# Patient Record
Sex: MALE | Race: WHITE | HISPANIC OR LATINO | Employment: OTHER | URBAN - METROPOLITAN AREA
[De-identification: names, ages, dates, MRNs, and addresses within clinical notes are randomized per-mention and may not be internally consistent; named-entity substitution may affect disease eponyms.]

---

## 2023-08-22 ENCOUNTER — HOSPITAL ENCOUNTER (OUTPATIENT)
Dept: DATA CONVERSION | Facility: HOSPITAL | Age: 78
Discharge: HOME | End: 2023-08-22
Payer: MEDICARE

## 2023-08-22 DIAGNOSIS — I65.22 OCCLUSION AND STENOSIS OF LEFT CAROTID ARTERY: ICD-10-CM

## 2023-08-22 LAB
ABO + RH BLD: NORMAL
ALBUMIN SERPL-MCNC: 4 GM/DL (ref 3.5–5)
ALBUMIN/GLOB SERPL: 1.4 RATIO (ref 1.5–3)
ALP BLD-CCNC: 141 U/L (ref 35–125)
ALT SERPL-CCNC: 9 U/L (ref 5–40)
ANION GAP SERPL CALCULATED.3IONS-SCNC: 12 MMOL/L (ref 0–19)
ANTICOAGULANT: NORMAL
APTT PPP: 27.3 SEC (ref 22–32.5)
AST SERPL-CCNC: 20 U/L (ref 5–40)
BASOPHILS # BLD AUTO: 0.07 K/UL (ref 0–0.22)
BASOPHILS NFR BLD AUTO: 1 % (ref 0–1)
BILIRUB SERPL-MCNC: 0.3 MG/DL (ref 0.1–1.2)
BLD GP AB SCN SERPL QL: NEGATIVE
BLD PROD TYP BPU: NORMAL
BPU ID: NORMAL
BPU ID: NORMAL
BUN SERPL-MCNC: 38 MG/DL (ref 8–25)
BUN/CREAT SERPL: 10 RATIO (ref 8–21)
CALCIUM SERPL-MCNC: 8.7 MG/DL (ref 8.5–10.4)
CHLORIDE SERPL-SCNC: 104 MMOL/L (ref 97–107)
CO2 SERPL-SCNC: 24 MMOL/L (ref 24–31)
CREAT SERPL-MCNC: 3.8 MG/DL (ref 0.4–1.6)
DEPRECATED RDW RBC AUTO: 49.8 FL (ref 37–54)
DIFFERENTIAL METHOD BLD: ABNORMAL
EOSINOPHIL # BLD AUTO: 0.63 K/UL (ref 0–0.45)
EOSINOPHIL NFR BLD: 9 % (ref 0–3)
ERYTHROCYTE [DISTWIDTH] IN BLOOD BY AUTOMATED COUNT: 14.3 % (ref 11.7–15)
GFR SERPL CREATININE-BSD FRML MDRD: 16 ML/MIN/1.73 M2
GLOBULIN SER-MCNC: 2.9 G/DL (ref 1.9–3.7)
GLUCOSE SERPL-MCNC: 100 MG/DL (ref 65–99)
HBA1C MFR BLD: 6 % (ref 4–6)
HCT VFR BLD AUTO: 33.8 % (ref 41–50)
HGB BLD-MCNC: 11 GM/DL (ref 13.5–16.5)
HX OF BLOOD TRANSFUSION: NORMAL
IMM GRANULOCYTES # BLD AUTO: 0.02 K/UL (ref 0–0.1)
INR PPP: 1 (ref 0.86–1.16)
LYMPHOCYTES # BLD AUTO: 1.79 K/UL (ref 1.2–3.2)
LYMPHOCYTES NFR BLD MANUAL: 25.6 % (ref 20–40)
MAJ XM SERPL-IMP: NORMAL
MAJ XM SERPL-IMP: NORMAL
MCH RBC QN AUTO: 31.3 PG (ref 26–34)
MCHC RBC AUTO-ENTMCNC: 32.5 % (ref 31–37)
MCV RBC AUTO: 96 FL (ref 80–100)
MONOCYTES # BLD AUTO: 0.36 K/UL (ref 0–0.8)
MONOCYTES NFR BLD MANUAL: 5.1 % (ref 0–8)
NEUTROPHILS # BLD AUTO: 4.13 K/UL
NEUTROPHILS # BLD AUTO: 4.13 K/UL (ref 1.8–7.7)
NEUTROPHILS.IMMATURE NFR BLD: 0.3 % (ref 0–1)
NEUTS SEG NFR BLD: 59 % (ref 50–70)
NRBC BLD-RTO: 0 /100 WBC
NUM BPU REQUESTED: 2
PLATELET # BLD AUTO: 227 K/UL (ref 150–450)
PMV BLD AUTO: 12 CU (ref 7–12.6)
POTASSIUM SERPL-SCNC: 5.1 MMOL/L (ref 3.4–5.1)
PROT SERPL-MCNC: 6.9 G/DL (ref 5.9–7.9)
PROTHROMBIN TIME: 10.9 SEC (ref 9.3–12.7)
RBC # BLD AUTO: 3.52 M/UL (ref 4.5–5.5)
SODIUM SERPL-SCNC: 140 MMOL/L (ref 133–145)
SPECIMEN EXP DATE BLD: NORMAL
STATUS OF UNIT: NORMAL
STATUS OF UNIT: NORMAL
SURGERY DATE: NORMAL
TEST ORDERED: NORMAL
TRANSF BAND NUM PATIENT: NORMAL
TRANSFUSION STATUS: NORMAL
TRANSFUSION STATUS: NORMAL
UNIT DIVISION: 0
UNIT DIVISION: 0
WBC # BLD AUTO: 7 K/UL (ref 4.5–11)

## 2023-08-23 PROBLEM — E16.2 HYPOGLYCEMIA: Status: ACTIVE | Noted: 2023-08-23

## 2023-08-23 PROBLEM — N18.9 ANEMIA OF CHRONIC RENAL FAILURE: Status: ACTIVE | Noted: 2023-08-23

## 2023-08-23 PROBLEM — D63.1 ANEMIA IN CHRONIC KIDNEY DISEASE: Status: ACTIVE | Noted: 2023-08-23

## 2023-08-23 PROBLEM — M35.9 SYSTEMIC INVOLVEMENT OF CONNECTIVE TISSUE (MULTI): Status: ACTIVE | Noted: 2023-08-23

## 2023-08-23 PROBLEM — I35.8 AORTIC VALVE SCLEROSIS: Status: ACTIVE | Noted: 2023-08-23

## 2023-08-23 PROBLEM — I21.3 ACUTE ST ELEVATION MYOCARDIAL INFARCTION (STEMI) (MULTI): Status: ACTIVE | Noted: 2019-01-20

## 2023-08-23 PROBLEM — I47.29 NONSUSTAINED VENTRICULAR TACHYCARDIA (MULTI): Status: ACTIVE | Noted: 2023-08-23

## 2023-08-23 PROBLEM — I15.1 HYPERTENSION SECONDARY TO OTHER RENAL DISORDERS: Status: ACTIVE | Noted: 2023-08-23

## 2023-08-23 PROBLEM — I35.0 MILD AORTIC VALVE STENOSIS: Status: ACTIVE | Noted: 2023-08-23

## 2023-08-23 PROBLEM — E78.5 DYSLIPIDEMIA: Status: ACTIVE | Noted: 2023-08-23

## 2023-08-23 PROBLEM — I65.29 CAROTID ARTERY STENOSIS: Status: ACTIVE | Noted: 2023-08-23

## 2023-08-23 PROBLEM — N25.81 SECONDARY HYPERPARATHYROIDISM (MULTI): Status: ACTIVE | Noted: 2023-08-23

## 2023-08-23 PROBLEM — N19 RENAL FAILURE: Status: ACTIVE | Noted: 2023-08-23

## 2023-08-23 PROBLEM — I65.22 OCCLUSION OF LEFT INTERNAL CAROTID ARTERY: Status: ACTIVE | Noted: 2023-08-23

## 2023-08-23 PROBLEM — R73.09 BLOOD GLUCOSE ABNORMAL: Status: ACTIVE | Noted: 2023-08-23

## 2023-08-23 PROBLEM — I35.9 AORTIC VALVE DISEASE: Status: ACTIVE | Noted: 2023-08-23

## 2023-08-23 PROBLEM — L02.419: Status: ACTIVE | Noted: 2023-08-23

## 2023-08-23 PROBLEM — M32.14: Status: ACTIVE | Noted: 2023-08-23

## 2023-08-23 PROBLEM — I10 ESSENTIAL HYPERTENSION: Status: ACTIVE | Noted: 2023-08-23

## 2023-08-23 PROBLEM — H92.03 OTALGIA OF BOTH EARS: Status: ACTIVE | Noted: 2023-08-23

## 2023-08-23 PROBLEM — E55.9 VITAMIN D DEFICIENCY: Status: ACTIVE | Noted: 2023-08-23

## 2023-08-23 PROBLEM — I12.9 NEPHROSCLEROSIS: Status: ACTIVE | Noted: 2023-08-23

## 2023-08-23 PROBLEM — N18.9 ANEMIA IN CHRONIC KIDNEY DISEASE: Status: ACTIVE | Noted: 2023-08-23

## 2023-08-23 PROBLEM — G63 POLYNEUROPATHY ASSOCIATED WITH UNDERLYING DISEASE (MULTI): Status: ACTIVE | Noted: 2023-08-23

## 2023-08-23 PROBLEM — G62.89 PERIPHERAL MOTOR NEUROPATHY: Status: ACTIVE | Noted: 2023-08-23

## 2023-08-23 PROBLEM — G47.01 INSOMNIA DUE TO MEDICAL CONDITION: Status: ACTIVE | Noted: 2023-08-23

## 2023-08-23 PROBLEM — M06.4 INFLAMMATORY POLYARTHROPATHY (MULTI): Status: ACTIVE | Noted: 2023-08-23

## 2023-08-23 PROBLEM — J31.0 RHINITIS: Status: ACTIVE | Noted: 2023-08-23

## 2023-08-23 PROBLEM — Z95.5 HISTORY OF CORONARY ARTERY STENT PLACEMENT: Status: ACTIVE | Noted: 2023-08-23

## 2023-08-23 PROBLEM — I25.2 HISTORY OF MYOCARDIAL INFARCTION: Status: ACTIVE | Noted: 2023-08-23

## 2023-08-23 PROBLEM — H90.3 BILATERAL SENSORINEURAL HEARING LOSS: Status: ACTIVE | Noted: 2023-08-23

## 2023-08-23 PROBLEM — E11.22 TYPE 2 DIABETES MELLITUS WITH DIABETIC CHRONIC KIDNEY DISEASE (MULTI): Status: ACTIVE | Noted: 2023-08-23

## 2023-08-23 PROBLEM — R00.1 SINUS BRADYCARDIA: Status: ACTIVE | Noted: 2023-08-23

## 2023-08-23 PROBLEM — D63.1 ANEMIA OF CHRONIC RENAL FAILURE: Status: ACTIVE | Noted: 2023-08-23

## 2023-08-23 PROBLEM — I87.2 VENOUS INSUFFICIENCY: Status: ACTIVE | Noted: 2023-08-23

## 2023-08-23 PROBLEM — M51.9 DISORDER OF INTERVERTEBRAL DISC OF LUMBAR SPINE: Status: ACTIVE | Noted: 2023-08-23

## 2023-08-23 PROBLEM — N18.30 CHRONIC RENAL INSUFFICIENCY, STAGE III (MODERATE) (MULTI): Status: ACTIVE | Noted: 2023-08-23

## 2023-08-23 PROBLEM — N28.89 OTHER SPECIFIED DISORDERS OF KIDNEY AND URETER: Status: ACTIVE | Noted: 2023-08-23

## 2023-08-23 PROBLEM — M32.9 SYSTEMIC LUPUS ERYTHEMATOSUS (MULTI): Status: ACTIVE | Noted: 2023-08-23

## 2023-08-23 PROBLEM — D59.10 AUTOIMMUNE HEMOLYTIC ANEMIA (MULTI): Status: ACTIVE | Noted: 2023-08-23

## 2023-08-23 PROBLEM — I21.9 MYOCARDIAL INFARCTION (MULTI): Status: ACTIVE | Noted: 2023-08-23

## 2023-08-23 PROBLEM — E78.2 MIXED HYPERLIPIDEMIA: Status: ACTIVE | Noted: 2023-08-23

## 2023-08-23 RX ORDER — GABAPENTIN 300 MG/1
1 CAPSULE ORAL DAILY
COMMUNITY
Start: 2022-01-17 | End: 2023-10-02

## 2023-08-23 RX ORDER — LOSARTAN POTASSIUM 50 MG/1
100 TABLET ORAL DAILY
COMMUNITY

## 2023-08-23 RX ORDER — HYDROXYCHLOROQUINE SULFATE 200 MG/1
1 TABLET, FILM COATED ORAL DAILY
COMMUNITY
Start: 2018-07-24 | End: 2023-10-02 | Stop reason: SDUPTHER

## 2023-08-23 RX ORDER — SILDENAFIL 100 MG/1
1 TABLET, FILM COATED ORAL DAILY
COMMUNITY
Start: 2020-05-15 | End: 2023-11-22 | Stop reason: WASHOUT

## 2023-08-23 RX ORDER — POTASSIUM CHLORIDE 750 MG/1
1 TABLET, FILM COATED, EXTENDED RELEASE ORAL
COMMUNITY
End: 2023-11-22 | Stop reason: WASHOUT

## 2023-08-23 RX ORDER — CETIRIZINE HYDROCHLORIDE 10 MG/1
1 TABLET ORAL DAILY
COMMUNITY
End: 2023-11-27 | Stop reason: ALTCHOICE

## 2023-08-23 RX ORDER — LOVASTATIN 20 MG/1
1 TABLET ORAL DAILY
COMMUNITY
End: 2023-11-22 | Stop reason: WASHOUT

## 2023-08-23 RX ORDER — ERGOCALCIFEROL 1.25 MG/1
1 CAPSULE ORAL
COMMUNITY
Start: 2020-04-21 | End: 2023-10-02 | Stop reason: SDUPTHER

## 2023-08-23 RX ORDER — VIT A/VIT C/VIT E/ZINC/COPPER 4296-226
CAPSULE ORAL
COMMUNITY
End: 2023-11-27 | Stop reason: ALTCHOICE

## 2023-08-23 RX ORDER — ESZOPICLONE 2 MG/1
1 TABLET, FILM COATED ORAL NIGHTLY PRN
COMMUNITY
Start: 2022-01-11 | End: 2023-11-20

## 2023-08-23 RX ORDER — AMLODIPINE BESYLATE 10 MG/1
1 TABLET ORAL DAILY
COMMUNITY
Start: 2021-06-15 | End: 2023-11-27 | Stop reason: ALTCHOICE

## 2023-08-23 RX ORDER — ALLOPURINOL 300 MG/1
1 TABLET ORAL DAILY
COMMUNITY
Start: 2015-08-24 | End: 2023-11-07

## 2023-08-23 RX ORDER — CARVEDILOL 6.25 MG/1
1 TABLET ORAL DAILY
COMMUNITY
Start: 2021-07-21

## 2023-08-23 RX ORDER — LEVOTHYROXINE SODIUM 50 UG/1
1 TABLET ORAL
COMMUNITY
Start: 2021-12-15 | End: 2024-03-14

## 2023-08-23 RX ORDER — ERYTHROPOIETIN 10000 [IU]/ML
INJECTION, SOLUTION INTRAVENOUS; SUBCUTANEOUS
COMMUNITY

## 2023-08-23 RX ORDER — FLUTICASONE PROPIONATE 50 MCG
2 SPRAY, SUSPENSION (ML) NASAL DAILY
COMMUNITY
Start: 2021-05-17 | End: 2023-10-01

## 2023-08-23 RX ORDER — IBUPROFEN 400 MG/1
1 TABLET ORAL 3 TIMES DAILY PRN
COMMUNITY
End: 2023-11-27 | Stop reason: ALTCHOICE

## 2023-08-23 RX ORDER — AMLODIPINE BESYLATE 5 MG/1
1 TABLET ORAL DAILY
COMMUNITY
End: 2023-10-12 | Stop reason: ALTCHOICE

## 2023-08-23 RX ORDER — ATORVASTATIN CALCIUM 20 MG/1
1 TABLET, FILM COATED ORAL DAILY
COMMUNITY
Start: 2021-11-05 | End: 2024-03-15

## 2023-08-23 RX ORDER — ATENOLOL 25 MG/1
1 TABLET ORAL DAILY
COMMUNITY
End: 2023-10-12 | Stop reason: ALTCHOICE

## 2023-08-23 RX ORDER — HYDRALAZINE HYDROCHLORIDE 50 MG/1
TABLET, FILM COATED ORAL 2 TIMES DAILY
COMMUNITY
End: 2023-10-12 | Stop reason: ALTCHOICE

## 2023-08-23 RX ORDER — LEVOTHYROXINE SODIUM 75 UG/1
1 TABLET ORAL
COMMUNITY
End: 2023-11-27 | Stop reason: ALTCHOICE

## 2023-08-23 RX ORDER — HYDROCHLOROTHIAZIDE 12.5 MG/1
1 CAPSULE ORAL
COMMUNITY
Start: 2022-01-24 | End: 2023-10-12 | Stop reason: ALTCHOICE

## 2023-09-14 ENCOUNTER — HOSPITAL ENCOUNTER (OUTPATIENT)
Dept: DATA CONVERSION | Facility: HOSPITAL | Age: 78
Discharge: HOME | End: 2023-09-14
Payer: MEDICARE

## 2023-09-14 DIAGNOSIS — N18.4 CHRONIC KIDNEY DISEASE, STAGE 4 (SEVERE) (MULTI): ICD-10-CM

## 2023-09-14 LAB
ALBUMIN SERPL-MCNC: 3.5 GM/DL (ref 3.5–5)
ANION GAP SERPL CALCULATED.3IONS-SCNC: 13 MMOL/L (ref 0–19)
BACTERIA UR QL AUTO: NEGATIVE
BILIRUB UR QL STRIP.AUTO: NEGATIVE
BUN SERPL-MCNC: 38 MG/DL (ref 8–25)
BUN/CREAT SERPL: 9.7 RATIO (ref 8–21)
CALCIUM SERPL-MCNC: 8.8 MG/DL (ref 8.5–10.4)
CHLORIDE SERPL-SCNC: 110 MMOL/L (ref 97–107)
CLARITY UR: CLEAR
CO2 SERPL-SCNC: 22 MMOL/L (ref 24–31)
COLOR UR: ABNORMAL
CREAT SERPL-MCNC: 3.9 MG/DL (ref 0.4–1.6)
CREAT UR-MCNC: 115.3 MG/DL
DEPRECATED RDW RBC AUTO: 52.7 FL (ref 37–54)
ERYTHROCYTE [DISTWIDTH] IN BLOOD BY AUTOMATED COUNT: 14.6 % (ref 11.7–15)
GFR SERPL CREATININE-BSD FRML MDRD: 15 ML/MIN/1.73 M2
GLUCOSE SERPL-MCNC: 125 MG/DL (ref 65–99)
GLUCOSE UR STRIP.AUTO-MCNC: NEGATIVE MG/DL
HCT VFR BLD AUTO: 34.3 % (ref 41–50)
HGB BLD-MCNC: 11 GM/DL (ref 13.5–16.5)
HGB UR QL STRIP.AUTO: 2 /HPF (ref 0–3)
HGB UR QL: NEGATIVE
HYALINE CASTS UR QL AUTO: 3 /LPF
KETONES UR QL STRIP.AUTO: NEGATIVE
LEUKOCYTE ESTERASE UR QL STRIP.AUTO: NEGATIVE
MCH RBC QN AUTO: 31.5 PG (ref 26–34)
MCHC RBC AUTO-ENTMCNC: 32.1 % (ref 31–37)
MCV RBC AUTO: 98.3 FL (ref 80–100)
MICROALBUMIN UR-MCNC: 2732 MG/L (ref 0–23)
MICROALBUMIN/CREAT UR: 2369.5 MG/G (ref 0–30)
NITRITE UR QL STRIP.AUTO: NEGATIVE
NRBC BLD-RTO: 0 /100 WBC
PH UR STRIP.AUTO: 6 [PH] (ref 4.6–8)
PHOSPHATE SERPL-MCNC: 4.6 MG/DL (ref 2.5–4.5)
PLATELET # BLD AUTO: 235 K/UL (ref 150–450)
PMV BLD AUTO: 12.1 CU (ref 7–12.6)
POTASSIUM SERPL-SCNC: 4.6 MMOL/L (ref 3.4–5.1)
PROT UR STRIP.AUTO-MCNC: 300 MG/DL
RBC # BLD AUTO: 3.49 M/UL (ref 4.5–5.5)
REFLEX MICROSCOPIC (UA): ABNORMAL
SODIUM SERPL-SCNC: 144 MMOL/L (ref 133–145)
SP GR UR STRIP.AUTO: 1.01 (ref 1–1.03)
SQUAMOUS UR QL AUTO: ABNORMAL /HPF
UROBILINOGEN UR QL STRIP.AUTO: NORMAL MG/DL (ref 0–1)
WBC # BLD AUTO: 7.7 K/UL (ref 4.5–11)
WBC #/AREA URNS AUTO: 2 /HPF (ref 0–3)

## 2023-09-30 DIAGNOSIS — G62.89 PERIPHERAL MOTOR NEUROPATHY: Primary | ICD-10-CM

## 2023-09-30 DIAGNOSIS — J30.2 SEASONAL ALLERGIC RHINITIS, UNSPECIFIED TRIGGER: Primary | ICD-10-CM

## 2023-09-30 DIAGNOSIS — L30.9 DERMATITIS: ICD-10-CM

## 2023-10-01 RX ORDER — FLUTICASONE PROPIONATE 50 MCG
2 SPRAY, SUSPENSION (ML) NASAL DAILY
Qty: 48 G | Refills: 11 | Status: SHIPPED | OUTPATIENT
Start: 2023-10-01

## 2023-10-01 RX ORDER — TRIAMCINOLONE ACETONIDE 1 MG/G
CREAM TOPICAL
Qty: 90 G | Refills: 1 | Status: SHIPPED | OUTPATIENT
Start: 2023-10-01 | End: 2023-11-22 | Stop reason: WASHOUT

## 2023-10-02 DIAGNOSIS — E55.9 HYPOVITAMINOSIS D: Primary | ICD-10-CM

## 2023-10-02 DIAGNOSIS — M32.9 SYSTEMIC LUPUS ERYTHEMATOSUS, UNSPECIFIED SLE TYPE, UNSPECIFIED ORGAN INVOLVEMENT STATUS (MULTI): ICD-10-CM

## 2023-10-02 RX ORDER — ERGOCALCIFEROL 1.25 MG/1
1 CAPSULE ORAL
Qty: 12 CAPSULE | Refills: 3 | Status: SHIPPED | OUTPATIENT
Start: 2023-10-02 | End: 2023-10-23

## 2023-10-02 RX ORDER — HYDROXYCHLOROQUINE SULFATE 200 MG/1
200 TABLET, FILM COATED ORAL DAILY
Qty: 90 TABLET | Refills: 1 | Status: SHIPPED | OUTPATIENT
Start: 2023-10-02 | End: 2023-10-23

## 2023-10-02 RX ORDER — HYDROXYCHLOROQUINE SULFATE 200 MG/1
TABLET, FILM COATED ORAL
Qty: 200 TABLET | Refills: 2 | OUTPATIENT
Start: 2023-10-02

## 2023-10-02 RX ORDER — ERGOCALCIFEROL 1.25 1/1
1 CAPSULE ORAL
Qty: 11 CAPSULE | Refills: 3 | OUTPATIENT
Start: 2023-10-02

## 2023-10-02 RX ORDER — GABAPENTIN 300 MG/1
300 CAPSULE ORAL DAILY
Qty: 90 CAPSULE | Refills: 3 | Status: SHIPPED | OUTPATIENT
Start: 2023-10-02 | End: 2024-06-04 | Stop reason: SDUPTHER

## 2023-10-03 DIAGNOSIS — N18.4 CKD (CHRONIC KIDNEY DISEASE) STAGE 4, GFR 15-29 ML/MIN (MULTI): Primary | ICD-10-CM

## 2023-10-03 DIAGNOSIS — D63.8 ANEMIA OF CHRONIC DISEASE: ICD-10-CM

## 2023-10-03 RX ORDER — FAMOTIDINE 10 MG/ML
20 INJECTION INTRAVENOUS ONCE AS NEEDED
Status: CANCELLED | OUTPATIENT
Start: 2023-10-04

## 2023-10-03 RX ORDER — DIPHENHYDRAMINE HYDROCHLORIDE 50 MG/ML
50 INJECTION INTRAMUSCULAR; INTRAVENOUS AS NEEDED
Status: CANCELLED | OUTPATIENT
Start: 2023-10-04

## 2023-10-03 RX ORDER — ALBUTEROL SULFATE 0.83 MG/ML
3 SOLUTION RESPIRATORY (INHALATION) AS NEEDED
Status: CANCELLED | OUTPATIENT
Start: 2023-10-04

## 2023-10-03 RX ORDER — EPINEPHRINE 0.3 MG/.3ML
0.3 INJECTION SUBCUTANEOUS EVERY 5 MIN PRN
Status: CANCELLED | OUTPATIENT
Start: 2023-10-04

## 2023-10-03 NOTE — PROGRESS NOTES
Patient to be scheduled for (continuation ) of Procrit injections.  For Diagnosis: Anemia due to chronic kidney disease   FLAT DOSE)     For a total dose of __10,000_units every __2_weeks.  Per the Rx, Procrit to be administered ONLY if the Hgb is <=_11.0__   If the Hgb is >11.0___ we will hold the injection and reschedule pt for next injection for the next week.  Labs..  9/28/23  Hgb: __11.4  Iron Studies completed on: _7/10/2 due at next visit.  History of hypertension? Yes   Last injection received: _9/7/23__ (if continuation)    Due: now___    This result meets treatment criteria

## 2023-10-05 ENCOUNTER — DOCUMENTATION (OUTPATIENT)
Dept: INFUSION THERAPY | Facility: CLINIC | Age: 78
End: 2023-10-05

## 2023-10-05 ENCOUNTER — LAB (OUTPATIENT)
Dept: LAB | Facility: LAB | Age: 78
End: 2023-10-05
Payer: MEDICARE

## 2023-10-05 DIAGNOSIS — N18.9 CHRONIC KIDNEY DISEASE, UNSPECIFIED: ICD-10-CM

## 2023-10-05 DIAGNOSIS — Z79.899 OTHER LONG TERM (CURRENT) DRUG THERAPY: Primary | ICD-10-CM

## 2023-10-05 DIAGNOSIS — M32.10 SYSTEMIC LUPUS ERYTHEMATOSUS, ORGAN OR SYSTEM INVOLVEMENT UNSPECIFIED (MULTI): ICD-10-CM

## 2023-10-05 LAB
ALBUMIN SERPL BCP-MCNC: 3.6 G/DL (ref 3.4–5)
ALP SERPL-CCNC: 109 U/L (ref 33–136)
ALT SERPL W P-5'-P-CCNC: 10 U/L (ref 10–52)
ANION GAP SERPL CALC-SCNC: 12 MMOL/L (ref 10–20)
AST SERPL W P-5'-P-CCNC: 10 U/L (ref 9–39)
BASOPHILS # BLD AUTO: 0.07 X10*3/UL (ref 0–0.1)
BASOPHILS NFR BLD AUTO: 1.3 %
BILIRUB SERPL-MCNC: 0.4 MG/DL (ref 0–1.2)
BUN SERPL-MCNC: 39 MG/DL (ref 6–23)
CALCIUM SERPL-MCNC: 8.6 MG/DL (ref 8.6–10.3)
CHLORIDE SERPL-SCNC: 105 MMOL/L (ref 98–107)
CO2 SERPL-SCNC: 25 MMOL/L (ref 21–32)
CREAT SERPL-MCNC: 3.84 MG/DL (ref 0.5–1.3)
CRP SERPL-MCNC: <0.1 MG/DL
EOSINOPHIL # BLD AUTO: 0.6 X10*3/UL (ref 0–0.4)
EOSINOPHIL NFR BLD AUTO: 10.8 %
ERYTHROCYTE [DISTWIDTH] IN BLOOD BY AUTOMATED COUNT: 13.6 % (ref 11.5–14.5)
ERYTHROCYTE [SEDIMENTATION RATE] IN BLOOD BY WESTERGREN METHOD: 15 MM/H (ref 0–20)
GFR SERPL CREATININE-BSD FRML MDRD: 15 ML/MIN/1.73M*2
GLUCOSE SERPL-MCNC: 103 MG/DL (ref 74–99)
HCT VFR BLD AUTO: 33.6 % (ref 41–52)
HGB BLD-MCNC: 10.8 G/DL (ref 13.5–17.5)
IMM GRANULOCYTES # BLD AUTO: 0.01 X10*3/UL (ref 0–0.5)
IMM GRANULOCYTES NFR BLD AUTO: 0.2 % (ref 0–0.9)
LYMPHOCYTES # BLD AUTO: 1.47 X10*3/UL (ref 0.8–3)
LYMPHOCYTES NFR BLD AUTO: 26.6 %
MCH RBC QN AUTO: 31.4 PG (ref 26–34)
MCHC RBC AUTO-ENTMCNC: 32.1 G/DL (ref 32–36)
MCV RBC AUTO: 98 FL (ref 80–100)
MONOCYTES # BLD AUTO: 0.37 X10*3/UL (ref 0.05–0.8)
MONOCYTES NFR BLD AUTO: 6.7 %
NEUTROPHILS # BLD AUTO: 3.01 X10*3/UL (ref 1.6–5.5)
NEUTROPHILS NFR BLD AUTO: 54.4 %
NRBC BLD-RTO: 0 /100 WBCS (ref 0–0)
PLATELET # BLD AUTO: 192 X10*3/UL (ref 150–450)
PMV BLD AUTO: 12.5 FL (ref 7.5–11.5)
POTASSIUM SERPL-SCNC: 4.4 MMOL/L (ref 3.5–5.3)
PROT SERPL-MCNC: 6.5 G/DL (ref 6.4–8.2)
RBC # BLD AUTO: 3.44 X10*6/UL (ref 4.5–5.9)
SODIUM SERPL-SCNC: 138 MMOL/L (ref 136–145)
WBC # BLD AUTO: 5.5 X10*3/UL (ref 4.4–11.3)

## 2023-10-05 PROCEDURE — 85652 RBC SED RATE AUTOMATED: CPT

## 2023-10-05 PROCEDURE — 85025 COMPLETE CBC W/AUTO DIFF WBC: CPT

## 2023-10-05 PROCEDURE — 86225 DNA ANTIBODY NATIVE: CPT

## 2023-10-05 PROCEDURE — 86160 COMPLEMENT ANTIGEN: CPT

## 2023-10-05 PROCEDURE — 36415 COLL VENOUS BLD VENIPUNCTURE: CPT

## 2023-10-05 PROCEDURE — 80053 COMPREHEN METABOLIC PANEL: CPT

## 2023-10-05 PROCEDURE — 86140 C-REACTIVE PROTEIN: CPT

## 2023-10-05 NOTE — PROGRESS NOTES
Patient to be scheduled for (continuation ) of Procrit injections.  For Diagnosis: Anemia due to chronic kidney disease    For a total dose of _10,000__units every 1___weeks.  Per the Rx, Procrit to be administered ONLY if the Hgb is <=___11.0   If the Hgb is >__11.0_ we will hold the injection and reschedule pt for next injection for the next week.  Labs..    Hgb: __11.4 on 9/28/23  Iron Studies completed on: __June 2023  History of hypertension? Yes   Last injection received: _9/7/23__ (if continuation)    Due: _now__    This result meets treatment criteria.

## 2023-10-06 LAB
C3 SERPL-MCNC: 110 MG/DL (ref 87–200)
C4 SERPL-MCNC: 27 MG/DL (ref 10–50)
DSDNA AB SER-ACNC: 1 IU/ML

## 2023-10-12 ENCOUNTER — OFFICE VISIT (OUTPATIENT)
Dept: RHEUMATOLOGY | Facility: CLINIC | Age: 78
End: 2023-10-12
Payer: MEDICARE

## 2023-10-12 VITALS
DIASTOLIC BLOOD PRESSURE: 68 MMHG | SYSTOLIC BLOOD PRESSURE: 144 MMHG | WEIGHT: 163 LBS | HEART RATE: 55 BPM | BODY MASS INDEX: 25.58 KG/M2 | HEIGHT: 67 IN

## 2023-10-12 DIAGNOSIS — F32.9 REACTIVE DEPRESSION: Primary | ICD-10-CM

## 2023-10-12 DIAGNOSIS — I15.1 HYPERTENSION SECONDARY TO OTHER RENAL DISORDERS: ICD-10-CM

## 2023-10-12 PROBLEM — I25.10 CORONARY ARTERY DISEASE: Status: ACTIVE | Noted: 2023-10-12

## 2023-10-12 PROBLEM — R94.31 ELECTROCARDIOGRAM ABNORMAL: Status: ACTIVE | Noted: 2023-10-12

## 2023-10-12 PROCEDURE — 99214 OFFICE O/P EST MOD 30 MIN: CPT | Performed by: INTERNAL MEDICINE

## 2023-10-12 PROCEDURE — 3077F SYST BP >= 140 MM HG: CPT | Performed by: INTERNAL MEDICINE

## 2023-10-12 PROCEDURE — 3078F DIAST BP <80 MM HG: CPT | Performed by: INTERNAL MEDICINE

## 2023-10-12 PROCEDURE — 1159F MED LIST DOCD IN RCRD: CPT | Performed by: INTERNAL MEDICINE

## 2023-10-12 PROCEDURE — 1126F AMNT PAIN NOTED NONE PRSNT: CPT | Performed by: INTERNAL MEDICINE

## 2023-10-12 PROCEDURE — 1036F TOBACCO NON-USER: CPT | Performed by: INTERNAL MEDICINE

## 2023-10-12 RX ORDER — ERYTHROMYCIN 5 MG/G
OINTMENT OPHTHALMIC
COMMUNITY
Start: 2023-07-28 | End: 2024-02-21

## 2023-10-12 RX ORDER — SERTRALINE HYDROCHLORIDE 25 MG/1
25 TABLET, FILM COATED ORAL ONCE
Status: DISCONTINUED | OUTPATIENT
Start: 2023-10-12 | End: 2023-10-12

## 2023-10-12 RX ORDER — CALCITRIOL 0.25 UG/1
2 CAPSULE ORAL EVERY 24 HOURS
COMMUNITY
Start: 2023-09-21

## 2023-10-12 RX ORDER — LORATADINE 10 MG/1
10 TABLET ORAL DAILY
COMMUNITY
Start: 2023-05-14 | End: 2024-02-21

## 2023-10-12 RX ORDER — CHLORHEXIDINE GLUCONATE ORAL RINSE 1.2 MG/ML
SOLUTION DENTAL
COMMUNITY
Start: 2022-12-13 | End: 2023-10-12 | Stop reason: ALTCHOICE

## 2023-10-12 ASSESSMENT — ENCOUNTER SYMPTOMS
CARDIOVASCULAR NEGATIVE: 1
MYALGIAS: 0
JOINT SWELLING: 0
DYSPHORIC MOOD: 1
ARTHRALGIAS: 0
DEPRESSION: 0
ENDOCRINE NEGATIVE: 1
HEMATURIA: 0
HEMATOLOGIC/LYMPHATIC NEGATIVE: 1
OCCASIONAL FEELINGS OF UNSTEADINESS: 0
NEUROLOGICAL NEGATIVE: 1
APPETITE CHANGE: 0
LOSS OF SENSATION IN FEET: 0
GASTROINTESTINAL NEGATIVE: 1
RESPIRATORY NEGATIVE: 1
EYES NEGATIVE: 1
FATIGUE: 1

## 2023-10-12 ASSESSMENT — PATIENT HEALTH QUESTIONNAIRE - PHQ9
2. FEELING DOWN, DEPRESSED OR HOPELESS: NOT AT ALL
SUM OF ALL RESPONSES TO PHQ9 QUESTIONS 1 AND 2: 0
1. LITTLE INTEREST OR PLEASURE IN DOING THINGS: NOT AT ALL

## 2023-10-12 ASSESSMENT — PAIN SCALES - GENERAL: PAINLEVEL: 0-NO PAIN

## 2023-10-12 NOTE — PROGRESS NOTES
"George Rowan     Chief Complaint:  This 77 y.o. year old male presents with the chief complaint of systemic lupus erythematosus (SLE) and chronic renal insufficiency.     HPI  Subjective:  Prob. #1: SLE       The patient returns for ongoing follow-up of SLE.       Since his previous visit on 4/26/2023, George reports no significant change in his status. In particular, the patient denies: rashes, severe headaches, xerophthalmia, xerostomia, xerosis, sunlight photosensitivity, polyarthritis, pleurisy, pericarditis, accelerated hypertension, dyspnea, Raynaud's, gross hematuria, seizures, hypercoagulability, or seizures.        The patient does report two concerns: first, he reports a gradual increase in this systolic bp, averaging ~140 mm Hg; diastolic bp averages ~60 mm Hg. Second, he is very concerned about the downward trend of his EGFR, which is now 15. He is discussing this change with Dr. Mckoy, nephrology, and Dr. Mckoy has made some changes in his medications, especially stopping both the diuretic and potassium. The patient is feeling very \"depressed lately because I see myself on a dialysis machine or having to do overnight (home) dialysis\". He reports feeling very depressed, sad, and has occasionally been crying. He asks whether an anti-depressant could be prescribed.          However, it is important to point out that the patient's SLE is otherwise asymptomatic. His treatment now consists only of HYDROXYCHLOROQUINE (HCQ).     Review of Systems   Constitutional:  Positive for fatigue. Negative for appetite change.   HENT: Negative.     Eyes: Negative.    Respiratory: Negative.     Cardiovascular: Negative.    Gastrointestinal: Negative.    Endocrine: Negative.    Genitourinary:  Negative for hematuria and urgency.        Nocturia x 2-3   Musculoskeletal:  Negative for arthralgias, gait problem, joint swelling and myalgias.   Skin:  Negative for rash.   Neurological: Negative.    Hematological: " "Negative.    Psychiatric/Behavioral:  Positive for dysphoric mood. Negative for suicidal ideas.         Situational depression due to concerns over chronic renal insufficiency and potential dialysis       Objective:    Vital signs:  Vitals:    10/12/23 1010   BP: 144/68   Pulse: 55       Physical Exam  Vitals and nursing note reviewed.   Constitutional:       Appearance: Normal appearance. He is normal weight.   HENT:      Head: Normocephalic and atraumatic.   Eyes:      Extraocular Movements: Extraocular movements intact.      Conjunctiva/sclera: Conjunctivae normal.      Pupils: Pupils are equal, round, and reactive to light.   Neck:      Vascular: Carotid bruit present.      Comments: Bilateral carotid bruits, L>R.  Cardiovascular:      Rate and Rhythm: Normal rate and regular rhythm.      Comments: Pulses: decreased left carotid pulse associated with bruit. S1 and S2 normal; no gallop; Gr3/6 systolic ejection murmur, likely aortic stenosis; Gr 2/6 systolic ejection murmur heard best at the left midaxillary line, likely a mitral valve stenosis. No definite diastolic murmur(s) heard  Musculoskeletal:         General: No swelling, tenderness or deformity. Normal range of motion.      Cervical back: Normal range of motion.      Right lower leg: No edema.      Left lower leg: No edema.   Skin:     General: Skin is warm.   Neurological:      Mental Status: He is alert.   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 Left carotid atherosclerosis: Patient informs me he has >70% left carotid artery occlusion and will require surgery. He is being referred to a vascular surgeon.  3. Anemia of chronic renal disease: Patient is being treated with PROCRIT every 3 weeks per Dr. Mckoy.  4. Depression: I agree that patient should be treated for a reactive depression. I recommend SERTRALINE 25 mg every day to begin. ECG to measure Qtc interval will be done, especially because patient is on treatment with HCQ. I reviewed the patient's last ECG done in 2019, and Qtc was 428, a normal value. Patient understands and agrees. I reviewed the indications, risks/rewards, and potential side effects of SERTRALINE. I also reviewed his med list and looked for any potential adverse interactions with other medications, and found none at this time.      Maurice Felix MD

## 2023-10-13 LAB — C1Q SERPL-MCNC: 10.9 MG/DL (ref 10.2–20.3)

## 2023-10-16 ENCOUNTER — TELEPHONE (OUTPATIENT)
Dept: RHEUMATOLOGY | Facility: CLINIC | Age: 78
End: 2023-10-16

## 2023-10-16 ENCOUNTER — HOSPITAL ENCOUNTER (OUTPATIENT)
Dept: CARDIOLOGY | Facility: CLINIC | Age: 78
Discharge: HOME | End: 2023-10-16
Payer: MEDICARE

## 2023-10-16 DIAGNOSIS — I15.1 HYPERTENSION SECONDARY TO OTHER RENAL DISORDERS: ICD-10-CM

## 2023-10-16 PROCEDURE — 93005 ELECTROCARDIOGRAM TRACING: CPT

## 2023-10-16 PROCEDURE — 93010 ELECTROCARDIOGRAM REPORT: CPT | Performed by: INTERNAL MEDICINE

## 2023-10-17 NOTE — TELEPHONE ENCOUNTER
I reviewed the ECG report. The Qtc interval result was not indicated on the report. Please call Dr. Waddell's office and ask him to amend report with the Qtc interval result. This is being requested for patient on HYDROXYCHLOROQUINE, as indicated on the ECG request. Call pt and inform that I cannot send Rx until I have that result. Dr GABRIEL

## 2023-10-20 ENCOUNTER — INFUSION (OUTPATIENT)
Dept: INFUSION THERAPY | Facility: CLINIC | Age: 78
End: 2023-10-20
Payer: MEDICARE

## 2023-10-20 VITALS — HEART RATE: 66 BPM | SYSTOLIC BLOOD PRESSURE: 180 MMHG | DIASTOLIC BLOOD PRESSURE: 78 MMHG | OXYGEN SATURATION: 99 %

## 2023-10-20 DIAGNOSIS — N18.4 CKD (CHRONIC KIDNEY DISEASE) STAGE 4, GFR 15-29 ML/MIN (MULTI): Primary | ICD-10-CM

## 2023-10-20 PROCEDURE — 99211 OFF/OP EST MAY X REQ PHY/QHP: CPT | Performed by: NURSE PRACTITIONER

## 2023-10-20 ASSESSMENT — PAIN SCALES - GENERAL: PAINLEVEL: 0-NO PAIN

## 2023-10-20 NOTE — PROGRESS NOTES
[facility]   infusion Clinic Note   Date: 2023   Name: George Rowan  : 1945   MRN: [id number]         Reason for Visit: No chief complaint on file.       Visit Type: INJECTION     Ordered By: Dr. Corea   Accompanied by:Self      Diagnosis: No diagnosis found.      Allergies:   Allergies as of 10/20/2023 - Reviewed 10/20/2023   Allergen Reaction Noted    Azathioprine Other 2023    Fluorescein-benoxinate Swelling 2023    Other Unknown 2023    Tetracaine Other 2023        Current Medications has a current medication list which includes the following prescription(s): allopurinol, amlodipine, aspirin, atorvastatin, calcitriol, carvedilol, cetirizine, procrit, ergocalciferol, erythromycin, eszopiclone, fluticasone, gabapentin, hydroxychloroquine, ibuprofen, levothyroxine, levothyroxine, loratadine, losartan, lovastatin, NON FORMULARY, potassium chloride cr, sildenafil, triamcinolone, vitamin b complex vit c no.4, and preservision areds.          Vitals:There were no vitals filed for this visit.       Infusion Pre-procedure Checklist:   Allergies reviewed: yes   Medications reviewed: yes     Previous reaction to current treatment: No      Assess patient for the concerns below. Document provider notification as appropriate.  - Active or recent infection with/without current antibiotic use: n/a  - Recent or planned invasive dental work: n/a  - Recent or planned surgeries: n/a  - Recently received or plans to receive vaccinations: n/a  - Has treatment related toxicities: n/a  - Is pregnant:  n/a    - Does the patient meet criteria to treat? No    Provider notified? Yes      Pain: 0    Is the pain different from normal: n/a   Is the pain tolerable: n/a   Is your Doctor aware: n/a       Labs: Labs reviewed      Fall Risk Screening:         Review of Systems - Oncology      Infusion Readiness:   Assessment Concerns Related to Infusion: Yes  Provider notified:  yes      Document Below Only If Indicated:   New Patient Education:  N/A (returning patient for continuation of therapy. Ongoing education provided as needed.)       Drug Specific Questions:  NOT APPLICABLE     Weight Based Drug Calculations:  WEIGHT BASED DRUGS: NOT APPLICABLE           Initiated By: PRINCESS Barrera   Time: 9:23 AM     George Rowan had no medications administered during this visit. Bloodpresure elevated. Hemoglobin is 10.5  Will notify Dr. Mckoy.

## 2023-10-23 ENCOUNTER — PATIENT MESSAGE (OUTPATIENT)
Dept: RHEUMATOLOGY | Facility: CLINIC | Age: 78
End: 2023-10-23
Payer: MEDICARE

## 2023-10-23 NOTE — PATIENT COMMUNICATION
Telephone conversation: Reported to patient results of ECG, especially mildly prolonged Qtc of 369. This is a change from the previous value in 2019. This prolongation may be due to HYDROXYCHLOROQUINE. I explained its significance. I recommend against addition of SERTRALINE due to potential to further prolong QTc, which may cause a potentially dangerous arrhythmia. Patient can remain on HCQ, since his blood level is optimal. A repeat ECG will be done in 4-6 mos to monitor the QTc. Patient understands the issue, and is agreeable to monitoring the interval by subsequent ECGs. MENA

## 2023-10-26 ENCOUNTER — APPOINTMENT (OUTPATIENT)
Dept: VASCULAR SURGERY | Facility: CLINIC | Age: 78
End: 2023-10-26
Payer: MEDICARE

## 2023-11-03 ENCOUNTER — APPOINTMENT (OUTPATIENT)
Dept: INFUSION THERAPY | Facility: CLINIC | Age: 78
End: 2023-11-03
Payer: MEDICARE

## 2023-11-05 DIAGNOSIS — M10.9 GOUT, UNSPECIFIED CAUSE, UNSPECIFIED CHRONICITY, UNSPECIFIED SITE: ICD-10-CM

## 2023-11-06 ENCOUNTER — INFUSION (OUTPATIENT)
Dept: INFUSION THERAPY | Facility: CLINIC | Age: 78
End: 2023-11-06
Payer: MEDICARE

## 2023-11-06 VITALS
RESPIRATION RATE: 16 BRPM | DIASTOLIC BLOOD PRESSURE: 71 MMHG | SYSTOLIC BLOOD PRESSURE: 151 MMHG | HEART RATE: 78 BPM | TEMPERATURE: 97.6 F | OXYGEN SATURATION: 98 %

## 2023-11-06 DIAGNOSIS — D63.8 ANEMIA OF CHRONIC DISEASE: ICD-10-CM

## 2023-11-06 DIAGNOSIS — N18.4 CKD (CHRONIC KIDNEY DISEASE) STAGE 4, GFR 15-29 ML/MIN (MULTI): ICD-10-CM

## 2023-11-06 LAB — POC HEMOGLOBIN: 9.1 G/DL (ref 13.5–17.5)

## 2023-11-06 PROCEDURE — 85018 HEMOGLOBIN: CPT | Performed by: NURSE PRACTITIONER

## 2023-11-06 RX ORDER — DIPHENHYDRAMINE HYDROCHLORIDE 50 MG/ML
50 INJECTION INTRAMUSCULAR; INTRAVENOUS AS NEEDED
Status: CANCELLED | OUTPATIENT
Start: 2023-11-20

## 2023-11-06 RX ORDER — ALBUTEROL SULFATE 0.83 MG/ML
3 SOLUTION RESPIRATORY (INHALATION) AS NEEDED
Status: CANCELLED | OUTPATIENT
Start: 2023-11-20

## 2023-11-06 RX ORDER — FAMOTIDINE 10 MG/ML
20 INJECTION INTRAVENOUS ONCE AS NEEDED
Status: CANCELLED | OUTPATIENT
Start: 2023-11-20

## 2023-11-06 RX ORDER — EPINEPHRINE 0.3 MG/.3ML
0.3 INJECTION SUBCUTANEOUS EVERY 5 MIN PRN
Status: CANCELLED | OUTPATIENT
Start: 2023-11-20

## 2023-11-06 ASSESSMENT — PAIN SCALES - GENERAL: PAINLEVEL: 0-NO PAIN

## 2023-11-06 NOTE — PROGRESS NOTES
1115  Blood pressure is 185/ 63.  Pt. Requests that he goes home to take his second blood pressure pill and return at 1400 to have blood pressure rechecked.  Spoke to Kylie Smith NP and she is ok with this plan.      1340 Patient is back in the [facility], his BP at this time is 157/52. Hemocue from earlier is 9.1. Patient is reporting that he normally gets 15,000 units of Retacrit. Call placed to Dr. Mckoy for a new order. Winnie in Dr. Mckoy's office reported that the MD would like to keep the patient at 10,000U at this time due to hgb not dropping to low over a couple of weeks. Patient made aware of plan and is fine with this. 10,000 U given today in the TOBY. Patient scheduled ahead for 2 weeks.

## 2023-11-06 NOTE — PROGRESS NOTES
[facility]   Infusion Clinic Note   Date: 2023   Name: George Rowan  : 1945   MRN: [id number]         Reason for Visit: OP Infusion (Retacrit)      Accompanied by:Self   Visit Type:: injection   Diagnosis: No diagnosis found.    Allergies:   Allergies as of 2023 - Reviewed 2023   Allergen Reaction Noted    Azathioprine Other 2023    Fluorescein-benoxinate Swelling 2023    Other Unknown 2023    Tetracaine Other 2023      Current Meds has a current medication list which includes the following prescription(s): allopurinol, amlodipine, aspirin, atorvastatin, calcitriol, carvedilol, cetirizine, procrit, ergocalciferol, erythromycin, eszopiclone, fluticasone, gabapentin, hydroxychloroquine, ibuprofen, levothyroxine, levothyroxine, loratadine, losartan, lovastatin, NON FORMULARY, potassium chloride cr, sildenafil, triamcinolone, vitamin b complex vit c no.4, and preservision areds.        Vitals:There were no vitals filed for this visit.   Infusion Pre-procedure Checklist   Allergies reviewed: yes   Medications reviewed: yes   Contraindications to treatment:No   Previous reaction to current treatment:No   Current Health Issues: None   Pain: '    Is the pain different from normal: No   Is the pain tolerable: n/a   Is your Doctor aware: n/a   Contraindications based on patient history: No   Provider notified: Not applicable   Labs: Labs reviewed   Fall Risk Screening:      Review of Systems   All other systems reviewed and are negative.     Negative for complaint: [x] all other systems have been reviewed and are negative for complaint   Infusion Readiness:   Assessment Concerns Related to Infusion: No  Provider notified: n/a  Assess patient for the concerns below. Document provider notification as appropriate:  - Does not meet criteria to treat N/A  - Has an active or recent infection with/without current antibiotic use N/A  - Has  recent/planned dental work N/A  - Has recent/planned surgeries N/A  - Has recently received or plans to receive vaccinations N/A  - Has treatment related toxicities N/A  - Is pregnant (unless noted otherwise) N/A    Initiated By: Soila Townsend RN   Time: 10:18 AM     George Rowan had no medications administered during this visit. PT. Blood pressure initially on admisision was 190/74 at 1000.

## 2023-11-06 NOTE — PROGRESS NOTES
1030 Blood pressure has come down some but is not within the paramenters to receive his retacrit.  Hemo Cue was 9.1.  Pt. States he wants to contnue to wait to see if h is blood pressure will continue to come down in order for him to receive his retacrit.

## 2023-11-07 RX ORDER — ALLOPURINOL 300 MG/1
150 TABLET ORAL DAILY
Qty: 90 TABLET | Refills: 3 | Status: SHIPPED | OUTPATIENT
Start: 2023-11-07

## 2023-11-20 ENCOUNTER — APPOINTMENT (OUTPATIENT)
Dept: INFUSION THERAPY | Facility: CLINIC | Age: 78
End: 2023-11-20
Payer: MEDICARE

## 2023-11-22 ENCOUNTER — INFUSION (OUTPATIENT)
Dept: INFUSION THERAPY | Facility: CLINIC | Age: 78
End: 2023-11-22
Payer: MEDICARE

## 2023-11-22 ENCOUNTER — OFFICE VISIT (OUTPATIENT)
Dept: PRIMARY CARE | Facility: CLINIC | Age: 78
End: 2023-11-22
Payer: MEDICARE

## 2023-11-22 VITALS
BODY MASS INDEX: 25.69 KG/M2 | RESPIRATION RATE: 18 BRPM | WEIGHT: 164 LBS | HEART RATE: 55 BPM | SYSTOLIC BLOOD PRESSURE: 154 MMHG | DIASTOLIC BLOOD PRESSURE: 54 MMHG | OXYGEN SATURATION: 100 % | TEMPERATURE: 98.1 F

## 2023-11-22 VITALS
RESPIRATION RATE: 16 BRPM | BODY MASS INDEX: 25.74 KG/M2 | HEIGHT: 67 IN | DIASTOLIC BLOOD PRESSURE: 60 MMHG | TEMPERATURE: 97.3 F | SYSTOLIC BLOOD PRESSURE: 112 MMHG | HEART RATE: 61 BPM | WEIGHT: 164 LBS | OXYGEN SATURATION: 98 %

## 2023-11-22 DIAGNOSIS — N18.4 CKD (CHRONIC KIDNEY DISEASE) STAGE 4, GFR 15-29 ML/MIN (MULTI): ICD-10-CM

## 2023-11-22 DIAGNOSIS — N64.4 BREAST TENDERNESS IN MALE: Primary | ICD-10-CM

## 2023-11-22 DIAGNOSIS — G63 POLYNEUROPATHY ASSOCIATED WITH UNDERLYING DISEASE (MULTI): ICD-10-CM

## 2023-11-22 DIAGNOSIS — G47.01 INSOMNIA DUE TO MEDICAL CONDITION: ICD-10-CM

## 2023-11-22 DIAGNOSIS — N25.81 SECONDARY HYPERPARATHYROIDISM (MULTI): ICD-10-CM

## 2023-11-22 DIAGNOSIS — M35.9 SYSTEMIC INVOLVEMENT OF CONNECTIVE TISSUE (MULTI): ICD-10-CM

## 2023-11-22 DIAGNOSIS — I77.9 DISORDER OF CAROTID ARTERY (CMS-HCC): ICD-10-CM

## 2023-11-22 DIAGNOSIS — D63.8 ANEMIA OF CHRONIC DISEASE: ICD-10-CM

## 2023-11-22 DIAGNOSIS — G47.00 INSOMNIA, UNSPECIFIED TYPE: ICD-10-CM

## 2023-11-22 DIAGNOSIS — D59.10 AUTOIMMUNE HEMOLYTIC ANEMIA (MULTI): ICD-10-CM

## 2023-11-22 DIAGNOSIS — I47.29 NONSUSTAINED VENTRICULAR TACHYCARDIA (MULTI): ICD-10-CM

## 2023-11-22 LAB — POC HEMOGLOBIN: 10.7 G/DL (ref 13.5–17.5)

## 2023-11-22 PROCEDURE — 3074F SYST BP LT 130 MM HG: CPT | Performed by: FAMILY MEDICINE

## 2023-11-22 PROCEDURE — 1036F TOBACCO NON-USER: CPT | Performed by: FAMILY MEDICINE

## 2023-11-22 PROCEDURE — 96372 THER/PROPH/DIAG INJ SC/IM: CPT | Performed by: NURSE PRACTITIONER

## 2023-11-22 PROCEDURE — 1160F RVW MEDS BY RX/DR IN RCRD: CPT | Performed by: FAMILY MEDICINE

## 2023-11-22 PROCEDURE — 85018 HEMOGLOBIN: CPT | Performed by: NURSE PRACTITIONER

## 2023-11-22 PROCEDURE — 1126F AMNT PAIN NOTED NONE PRSNT: CPT | Performed by: FAMILY MEDICINE

## 2023-11-22 PROCEDURE — 3078F DIAST BP <80 MM HG: CPT | Performed by: FAMILY MEDICINE

## 2023-11-22 PROCEDURE — 99214 OFFICE O/P EST MOD 30 MIN: CPT | Performed by: FAMILY MEDICINE

## 2023-11-22 PROCEDURE — 1159F MED LIST DOCD IN RCRD: CPT | Performed by: FAMILY MEDICINE

## 2023-11-22 RX ORDER — FAMOTIDINE 10 MG/ML
20 INJECTION INTRAVENOUS ONCE AS NEEDED
Status: CANCELLED | OUTPATIENT
Start: 2023-12-04

## 2023-11-22 RX ORDER — ESZOPICLONE 2 MG/1
TABLET, FILM COATED ORAL
Qty: 30 TABLET | Refills: 2 | Status: SHIPPED | OUTPATIENT
Start: 2023-11-22 | End: 2024-01-08 | Stop reason: SDUPTHER

## 2023-11-22 RX ORDER — ALBUTEROL SULFATE 0.83 MG/ML
3 SOLUTION RESPIRATORY (INHALATION) AS NEEDED
Status: CANCELLED | OUTPATIENT
Start: 2023-12-04

## 2023-11-22 RX ORDER — DIPHENHYDRAMINE HYDROCHLORIDE 50 MG/ML
50 INJECTION INTRAMUSCULAR; INTRAVENOUS AS NEEDED
Status: CANCELLED | OUTPATIENT
Start: 2023-12-04

## 2023-11-22 RX ORDER — EPINEPHRINE 0.3 MG/.3ML
0.3 INJECTION SUBCUTANEOUS EVERY 5 MIN PRN
Status: CANCELLED | OUTPATIENT
Start: 2023-12-04

## 2023-11-22 ASSESSMENT — ENCOUNTER SYMPTOMS
CARDIOVASCULAR NEGATIVE: 1
NEUROLOGICAL NEGATIVE: 1
LOSS OF SENSATION IN FEET: 0
CONSTITUTIONAL NEGATIVE: 1
GASTROINTESTINAL NEGATIVE: 1
DEPRESSION: 0
OCCASIONAL FEELINGS OF UNSTEADINESS: 0
RESPIRATORY NEGATIVE: 1
MUSCULOSKELETAL NEGATIVE: 1

## 2023-11-22 ASSESSMENT — PAIN SCALES - GENERAL
PAINLEVEL: 0-NO PAIN
PAINLEVEL: 0-NO PAIN

## 2023-11-22 NOTE — PROGRESS NOTES
"Subjective   Patient ID: George Rowan is a 77 y.o. male who presents for Breast Mass (Pt is here with concerns of some left breast tenderness and would just like to get it checked. Pt needs a refill of lunesta./).    HPI home bps have been good after Dr Mckoy has increased his meds.      Review of Systems   Constitutional: Negative.    HENT: Negative.     Respiratory: Negative.     Cardiovascular: Negative.    Gastrointestinal: Negative.    Genitourinary: Negative.    Musculoskeletal: Negative.    Neurological: Negative.        Objective   /60 (BP Location: Left arm, Patient Position: Sitting, BP Cuff Size: Adult)   Pulse 61   Temp 36.3 °C (97.3 °F) (Temporal)   Resp 16   Ht 1.702 m (5' 7\")   Wt 74.4 kg (164 lb)   SpO2 98%   BMI 25.69 kg/m²     Physical Exam  Constitutional:       General: He is not in acute distress.     Appearance: Normal appearance.   Cardiovascular:      Rate and Rhythm: Normal rate and regular rhythm.      Heart sounds: No murmur heard.  Pulmonary:      Breath sounds: Normal breath sounds. No wheezing.   Chest:      Comments: Some mild tenderness and slight fullness left aerolar region.  No nipple dc.  No definite mass.    Neurological:      Mental Status: He is alert.         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

## 2023-11-22 NOTE — PATIENT INSTRUCTIONS
Today you received: ( free text drug name and dose, including premedication's)  Retacrit 10,000 units    For:   1. Anemia of chronic disease    2. CKD (chronic kidney disease) stage 4, GFR 15-29 ml/min (CMS/[facility])          Please read the  Medication Guide that was given to you and reviewed during todays visit.     (Tell all doctors including dentists that you are taking this medication)     Go to the emergency room or call 911 if:  -You have signs of allergic reaction:   o         Rash, hives, itching.   o         Swollen, blistered, peeling skin.   o         Swelling of face, lips, mouth, tongue or throat.   o         Tightness of chest, trouble breathing, swallowing or talking      Call your doctor:     - If IV / injection site gets red, warm, swollen, itchy or leaks fluid or pus.     (Leave dressing on your IV site for at least 2 hours and keep area clean and dry  - If you get sick or have symptoms of infection or are not feeling well for any reason.    (Wash your hands often, stay away from people who are sick)  - If you have side effects from your medication that do not go away or are bothersome.     (Refer to the teaching your nurse gave you for side effects to call your doctor about)     Common side effects may include:  stuffy nose, headache, feeling tired, muscle aches, upset stomach  - Before receiving any vaccines, Call the [facility] at   if:  - You get sick, are on antibiotics, have had a recent vaccine, have surgery or dental work and your doctor wants your visit rescheduled.  - You need to cancel and reschedule your visit for any reason. Call at least 2 days before your visit if you need to cancel.   - Your insurance changes before your next visit.    (We will need to get approval from your new insurance. This can take up to two weeks.)     The [facility] is opened Monday thru Friday. We are closed on weekends and holidays.     Voice mail will take your call if the  center is closed. If you leave a message please allow 24 hours for a call back during weekdays. If you leave a message on a weekend/holiday, we will call you back the next business day.

## 2023-11-22 NOTE — PROGRESS NOTES
[facility]   infusion Clinic Note   Date: 2023   Name: George Rowan  : 1945   MRN: [id number]         Reason for Visit: OP Infusion (Procrit)   HGB 10.7 today via hemocue      Visit Type: INJECTION      Ordered By: Dr. Mckoy      Accompanied by:Self      Diagnosis: Anemia of chronic disease    CKD (chronic kidney disease) stage 4, GFR 15-29 ml/min (CMS/[facility])       Allergies:   Allergies as of 2023 - Reviewed 2023   Allergen Reaction Noted    Azathioprine Other 2023    Fluorescein-benoxinate Swelling 2023    Other Unknown 2023    Tetracaine Other 2023         Current Medications has a current medication list which includes the following prescription(s): allopurinol, amlodipine, aspirin, atorvastatin, calcitriol, carvedilol, cetirizine, procrit, ergocalciferol, erythromycin, eszopiclone, fluticasone, gabapentin, hydroxychloroquine, ibuprofen, levothyroxine, levothyroxine, loratadine, losartan, NON FORMULARY, vitamin b complex vit c no.4, and preservision areds, and the following Facility-Administered Medications: epoetin clyde-epbx.       Vitals:  Vitals:    23 1055   BP: 154/54   Pulse: 55   Resp: 18   Temp: 36.7 °C (98.1 °F)   SpO2: 100%   Weight: 74.4 kg (164 lb)             Infusion Pre-procedure Checklist:   - Allergies reviewed: yes   - Medications reviewed: no       - Previous reaction to current treatment: no      Assess patient for the concerns below. Document provider notification as appropriate.  - Active or recent infection with/without current antibiotic use: no  - Recent or planned invasive dental work: no  - Recent or planned surgeries: no  - Recently received or plans to receive vaccinations: no  - Has treatment related toxicities: no  - Is pregnant:  n/a      Pain: 0   - Is the pain different from normal: no   - Is the pain tolerable: yes   - Is your Doctor aware:  no      Labs: Labs reviewed Hemocue 10.7 today    (Consider pulling in pertinent labs):      Fall Risk Screening:         Review Of Systems:  Review of Systems   All other systems reviewed and are negative.        Infusion Readiness:   - Assessment Concerns Related to Infusion: No  - Provider notified: n/a      Document Below Only If Indicated:   New Patient Education:  N/A (returning patient for continuation of therapy. Ongoing education provided as needed.)        Treatment Conditions & Drug Specific Questions:  NOT APPLICABLE        Weight Based Drug Calculations:  WEIGHT BASED DRUGS: NOT APPLICABLE          Initiated By: Terrell Alanis RN   Time: 10:59 AM     George Rowan received Retacrit 10,000 units today November 22, 2023

## 2023-11-27 ENCOUNTER — OFFICE VISIT (OUTPATIENT)
Dept: VASCULAR SURGERY | Facility: CLINIC | Age: 78
End: 2023-11-27
Payer: MEDICARE

## 2023-11-27 VITALS — HEART RATE: 50 BPM | SYSTOLIC BLOOD PRESSURE: 173 MMHG | DIASTOLIC BLOOD PRESSURE: 72 MMHG

## 2023-11-27 DIAGNOSIS — I65.23 BILATERAL CAROTID ARTERY STENOSIS: Primary | ICD-10-CM

## 2023-11-27 PROCEDURE — 3077F SYST BP >= 140 MM HG: CPT | Performed by: SURGERY

## 2023-11-27 PROCEDURE — 1036F TOBACCO NON-USER: CPT | Performed by: SURGERY

## 2023-11-27 PROCEDURE — 1160F RVW MEDS BY RX/DR IN RCRD: CPT | Performed by: SURGERY

## 2023-11-27 PROCEDURE — 3078F DIAST BP <80 MM HG: CPT | Performed by: SURGERY

## 2023-11-27 PROCEDURE — 99203 OFFICE O/P NEW LOW 30 MIN: CPT | Performed by: SURGERY

## 2023-11-27 PROCEDURE — 1159F MED LIST DOCD IN RCRD: CPT | Performed by: SURGERY

## 2023-11-27 PROCEDURE — 99213 OFFICE O/P EST LOW 20 MIN: CPT | Performed by: SURGERY

## 2023-11-27 PROCEDURE — 1126F AMNT PAIN NOTED NONE PRSNT: CPT | Performed by: SURGERY

## 2023-11-27 ASSESSMENT — LIFESTYLE VARIABLES
SKIP TO QUESTIONS 9-10: 1
HOW MANY STANDARD DRINKS CONTAINING ALCOHOL DO YOU HAVE ON A TYPICAL DAY: PATIENT DOES NOT DRINK
AUDIT-C TOTAL SCORE: 0
HOW OFTEN DO YOU HAVE A DRINK CONTAINING ALCOHOL: NEVER
HOW OFTEN DO YOU HAVE SIX OR MORE DRINKS ON ONE OCCASION: NEVER

## 2023-11-27 ASSESSMENT — ENCOUNTER SYMPTOMS
OCCASIONAL FEELINGS OF UNSTEADINESS: 0
DEPRESSION: 0
LOSS OF SENSATION IN FEET: 0

## 2023-11-27 ASSESSMENT — COLUMBIA-SUICIDE SEVERITY RATING SCALE - C-SSRS
2. HAVE YOU ACTUALLY HAD ANY THOUGHTS OF KILLING YOURSELF?: NO
6. HAVE YOU EVER DONE ANYTHING, STARTED TO DO ANYTHING, OR PREPARED TO DO ANYTHING TO END YOUR LIFE?: NO
1. IN THE PAST MONTH, HAVE YOU WISHED YOU WERE DEAD OR WISHED YOU COULD GO TO SLEEP AND NOT WAKE UP?: NO

## 2023-11-27 ASSESSMENT — PATIENT HEALTH QUESTIONNAIRE - PHQ9
2. FEELING DOWN, DEPRESSED OR HOPELESS: NOT AT ALL
1. LITTLE INTEREST OR PLEASURE IN DOING THINGS: NOT AT ALL
SUM OF ALL RESPONSES TO PHQ9 QUESTIONS 1 AND 2: 0

## 2023-11-27 NOTE — PROGRESS NOTES
Patient comes for evaluation of carotid atherosclerotic disease.  He is asymptomatic of stroke symptoms.  He has stage IV chronic kidney disease and is discussing peritoneal dialysis with his nephrologist.  He is a former smoker having quit in 2014.  He denies any current stroke or cardiac symptoms.  He has a carotid duplex done in the  system performed February 2022 showing a greater than 70% stenosis of the left internal carotid artery.  He reports his creatinine is over 4 mg/dL.    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    Reviewing his duplex at the bedside with a linear probe I see that his internal carotid and common carotid artery are heavily calcified obscuring adequate visualization of the lumen.  The common carotid artery is occluded or calcified down to the base of the neck suggesting disease originating from the arch.    The right side has decent flow.  I do not have any records from [facility].  I am going to order a carotid duplex in our lab.    In the setting of chronic kidney disease and asymptomatic carotid stenosis, carotid endarterectomy faces and increased risk of stroke.  Investigating this with contrast confers a risk of worsening kidney failure and dialysis and would be contraindicated.    He is following closely with both the nephrologist and the cardiologist and ultimately would likely benefit from best medical therapy which is increasingly considered a good option for those with high risk situations for carotid revascularization.    I will see the patient after his carotid duplex.  He was to stay on his aspirin and his antilipid medications.  Patient understands and agrees with my recommendations.    Total time with patient including reviewing prior studies was 35 minutes.

## 2023-12-06 ENCOUNTER — INFUSION (OUTPATIENT)
Dept: INFUSION THERAPY | Facility: CLINIC | Age: 78
End: 2023-12-06
Payer: MEDICARE

## 2023-12-06 VITALS
WEIGHT: 166.67 LBS | TEMPERATURE: 96.5 F | BODY MASS INDEX: 26.1 KG/M2 | HEART RATE: 55 BPM | DIASTOLIC BLOOD PRESSURE: 66 MMHG | RESPIRATION RATE: 18 BRPM | SYSTOLIC BLOOD PRESSURE: 198 MMHG | OXYGEN SATURATION: 100 %

## 2023-12-06 DIAGNOSIS — D63.8 ANEMIA OF CHRONIC DISEASE: ICD-10-CM

## 2023-12-06 DIAGNOSIS — N18.4 CKD (CHRONIC KIDNEY DISEASE) STAGE 4, GFR 15-29 ML/MIN (MULTI): ICD-10-CM

## 2023-12-06 LAB — POC HEMOGLOBIN: 10 G/DL (ref 13.5–17.5)

## 2023-12-06 PROCEDURE — 85018 HEMOGLOBIN: CPT | Performed by: NURSE PRACTITIONER

## 2023-12-06 RX ORDER — EPINEPHRINE 0.3 MG/.3ML
0.3 INJECTION SUBCUTANEOUS EVERY 5 MIN PRN
Status: CANCELLED | OUTPATIENT
Start: 2023-12-20

## 2023-12-06 RX ORDER — DIPHENHYDRAMINE HYDROCHLORIDE 50 MG/ML
50 INJECTION INTRAMUSCULAR; INTRAVENOUS AS NEEDED
Status: CANCELLED | OUTPATIENT
Start: 2023-12-20

## 2023-12-06 RX ORDER — FAMOTIDINE 10 MG/ML
20 INJECTION INTRAVENOUS ONCE AS NEEDED
Status: CANCELLED | OUTPATIENT
Start: 2023-12-20

## 2023-12-06 RX ORDER — ALBUTEROL SULFATE 0.83 MG/ML
3 SOLUTION RESPIRATORY (INHALATION) AS NEEDED
Status: CANCELLED | OUTPATIENT
Start: 2023-12-20

## 2023-12-06 ASSESSMENT — PAIN SCALES - GENERAL: PAINLEVEL: 0-NO PAIN

## 2023-12-06 NOTE — PATIENT INSTRUCTIONS
Today :George Rowan had no medications administered during this visit.     For:   1. CKD (chronic kidney disease) stage 4, GFR 15-29 ml/min (CMS/[facility])    2. Anemia of chronic disease         Your next appointment is due in:  tomorrow 12/7/23 to recheck BP      Please read the  Medication Guide that was given to you and reviewed during todays visit.     (Tell all doctors including dentists that you are taking this medication)     Go to the emergency room or call 911 if:  -You have signs of allergic reaction:   -Rash, hives, itching.   -Swollen, blistered, peeling skin.   -Swelling of face, lips, mouth, tongue or throat.   -Tightness of chest, trouble breathing, swallowing or talking     Call your doctor:  - If IV / injection site gets red, warm, swollen, itchy or leaks fluid or pus.     (Leave dressing on your IV site for at least 2 hours and keep area clean and dry  - If you get sick or have symptoms of infection or are not feeling well for any reason.    (Wash your hands often, stay away from people who are sick)  - If you have side effects from your medication that do not go away or are bothersome.     (Refer to the teaching your nurse gave you for side effects to call your doctor about)    - Common side effects may include:  stuffy nose, headache, feeling tired, muscle aches, upset stomach  - Before receiving any vaccines     - Call the [facility] at   If:  - You get sick, are on antibiotics, have had a recent vaccine, have surgery or dental work and your doctor wants your visit rescheduled.  - You need to cancel and reschedule your visit for any reason. Call at least 2 days before your visit if you need to cancel.   - Your insurance changes before your next visit.    (We will need to get approval from your new insurance. This can take up to two weeks.)     [facility] is opened Monday thru Friday. We are closed on weekends and holidays.   Voice mail will take your call if  the center is closed. If you leave a message please allow 24 hours for a call back during weekdays. If you leave a message on a weekend/holiday, we will call you back the next business day.

## 2023-12-06 NOTE — PROGRESS NOTES
[facility]   infusion Clinic Note   Date: 2023   Name: George Rowan  : 1945   MRN: [id number]         Reason for Visit: OP Infusion (Retacrit)         Visit Type: INJECTION      Ordered By: Dr. Mckoy      Accompanied by:Self      Diagnosis: CKD (chronic kidney disease) stage 4, GFR 15-29 ml/min (CMS/[facility])    Anemia of chronic disease       Allergies:   Allergies as of 2023 - Reviewed 2023   Allergen Reaction Noted    Azathioprine Other 2023    Fluorescein-benoxinate Swelling 2023    Other Unknown 2023    Tetracaine Other 2023         Current Medications has a current medication list which includes the following prescription(s): allopurinol, aspirin, atorvastatin, calcitriol, carvedilol, procrit, ergocalciferol, erythromycin, eszopiclone, fluticasone, gabapentin, hydralazine/hydrochlorothiazid, hydroxychloroquine, levothyroxine, loratadine, losartan, and NON FORMULARY, and the following Facility-Administered Medications: epoetin clyde-epbx.       Vitals:  Vitals:    23 1423   BP: (!) 197/66   BP Location: Left arm   Pulse: 55   Resp: 18   Temp: 35.8 °C (96.5 °F)   TempSrc: Temporal   SpO2: 100%   Weight: 75.6 kg (166 lb 10.7 oz)             Infusion Pre-procedure Checklist:   - Allergies reviewed: yes   - Medications reviewed: yes       - Previous reaction to current treatment: no      Assess patient for the concerns below. Document provider notification as appropriate.  - Active or recent infection with/without current antibiotic use: no  - Recent or planned invasive dental work: no  - Recent or planned surgeries: no  - Recently received or plans to receive vaccinations: no  - Has treatment related toxicities: no  - Is pregnant:  no      Pain: 0   - Is the pain different from normal: no   - Is the pain tolerable: yes   - Is your Doctor aware:  n/a      Labs:  Hemocue 10.0 today 23         Fall Risk Screening: Chris Grajeda  "Risk  History of Falling, Immediate or Within 3 Months: No  Secondary Diagnosis: Yes  Ambulatory Aid: Walks without aid/bedrest/nurse assist  Intravenous Therapy/Heparin Lock: No  Gait/Transferring: Normal/bedrest/immobile  Mental Status: Oriented to own ability  Perez Fall Risk Score: 15       Review Of Systems:  Review of Systems   All other systems reviewed and are negative.        Infusion Readiness:   - Assessment Concerns Related to Infusion: Yes. /66 rechecked manually and 198/66. Patient unable to get Retacrit today due to elevated BP. Patient rescheduled to come back tomorrow 12/7 at 1000 am.    - Provider notified: Yes Kylie Smith NP notified.      Document Below Only If Indicated:   New Patient Education:    N/A (returning patient for continuation of therapy. Ongoing education provided as needed.)        Treatment Conditions & Drug Specific Questions:    Epoetin Burak (EPOGEN. PROCRIT, RETACRIT)    TREATMENT CONDITIONS:    Unless otherwise specified on patient specific therapy plan HOLD and notify provider prior to proceeding with today's injection if:  o Hemoglobin GREATER THAN 11 g/dL (or parameter set by prescribing provider)  o Increase in hemoglobin GREATER THAN 1 g/dL   o SBP GREATER THAN 160 mmHg    Lab Results   Component Value Date/Time    HGB 10.0 (A) 12/06/2023 1429    HGB 10.7 (A) 11/22/2023 1102    HGB 9.1 (A) 11/06/2023 1138     No results found for: \"HGBCAP\"     Labs reviewed and patient meets treatment conditions? Yes    DRUG SPECIFIC QUESTIONS:  - Does the patient have uncontrolled hypertension?  Yes    (Use is contraindicated in patients with uncontrolled hypertension)     - Educate on the following:? Yes       - Increased risk of serious cardiovascular reactions, thromboembolic reactions, stroke and tumor progression.        - Need to undergo regular blood pressure monitoring. Adhere to prescribed anti-hypertensive regimen and follow recommended dietary restrictions.        - " Need to contact their healthcare provider for new onset neurologic symptoms or change in seizure frequency.        - Need for  regular laboratory tests to monitor hemoglobin levels.          Weight Based Drug Calculations:    WEIGHT BASED DRUGS: NOT APPLICABLE / FLAT DOSE          Initiated By: Terrell Alanis RN   Time: 3:01 PM     George Rowan had no medications administered during this visit.

## 2023-12-07 ENCOUNTER — INFUSION (OUTPATIENT)
Dept: INFUSION THERAPY | Facility: CLINIC | Age: 78
End: 2023-12-07
Payer: MEDICARE

## 2023-12-07 VITALS
DIASTOLIC BLOOD PRESSURE: 82 MMHG | HEART RATE: 61 BPM | OXYGEN SATURATION: 99 % | RESPIRATION RATE: 18 BRPM | BODY MASS INDEX: 25.83 KG/M2 | WEIGHT: 164.9 LBS | SYSTOLIC BLOOD PRESSURE: 210 MMHG | TEMPERATURE: 97.2 F

## 2023-12-07 DIAGNOSIS — N18.4 CKD (CHRONIC KIDNEY DISEASE) STAGE 4, GFR 15-29 ML/MIN (MULTI): Primary | ICD-10-CM

## 2023-12-07 PROCEDURE — 99211 OFF/OP EST MAY X REQ PHY/QHP: CPT | Performed by: NURSE PRACTITIONER

## 2023-12-07 ASSESSMENT — PAIN SCALES - GENERAL: PAINLEVEL: 0-NO PAIN

## 2023-12-07 NOTE — PATIENT INSTRUCTIONS
Today :George Rowan had no medications administered during this visit.     For:   1. CKD (chronic kidney disease) stage 4, GFR 15-29 ml/min (CMS/[facility])         Your next appointment is due in:  one week        Please read the  Medication Guide that was given to you and reviewed during todays visit.     (Tell all doctors including dentists that you are taking this medication)     Go to the emergency room or call 911 if:  -You have signs of allergic reaction:   -Rash, hives, itching.   -Swollen, blistered, peeling skin.   -Swelling of face, lips, mouth, tongue or throat.   -Tightness of chest, trouble breathing, swallowing or talking     Call your doctor:  - If IV / injection site gets red, warm, swollen, itchy or leaks fluid or pus.     (Leave dressing on your IV site for at least 2 hours and keep area clean and dry  - If you get sick or have symptoms of infection or are not feeling well for any reason.    (Wash your hands often, stay away from people who are sick)  - If you have side effects from your medication that do not go away or are bothersome.     (Refer to the teaching your nurse gave you for side effects to call your doctor about)    - Common side effects may include:  stuffy nose, headache, feeling tired, muscle aches, upset stomach  - Before receiving any vaccines     - [facility] at   If:  - You get sick, are on antibiotics, have had a recent vaccine, have surgery or dental work and your doctor wants your visit rescheduled.  - You need to cancel and reschedule your visit for any reason. Call at least 2 days before your visit if you need to cancel.   - Your insurance changes before your next visit.    (We will need to get approval from your new insurance. This can take up to two weeks.)     [facility] is opened Monday thru Friday. We are closed on weekends and holidays.   Voice mail will take your call if the center is closed. If you leave a message please  allow 24 hours for a call back during weekdays. If you leave a message on a weekend/holiday, we will call you back the next business day.

## 2023-12-07 NOTE — PROGRESS NOTES
[facility]   infusion Clinic Note   Date: 2023   Name: George Rowan  : 1945   MRN: [id number]         Reason for Visit: OP Infusion (Retacrit)         Visit Type: INJECTION      Ordered By: Dr. Mckoy      Accompanied by:Self      Diagnosis: CKD stage 4      Allergies:   Allergies as of 2023 - Reviewed 2023   Allergen Reaction Noted    Azathioprine Other 2023    Fluorescein-benoxinate Swelling 2023    Other Unknown 2023    Tetracaine Other 2023         Current Medications has a current medication list which includes the following prescription(s): allopurinol, aspirin, atorvastatin, calcitriol, carvedilol, procrit, ergocalciferol, erythromycin, eszopiclone, fluticasone, gabapentin, hydralazine/hydrochlorothiazid, hydroxychloroquine, levothyroxine, loratadine, losartan, and NON FORMULARY.       Vitals:  Vitals:    23 1013 23 1030 23 1458   BP: 180/75 (!) 182/66 (!) 210/82   Pulse: 60  61   Resp: 18     Temp: 36.2 °C (97.2 °F)     TempSrc: Temporal     SpO2: 99%     Weight: 74.8 kg (164 lb 14.5 oz)               Infusion Pre-procedure Checklist:   - Allergies reviewed: yes   - Medications reviewed: yes       - Previous reaction to current treatment: no      Assess patient for the concerns below. Document provider notification as appropriate.  - Active or recent infection with/without current antibiotic use: no  - Recent or planned invasive dental work: no  - Recent or planned surgeries: no  - Recently received or plans to receive vaccinations: no  - Has treatment related toxicities: no  - Is pregnant:  no      Pain: 0   - Is the pain different from normal: no   - Is the pain tolerable: yes   - Is your Doctor aware:  n/a      Labs:  Hemocue 10.0 yesterday 23         Fall Risk Screening: Chris Fall Risk  History of Falling, Immediate or Within 3 Months: No  Secondary Diagnosis: Yes  Ambulatory Aid: Walks without  "aid/bedrest/nurse assist  Intravenous Therapy/Heparin Lock: No  Gait/Transferring: Normal/bedrest/immobile  Mental Status: Oriented to own ability  Perez Fall Risk Score: 15       Review Of Systems:  Review of Systems   All other systems reviewed and are negative.        Infusion Readiness:   - Assessment Concerns Related to Infusion: Yes. Patient's /66 and patient reports taking BP medications this morning. Discussed with Kylie Manning  NP and patient will return at 3:00 pm today and BP will be rechecked. Patient returned at 1458 today and /82. Patient asymptomatic. Patient unable to get Retacrit due to uncontrolled hypertension.    - Provider notified: Yes. Dr. Mckoy notified of above and will follow-up with patient.      Document Below Only If Indicated:   New Patient Education:    N/A (returning patient for continuation of therapy. Ongoing education provided as needed.)        Treatment Conditions & Drug Specific Questions:    Epoetin Burak (EPOGEN. PROCRIT, RETACRIT)    TREATMENT CONDITIONS:    Unless otherwise specified on patient specific therapy plan HOLD and notify provider prior to proceeding with today's injection if:  o Hemoglobin GREATER THAN 11 g/dL (or parameter set by prescribing provider)  o Increase in hemoglobin GREATER THAN 1 g/dL   o SBP GREATER THAN 160 mmHg    Lab Results   Component Value Date/Time    HGB 10.0 (A) 12/06/2023 1429    HGB 10.7 (A) 11/22/2023 1102    HGB 9.1 (A) 11/06/2023 1138     No results found for: \"HGBCAP\"     Labs reviewed and patient meets treatment conditions? Yes    DRUG SPECIFIC QUESTIONS:  - Does the patient have uncontrolled hypertension?  Yes    (Use is contraindicated in patients with uncontrolled hypertension)     - Educate on the following:? Yes       - Increased risk of serious cardiovascular reactions, thromboembolic reactions, stroke and tumor progression.        - Need to undergo regular blood pressure monitoring. Adhere to prescribed " anti-hypertensive regimen and follow recommended dietary restrictions.        - Need to contact their healthcare provider for new onset neurologic symptoms or change in seizure frequency.        - Need for  regular laboratory tests to monitor hemoglobin levels.          Weight Based Drug Calculations:    WEIGHT BASED DRUGS: NOT APPLICABLE / FLAT DOSE          Initiated By: Terrell Alanis RN   Time: 2:58 PM     George Rowan had no medications administered during this visit.  Patient's systolic blood pressure consistently above 160. Instructed patient to follow up with Dr. Mckoy.  RTC  1 week. Call placed to Dr. Mckoy's office to make him aware. PATIENCE JOLLEY-CNP

## 2023-12-11 ENCOUNTER — INFUSION (OUTPATIENT)
Dept: INFUSION THERAPY | Facility: CLINIC | Age: 78
End: 2023-12-11
Payer: MEDICARE

## 2023-12-11 VITALS
WEIGHT: 163.36 LBS | RESPIRATION RATE: 18 BRPM | TEMPERATURE: 96.6 F | DIASTOLIC BLOOD PRESSURE: 60 MMHG | HEART RATE: 51 BPM | OXYGEN SATURATION: 98 % | SYSTOLIC BLOOD PRESSURE: 176 MMHG | BODY MASS INDEX: 25.59 KG/M2

## 2023-12-11 DIAGNOSIS — N18.4 CKD (CHRONIC KIDNEY DISEASE) STAGE 4, GFR 15-29 ML/MIN (MULTI): ICD-10-CM

## 2023-12-11 DIAGNOSIS — D63.8 ANEMIA OF CHRONIC DISEASE: ICD-10-CM

## 2023-12-11 PROCEDURE — 99211 OFF/OP EST MAY X REQ PHY/QHP: CPT | Performed by: NURSE PRACTITIONER

## 2023-12-11 RX ORDER — DIPHENHYDRAMINE HYDROCHLORIDE 50 MG/ML
50 INJECTION INTRAMUSCULAR; INTRAVENOUS AS NEEDED
Status: CANCELLED | OUTPATIENT
Start: 2023-12-20

## 2023-12-11 RX ORDER — FAMOTIDINE 10 MG/ML
20 INJECTION INTRAVENOUS ONCE AS NEEDED
Status: CANCELLED | OUTPATIENT
Start: 2023-12-20

## 2023-12-11 RX ORDER — ALBUTEROL SULFATE 0.83 MG/ML
3 SOLUTION RESPIRATORY (INHALATION) AS NEEDED
Status: CANCELLED | OUTPATIENT
Start: 2023-12-20

## 2023-12-11 RX ORDER — EPINEPHRINE 0.3 MG/.3ML
0.3 INJECTION SUBCUTANEOUS EVERY 5 MIN PRN
Status: CANCELLED | OUTPATIENT
Start: 2023-12-20

## 2023-12-11 ASSESSMENT — PAIN SCALES - GENERAL: PAINLEVEL: 0-NO PAIN

## 2023-12-11 NOTE — PROGRESS NOTES
[facility]   infusion Clinic Note   Date: 2023   Name: George Rowan  : 1945   MRN: [id number]         Reason for Visit: OP Infusion (Retacrit)         Visit Type: INJECTION      Ordered By: Dr. Mckoy      Accompanied by:Self      Diagnosis: CKD stage 4      Allergies:   Allergies as of 2023 - Reviewed 2023   Allergen Reaction Noted    Azathioprine Other 2023    Fluorescein-benoxinate Swelling 2023    Other Unknown 2023    Tetracaine Other 2023         Current Medications has a current medication list which includes the following prescription(s): allopurinol, aspirin, atorvastatin, calcitriol, carvedilol, procrit, ergocalciferol, erythromycin, eszopiclone, fluticasone, gabapentin, hydralazine/hydrochlorothiazid, hydroxychloroquine, levothyroxine, loratadine, losartan, and NON FORMULARY.       Vitals:  Vitals:    23 0903 23 0915   BP: (!) 186/68 176/60   BP Location: Right arm    Pulse: 51    Resp: 18    Temp: 35.9 °C (96.6 °F)    TempSrc: Temporal    SpO2: 98%    Weight: 74.1 kg (163 lb 5.8 oz)              Infusion Pre-procedure Checklist:   - Allergies reviewed: yes   - Medications reviewed: yes       - Previous reaction to current treatment: no      Assess patient for the concerns below. Document provider notification as appropriate.  - Active or recent infection with/without current antibiotic use: no  - Recent or planned invasive dental work: no  - Recent or planned surgeries: no  - Recently received or plans to receive vaccinations: no  - Has treatment related toxicities: no  - Is pregnant:  no      Pain: 0   - Is the pain different from normal: no   - Is the pain tolerable: yes   - Is your Doctor aware:  n/a      Labs:  Hemocue 10.0 23         Fall Risk Screening: Chris Fall Risk  History of Falling, Immediate or Within 3 Months: No  Secondary Diagnosis: Yes  Ambulatory Aid: Walks without aid/bedrest/nurse  "assist  Intravenous Therapy/Heparin Lock: No  Gait/Transferring: Normal/bedrest/immobile  Mental Status: Oriented to own ability  Perez Fall Risk Score: 15       Review Of Systems:  Review of Systems   All other systems reviewed and are negative.        Infusion Readiness:   - Assessment Concerns Related to Infusion: Yes. Patient's /68 (checked manually and 176/60). Patient unable to receive Retacrit today due to uncontrolled hypertension. Discussed with Kylie Smith NP and patient informed to follow-up with Dr. Mckoy today.    - Provider notified: Yes. Dr. Mckoy notified of above and will follow-up with patient.      Document Below Only If Indicated:   New Patient Education:    N/A (returning patient for continuation of therapy. Ongoing education provided as needed.)        Treatment Conditions & Drug Specific Questions:    Epoetin Burak (EPOGEN. PROCRIT, RETACRIT)    TREATMENT CONDITIONS:    Unless otherwise specified on patient specific therapy plan HOLD and notify provider prior to proceeding with today's injection if:  o Hemoglobin GREATER THAN 11 g/dL (or parameter set by prescribing provider)  o Increase in hemoglobin GREATER THAN 1 g/dL   o SBP GREATER THAN 160 mmHg    Lab Results   Component Value Date/Time    HGB 10.0 (A) 12/06/2023 1429    HGB 10.7 (A) 11/22/2023 1102    HGB 9.1 (A) 11/06/2023 1138     No results found for: \"HGBCAP\"     Labs reviewed and patient meets treatment conditions? Yes    DRUG SPECIFIC QUESTIONS:  - Does the patient have uncontrolled hypertension?  Yes    (Use is contraindicated in patients with uncontrolled hypertension)     - Educate on the following:? Yes       - Increased risk of serious cardiovascular reactions, thromboembolic reactions, stroke and tumor progression.        - Need to undergo regular blood pressure monitoring. Adhere to prescribed anti-hypertensive regimen and follow recommended dietary restrictions.        - Need to contact their healthcare " provider for new onset neurologic symptoms or change in seizure frequency.        - Need for  regular laboratory tests to monitor hemoglobin levels.          Weight Based Drug Calculations:    WEIGHT BASED DRUGS: NOT APPLICABLE / FLAT DOSE          Initiated By: Terrell Alanis RN   Time: 9:18 AM     George Rowan had no medications administered during this visit.

## 2023-12-13 ENCOUNTER — INFUSION (OUTPATIENT)
Dept: INFUSION THERAPY | Facility: CLINIC | Age: 78
End: 2023-12-13
Payer: MEDICARE

## 2023-12-13 VITALS
SYSTOLIC BLOOD PRESSURE: 166 MMHG | HEART RATE: 54 BPM | DIASTOLIC BLOOD PRESSURE: 68 MMHG | OXYGEN SATURATION: 98 % | BODY MASS INDEX: 25.66 KG/M2 | WEIGHT: 163.8 LBS

## 2023-12-13 DIAGNOSIS — N18.4 CKD (CHRONIC KIDNEY DISEASE) STAGE 4, GFR 15-29 ML/MIN (MULTI): ICD-10-CM

## 2023-12-13 DIAGNOSIS — D63.8 ANEMIA OF CHRONIC DISEASE: ICD-10-CM

## 2023-12-13 ASSESSMENT — ENCOUNTER SYMPTOMS
ENDOCRINE NEGATIVE: 1
NEUROLOGICAL NEGATIVE: 1
RESPIRATORY NEGATIVE: 1
EYES NEGATIVE: 1
HEMATOLOGIC/LYMPHATIC NEGATIVE: 1
FATIGUE: 1
MUSCULOSKELETAL NEGATIVE: 1
PSYCHIATRIC NEGATIVE: 1
CARDIOVASCULAR NEGATIVE: 1
GASTROINTESTINAL NEGATIVE: 1

## 2023-12-13 NOTE — PROGRESS NOTES
[facility]   infusion Clinic Note   Date: 2023   Name: George Rowan  : 1945   MRN: [id number]         Reason for Visit: No chief complaint on file.         Visit Type: INJECTION      Ordered By: Ean      Accompanied by:Self      Diagnosis: Anemia of chronic disease    CKD (chronic kidney disease) stage 4, GFR 15-29 ml/min (CMS/[facility])       Allergies:   Allergies as of 2023 - Reviewed 2023   Allergen Reaction Noted    Azathioprine Other 2023    Fluorescein-benoxinate Swelling 2023    Other Unknown 2023    Tetracaine Other 2023         Current Medications has a current medication list which includes the following prescription(s): hydralazine/hydrochlorothiazid, allopurinol, aspirin, atorvastatin, calcitriol, carvedilol, procrit, ergocalciferol, erythromycin, eszopiclone, fluticasone, gabapentin, hydroxychloroquine, levothyroxine, loratadine, losartan, and NON FORMULARY.       Vitals:There were no vitals filed for this visit. Manual /68.          Infusion Pre-procedure Checklist:   - Allergies reviewed: yes   - Medications reviewed: yes       - Previous reaction to current treatment: no      Assess patient for the concerns below. Document provider notification as appropriate.  - Active or recent infection with/without current antibiotic use: no  - Recent or planned invasive dental work: no  - Recent or planned surgeries: no  - Recently received or plans to receive vaccinations: no  - Has treatment related toxicities: no  - Is pregnant:  n/a      Pain: 0   - Is the pain different from normal: n/a   - Is the pain tolerable: n/a   - Is your Doctor aware:  n/a      Labs:  POCT hemoglobin on 23 was 10.0         Fall Risk Screening:         Review Of Systems:  Review of Systems   Constitutional:  Positive for fatigue.   HENT:  Negative.     Eyes: Negative.    Respiratory: Negative.     Cardiovascular: Negative.   "  Gastrointestinal: Negative.    Endocrine: Negative.    Genitourinary: Negative.     Musculoskeletal: Negative.    Skin: Negative.    Neurological: Negative.    Hematological: Negative.    Psychiatric/Behavioral: Negative.           Infusion Readiness:   - Assessment Concerns Related to Infusion: Yes  - Provider notified: yes      Document Below Only If Indicated:   New Patient Education:    N/A (returning patient for continuation of therapy. Ongoing education provided as needed.)        Treatment Conditions & Drug Specific Questions:    Epoetin Burak (EPOGEN. PROCRIT, RETACRIT)    TREATMENT CONDITIONS:    Unless otherwise specified on patient specific therapy plan HOLD and notify provider prior to proceeding with today's injection if:  o Hemoglobin GREATER THAN 11 g/dL (or parameter set by prescribing provider)  o Increase in hemoglobin GREATER THAN 1 g/dL   o SBP GREATER THAN 160 mmHg    Lab Results   Component Value Date/Time    HGB 10.0 (A) 12/06/2023 1429    HGB 10.7 (A) 11/22/2023 1102    HGB 9.1 (A) 11/06/2023 1138     No results found for: \"HGBCAP\"      Labs reviewed and patient meets treatment conditions? Yes    DRUG SPECIFIC QUESTIONS:  - Does the patient have uncontrolled hypertension?  Yes    (Use is contraindicated in patients with uncontrolled hypertension)     - Educate on the following:? Yes       - Increased risk of serious cardiovascular reactions, thromboembolic reactions, stroke and tumor progression.        - Need to undergo regular blood pressure monitoring. Adhere to prescribed anti-hypertensive regimen and follow recommended dietary restrictions.        - Need to contact their healthcare provider for new onset neurologic symptoms or change in seizure frequency.        - Need for  regular laboratory tests to monitor hemoglobin levels.          Weight Based Drug Calculations:    WEIGHT BASED DRUGS: NOT APPLICABLE / FLAT DOSE          Initiated By: SHOLA Barrera-CNP   Time: 11:10 AM "     George Rowan had no medications administered during this visit.  Systolic blood pressure remains over 160.  Messaged Dr. Mckoy.

## 2023-12-15 ENCOUNTER — HOSPITAL ENCOUNTER (OUTPATIENT)
Dept: RADIOLOGY | Facility: HOSPITAL | Age: 78
Discharge: HOME | End: 2023-12-15
Payer: MEDICARE

## 2023-12-15 VITALS — HEIGHT: 67 IN | BODY MASS INDEX: 27.47 KG/M2 | WEIGHT: 175 LBS

## 2023-12-15 DIAGNOSIS — N64.4 MASTODYNIA: ICD-10-CM

## 2023-12-15 PROCEDURE — 77066 DX MAMMO INCL CAD BI: CPT

## 2023-12-20 ENCOUNTER — INFUSION (OUTPATIENT)
Dept: INFUSION THERAPY | Facility: CLINIC | Age: 78
End: 2023-12-20
Payer: MEDICARE

## 2023-12-20 VITALS
DIASTOLIC BLOOD PRESSURE: 68 MMHG | OXYGEN SATURATION: 98 % | SYSTOLIC BLOOD PRESSURE: 167 MMHG | RESPIRATION RATE: 16 BRPM | TEMPERATURE: 97.6 F | HEART RATE: 56 BPM

## 2023-12-20 DIAGNOSIS — N18.4 CKD (CHRONIC KIDNEY DISEASE) STAGE 4, GFR 15-29 ML/MIN (MULTI): Primary | ICD-10-CM

## 2023-12-20 DIAGNOSIS — D63.8 ANEMIA OF CHRONIC DISEASE: ICD-10-CM

## 2023-12-20 LAB — POC HEMOGLOBIN: 9.7 G/DL (ref 13.5–17.5)

## 2023-12-20 PROCEDURE — 85018 HEMOGLOBIN: CPT | Performed by: NURSE PRACTITIONER

## 2023-12-20 PROCEDURE — 96372 THER/PROPH/DIAG INJ SC/IM: CPT | Performed by: NURSE PRACTITIONER

## 2023-12-20 RX ORDER — EPINEPHRINE 0.3 MG/.3ML
0.3 INJECTION SUBCUTANEOUS EVERY 5 MIN PRN
Status: CANCELLED | OUTPATIENT
Start: 2024-01-03

## 2023-12-20 RX ORDER — ALBUTEROL SULFATE 0.83 MG/ML
3 SOLUTION RESPIRATORY (INHALATION) AS NEEDED
Status: CANCELLED | OUTPATIENT
Start: 2024-01-03

## 2023-12-20 RX ORDER — FAMOTIDINE 10 MG/ML
20 INJECTION INTRAVENOUS ONCE AS NEEDED
Status: CANCELLED | OUTPATIENT
Start: 2024-01-03

## 2023-12-20 RX ORDER — DIPHENHYDRAMINE HYDROCHLORIDE 50 MG/ML
50 INJECTION INTRAMUSCULAR; INTRAVENOUS AS NEEDED
Status: CANCELLED | OUTPATIENT
Start: 2024-01-03

## 2023-12-20 ASSESSMENT — PAIN SCALES - GENERAL: PAINLEVEL: 0-NO PAIN

## 2023-12-20 NOTE — PATIENT INSTRUCTIONS
Today :We administered epoetin clyde-epbx.     For:   1. Anemia of chronic disease    2. CKD (chronic kidney disease) stage 4, GFR 15-29 ml/min (CMS/[facility])         Your next appointment is due in:  two weeks        Please read the  Medication Guide that was given to you and reviewed during todays visit.     (Tell all doctors including dentists that you are taking this medication)     Go to the emergency room or call 911 if:  -You have signs of allergic reaction:   -Rash, hives, itching.   -Swollen, blistered, peeling skin.   -Swelling of face, lips, mouth, tongue or throat.   -Tightness of chest, trouble breathing, swallowing or talking     Call your doctor:  - If IV / injection site gets red, warm, swollen, itchy or leaks fluid or pus.     (Leave dressing on your IV site for at least 2 hours and keep area clean and dry  - If you get sick or have symptoms of infection or are not feeling well for any reason.    (Wash your hands often, stay away from people who are sick)  - If you have side effects from your medication that do not go away or are bothersome.     (Refer to the teaching your nurse gave you for side effects to call your doctor about)    - Common side effects may include:  stuffy nose, headache, feeling tired, muscle aches, upset stomach  - Before receiving any vaccines     - Call [facility] at   If:  - You get sick, are on antibiotics, have had a recent vaccine, have surgery or dental work and your doctor wants your visit rescheduled.  - You need to cancel and reschedule your visit for any reason. Call at least 2 days before your visit if you need to cancel.   - Your insurance changes before your next visit.    (We will need to get approval from your new insurance. This can take up to two weeks.)     [facility] is opened Monday thru Friday. We are closed on weekends and holidays.   Voice mail will take your call if the center is closed. If you leave a message please  allow 24 hours for a call back during weekdays. If you leave a message on a weekend/holiday, we will call you back the next business day.

## 2023-12-20 NOTE — PROGRESS NOTES
[facility]   infusion Clinic Note   Date: 2023   Name: George Rowan  : 1945   MRN: [id number]         Reason for Visit: OP Infusion (Retacrit)         Visit Type: INJECTION      Ordered By: Dr. Mckoy      Accompanied by:Significant Other      Diagnosis: Anemia of chronic disease    CKD (chronic kidney disease) stage 4, GFR 15-29 ml/min (CMS/[facility])       Allergies:   Allergies as of 2023 - Reviewed 2023   Allergen Reaction Noted    Azathioprine Other 2023    Fluorescein-benoxinate Swelling 2023    Other Unknown 2023    Tetracaine Other 2023         Current Medications has a current medication list which includes the following prescription(s): allopurinol, aspirin, atorvastatin, calcitriol, carvedilol, procrit, ergocalciferol, erythromycin, eszopiclone, fluticasone, gabapentin, hydralazine/hydrochlorothiazid, hydroxychloroquine, levothyroxine, loratadine, losartan, and NON FORMULARY.       Vitals:  Vitals:    23 1015 23 1026 23 1037   BP: 169/72 168/67 167/68   Pulse: 56     Resp: 16     Temp: 36.4 °C (97.6 °F)     TempSrc: Temporal     SpO2: 98%               Infusion Pre-procedure Checklist:   - Allergies reviewed: yes   - Medications reviewed: yes       - Previous reaction to current treatment: no      Assess patient for the concerns below. Document provider notification as appropriate.  - Active or recent infection with/without current antibiotic use: no  - Recent or planned invasive dental work: no  - Recent or planned surgeries: no  - Recently received or plans to receive vaccinations: no  - Has treatment related toxicities: no  - Is pregnant:  no      Pain: 0   - Is the pain different from normal: no   - Is the pain tolerable: yes   - Is your Doctor aware:  n/a      Labs:  Hemocue done today 23 and 9.7         Fall Risk Screening: Chris Fall Risk  History of Falling, Immediate or Within 3 Months:  "No  Secondary Diagnosis: Yes  Ambulatory Aid: Walks without aid/bedrest/nurse assist  Intravenous Therapy/Heparin Lock: No  Gait/Transferring: Normal/bedrest/immobile  Mental Status: Oriented to own ability  Perez Fall Risk Score: 15       Review Of Systems:  Review of Systems   All other systems reviewed and are negative.        Infusion Readiness:   - Assessment Concerns Related to Infusion: Yes  - Provider notified: yes. Ok per Dr. Mckoy to give Retacrit today with /68 because states was assessed in office and BP normal. Patient has \"white coat\" syndrome.      Document Below Only If Indicated:   New Patient Education:    N/A (returning patient for continuation of therapy. Ongoing education provided as needed.)        Treatment Conditions & Drug Specific Questions:    Epoetin Burak (EPOGEN. PROCRIT, RETACRIT)    TREATMENT CONDITIONS:    Unless otherwise specified on patient specific therapy plan HOLD and notify provider prior to proceeding with today's injection if:  o Hemoglobin GREATER THAN 11 g/dL (parameter set by prescribing provider)  o Increase in hemoglobin GREATER THAN 1 g/dL   o SBP GREATER THAN 160 mmHg    Lab Results   Component Value Date/Time    HGB 9.7 (A) 12/20/2023 1025    HGB 10.0 (A) 12/06/2023 1429    HGB 10.7 (A) 11/22/2023 1102     No results found for: \"HGBCAP\"     Labs reviewed and patient meets treatment conditions? Yes    DRUG SPECIFIC QUESTIONS:  - Does the patient have uncontrolled hypertension?  No.     (Use is contraindicated in patients with uncontrolled hypertension)     - Educate on the following:? Yes       - Increased risk of serious cardiovascular reactions, thromboembolic reactions, stroke and tumor progression.        - Need to undergo regular blood pressure monitoring. Adhere to prescribed anti-hypertensive regimen and follow recommended dietary restrictions.        - Need to contact their healthcare provider for new onset neurologic symptoms or change in seizure " frequency.        - Need for  regular laboratory tests to monitor hemoglobin levels.          Weight Based Drug Calculations:    WEIGHT BASED DRUGS: NOT APPLICABLE / FLAT DOSE          Initiated By: Terrell Alanis RN   Time: 10:41 AM     We administered epoetin clyde-epbx.

## 2023-12-20 NOTE — PROGRESS NOTES
Per Dr. Townsend;ock concerning BP and procrit:  MD Magali Quiles, APRN-CNP  I told  Him to increase to 100 of hydralazine before appt. I would suggest giving the medicine regardless of  The BP at this point. This is all “white coat HTN” he had normal BP in my office yesterday. He is very anxious.

## 2023-12-21 RX ORDER — ALBUTEROL SULFATE 0.83 MG/ML
3 SOLUTION RESPIRATORY (INHALATION) AS NEEDED
Status: CANCELLED | OUTPATIENT
Start: 2024-01-03

## 2023-12-21 RX ORDER — DIPHENHYDRAMINE HYDROCHLORIDE 50 MG/ML
50 INJECTION INTRAMUSCULAR; INTRAVENOUS AS NEEDED
Status: CANCELLED | OUTPATIENT
Start: 2024-01-03

## 2023-12-21 RX ORDER — FAMOTIDINE 10 MG/ML
20 INJECTION INTRAVENOUS ONCE AS NEEDED
Status: CANCELLED | OUTPATIENT
Start: 2024-01-03

## 2023-12-21 RX ORDER — EPINEPHRINE 0.3 MG/.3ML
0.3 INJECTION SUBCUTANEOUS EVERY 5 MIN PRN
Status: CANCELLED | OUTPATIENT
Start: 2024-01-03

## 2023-12-27 DIAGNOSIS — E55.9 HYPOVITAMINOSIS D: ICD-10-CM

## 2023-12-28 RX ORDER — ERGOCALCIFEROL 1.25 1/1
1 CAPSULE ORAL
Qty: 15 CAPSULE | Refills: 2 | Status: SHIPPED | OUTPATIENT
Start: 2023-12-28 | End: 2024-02-29 | Stop reason: WASHOUT

## 2024-01-01 ENCOUNTER — HOSPITAL ENCOUNTER (OUTPATIENT)
Facility: HOSPITAL | Age: 79
Setting detail: OUTPATIENT SURGERY
End: 2024-01-01
Attending: INTERNAL MEDICINE | Admitting: INTERNAL MEDICINE
Payer: MEDICARE

## 2024-01-01 ENCOUNTER — APPOINTMENT (OUTPATIENT)
Dept: RADIOLOGY | Facility: HOSPITAL | Age: 79
DRG: 871 | End: 2024-01-01
Payer: MEDICARE

## 2024-01-01 ENCOUNTER — APPOINTMENT (OUTPATIENT)
Dept: CARDIOLOGY | Facility: HOSPITAL | Age: 79
DRG: 871 | End: 2024-01-01
Payer: MEDICARE

## 2024-01-01 ENCOUNTER — HOSPITAL ENCOUNTER (OUTPATIENT)
Dept: CARDIOLOGY | Facility: HOSPITAL | Age: 79
Discharge: HOME | DRG: 871 | End: 2024-08-13
Payer: MEDICARE

## 2024-01-01 DIAGNOSIS — I21.4 NSTEMI (NON-ST ELEVATED MYOCARDIAL INFARCTION) (MULTI): ICD-10-CM

## 2024-01-01 DIAGNOSIS — I20.9 ANGINA PECTORIS (CMS-HCC): ICD-10-CM

## 2024-01-01 PROCEDURE — 71045 X-RAY EXAM CHEST 1 VIEW: CPT

## 2024-01-01 PROCEDURE — 71250 CT THORAX DX C-: CPT

## 2024-01-01 PROCEDURE — 93005 ELECTROCARDIOGRAM TRACING: CPT

## 2024-01-03 ENCOUNTER — APPOINTMENT (OUTPATIENT)
Dept: INFUSION THERAPY | Facility: CLINIC | Age: 79
End: 2024-01-03
Payer: MEDICARE

## 2024-01-04 LAB
ATRIAL RATE: 48 BPM
P AXIS: 33 DEGREES
P OFFSET: 175 MS
P ONSET: 117 MS
PR INTERVAL: 188 MS
Q ONSET: 211 MS
QRS COUNT: 8 BEATS
QRS DURATION: 112 MS
QT INTERVAL: 482 MS
QTC CALCULATION(BAZETT): 430 MS
QTC FREDERICIA: 447 MS
R AXIS: 25 DEGREES
T AXIS: -17 DEGREES
T OFFSET: 452 MS
VENTRICULAR RATE: 48 BPM

## 2024-01-08 DIAGNOSIS — G47.00 INSOMNIA, UNSPECIFIED TYPE: ICD-10-CM

## 2024-01-08 RX ORDER — ESZOPICLONE 2 MG/1
TABLET, FILM COATED ORAL
Qty: 30 TABLET | Refills: 2 | Status: SHIPPED | OUTPATIENT
Start: 2024-01-08 | End: 2024-01-09 | Stop reason: SDUPTHER

## 2024-01-09 ENCOUNTER — OFFICE VISIT (OUTPATIENT)
Dept: VASCULAR SURGERY | Facility: CLINIC | Age: 79
End: 2024-01-09
Payer: MEDICARE

## 2024-01-09 ENCOUNTER — CLINICAL SUPPORT (OUTPATIENT)
Dept: VASCULAR MEDICINE | Facility: CLINIC | Age: 79
End: 2024-01-09
Payer: MEDICARE

## 2024-01-09 VITALS — SYSTOLIC BLOOD PRESSURE: 134 MMHG | DIASTOLIC BLOOD PRESSURE: 47 MMHG | HEART RATE: 48 BPM

## 2024-01-09 DIAGNOSIS — G47.00 INSOMNIA, UNSPECIFIED TYPE: ICD-10-CM

## 2024-01-09 DIAGNOSIS — I65.23 BILATERAL CAROTID ARTERY STENOSIS: Primary | ICD-10-CM

## 2024-01-09 DIAGNOSIS — I65.23 BILATERAL CAROTID ARTERY STENOSIS: ICD-10-CM

## 2024-01-09 PROCEDURE — 3078F DIAST BP <80 MM HG: CPT | Performed by: SURGERY

## 2024-01-09 PROCEDURE — 1036F TOBACCO NON-USER: CPT | Performed by: SURGERY

## 2024-01-09 PROCEDURE — 93880 EXTRACRANIAL BILAT STUDY: CPT | Performed by: SURGERY

## 2024-01-09 PROCEDURE — 1126F AMNT PAIN NOTED NONE PRSNT: CPT | Performed by: SURGERY

## 2024-01-09 PROCEDURE — 99212 OFFICE O/P EST SF 10 MIN: CPT | Performed by: SURGERY

## 2024-01-09 PROCEDURE — 93880 EXTRACRANIAL BILAT STUDY: CPT

## 2024-01-09 PROCEDURE — 3075F SYST BP GE 130 - 139MM HG: CPT | Performed by: SURGERY

## 2024-01-09 PROCEDURE — 1159F MED LIST DOCD IN RCRD: CPT | Performed by: SURGERY

## 2024-01-09 RX ORDER — ESZOPICLONE 2 MG/1
TABLET, FILM COATED ORAL
Qty: 30 TABLET | Refills: 0 | Status: SHIPPED | OUTPATIENT
Start: 2024-01-09 | End: 2024-01-23 | Stop reason: SDUPTHER

## 2024-01-09 ASSESSMENT — PATIENT HEALTH QUESTIONNAIRE - PHQ9
2. FEELING DOWN, DEPRESSED OR HOPELESS: NOT AT ALL
1. LITTLE INTEREST OR PLEASURE IN DOING THINGS: NOT AT ALL
SUM OF ALL RESPONSES TO PHQ9 QUESTIONS 1 & 2: 0

## 2024-01-09 ASSESSMENT — ENCOUNTER SYMPTOMS
DEPRESSION: 0
OCCASIONAL FEELINGS OF UNSTEADINESS: 0
LOSS OF SENSATION IN FEET: 0

## 2024-01-09 ASSESSMENT — PAIN SCALES - GENERAL: PAINLEVEL: 0-NO PAIN

## 2024-01-09 NOTE — PROGRESS NOTES
Mr. Rowan comes for follow-up of his carotid disease.  His left common carotid artery has a calcified plaque that causes a greater than 70% stenosis.  Both internal carotid arteries are relatively patent.  He is asymptomatic of stroke.  He has good control of his cardiovascular risk factors and is on a statin and medications for his coronary artery disease and kidney failure.  He is not yet on dialysis.    As we have discussed at our last visit, patients with chronic kidney disease and asymptomatic carotid atherosclerosis do not benefit from prophylactic carotid surgery or interventions.  Was nice to see is that his carotid artery has not changed symptoms of velocities compared to his last ultrasound of February 2022.    I went to review back in 6 months for carotid duplex.  Meanwhile I asked that he continue his close follow-up and management of his cardiovascular risk factors and to start regular daily exercise.  He understands and agrees to proceed.

## 2024-01-17 ENCOUNTER — INFUSION (OUTPATIENT)
Dept: INFUSION THERAPY | Facility: CLINIC | Age: 79
End: 2024-01-17
Payer: MEDICARE

## 2024-01-17 VITALS
BODY MASS INDEX: 25.7 KG/M2 | DIASTOLIC BLOOD PRESSURE: 64 MMHG | TEMPERATURE: 97.7 F | WEIGHT: 164.1 LBS | HEART RATE: 60 BPM | RESPIRATION RATE: 16 BRPM | SYSTOLIC BLOOD PRESSURE: 159 MMHG | OXYGEN SATURATION: 98 %

## 2024-01-17 DIAGNOSIS — N18.4 CKD (CHRONIC KIDNEY DISEASE) STAGE 4, GFR 15-29 ML/MIN (MULTI): ICD-10-CM

## 2024-01-17 DIAGNOSIS — D63.8 ANEMIA OF CHRONIC DISEASE: ICD-10-CM

## 2024-01-17 LAB — POC HEMOGLOBIN: 9.4 G/DL (ref 13.5–17.5)

## 2024-01-17 PROCEDURE — 85018 HEMOGLOBIN: CPT | Performed by: NURSE PRACTITIONER

## 2024-01-17 PROCEDURE — 96372 THER/PROPH/DIAG INJ SC/IM: CPT | Performed by: NURSE PRACTITIONER

## 2024-01-17 RX ORDER — EPINEPHRINE 0.3 MG/.3ML
0.3 INJECTION SUBCUTANEOUS EVERY 5 MIN PRN
Status: CANCELLED | OUTPATIENT
Start: 2024-01-24

## 2024-01-17 RX ORDER — ALBUTEROL SULFATE 0.83 MG/ML
3 SOLUTION RESPIRATORY (INHALATION) AS NEEDED
Status: CANCELLED | OUTPATIENT
Start: 2024-01-24

## 2024-01-17 RX ORDER — DIPHENHYDRAMINE HYDROCHLORIDE 50 MG/ML
50 INJECTION INTRAMUSCULAR; INTRAVENOUS AS NEEDED
Status: CANCELLED | OUTPATIENT
Start: 2024-01-24

## 2024-01-17 RX ORDER — FAMOTIDINE 10 MG/ML
20 INJECTION INTRAVENOUS ONCE AS NEEDED
Status: CANCELLED | OUTPATIENT
Start: 2024-01-24

## 2024-01-17 ASSESSMENT — PAIN SCALES - GENERAL: PAINLEVEL: 0-NO PAIN

## 2024-01-17 NOTE — PATIENT INSTRUCTIONS
Today :We administered epoetin clyde-epbx.     For:   1. Anemia of chronic disease    2. CKD (chronic kidney disease) stage 4, GFR 15-29 ml/min (CMS/[facility])         Your next appointment is due in:  One Week from today        Please read the  Medication Guide that was given to you and reviewed during todays visit.     (Tell all doctors including dentists that you are taking this medication)     Go to the emergency room or call 911 if:  -You have signs of allergic reaction:   -Rash, hives, itching.   -Swollen, blistered, peeling skin.   -Swelling of face, lips, mouth, tongue or throat.   -Tightness of chest, trouble breathing, swallowing or talking     Call your doctor:  - If IV / injection site gets red, warm, swollen, itchy or leaks fluid or pus.     (Leave dressing on your IV site for at least 2 hours and keep area clean and dry  - If you get sick or have symptoms of infection or are not feeling well for any reason.    (Wash your hands often, stay away from people who are sick)  - If you have side effects from your medication that do not go away or are bothersome.     (Refer to the teaching your nurse gave you for side effects to call your doctor about)    - Common side effects may include:  stuffy nose, headache, feeling tired, muscle aches, upset stomach  - Before receiving any vaccines     - [facility] at   If:  - You get sick, are on antibiotics, have had a recent vaccine, have surgery or dental work and your doctor wants your visit rescheduled.  - You need to cancel and reschedule your visit for any reason. Call at least 2 days before your visit if you need to cancel.   - Your insurance changes before your next visit.    (We will need to get approval from your new insurance. This can take up to two weeks.)     [facility] is opened Monday thru Friday. We are closed on weekends and holidays.   Voice mail will take your call if the center is closed. If you leave a  message please allow 24 hours for a call back during weekdays. If you leave a message on a weekend/holiday, we will call you back the next business day.

## 2024-01-17 NOTE — PROGRESS NOTES
[facility]   infusion Clinic Note   Date: 2024   Name: George Rowan  : 1945   MRN: [id number]         Reason for Visit: Injections (Procrit)         Visit Type: INJECTION      Ordered By: Ean      Accompanied by:Self      Diagnosis: Anemia of chronic disease    CKD (chronic kidney disease) stage 4, GFR 15-29 ml/min (CMS/formerly [facility])       Allergies:   Allergies as of 2024 - Reviewed 2024   Allergen Reaction Noted   • Azathioprine Other 2023   • Fluorescein-benoxinate Swelling 2023   • Other Unknown 2023   • Tetracaine Other 2023         Current Medications has a current medication list which includes the following prescription(s): allopurinol, aspirin, atorvastatin, calcitriol, carvedilol, procrit, erythromycin, eszopiclone, fluticasone, gabapentin, hydralazine/hydrochlorothiazid, hydroxychloroquine, levothyroxine, loratadine, losartan, NON FORMULARY, and vitamin d2.       Vitals:  Vitals:    24 0903   BP: 159/64   Pulse: 60   Resp: 16   Temp: 36.5 °C (97.7 °F)   SpO2: 98%   Weight: 74.4 kg (164 lb 1.6 oz)             Infusion Pre-procedure Checklist:   - Allergies reviewed: yes   - Medications reviewed: yes       - Previous reaction to current treatment: no      Assess patient for the concerns below. Document provider notification as appropriate.  - Active or recent infection with/without current antibiotic use: no  - Recent or planned invasive dental work: no  - Recent or planned surgeries: no  - Recently received or plans to receive vaccinations: no  - Has treatment related toxicities: no  - Is pregnant:  n/a      Pain: 0   - Is the pain different from normal: no   - Is the pain tolerable: n/a   - Is your Doctor aware:  n/a      Labs:  POC HGB 9.4 24         Fall Risk Screening: Chris Fall Risk  History of Falling, Immediate or Within 3 Months: No  Secondary Diagnosis: Yes  Ambulatory Aid: Walks without aid/bedrest/nurse  "assist  Intravenous Therapy/Heparin Lock: No  Gait/Transferring: Normal/bedrest/immobile  Mental Status: Oriented to own ability  Perez Fall Risk Score: 15       Review Of Systems:  Review of Systems - Oncology      Infusion Readiness:   - Assessment Concerns Related to Infusion: No  - Provider notified: n/a      Document Below Only If Indicated:   New Patient Education:    N/A (returning patient for continuation of therapy. Ongoing education provided as needed.)        Treatment Conditions & Drug Specific Questions:    Epoetin Burak (EPOGEN. PROCRIT, RETACRIT)    TREATMENT CONDITIONS:    Unless otherwise specified on patient specific therapy plan HOLD and notify provider prior to proceeding with today's injection if:  o Hemoglobin GREATER THAN 11 g/dL (parameter set by prescribing provider)  o Increase in hemoglobin GREATER THAN 1 g/dL   o SBP GREATER THAN 160 mmHg    Lab Results   Component Value Date/Time    HGB 9.4 (A) 01/17/2024 0922    HGB 9.7 (A) 12/20/2023 1025    HGB 10.0 (A) 12/06/2023 1429     No results found for: \"HGBCAP\"     Labs reviewed and patient meets treatment conditions? Yes    DRUG SPECIFIC QUESTIONS:  - Does the patient have uncontrolled hypertension?  No    (Use is contraindicated in patients with uncontrolled hypertension)     - Educate on the following:? Yes       - Increased risk of serious cardiovascular reactions, thromboembolic reactions, stroke and tumor progression.        - Need to undergo regular blood pressure monitoring. Adhere to prescribed anti-hypertensive regimen and follow recommended dietary restrictions.        - Need to contact their healthcare provider for new onset neurologic symptoms or change in seizure frequency.        - Need for  regular laboratory tests to monitor hemoglobin levels.          Weight Based Drug Calculations:    WEIGHT BASED DRUGS: NOT APPLICABLE / FLAT DOSE          Initiated By: Alyson Flores RN   Time: 9:23 AM     We administered epoetin " clyde-epbx.

## 2024-01-23 ENCOUNTER — OFFICE VISIT (OUTPATIENT)
Dept: PRIMARY CARE | Facility: CLINIC | Age: 79
End: 2024-01-23
Payer: MEDICARE

## 2024-01-23 VITALS
HEIGHT: 67 IN | TEMPERATURE: 98.1 F | BODY MASS INDEX: 25.58 KG/M2 | SYSTOLIC BLOOD PRESSURE: 110 MMHG | WEIGHT: 163 LBS | OXYGEN SATURATION: 99 % | HEART RATE: 78 BPM | RESPIRATION RATE: 20 BRPM | DIASTOLIC BLOOD PRESSURE: 70 MMHG

## 2024-01-23 DIAGNOSIS — I65.23 BILATERAL CAROTID ARTERY STENOSIS: ICD-10-CM

## 2024-01-23 DIAGNOSIS — G47.00 INSOMNIA, UNSPECIFIED TYPE: Primary | ICD-10-CM

## 2024-01-23 PROCEDURE — 99213 OFFICE O/P EST LOW 20 MIN: CPT | Performed by: FAMILY MEDICINE

## 2024-01-23 PROCEDURE — 3078F DIAST BP <80 MM HG: CPT | Performed by: FAMILY MEDICINE

## 2024-01-23 PROCEDURE — 3074F SYST BP LT 130 MM HG: CPT | Performed by: FAMILY MEDICINE

## 2024-01-23 PROCEDURE — 1159F MED LIST DOCD IN RCRD: CPT | Performed by: FAMILY MEDICINE

## 2024-01-23 PROCEDURE — 1036F TOBACCO NON-USER: CPT | Performed by: FAMILY MEDICINE

## 2024-01-23 PROCEDURE — 1126F AMNT PAIN NOTED NONE PRSNT: CPT | Performed by: FAMILY MEDICINE

## 2024-01-23 RX ORDER — ESZOPICLONE 2 MG/1
TABLET, FILM COATED ORAL
Qty: 30 TABLET | Refills: 2 | Status: SHIPPED | OUTPATIENT
Start: 2024-01-23 | End: 2024-04-18 | Stop reason: ALTCHOICE

## 2024-01-23 NOTE — PROGRESS NOTES
"Subjective   Patient ID: George Rowan is a 78 y.o. male who presents for Med Refill (PATIENT IS HERE FOR REFILL ON HIS LUNESTA).    HPI He had carotid surgery scheduled originally and then had another opinion and repeat US in Jan.      Review of Systems   Constitutional: Negative.    HENT: Negative.     Respiratory: Negative.     Cardiovascular: Negative.    Gastrointestinal: Negative.    Genitourinary: Negative.    Musculoskeletal: Negative.    Neurological: Negative.        Objective   /70 (BP Location: Right arm, Patient Position: Sitting, BP Cuff Size: Adult)   Pulse 78   Temp 36.7 °C (98.1 °F) (Skin)   Resp 20   Ht 1.702 m (5' 7\")   Wt 73.9 kg (163 lb)   SpO2 99%   BMI 25.53 kg/m²     Physical Exam  Constitutional:       General: He is not in acute distress.     Appearance: Normal appearance.   Cardiovascular:      Rate and Rhythm: Normal rate and regular rhythm.      Heart sounds: No murmur heard.  Pulmonary:      Breath sounds: Normal breath sounds. No wheezing.   Neurological:      Mental Status: He is alert.         Assessment/Plan   Problem List Items Addressed This Visit             ICD-10-CM    Carotid artery stenosis I65.29     Other Visit Diagnoses         Codes    Insomnia, unspecified type    -  Primary G47.00    Relevant Medications    eszopiclone (Lunesta) 2 mg tablet          Discussed pros/cons of ongoing sleep med use an he will just use occasionally and try to wean off over time.   Follow up with vascular as scheduled.      "

## 2024-01-24 ENCOUNTER — LAB (OUTPATIENT)
Dept: LAB | Facility: LAB | Age: 79
End: 2024-01-24
Payer: MEDICARE

## 2024-01-24 DIAGNOSIS — N18.4 CHRONIC KIDNEY DISEASE, STAGE 4 (SEVERE) (MULTI): ICD-10-CM

## 2024-01-24 DIAGNOSIS — N25.81 SECONDARY HYPERPARATHYROIDISM OF RENAL ORIGIN (MULTI): ICD-10-CM

## 2024-01-24 DIAGNOSIS — N25.81 SECONDARY HYPERPARATHYROIDISM OF RENAL ORIGIN (MULTI): Primary | ICD-10-CM

## 2024-01-24 LAB
ALBUMIN SERPL BCP-MCNC: 3.5 G/DL (ref 3.4–5)
ANION GAP SERPL CALC-SCNC: 13 MMOL/L (ref 10–20)
BUN SERPL-MCNC: 52 MG/DL (ref 6–23)
CALCIUM SERPL-MCNC: 8.1 MG/DL (ref 8.6–10.3)
CHLORIDE SERPL-SCNC: 107 MMOL/L (ref 98–107)
CO2 SERPL-SCNC: 23 MMOL/L (ref 21–32)
CREAT SERPL-MCNC: 4.66 MG/DL (ref 0.5–1.3)
EGFRCR SERPLBLD CKD-EPI 2021: 12 ML/MIN/1.73M*2
ERYTHROCYTE [DISTWIDTH] IN BLOOD BY AUTOMATED COUNT: 15.4 % (ref 11.5–14.5)
GLUCOSE SERPL-MCNC: 127 MG/DL (ref 74–99)
HCT VFR BLD AUTO: 28.6 % (ref 41–52)
HGB BLD-MCNC: 9.1 G/DL (ref 13.5–17.5)
MCH RBC QN AUTO: 32.5 PG (ref 26–34)
MCHC RBC AUTO-ENTMCNC: 31.8 G/DL (ref 32–36)
MCV RBC AUTO: 102 FL (ref 80–100)
NRBC BLD-RTO: 0.3 /100 WBCS (ref 0–0)
PHOSPHATE SERPL-MCNC: 4.8 MG/DL (ref 2.5–4.9)
PLATELET # BLD AUTO: 231 X10*3/UL (ref 150–450)
POTASSIUM SERPL-SCNC: 4.6 MMOL/L (ref 3.5–5.3)
RBC # BLD AUTO: 2.8 X10*6/UL (ref 4.5–5.9)
SODIUM SERPL-SCNC: 138 MMOL/L (ref 136–145)
WBC # BLD AUTO: 6.9 X10*3/UL (ref 4.4–11.3)

## 2024-01-24 PROCEDURE — 85027 COMPLETE CBC AUTOMATED: CPT

## 2024-01-24 PROCEDURE — 83970 ASSAY OF PARATHORMONE: CPT

## 2024-01-24 PROCEDURE — 36415 COLL VENOUS BLD VENIPUNCTURE: CPT

## 2024-01-24 PROCEDURE — 80069 RENAL FUNCTION PANEL: CPT

## 2024-01-25 DIAGNOSIS — D63.8 ANEMIA OF CHRONIC DISEASE: Primary | ICD-10-CM

## 2024-01-25 DIAGNOSIS — N18.4 CKD (CHRONIC KIDNEY DISEASE) STAGE 4, GFR 15-29 ML/MIN (MULTI): ICD-10-CM

## 2024-01-25 LAB — PTH-INTACT SERPL-MCNC: 120.7 PG/ML (ref 18.5–88)

## 2024-01-25 RX ORDER — DIPHENHYDRAMINE HYDROCHLORIDE 50 MG/ML
50 INJECTION INTRAMUSCULAR; INTRAVENOUS AS NEEDED
Status: CANCELLED | OUTPATIENT
Start: 2024-01-31

## 2024-01-25 RX ORDER — ALBUTEROL SULFATE 0.83 MG/ML
3 SOLUTION RESPIRATORY (INHALATION) AS NEEDED
Status: CANCELLED | OUTPATIENT
Start: 2024-01-31

## 2024-01-25 RX ORDER — EPINEPHRINE 0.3 MG/.3ML
0.3 INJECTION SUBCUTANEOUS EVERY 5 MIN PRN
Status: CANCELLED | OUTPATIENT
Start: 2024-01-31

## 2024-01-25 RX ORDER — FAMOTIDINE 10 MG/ML
20 INJECTION INTRAVENOUS ONCE AS NEEDED
Status: CANCELLED | OUTPATIENT
Start: 2024-01-31

## 2024-01-28 DIAGNOSIS — M32.9 SYSTEMIC LUPUS ERYTHEMATOSUS, UNSPECIFIED SLE TYPE, UNSPECIFIED ORGAN INVOLVEMENT STATUS (MULTI): ICD-10-CM

## 2024-01-28 ASSESSMENT — ENCOUNTER SYMPTOMS
MUSCULOSKELETAL NEGATIVE: 1
CARDIOVASCULAR NEGATIVE: 1
RESPIRATORY NEGATIVE: 1
GASTROINTESTINAL NEGATIVE: 1
NEUROLOGICAL NEGATIVE: 1
CONSTITUTIONAL NEGATIVE: 1

## 2024-01-29 RX ORDER — HYDROXYCHLOROQUINE SULFATE 200 MG/1
TABLET, FILM COATED ORAL DAILY
Qty: 100 TABLET | Refills: 2 | Status: SHIPPED | OUTPATIENT
Start: 2024-01-29

## 2024-01-31 ENCOUNTER — INFUSION (OUTPATIENT)
Dept: INFUSION THERAPY | Facility: CLINIC | Age: 79
End: 2024-01-31
Payer: MEDICARE

## 2024-01-31 VITALS
BODY MASS INDEX: 25.56 KG/M2 | DIASTOLIC BLOOD PRESSURE: 60 MMHG | RESPIRATION RATE: 16 BRPM | TEMPERATURE: 97.8 F | WEIGHT: 163.2 LBS | SYSTOLIC BLOOD PRESSURE: 125 MMHG | HEART RATE: 68 BPM | OXYGEN SATURATION: 99 %

## 2024-01-31 DIAGNOSIS — D63.8 ANEMIA OF CHRONIC DISEASE: ICD-10-CM

## 2024-01-31 DIAGNOSIS — N18.4 CKD (CHRONIC KIDNEY DISEASE) STAGE 4, GFR 15-29 ML/MIN (MULTI): ICD-10-CM

## 2024-01-31 PROCEDURE — 96372 THER/PROPH/DIAG INJ SC/IM: CPT | Performed by: NURSE PRACTITIONER

## 2024-01-31 RX ORDER — EPINEPHRINE 0.3 MG/.3ML
0.3 INJECTION SUBCUTANEOUS EVERY 5 MIN PRN
Status: CANCELLED | OUTPATIENT
Start: 2024-02-07

## 2024-01-31 RX ORDER — FAMOTIDINE 10 MG/ML
20 INJECTION INTRAVENOUS ONCE AS NEEDED
Status: CANCELLED | OUTPATIENT
Start: 2024-02-07

## 2024-01-31 RX ORDER — ALBUTEROL SULFATE 0.83 MG/ML
3 SOLUTION RESPIRATORY (INHALATION) AS NEEDED
Status: CANCELLED | OUTPATIENT
Start: 2024-02-07

## 2024-01-31 RX ORDER — DIPHENHYDRAMINE HYDROCHLORIDE 50 MG/ML
50 INJECTION INTRAMUSCULAR; INTRAVENOUS AS NEEDED
Status: CANCELLED | OUTPATIENT
Start: 2024-02-07

## 2024-01-31 ASSESSMENT — PAIN SCALES - GENERAL: PAINLEVEL: 0-NO PAIN

## 2024-01-31 NOTE — PROGRESS NOTES
[facility]   infusion Clinic Note   Date: 2024   Name: George Rowan  : 1945   MRN: [id number]         Reason for Visit: OP Infusion (retacrit)         Visit Type: INJECTION      Ordered By: Ean      Accompanied by:Self      Diagnosis: Anemia of chronic disease    CKD (chronic kidney disease) stage 4, GFR 15-29 ml/min (CMS/[facility])         Allergies:   Allergies as of 2024 - Reviewed 2024   Allergen Reaction Noted    Azathioprine Other 2023    Fluorescein-benoxinate Swelling 2023    Other Unknown 2023    Tetracaine Other 2023         Current Medications has a current medication list which includes the following prescription(s): allopurinol, aspirin, atorvastatin, calcitriol, carvedilol, procrit, erythromycin, eszopiclone, fluticasone, gabapentin, hydralazine/hydrochlorothiazid, hydroxychloroquine, levothyroxine, loratadine, losartan, NON FORMULARY, and vitamin d2.       Vitals:  Vitals:    24 0848   BP: 125/60   BP Location: Left arm   Patient Position: Sitting   BP Cuff Size: Adult long   Pulse: 68   Resp: 16   Temp: 36.6 °C (97.8 °F)   TempSrc: Temporal   SpO2: 99%   Weight: 74 kg (163 lb 3.2 oz)             Infusion Pre-procedure Checklist:   - Allergies reviewed: yes   - Medications reviewed: yes       - Previous reaction to current treatment: no      Assess patient for the concerns below. Document provider notification as appropriate.  - Active or recent infection with/without current antibiotic use: no  - Recent or planned invasive dental work: no  - Recent or planned surgeries: no  - Recently received or plans to receive vaccinations: no  - Has treatment related toxicities: no  - Is pregnant:  n/a      Pain: 0   - Is the pain different from normal: no   - Is the pain tolerable: n/a   - Is your Doctor aware:  n/a      Labs: Hgb 9.1 24         Fall Risk Screening: Chris Fall Risk  History of Falling, Immediate or Within  "3 Months: No  Secondary Diagnosis: Yes  Ambulatory Aid: Walks without aid/bedrest/nurse assist  Intravenous Therapy/Heparin Lock: No  Gait/Transferring: Normal/bedrest/immobile  Mental Status: Oriented to own ability  Perez Fall Risk Score: 15       Review Of Systems:  Review of Systems   All other systems reviewed and are negative.        Infusion Readiness:   - Assessment Concerns Related to Infusion: No  - Provider notified: n/a      Document Below Only If Indicated:   New Patient Education:    N/A (returning patient for continuation of therapy. Ongoing education provided as needed.)        Treatment Conditions & Drug Specific Questions:    Epoetin Burak (EPOGEN. PROCRIT, RETACRIT)    TREATMENT CONDITIONS:    Unless otherwise specified on patient specific therapy plan HOLD and notify provider prior to proceeding with today's injection if:  o Hemoglobin GREATER THAN 11 g/dL (parameter set by prescribing provider)  o Increase in hemoglobin GREATER THAN 1 g/dL   o SBP GREATER THAN 160 mmHg    Lab Results   Component Value Date/Time    HGB 9.1 (L) 01/24/2024 1145    HGB 9.4 (A) 01/17/2024 0922    HGB 9.7 (A) 12/20/2023 1025    HGB 10.0 (A) 12/06/2023 1429     No results found for: \"HGBCAP\"     Labs reviewed and patient meets treatment conditions? Yes    DRUG SPECIFIC QUESTIONS:  - Does the patient have uncontrolled hypertension?  No    (Use is contraindicated in patients with uncontrolled hypertension)     - Educate on the following:? Yes       - Increased risk of serious cardiovascular reactions, thromboembolic reactions, stroke and tumor progression.        - Need to undergo regular blood pressure monitoring. Adhere to prescribed anti-hypertensive regimen and follow recommended dietary restrictions.        - Need to contact their healthcare provider for new onset neurologic symptoms or change in seizure frequency.        - Need for  regular laboratory tests to monitor hemoglobin levels.          Weight Based Drug " Calculations:    WEIGHT BASED DRUGS: NOT APPLICABLE / FLAT DOSE          Initiated By: Terrell Alanis RN   Time: 9:05 AM     We administered epoetin clyde-epbx.

## 2024-01-31 NOTE — PATIENT INSTRUCTIONS
Today :We administered epoetin clyde-epbx.     For:   1. Anemia of chronic disease    2. CKD (chronic kidney disease) stage 4, GFR 15-29 ml/min (CMS/formerly [facility])         Your next appointment is due in:  one week        Please read the  Medication Guide that was given to you and reviewed during todays visit.     (Tell all doctors including dentists that you are taking this medication)     Go to the emergency room or call 911 if:  -You have signs of allergic reaction:   -Rash, hives, itching.   -Swollen, blistered, peeling skin.   -Swelling of face, lips, mouth, tongue or throat.   -Tightness of chest, trouble breathing, swallowing or talking     Call your doctor:  - If IV / injection site gets red, warm, swollen, itchy or leaks fluid or pus.     (Leave dressing on your IV site for at least 2 hours and keep area clean and dry  - If you get sick or have symptoms of infection or are not feeling well for any reason.    (Wash your hands often, stay away from people who are sick)  - If you have side effects from your medication that do not go away or are bothersome.     (Refer to the teaching your nurse gave you for side effects to call your doctor about)    - Common side effects may include:  stuffy nose, headache, feeling tired, muscle aches, upset stomach  - Before receiving any vaccines     - Call [facility] at   If:  - You get sick, are on antibiotics, have had a recent vaccine, have surgery or dental work and your doctor wants your visit rescheduled.  - You need to cancel and reschedule your visit for any reason. Call at least 2 days before your visit if you need to cancel.   - Your insurance changes before your next visit.    (We will need to get approval from your new insurance. This can take up to two weeks.)     [facility] is opened Monday thru Friday. We are closed on weekends and holidays.   Voice mail will take your call if the center is closed. If you leave a message please  allow 24 hours for a call back during weekdays. If you leave a message on a weekend/holiday, we will call you back the next business day.

## 2024-02-08 ENCOUNTER — INFUSION (OUTPATIENT)
Dept: INFUSION THERAPY | Facility: CLINIC | Age: 79
End: 2024-02-08
Payer: MEDICARE

## 2024-02-08 VITALS
WEIGHT: 163 LBS | OXYGEN SATURATION: 96 % | SYSTOLIC BLOOD PRESSURE: 126 MMHG | DIASTOLIC BLOOD PRESSURE: 58 MMHG | RESPIRATION RATE: 16 BRPM | TEMPERATURE: 97.8 F | HEART RATE: 62 BPM | BODY MASS INDEX: 25.53 KG/M2

## 2024-02-08 DIAGNOSIS — N18.4 CKD (CHRONIC KIDNEY DISEASE) STAGE 4, GFR 15-29 ML/MIN (MULTI): ICD-10-CM

## 2024-02-08 DIAGNOSIS — D63.8 ANEMIA OF CHRONIC DISEASE: ICD-10-CM

## 2024-02-08 LAB
POC HEMOGLOBIN: 9.3 G/DL (ref 13.5–17.5)
POC HEMOGLOBIN: 9.3 G/DL (ref 13.5–17.5)

## 2024-02-08 PROCEDURE — 85018 HEMOGLOBIN: CPT | Performed by: NURSE PRACTITIONER

## 2024-02-08 PROCEDURE — 96372 THER/PROPH/DIAG INJ SC/IM: CPT | Performed by: NURSE PRACTITIONER

## 2024-02-08 RX ORDER — ALBUTEROL SULFATE 0.83 MG/ML
3 SOLUTION RESPIRATORY (INHALATION) AS NEEDED
Status: CANCELLED | OUTPATIENT
Start: 2024-02-14

## 2024-02-08 RX ORDER — FAMOTIDINE 10 MG/ML
20 INJECTION INTRAVENOUS ONCE AS NEEDED
Status: CANCELLED | OUTPATIENT
Start: 2024-02-14

## 2024-02-08 RX ORDER — EPINEPHRINE 0.3 MG/.3ML
0.3 INJECTION SUBCUTANEOUS EVERY 5 MIN PRN
Status: CANCELLED | OUTPATIENT
Start: 2024-02-14

## 2024-02-08 RX ORDER — DIPHENHYDRAMINE HYDROCHLORIDE 50 MG/ML
50 INJECTION INTRAMUSCULAR; INTRAVENOUS AS NEEDED
Status: CANCELLED | OUTPATIENT
Start: 2024-02-14

## 2024-02-08 ASSESSMENT — PAIN SCALES - GENERAL: PAINLEVEL: 0-NO PAIN

## 2024-02-08 NOTE — PATIENT INSTRUCTIONS
Today :We administered epoetin clyde-epbx.     For:   1. Anemia of chronic disease    2. CKD (chronic kidney disease) stage 4, GFR 15-29 ml/min (CMS/[facility])         Your next appointment is due in:  2/14/24        Please read the  Medication Guide that was given to you and reviewed during todays visit.     (Tell all doctors including dentists that you are taking this medication)     Go to the emergency room or call 911 if:  -You have signs of allergic reaction:   -Rash, hives, itching.   -Swollen, blistered, peeling skin.   -Swelling of face, lips, mouth, tongue or throat.   -Tightness of chest, trouble breathing, swallowing or talking     Call your doctor:  - If IV / injection site gets red, warm, swollen, itchy or leaks fluid or pus.     (Leave dressing on your IV site for at least 2 hours and keep area clean and dry  - If you get sick or have symptoms of infection or are not feeling well for any reason.    (Wash your hands often, stay away from people who are sick)  - If you have side effects from your medication that do not go away or are bothersome.     (Refer to the teaching your nurse gave you for side effects to call your doctor about)    - Common side effects may include:  stuffy nose, headache, feeling tired, muscle aches, upset stomach  - Before receiving any vaccines     - Call the [facility] at   If:  - You get sick, are on antibiotics, have had a recent vaccine, have surgery or dental work and your doctor wants your visit rescheduled.  - You need to cancel and reschedule your visit for any reason. Call at least 2 days before your visit if you need to cancel.   - Your insurance changes before your next visit.    (We will need to get approval from your new insurance. This can take up to two weeks.)     [facility] is opened Monday thru Friday. We are closed on weekends and holidays.   Voice mail will take your call if the center is closed. If you leave a message please  allow 24 hours for a call back during weekdays. If you leave a message on a weekend/holiday, we will call you back the next business day.

## 2024-02-14 ENCOUNTER — INFUSION (OUTPATIENT)
Dept: INFUSION THERAPY | Facility: CLINIC | Age: 79
End: 2024-02-14
Payer: MEDICARE

## 2024-02-14 VITALS
WEIGHT: 163 LBS | OXYGEN SATURATION: 99 % | DIASTOLIC BLOOD PRESSURE: 66 MMHG | TEMPERATURE: 98.2 F | RESPIRATION RATE: 16 BRPM | SYSTOLIC BLOOD PRESSURE: 132 MMHG | HEART RATE: 57 BPM | BODY MASS INDEX: 25.53 KG/M2

## 2024-02-14 DIAGNOSIS — N18.4 CKD (CHRONIC KIDNEY DISEASE) STAGE 4, GFR 15-29 ML/MIN (MULTI): ICD-10-CM

## 2024-02-14 DIAGNOSIS — D63.8 ANEMIA OF CHRONIC DISEASE: ICD-10-CM

## 2024-02-14 PROCEDURE — 96372 THER/PROPH/DIAG INJ SC/IM: CPT | Performed by: NURSE PRACTITIONER

## 2024-02-14 RX ORDER — ALBUTEROL SULFATE 0.83 MG/ML
3 SOLUTION RESPIRATORY (INHALATION) AS NEEDED
Status: CANCELLED | OUTPATIENT
Start: 2024-02-15

## 2024-02-14 RX ORDER — EPINEPHRINE 0.3 MG/.3ML
0.3 INJECTION SUBCUTANEOUS EVERY 5 MIN PRN
Status: CANCELLED | OUTPATIENT
Start: 2024-02-15

## 2024-02-14 RX ORDER — FAMOTIDINE 10 MG/ML
20 INJECTION INTRAVENOUS ONCE AS NEEDED
Status: CANCELLED | OUTPATIENT
Start: 2024-02-15

## 2024-02-14 RX ORDER — DIPHENHYDRAMINE HYDROCHLORIDE 50 MG/ML
50 INJECTION INTRAMUSCULAR; INTRAVENOUS AS NEEDED
Status: CANCELLED | OUTPATIENT
Start: 2024-02-15

## 2024-02-14 ASSESSMENT — PAIN SCALES - GENERAL: PAINLEVEL: 0-NO PAIN

## 2024-02-14 ASSESSMENT — ENCOUNTER SYMPTOMS: FATIGUE: 1

## 2024-02-14 NOTE — PROGRESS NOTES
[facility]   infusion Clinic Note   Date: 2024   Name: George Rowan  : 1945   MRN: [id number]         Reason for Visit: OP Infusion (Retacrit injection)         Visit Type: INJECTION       Ordered By: Dr. Mckoy      Accompanied by:Self      Diagnosis: Anemia of chronic disease    CKD (chronic kidney disease) stage 4, GFR 15-29 ml/min (CMS/[facility])       Allergies:   Allergies as of 2024 - Reviewed 2024   Allergen Reaction Noted    Azathioprine Other 2023    Fluorescein-benoxinate Swelling 2023    Other Unknown 2023    Tetracaine Other 2023         Current Medications has a current medication list which includes the following prescription(s): allopurinol, aspirin, atorvastatin, calcitriol, carvedilol, procrit, erythromycin, eszopiclone, fluticasone, gabapentin, hydralazine/hydrochlorothiazid, hydroxychloroquine, levothyroxine, loratadine, losartan, NON FORMULARY, and vitamin d2.       Vitals:   Vitals:    24 0856   BP: 132/66   Pulse: 57   Resp: 16   Temp: 36.8 °C (98.2 °F)   TempSrc: Temporal   SpO2: 99%   Weight: 73.9 kg (163 lb)   PainSc: 0-No pain             Infusion Pre-procedure Checklist:   - Allergies reviewed: yes   - Medications reviewed: yes       - Previous reaction to current treatment: no      Assess patient for the concerns below. Document provider notification as appropriate.  - Active or recent infection with/without current antibiotic use: n/a  - Recent or planned invasive dental work: n/a  - Recent or planned surgeries: n/a  - Recently received or plans to receive vaccinations: n/a  - Has treatment related toxicities: n/a  - Is pregnant:  n/a      Pain: 0   - Is the pain different from normal: no   - Is the pain tolerable: n/a   - Is your Doctor aware:  n/a      Labs:  reviewed         Fall Risk Screening: Chris Fall Risk  History of Falling, Immediate or Within 3 Months: Yes  Secondary Diagnosis:  "No  Ambulatory Aid: Walks without aid/bedrest/nurse assist  Intravenous Therapy/Heparin Lock: No  Gait/Transferring: Normal/bedrest/immobile  Mental Status: Oriented to own ability  Perez Fall Risk Score: 25       Review Of Systems:  Review of Systems   Constitutional:  Positive for fatigue.   All other systems reviewed and are negative.        Infusion Readiness:   - Assessment Concerns Related to Infusion: No  - Provider notified: no      Document Below Only If Indicated:   New Patient Education:    N/A (returning patient for continuation of therapy. Ongoing education provided as needed.)        Treatment Conditions & Drug Specific Questions:    Epoetin Burak (EPOGEN. PROCRIT, RETACRIT)    TREATMENT CONDITIONS:    Unless otherwise specified on patient specific therapy plan HOLD and notify provider prior to proceeding with today's injection if:  o Hemoglobin GREATER THAN 11 g/dL (parameter set by prescribing provider)  o Increase in hemoglobin GREATER THAN 1 g/dL   o SBP GREATER THAN 160 mmHg    Lab Results   Component Value Date/Time    HGB 9.3 (A) 02/08/2024 0953    HGB 9.3 (A) 02/08/2024 0953    HGB 9.1 (L) 01/24/2024 1145    HGB 9.4 (A) 01/17/2024 0922     No results found for: \"HGBCAP\"     Labs reviewed and patient meets treatment conditions? Yes    DRUG SPECIFIC QUESTIONS:  - Does the patient have uncontrolled hypertension?  No    (Use is contraindicated in patients with uncontrolled hypertension)     - Educate on the following:? Yes       - Increased risk of serious cardiovascular reactions, thromboembolic reactions, stroke and tumor progression.        - Need to undergo regular blood pressure monitoring. Adhere to prescribed anti-hypertensive regimen and follow recommended dietary restrictions.        - Need to contact their healthcare provider for new onset neurologic symptoms or change in seizure frequency.        - Need for  regular laboratory tests to monitor hemoglobin levels.          Weight Based Drug " Calculations:    WEIGHT BASED DRUGS: NOT APPLICABLE / FLAT DOSE          Initiated By: Rosa M Swenson RN   Time: 9:14 AM     We administered epoetin clyde-epbx.    Procrit 15,000 units subcutaneous given.  Tolerated injection well.  RTC 2/20/24.

## 2024-02-15 ENCOUNTER — DOCUMENTATION (OUTPATIENT)
Dept: INFUSION THERAPY | Facility: CLINIC | Age: 79
End: 2024-02-15
Payer: MEDICARE

## 2024-02-15 DIAGNOSIS — D50.9 IRON DEFICIENCY ANEMIA, UNSPECIFIED IRON DEFICIENCY ANEMIA TYPE: ICD-10-CM

## 2024-02-15 DIAGNOSIS — N18.5 CKD (CHRONIC KIDNEY DISEASE) STAGE 5, GFR LESS THAN 15 ML/MIN (MULTI): Primary | ICD-10-CM

## 2024-02-16 ENCOUNTER — LAB (OUTPATIENT)
Dept: LAB | Facility: LAB | Age: 79
End: 2024-02-16
Payer: MEDICARE

## 2024-02-16 DIAGNOSIS — N18.5 CKD (CHRONIC KIDNEY DISEASE) STAGE 5, GFR LESS THAN 15 ML/MIN (MULTI): ICD-10-CM

## 2024-02-16 DIAGNOSIS — D50.9 IRON DEFICIENCY ANEMIA, UNSPECIFIED IRON DEFICIENCY ANEMIA TYPE: ICD-10-CM

## 2024-02-16 LAB
FERRITIN SERPL-MCNC: 54 NG/ML (ref 20–300)
IRON SATN MFR SERPL: 12 % (ref 25–45)
IRON SERPL-MCNC: 35 UG/DL (ref 35–150)
TIBC SERPL-MCNC: 282 UG/DL (ref 240–445)
UIBC SERPL-MCNC: 247 UG/DL (ref 110–370)

## 2024-02-16 PROCEDURE — 82728 ASSAY OF FERRITIN: CPT

## 2024-02-16 PROCEDURE — 36415 COLL VENOUS BLD VENIPUNCTURE: CPT

## 2024-02-16 PROCEDURE — 83550 IRON BINDING TEST: CPT

## 2024-02-16 PROCEDURE — 83540 ASSAY OF IRON: CPT

## 2024-02-20 ENCOUNTER — APPOINTMENT (OUTPATIENT)
Dept: INFUSION THERAPY | Facility: CLINIC | Age: 79
End: 2024-02-20
Payer: MEDICARE

## 2024-02-21 ENCOUNTER — OFFICE VISIT (OUTPATIENT)
Dept: CARDIOLOGY | Facility: CLINIC | Age: 79
End: 2024-02-21
Payer: MEDICARE

## 2024-02-21 VITALS
WEIGHT: 165 LBS | DIASTOLIC BLOOD PRESSURE: 62 MMHG | HEART RATE: 61 BPM | OXYGEN SATURATION: 99 % | SYSTOLIC BLOOD PRESSURE: 138 MMHG | BODY MASS INDEX: 25.84 KG/M2

## 2024-02-21 DIAGNOSIS — I25.10 CORONARY ARTERY DISEASE INVOLVING NATIVE CORONARY ARTERY OF NATIVE HEART WITHOUT ANGINA PECTORIS: Primary | ICD-10-CM

## 2024-02-21 DIAGNOSIS — I35.0 NONRHEUMATIC AORTIC VALVE STENOSIS: ICD-10-CM

## 2024-02-21 PROCEDURE — 99214 OFFICE O/P EST MOD 30 MIN: CPT | Performed by: INTERNAL MEDICINE

## 2024-02-21 PROCEDURE — 1036F TOBACCO NON-USER: CPT | Performed by: INTERNAL MEDICINE

## 2024-02-21 PROCEDURE — 3075F SYST BP GE 130 - 139MM HG: CPT | Performed by: INTERNAL MEDICINE

## 2024-02-21 PROCEDURE — 1126F AMNT PAIN NOTED NONE PRSNT: CPT | Performed by: INTERNAL MEDICINE

## 2024-02-21 PROCEDURE — 3078F DIAST BP <80 MM HG: CPT | Performed by: INTERNAL MEDICINE

## 2024-02-21 PROCEDURE — 1159F MED LIST DOCD IN RCRD: CPT | Performed by: INTERNAL MEDICINE

## 2024-02-21 ASSESSMENT — ENCOUNTER SYMPTOMS
OCCASIONAL FEELINGS OF UNSTEADINESS: 0
DEPRESSION: 0
LOSS OF SENSATION IN FEET: 0

## 2024-02-21 ASSESSMENT — PAIN SCALES - GENERAL: PAINLEVEL: 0-NO PAIN

## 2024-02-21 NOTE — PROGRESS NOTES
Subjective      Chief Complaint   Patient presents with    6 month follow up     Hx of myocardial infarction        Here for coronary disease risk management.  He remains angina free normal functional capacity, has excellent cholesterol/lipid management.  Nuclear stress test July 2023 with moderate fixed inferior wall previous myocardial infarction and no reversible ischemia with left ventricular ejection fraction 63%, echocardiography June 2023 normal LV systolic function, left ventricular ejection fraction 60 to 65%, mild aortic valve stenosis with aortic valve area 1.48 cm² and mild aortic regurgitation,.    Previous: Posterior wall STEMI February 2019 with catheterization showing mid circumflex occlusion left main had mild disease LAD had a 60 to 70% calcified proximal to mid stenosis with an intramyocardial bridge distally and right coronary artery small nondominant with 60 to 70% proximal stenosis.  Left ventricular ejection fraction 55 to 59% with posterior wall hypokinesis.  Drug-eluting stent deployment successful in his circumflex with a 3/12 mm Synergy stent dilated to 3.43 mm in overlapping 3/16 and 3/12 millimeters Synergy stents to mid vessel dilated to 3.32 mm    He has moderate bilateral carotid artery disease documented October 2022 and that is followed by Dr. Nina his vascular surgeon.    He has chronic kidney disease and that is now followed by his nephrologist,         Review of Systems   All other systems reviewed and are negative.       Objective   Physical Exam  Constitutional:       Appearance: Normal appearance.   HENT:      Head: Normocephalic and atraumatic.   Eyes:      Pupils: Pupils are equal, round, and reactive to light.   Cardiovascular:      Rate and Rhythm: Normal rate and regular rhythm.      Pulses: Normal pulses.      Heart sounds: Normal heart sounds.      Comments: 1/6 systolic ejection murmur aortic region and across precordium consistent with his mild aortic valve  stenosis.  Pulmonary:      Effort: Pulmonary effort is normal.      Breath sounds: Normal breath sounds.   Abdominal:      General: Abdomen is flat. Bowel sounds are normal.      Palpations: Abdomen is soft.   Musculoskeletal:         General: Normal range of motion.      Cervical back: Normal range of motion.   Skin:     General: Skin is warm and dry.   Neurological:      General: No focal deficit present.   Psychiatric:         Mood and Affect: Mood normal.         Judgment: Judgment normal.          Lab Review:   Not applicable    No problem-specific Assessment & Plan notes found for this encounter.

## 2024-02-22 ENCOUNTER — INFUSION (OUTPATIENT)
Dept: INFUSION THERAPY | Facility: CLINIC | Age: 79
End: 2024-02-22
Payer: MEDICARE

## 2024-02-22 VITALS
HEART RATE: 62 BPM | DIASTOLIC BLOOD PRESSURE: 61 MMHG | SYSTOLIC BLOOD PRESSURE: 130 MMHG | TEMPERATURE: 97.7 F | OXYGEN SATURATION: 98 % | WEIGHT: 164 LBS | BODY MASS INDEX: 25.69 KG/M2 | RESPIRATION RATE: 16 BRPM

## 2024-02-22 DIAGNOSIS — N18.4 CKD (CHRONIC KIDNEY DISEASE) STAGE 4, GFR 15-29 ML/MIN (MULTI): ICD-10-CM

## 2024-02-22 DIAGNOSIS — D63.8 ANEMIA OF CHRONIC DISEASE: ICD-10-CM

## 2024-02-22 LAB — POC HEMOGLOBIN: 9.9 G/DL (ref 13.5–17.5)

## 2024-02-22 PROCEDURE — 85018 HEMOGLOBIN: CPT | Performed by: NURSE PRACTITIONER

## 2024-02-22 PROCEDURE — 96372 THER/PROPH/DIAG INJ SC/IM: CPT | Performed by: NURSE PRACTITIONER

## 2024-02-22 RX ORDER — EPINEPHRINE 0.3 MG/.3ML
0.3 INJECTION SUBCUTANEOUS EVERY 5 MIN PRN
Status: CANCELLED | OUTPATIENT
Start: 2024-02-28

## 2024-02-22 RX ORDER — FAMOTIDINE 10 MG/ML
20 INJECTION INTRAVENOUS ONCE AS NEEDED
Status: CANCELLED | OUTPATIENT
Start: 2024-02-28

## 2024-02-22 RX ORDER — DIPHENHYDRAMINE HYDROCHLORIDE 50 MG/ML
50 INJECTION INTRAMUSCULAR; INTRAVENOUS AS NEEDED
Status: CANCELLED | OUTPATIENT
Start: 2024-02-28

## 2024-02-22 RX ORDER — ALBUTEROL SULFATE 0.83 MG/ML
3 SOLUTION RESPIRATORY (INHALATION) AS NEEDED
Status: CANCELLED | OUTPATIENT
Start: 2024-02-28

## 2024-02-22 ASSESSMENT — PAIN SCALES - GENERAL: PAINLEVEL: 0-NO PAIN

## 2024-02-22 NOTE — PATIENT INSTRUCTIONS
Today :We administered epoetin clyde-epbx.     For:   1. Anemia of chronic disease    2. CKD (chronic kidney disease) stage 4, GFR 15-29 ml/min (CMS/[facility])         Your next appointment is due in:  One week        Please read the  Medication Guide that was given to you and reviewed during todays visit.     (Tell all doctors including dentists that you are taking this medication)     Go to the emergency room or call 911 if:  -You have signs of allergic reaction:   -Rash, hives, itching.   -Swollen, blistered, peeling skin.   -Swelling of face, lips, mouth, tongue or throat.   -Tightness of chest, trouble breathing, swallowing or talking     Call your doctor:  - If IV / injection site gets red, warm, swollen, itchy or leaks fluid or pus.     (Leave dressing on your IV site for at least 2 hours and keep area clean and dry  - If you get sick or have symptoms of infection or are not feeling well for any reason.    (Wash your hands often, stay away from people who are sick)  - If you have side effects from your medication that do not go away or are bothersome.     (Refer to the teaching your nurse gave you for side effects to call your doctor about)    - Common side effects may include:  stuffy nose, headache, feeling tired, muscle aches, upset stomach  - Before receiving any vaccines     - [facility] at   If:  - You get sick, are on antibiotics, have had a recent vaccine, have surgery or dental work and your doctor wants your visit rescheduled.  - You need to cancel and reschedule your visit for any reason. Call at least 2 days before your visit if you need to cancel.   - Your insurance changes before your next visit.    (We will need to get approval from your new insurance. This can take up to two weeks.)     The [facility] is opened Monday thru Friday. We are closed on weekends and holidays.   Voice mail will take your call if the center is closed. If you leave a message please  allow 24 hours for a call back during weekdays. If you leave a message on a weekend/holiday, we will call you back the next business day.

## 2024-02-22 NOTE — PROGRESS NOTES
[facility]   infusion Clinic Note   Date: 2024   Name: George Rowan  : 1945   MRN: [id number]         Reason for Visit: Injections (Retacrit)         Visit Type: INJECTION      Ordered By: Ean      Accompanied by:Self      Diagnosis: Anemia of chronic disease    CKD (chronic kidney disease) stage 4, GFR 15-29 ml/min (CMS/[facility])         Allergies:   Allergies as of 2024 - Reviewed 2024   Allergen Reaction Noted    Azathioprine Other 2023    Fluorescein-benoxinate Swelling 2023    Other Unknown 2023    Tetracaine Other 2023         Current Medications has a current medication list which includes the following prescription(s): allopurinol, aspirin, atorvastatin, calcitriol, carvedilol, procrit, eszopiclone, fluticasone, gabapentin, hydralazine/hydrochlorothiazid, hydroxychloroquine, levothyroxine, losartan, NON FORMULARY, vit a/vit c/vit e/zinc/copper, vitamin b complex, and vitamin d2.       Vitals:  Vitals:    24 0851   BP: 130/61   BP Location: Left arm   Patient Position: Sitting   BP Cuff Size: Adult long   Pulse: 62   Resp: 16   Temp: 36.5 °C (97.7 °F)   TempSrc: Temporal   SpO2: 98%   Weight: 74.4 kg (164 lb)             Infusion Pre-procedure Checklist:   - Allergies reviewed: yes   - Medications reviewed: yes       - Previous reaction to current treatment: no      Assess patient for the concerns below. Document provider notification as appropriate.  - Active or recent infection with/without current antibiotic use: no  - Recent or planned invasive dental work: no  - Recent or planned surgeries: no  - Recently received or plans to receive vaccinations: no  - Has treatment related toxicities: no  - Is pregnant:  n/a      Pain: 0   - Is the pain different from normal: no   - Is the pain tolerable: n/a   - Is your Doctor aware:  n/a      Labs: Hgb 9.9 24         Fall Risk Screening:         Review Of Systems:  Review  "of Systems   All other systems reviewed and are negative.        Infusion Readiness:   - Assessment Concerns Related to Infusion: No  - Provider notified: n/a      Document Below Only If Indicated:   New Patient Education:    N/A (returning patient for continuation of therapy. Ongoing education provided as needed.)        Treatment Conditions & Drug Specific Questions:    Epoetin Burak (EPOGEN. PROCRIT, RETACRIT)    TREATMENT CONDITIONS:    Unless otherwise specified on patient specific therapy plan HOLD and notify provider prior to proceeding with today's injection if:  o Hemoglobin GREATER THAN 11 g/dL (parameter set by prescribing provider)  o Increase in hemoglobin GREATER THAN 1 g/dL   o SBP GREATER THAN 160 mmHg    Lab Results   Component Value Date/Time    HGB 9.9 (A) 02/22/2024 0911    HGB 9.3 (A) 02/08/2024 0953    HGB 9.3 (A) 02/08/2024 0953     No results found for: \"HGBCAP\"     Labs reviewed and patient meets treatment conditions? Yes    DRUG SPECIFIC QUESTIONS:  - Does the patient have uncontrolled hypertension?  No    (Use is contraindicated in patients with uncontrolled hypertension)     - Educate on the following:? Yes       - Increased risk of serious cardiovascular reactions, thromboembolic reactions, stroke and tumor progression.        - Need to undergo regular blood pressure monitoring. Adhere to prescribed anti-hypertensive regimen and follow recommended dietary restrictions.        - Need to contact their healthcare provider for new onset neurologic symptoms or change in seizure frequency.        - Need for  regular laboratory tests to monitor hemoglobin levels.          Weight Based Drug Calculations:    WEIGHT BASED DRUGS: NOT APPLICABLE / FLAT DOSE          Initiated By: Alyson Flores RN   Time: 9:11 AM     We administered epoetin burak-epbx.   Pt had iron studies completed 02/15, Dr Mckoy is out of the office until 02/26 and will review results when he returns per office staff. Made pt " aware.

## 2024-02-23 NOTE — ASSESSMENT & PLAN NOTE
"PRN Medication Follow-up Note:    Behavior:    Patient (Ciro King is a 20 y.o. male, : 2003, MRN: [id number])     Allergies: Patient has no known allergies.    Ciro's  height is 5' 6" (1.676 m) and weight is 65.8 kg (145 lb). His temperature is 97.7 °F (36.5 °C). His blood pressure is 116/77 and his pulse is 95. His respiration is 18 and oxygen saturation is 97%.     Administered Trazodone 150 mg per physician order to Ciro for restlessness.      Intervention:    Intervention to Ciro's response:None required.       Response:    Ciro's response: Resting quietly in bed. Remains awake at this time.       Plan:     Continue to monitor per MD/PA/APRN orders; and reevaluate medication effectiveness within 30 minutes.    " Doing well and angina free.  He has a history of previous posterior wall infarction, preserved LV systolic function, successful circumflex stenting and had moderate LAD disease that has been managed medically in the setting of nonischemic stress test in the past.  He has mild aortic valve stenosis follow-up with echocardiography    Suggest Lexiscan nuclear stress test and echocardiography prior to his 6-month follow-up visit to reassess moderate obstructive LAD disease on previous catheterization in reassess his degree of aortic valve stenosis.    Follow-up in September 2024 to review his clinical status and test results.

## 2024-02-28 ENCOUNTER — APPOINTMENT (OUTPATIENT)
Dept: INFUSION THERAPY | Facility: CLINIC | Age: 79
End: 2024-02-28
Payer: MEDICARE

## 2024-02-28 ENCOUNTER — INFUSION (OUTPATIENT)
Dept: INFUSION THERAPY | Facility: CLINIC | Age: 79
End: 2024-02-28
Payer: MEDICARE

## 2024-02-28 VITALS
BODY MASS INDEX: 25.84 KG/M2 | DIASTOLIC BLOOD PRESSURE: 72 MMHG | WEIGHT: 165 LBS | OXYGEN SATURATION: 100 % | HEART RATE: 56 BPM | TEMPERATURE: 97.7 F | SYSTOLIC BLOOD PRESSURE: 154 MMHG

## 2024-02-28 DIAGNOSIS — D63.8 ANEMIA OF CHRONIC DISEASE: Primary | ICD-10-CM

## 2024-02-28 DIAGNOSIS — N18.4 CKD (CHRONIC KIDNEY DISEASE) STAGE 4, GFR 15-29 ML/MIN (MULTI): ICD-10-CM

## 2024-02-28 DIAGNOSIS — D63.8 ANEMIA OF CHRONIC DISEASE: ICD-10-CM

## 2024-02-28 DIAGNOSIS — N18.9 ANEMIA OF CHRONIC RENAL FAILURE, UNSPECIFIED CKD STAGE: ICD-10-CM

## 2024-02-28 DIAGNOSIS — D63.1 ANEMIA OF CHRONIC RENAL FAILURE, UNSPECIFIED CKD STAGE: ICD-10-CM

## 2024-02-28 PROCEDURE — 96372 THER/PROPH/DIAG INJ SC/IM: CPT | Performed by: NURSE PRACTITIONER

## 2024-02-28 RX ORDER — ALBUTEROL SULFATE 0.83 MG/ML
3 SOLUTION RESPIRATORY (INHALATION) AS NEEDED
Status: CANCELLED | OUTPATIENT
Start: 2024-02-29

## 2024-02-28 RX ORDER — EPINEPHRINE 0.3 MG/.3ML
0.3 INJECTION SUBCUTANEOUS EVERY 5 MIN PRN
Status: CANCELLED | OUTPATIENT
Start: 2024-02-28

## 2024-02-28 RX ORDER — FAMOTIDINE 10 MG/ML
20 INJECTION INTRAVENOUS ONCE AS NEEDED
Status: CANCELLED | OUTPATIENT
Start: 2024-02-29

## 2024-02-28 RX ORDER — EPINEPHRINE 0.3 MG/.3ML
0.3 INJECTION SUBCUTANEOUS EVERY 5 MIN PRN
Status: CANCELLED | OUTPATIENT
Start: 2024-02-29

## 2024-02-28 RX ORDER — ALBUTEROL SULFATE 0.83 MG/ML
3 SOLUTION RESPIRATORY (INHALATION) AS NEEDED
Status: CANCELLED | OUTPATIENT
Start: 2024-02-28

## 2024-02-28 RX ORDER — DIPHENHYDRAMINE HYDROCHLORIDE 50 MG/ML
50 INJECTION INTRAMUSCULAR; INTRAVENOUS AS NEEDED
Status: CANCELLED | OUTPATIENT
Start: 2024-02-28

## 2024-02-28 RX ORDER — DIPHENHYDRAMINE HYDROCHLORIDE 50 MG/ML
50 INJECTION INTRAMUSCULAR; INTRAVENOUS AS NEEDED
Status: CANCELLED | OUTPATIENT
Start: 2024-02-29

## 2024-02-28 RX ORDER — FAMOTIDINE 10 MG/ML
20 INJECTION INTRAVENOUS ONCE AS NEEDED
Status: CANCELLED | OUTPATIENT
Start: 2024-02-28

## 2024-02-28 ASSESSMENT — PAIN SCALES - GENERAL: PAINLEVEL: 0-NO PAIN

## 2024-02-28 NOTE — PATIENT INSTRUCTIONS
Today :We administered epoetin clyde-epbx.     For:   1. Anemia of chronic disease    2. CKD (chronic kidney disease) stage 4, GFR 15-29 ml/min (CMS/[facility])         Your next appointment is due in:  one week for Iron Infusions        Please read the  Medication Guide that was given to you and reviewed during todays visit.     (Tell all doctors including dentists that you are taking this medication)     Go to the emergency room or call 911 if:  -You have signs of allergic reaction:   -Rash, hives, itching.   -Swollen, blistered, peeling skin.   -Swelling of face, lips, mouth, tongue or throat.   -Tightness of chest, trouble breathing, swallowing or talking     Call your doctor:  - If IV / injection site gets red, warm, swollen, itchy or leaks fluid or pus.     (Leave dressing on your IV site for at least 2 hours and keep area clean and dry  - If you get sick or have symptoms of infection or are not feeling well for any reason.    (Wash your hands often, stay away from people who are sick)  - If you have side effects from your medication that do not go away or are bothersome.     (Refer to the teaching your nurse gave you for side effects to call your doctor about)    - Common side effects may include:  stuffy nose, headache, feeling tired, muscle aches, upset stomach  - Before receiving any vaccines     - Call the [facility] at   If:  - You get sick, are on antibiotics, have had a recent vaccine, have surgery or dental work and your doctor wants your visit rescheduled.  - You need to cancel and reschedule your visit for any reason. Call at least 2 days before your visit if you need to cancel.   - Your insurance changes before your next visit.    (We will need to get approval from your new insurance. This can take up to two weeks.)     [facility] is opened Monday thru Friday. We are closed on weekends and holidays.   Voice mail will take your call if the center is closed. If you leave  a message please allow 24 hours for a call back during weekdays. If you leave a message on a weekend/holiday, we will call you back the next business day.

## 2024-02-28 NOTE — PROGRESS NOTES
[facility]   infusion Clinic Note   Date: 2024   Name: George Rowan  : 1945   MRN: [id number]         Reason for Visit: OP Infusion (Retacrit)   Hgb 9.9 on 24      Visit Type: INJECTION      Ordered By: Ean      Accompanied by:Self      Diagnosis: Anemia of chronic disease    CKD (chronic kidney disease) stage 4, GFR 15-29 ml/min (CMS/[facility])         Allergies:   Allergies as of 2024 - Reviewed 2024   Allergen Reaction Noted    Azathioprine Other 2023    Fluorescein-benoxinate Swelling 2023    Other Unknown 2023    Tetracaine Other 2023         Current Medications has a current medication list which includes the following prescription(s): allopurinol, aspirin, atorvastatin, calcitriol, carvedilol, procrit, eszopiclone, fluticasone, gabapentin, hydralazine/hydrochlorothiazid, hydroxychloroquine, levothyroxine, losartan, NON FORMULARY, vit a/vit c/vit e/zinc/copper, vitamin b complex, and vitamin d2.       Vitals:  Vitals:    24 1355   BP: 154/72   BP Location: Left arm   Patient Position: Sitting   BP Cuff Size: Adult long   Pulse: 56   Temp: 36.5 °C (97.7 °F)   TempSrc: Temporal   SpO2: 100%   Weight: 74.8 kg (165 lb)             Infusion Pre-procedure Checklist:   - Allergies reviewed: yes   - Medications reviewed: yes       - Previous reaction to current treatment: no      Assess patient for the concerns below. Document provider notification as appropriate.  - Active or recent infection with/without current antibiotic use: no  - Recent or planned invasive dental work: no  - Recent or planned surgeries: no  - Recently received or plans to receive vaccinations: no  - Has treatment related toxicities: no  - Is pregnant:  n/a      Pain: 0   - Is the pain different from normal: no   - Is the pain tolerable: n/a   - Is your Doctor aware:  n/a      Labs: Hgb 9.9 24         Fall Risk Screening: Chris Fall Risk  History of  "Falling, Immediate or Within 3 Months: No  Secondary Diagnosis: Yes  Ambulatory Aid: Walks without aid/bedrest/nurse assist  Intravenous Therapy/Heparin Lock: No  Gait/Transferring: Normal/bedrest/immobile  Mental Status: Oriented to own ability  Perez Fall Risk Score: 15       Review Of Systems:  Review of Systems   All other systems reviewed and are negative.        Infusion Readiness:   - Assessment Concerns Related to Infusion: No  - Provider notified: n/a      Document Below Only If Indicated:   New Patient Education:    N/A (returning patient for continuation of therapy. Ongoing education provided as needed.)        Treatment Conditions & Drug Specific Questions:    Epoetin Burak (EPOGEN. PROCRIT, RETACRIT)    TREATMENT CONDITIONS:    Unless otherwise specified on patient specific therapy plan HOLD and notify provider prior to proceeding with today's injection if:  o Hemoglobin GREATER THAN 11 g/dL (parameter set by prescribing provider)  o Increase in hemoglobin GREATER THAN 1 g/dL   o SBP GREATER THAN 160 mmHg    Lab Results   Component Value Date/Time    HGB 9.9 (A) 02/22/2024 0911    HGB 9.3 (A) 02/08/2024 0953    HGB 9.3 (A) 02/08/2024 0953     No results found for: \"HGBCAP\"     Labs reviewed and patient meets treatment conditions? Yes    DRUG SPECIFIC QUESTIONS:  - Does the patient have uncontrolled hypertension?  No    (Use is contraindicated in patients with uncontrolled hypertension)     - Educate on the following:? Yes       - Increased risk of serious cardiovascular reactions, thromboembolic reactions, stroke and tumor progression.        - Need to undergo regular blood pressure monitoring. Adhere to prescribed anti-hypertensive regimen and follow recommended dietary restrictions.        - Need to contact their healthcare provider for new onset neurologic symptoms or change in seizure frequency.        - Need for  regular laboratory tests to monitor hemoglobin levels.          Weight Based Drug " Calculations:    WEIGHT BASED DRUGS: NOT APPLICABLE / FLAT DOSE          Initiated By: Trerell Alanis RN   Time: 2:14 PM     We administered epoetin clyde-epbx. Patient to start Iron infusions next appointment.

## 2024-02-29 ENCOUNTER — OFFICE VISIT (OUTPATIENT)
Dept: PRIMARY CARE | Facility: CLINIC | Age: 79
End: 2024-02-29
Payer: MEDICARE

## 2024-02-29 VITALS
HEIGHT: 67 IN | WEIGHT: 173 LBS | BODY MASS INDEX: 27.15 KG/M2 | HEART RATE: 67 BPM | RESPIRATION RATE: 20 BRPM | OXYGEN SATURATION: 99 % | SYSTOLIC BLOOD PRESSURE: 136 MMHG | DIASTOLIC BLOOD PRESSURE: 70 MMHG | TEMPERATURE: 98.9 F

## 2024-02-29 DIAGNOSIS — E55.9 HYPOVITAMINOSIS D: ICD-10-CM

## 2024-02-29 DIAGNOSIS — R35.1 NOCTURIA: Primary | ICD-10-CM

## 2024-02-29 LAB — PSA SERPL-MCNC: 0.4 NG/ML

## 2024-02-29 PROCEDURE — 99213 OFFICE O/P EST LOW 20 MIN: CPT | Performed by: FAMILY MEDICINE

## 2024-02-29 PROCEDURE — 1036F TOBACCO NON-USER: CPT | Performed by: FAMILY MEDICINE

## 2024-02-29 PROCEDURE — 3075F SYST BP GE 130 - 139MM HG: CPT | Performed by: FAMILY MEDICINE

## 2024-02-29 PROCEDURE — 3078F DIAST BP <80 MM HG: CPT | Performed by: FAMILY MEDICINE

## 2024-02-29 PROCEDURE — 36415 COLL VENOUS BLD VENIPUNCTURE: CPT | Performed by: FAMILY MEDICINE

## 2024-02-29 PROCEDURE — 84153 ASSAY OF PSA TOTAL: CPT | Performed by: FAMILY MEDICINE

## 2024-02-29 PROCEDURE — 1159F MED LIST DOCD IN RCRD: CPT | Performed by: FAMILY MEDICINE

## 2024-02-29 PROCEDURE — 1126F AMNT PAIN NOTED NONE PRSNT: CPT | Performed by: FAMILY MEDICINE

## 2024-02-29 RX ORDER — TAMSULOSIN HYDROCHLORIDE 0.4 MG/1
0.4 CAPSULE ORAL DAILY
Qty: 30 CAPSULE | Refills: 11 | Status: SHIPPED | OUTPATIENT
Start: 2024-02-29 | End: 2025-02-28

## 2024-02-29 ASSESSMENT — PAIN SCALES - GENERAL: PAINLEVEL: 0-NO PAIN

## 2024-02-29 NOTE — PROGRESS NOTES
"Subjective   Patient ID: George Rowan is a 78 y.o. male who presents for Follow-up (PATIENT IS HERE TO  GET BLOOD WORK TO CHECK HIS PROSTATE LEVEL, HE HAS NOT HAD IT DONE SINCE 2018).    HPI he has nocturia x 2-3 each night.  Some freqency during the day.     Review of Systems see HPI    Objective   /70 (BP Location: Right arm, Patient Position: Sitting, BP Cuff Size: Adult)   Pulse 67   Temp 37.2 °C (98.9 °F) (Skin)   Resp 20   Ht 1.702 m (5' 7\")   Wt 78.5 kg (173 lb)   SpO2 99%   BMI 27.10 kg/m²     Physical Exam  Constitutional:       General: He is not in acute distress.     Appearance: Normal appearance.   Cardiovascular:      Rate and Rhythm: Normal rate and regular rhythm.      Heart sounds: No murmur heard.  Pulmonary:      Breath sounds: Normal breath sounds. No wheezing.   Neurological:      Mental Status: He is alert.         Assessment/Plan   Problem List Items Addressed This Visit    None  Visit Diagnoses         Codes    Nocturia    -  Primary R35.1    Relevant Medications    tamsulosin (Flomax) 0.4 mg 24 hr capsule    Other Relevant Orders    PSA (Completed)    Hypovitaminosis D     E55.9          Recheck 1 month     "

## 2024-03-06 ENCOUNTER — APPOINTMENT (OUTPATIENT)
Dept: INFUSION THERAPY | Facility: CLINIC | Age: 79
End: 2024-03-06
Payer: MEDICARE

## 2024-03-10 ENCOUNTER — DOCUMENTATION (OUTPATIENT)
Dept: INFUSION THERAPY | Facility: CLINIC | Age: 79
End: 2024-03-10
Payer: MEDICARE

## 2024-03-10 NOTE — PROGRESS NOTES
Patient to be scheduled for acute venofer infusions.   For Diagnosis: Iron Deficiency Anemia, CKD stage 5    Labs…  Iron Studies completed on:   Lab Results   Component Value Date    IRON 35 02/16/2024    TIBC 282 02/16/2024    FERRITIN 54 02/16/2024    % transferrin 12    Lab Results   Component Value Date    WBC 6.9 01/24/2024    HGB 9.9 (A) 02/22/2024    HCT 28.6 (L) 01/24/2024     (H) 01/24/2024     01/24/2024        Urine Hcg test ordered prior to first infusion?  Not applicable (If female pt <60 years of age and with reproductive capability)      Last infusion received: new (if continuation)    Due: ASAP    This result meets treatment criteria.

## 2024-03-13 ENCOUNTER — APPOINTMENT (OUTPATIENT)
Dept: INFUSION THERAPY | Facility: CLINIC | Age: 79
End: 2024-03-13
Payer: MEDICARE

## 2024-03-13 ENCOUNTER — INFUSION (OUTPATIENT)
Dept: INFUSION THERAPY | Facility: CLINIC | Age: 79
End: 2024-03-13
Payer: MEDICARE

## 2024-03-13 VITALS
TEMPERATURE: 98.2 F | SYSTOLIC BLOOD PRESSURE: 137 MMHG | WEIGHT: 161 LBS | DIASTOLIC BLOOD PRESSURE: 49 MMHG | HEART RATE: 49 BPM | RESPIRATION RATE: 16 BRPM | OXYGEN SATURATION: 97 % | BODY MASS INDEX: 25.22 KG/M2

## 2024-03-13 DIAGNOSIS — E78.2 MIXED HYPERLIPIDEMIA: Primary | ICD-10-CM

## 2024-03-13 DIAGNOSIS — D63.1 ANEMIA OF CHRONIC RENAL FAILURE, UNSPECIFIED CKD STAGE: ICD-10-CM

## 2024-03-13 DIAGNOSIS — N25.81 SECONDARY HYPERPARATHYROIDISM OF RENAL ORIGIN (MULTI): ICD-10-CM

## 2024-03-13 DIAGNOSIS — D63.8 ANEMIA OF CHRONIC DISEASE: ICD-10-CM

## 2024-03-13 DIAGNOSIS — N18.9 ANEMIA OF CHRONIC RENAL FAILURE, UNSPECIFIED CKD STAGE: ICD-10-CM

## 2024-03-13 PROCEDURE — 96365 THER/PROPH/DIAG IV INF INIT: CPT | Performed by: NURSE PRACTITIONER

## 2024-03-13 RX ORDER — EPINEPHRINE 0.3 MG/.3ML
0.3 INJECTION SUBCUTANEOUS EVERY 5 MIN PRN
Status: CANCELLED | OUTPATIENT
Start: 2024-03-20

## 2024-03-13 RX ORDER — FAMOTIDINE 10 MG/ML
20 INJECTION INTRAVENOUS ONCE AS NEEDED
Status: CANCELLED | OUTPATIENT
Start: 2024-03-20

## 2024-03-13 RX ORDER — DIPHENHYDRAMINE HYDROCHLORIDE 50 MG/ML
50 INJECTION INTRAMUSCULAR; INTRAVENOUS AS NEEDED
Status: CANCELLED | OUTPATIENT
Start: 2024-03-20

## 2024-03-13 RX ORDER — ALBUTEROL SULFATE 0.83 MG/ML
3 SOLUTION RESPIRATORY (INHALATION) AS NEEDED
Status: CANCELLED | OUTPATIENT
Start: 2024-03-20

## 2024-03-13 ASSESSMENT — PAIN SCALES - GENERAL: PAINLEVEL: 0-NO PAIN

## 2024-03-13 ASSESSMENT — ENCOUNTER SYMPTOMS: FATIGUE: 1

## 2024-03-13 NOTE — PROGRESS NOTES
[facility]   Infusion Clinic Note   Date: 2024   Name: George Rowan  : 1945   MRN: [id number]         Reason for Visit: OP Infusion (Venofer)         Visit Type: INFUSION       Ordered By: Dr. Mckoy      Accompanied by:Self      Diagnosis: Anemia of chronic disease    Anemia of chronic renal failure, unspecified CKD stage       Allergies:   Allergies as of 2024 - Reviewed 2024   Allergen Reaction Noted    Azathioprine Other 2023    Fluorescein-benoxinate Swelling 2023    Other Unknown 2023    Tetracaine Other 2023         Current Medications has a current medication list which includes the following prescription(s): allopurinol, aspirin, atorvastatin, calcitriol, carvedilol, procrit, eszopiclone, fluticasone, gabapentin, hydralazine/hydrochlorothiazid, hydroxychloroquine, levothyroxine, losartan, NON FORMULARY, tamsulosin, vit a/vit c/vit e/zinc/copper, and vitamin b complex.       Vitals:   Vitals:    24 0908 24 1000 24 1031   BP: (!) 139/48 (!) 135/48 (!) 137/49   Pulse: 52 (!) 48 (!) 49   Resp: 16 16 16   Temp: 36.8 °C (98.2 °F)     TempSrc: Temporal     SpO2: 100% 97%    Weight: 73 kg (161 lb)     PainSc: 0-No pain               Infusion Pre-procedure Checklist:   - Allergies reviewed: yes   - Medications reviewed: yes       - Previous reaction to current treatment: no      Assess patient for the concerns below. Document provider notification as appropriate.  - Active or recent infection with/without current antibiotic use: no  - Recent or planned invasive dental work: no  - Recent or planned surgeries: no  - Recently received or plans to receive vaccinations: no  - Has treatment related toxicities: no  - Is pregnant:  n/a      Pain: 0   - Is the pain different from normal: no   - Is the pain tolerable: yes   - Is your Doctor aware:  n/a      Labs:  labs reviewed.         Fall Risk Screening: Perez Nicci  Risk  History of Falling, Immediate or Within 3 Months: No  Secondary Diagnosis: Yes  Ambulatory Aid: Walks without aid/bedrest/nurse assist  Intravenous Therapy/Heparin Lock: Yes  Gait/Transferring: Normal/bedrest/immobile  Mental Status: Oriented to own ability  Perez Fall Risk Score: 35       Review Of Systems:  Review of Systems   Constitutional:  Positive for fatigue.   All other systems reviewed and are negative.        Infusion Readiness:   - Assessment Concerns Related to Infusion: No  - Provider notified: No      Document Below Only If Indicated:   New Patient Education:    NEW PATIENT MEDICATION EDUCATION PT PROVIDED WITH WRITTEN ([employer] PT EDUCATION SHEET) AND VERBAL EDUCATION REGARDING MEDICATION GIVEN. VERIFIED MEDICATION NAME WITH PATIENT AND DISCUSSED REASON FOR USE. BRIEFLY DISCUSSED HOW MEDICATION WORKS AND EDUCATED ON GOAL OF TREATMENT, FREQUENCY OF TREATMENT, ADVERSE RXN'S AND COMMON SIDE EFFECTS TO MONITOR FOR. INSTRUCTED PT TO ASSURE THAT ALL PROVIDERS INCLUDING DENTISTS ARE AWARE OF MEDICATION RECEIVED. DISCUSSED FLOW OF VISIT AND ORIENTED TO INFUSION CENTER. PT VERBALIZES UNDERSTANDING. CALL LIGHT PROVIDED AND PT AWARE TO ALERT STAFF OF ANY CONCERNS DURING TREATMENT.        Treatment Conditions & Drug Specific Questions:          Weight Based Drug Calculations:    WEIGHT BASED DRUGS: NOT APPLICABLE / FLAT DOSE          Initiated By: Terrell Alanis RN   Time: 10:46 AM     We administered iron sucrose (Venofer) 200 mg in sodium chloride 0.9% 100 mL IV.   1000 Line flushed with NS 20 ml after venofer completed.  1030 Patient observed and VSS. No signs of reaction noted. Patient tolerated well.

## 2024-03-13 NOTE — PATIENT INSTRUCTIONS
Today :We administered iron sucrose (Venofer) 200 mg in sodium chloride 0.9% 100 mL IV.     For:   1. Anemia of chronic disease    2. Anemia of chronic renal failure, unspecified CKD stage         Your next appointment is due in:  March 20, 2024        Please read the  Medication Guide that was given to you and reviewed during todays visit.     (Tell all doctors including dentists that you are taking this medication)     Go to the emergency room or call 911 if:  -You have signs of allergic reaction:   -Rash, hives, itching.   -Swollen, blistered, peeling skin.   -Swelling of face, lips, mouth, tongue or throat.   -Tightness of chest, trouble breathing, swallowing or talking     Call your doctor:  - If IV / injection site gets red, warm, swollen, itchy or leaks fluid or pus.     (Leave dressing on your IV site for at least 2 hours and keep area clean and dry  - If you get sick or have symptoms of infection or are not feeling well for any reason.    (Wash your hands often, stay away from people who are sick)  - If you have side effects from your medication that do not go away or are bothersome.     (Refer to the teaching your nurse gave you for side effects to call your doctor about)    - Common side effects may include:  stuffy nose, headache, feeling tired, muscle aches, upset stomach  - Before receiving any vaccines     - [facility] at   If:  - You get sick, are on antibiotics, have had a recent vaccine, have surgery or dental work and your doctor wants your visit rescheduled.  - You need to cancel and reschedule your visit for any reason. Call at least 2 days before your visit if you need to cancel.   - Your insurance changes before your next visit.    (We will need to get approval from your new insurance. This can take up to two weeks.)     The [facility] is opened Monday thru Friday. We are closed on weekends and holidays.   Voice mail will take your call if the center  is closed. If you leave a message please allow 24 hours for a call back during weekdays. If you leave a message on a weekend/holiday, we will call you back the next business day.

## 2024-03-14 PROBLEM — J30.2 SEASONAL ALLERGIES: Status: ACTIVE | Noted: 2024-03-14

## 2024-03-14 PROBLEM — F32.9 REACTIVE DEPRESSION: Status: ACTIVE | Noted: 2024-03-14

## 2024-03-14 PROBLEM — N64.4 BREAST TENDERNESS: Status: ACTIVE | Noted: 2024-03-14

## 2024-03-14 PROBLEM — E66.3 OVERWEIGHT WITH BODY MASS INDEX (BMI) 25.0-29.9: Status: ACTIVE | Noted: 2024-03-14

## 2024-03-14 PROBLEM — R42 DIZZINESS: Status: ACTIVE | Noted: 2024-03-14

## 2024-03-14 PROBLEM — Z86.79 HISTORY OF HYPERTENSION: Status: ACTIVE | Noted: 2024-03-14

## 2024-03-14 PROBLEM — R07.9 CHEST PAIN: Status: ACTIVE | Noted: 2024-03-14

## 2024-03-14 PROBLEM — Z86.2 HISTORY OF IMMUNE DISORDER: Status: ACTIVE | Noted: 2024-03-14

## 2024-03-14 RX ORDER — LEVOTHYROXINE SODIUM 50 UG/1
50 TABLET ORAL
Qty: 100 TABLET | Refills: 2 | Status: SHIPPED | OUTPATIENT
Start: 2024-03-14

## 2024-03-15 RX ORDER — ATORVASTATIN CALCIUM 20 MG/1
20 TABLET, FILM COATED ORAL DAILY
Qty: 100 TABLET | Refills: 2 | Status: SHIPPED | OUTPATIENT
Start: 2024-03-15

## 2024-03-20 ENCOUNTER — INFUSION (OUTPATIENT)
Dept: INFUSION THERAPY | Facility: CLINIC | Age: 79
End: 2024-03-20
Payer: MEDICARE

## 2024-03-20 VITALS
WEIGHT: 162 LBS | BODY MASS INDEX: 25.37 KG/M2 | OXYGEN SATURATION: 99 % | RESPIRATION RATE: 17 BRPM | DIASTOLIC BLOOD PRESSURE: 45 MMHG | SYSTOLIC BLOOD PRESSURE: 143 MMHG | HEART RATE: 51 BPM | TEMPERATURE: 102 F

## 2024-03-20 DIAGNOSIS — D63.8 ANEMIA OF CHRONIC DISEASE: ICD-10-CM

## 2024-03-20 DIAGNOSIS — D63.1 ANEMIA OF CHRONIC RENAL FAILURE, UNSPECIFIED CKD STAGE: ICD-10-CM

## 2024-03-20 DIAGNOSIS — N18.9 ANEMIA OF CHRONIC RENAL FAILURE, UNSPECIFIED CKD STAGE: ICD-10-CM

## 2024-03-20 PROCEDURE — 96365 THER/PROPH/DIAG IV INF INIT: CPT | Performed by: NURSE PRACTITIONER

## 2024-03-20 RX ORDER — ALBUTEROL SULFATE 0.83 MG/ML
3 SOLUTION RESPIRATORY (INHALATION) AS NEEDED
Status: CANCELLED | OUTPATIENT
Start: 2024-03-27

## 2024-03-20 RX ORDER — FAMOTIDINE 10 MG/ML
20 INJECTION INTRAVENOUS ONCE AS NEEDED
Status: CANCELLED | OUTPATIENT
Start: 2024-03-27

## 2024-03-20 RX ORDER — EPINEPHRINE 0.3 MG/.3ML
0.3 INJECTION SUBCUTANEOUS EVERY 5 MIN PRN
Status: CANCELLED | OUTPATIENT
Start: 2024-03-27

## 2024-03-20 RX ORDER — DIPHENHYDRAMINE HYDROCHLORIDE 50 MG/ML
50 INJECTION INTRAMUSCULAR; INTRAVENOUS AS NEEDED
Status: CANCELLED | OUTPATIENT
Start: 2024-03-27

## 2024-03-20 ASSESSMENT — PAIN SCALES - GENERAL: PAINLEVEL: 0-NO PAIN

## 2024-03-20 ASSESSMENT — ENCOUNTER SYMPTOMS: FATIGUE: 1

## 2024-03-20 NOTE — PATIENT INSTRUCTIONS
Today :We administered iron sucrose (Venofer) 200 mg in sodium chloride 0.9% 100 mL IV.     For:   1. Anemia of chronic disease    2. Anemia of chronic renal failure, unspecified CKD stage         Your next appointment is due in:  3/27/2024        Please read the  Medication Guide that was given to you and reviewed during todays visit.     (Tell all doctors including dentists that you are taking this medication)     Go to the emergency room or call 911 if:  -You have signs of allergic reaction:   -Rash, hives, itching.   -Swollen, blistered, peeling skin.   -Swelling of face, lips, mouth, tongue or throat.   -Tightness of chest, trouble breathing, swallowing or talking     Call your doctor:  - If IV / injection site gets red, warm, swollen, itchy or leaks fluid or pus.     (Leave dressing on your IV site for at least 2 hours and keep area clean and dry  - If you get sick or have symptoms of infection or are not feeling well for any reason.    (Wash your hands often, stay away from people who are sick)  - If you have side effects from your medication that do not go away or are bothersome.     (Refer to the teaching your nurse gave you for side effects to call your doctor about)    - Common side effects may include:  stuffy nose, headache, feeling tired, muscle aches, upset stomach  - Before receiving any vaccines     - Call the [facility] at   If:  - You get sick, are on antibiotics, have had a recent vaccine, have surgery or dental work and your doctor wants your visit rescheduled.  - You need to cancel and reschedule your visit for any reason. Call at least 2 days before your visit if you need to cancel.   - Your insurance changes before your next visit.    (We will need to get approval from your new insurance. This can take up to two weeks.)     [facility] is opened Monday thru Friday. We are closed on weekends and holidays.   Voice mail will take your call if the center is  closed. If you leave a message please allow 24 hours for a call back during weekdays. If you leave a message on a weekend/holiday, we will call you back the next business day.

## 2024-03-20 NOTE — PROGRESS NOTES
[facility]   Infusion Clinic Note   Date: 2024   Name: George Rowan  : 1945   MRN: [id number]         Reason for Visit: OP Infusion (Iron Sucrose Infusion - 2 of 5 doses)         Visit Type: INFUSION       Ordered By: Dr. Mckoy      Accompanied by:Self      Diagnosis: Anemia of chronic disease    Anemia of chronic renal failure, unspecified CKD stage       Allergies:   Allergies as of 2024 - Reviewed 2024   Allergen Reaction Noted    Azathioprine Other 2023    Fluorescein-benoxinate Swelling 2023    Other Unknown 2023    Tetracaine Other 2023         Current Medications has a current medication list which includes the following prescription(s): allopurinol, aspirin, atorvastatin, calcitriol, carvedilol, procrit, eszopiclone, fluticasone, gabapentin, hydralazine/hydrochlorothiazid, hydroxychloroquine, levothyroxine, losartan, NON FORMULARY, tamsulosin, vit a/vit c/vit e/zinc/copper, and vitamin b complex, and the following Facility-Administered Medications: iron sucrose (Venofer) 200 mg in sodium chloride 0.9% 100 mL IV.       Vitals:   Vitals:    24 0910   BP: (!) 148/47   Pulse: 55   Resp: 16   Temp: 37.3 °C (99.1 °F)   TempSrc: Temporal   SpO2: 100%   Weight: 73.5 kg (162 lb)   PainSc: 0-No pain             Infusion Pre-procedure Checklist:   - Allergies reviewed: yes   - Medications reviewed: yes       - Previous reaction to current treatment: no      Assess patient for the concerns below. Document provider notification as appropriate.  - Active or recent infection with/without current antibiotic use: n/a  - Recent or planned invasive dental work: n/a  - Recent or planned surgeries: n/a  - Recently received or plans to receive vaccinations: n/a  - Has treatment related toxicities: n/a  - Is pregnant:  n/a      Pain: 0   - Is the pain different from normal: n/a   - Is the pain tolerable: n/a   - Is your Doctor aware:  n/a       Labs:  reviewed from 2/16/2024         Fall Risk Screening: Perez Fall Risk  History of Falling, Immediate or Within 3 Months: No  Secondary Diagnosis: Yes  Ambulatory Aid: Walks without aid/bedrest/nurse assist  Intravenous Therapy/Heparin Lock: No  Gait/Transferring: Normal/bedrest/immobile  Mental Status: Oriented to own ability  Perez Fall Risk Score: 15       Review Of Systems:  Review of Systems   Constitutional:  Positive for fatigue.         Infusion Readiness:   - Assessment Concerns Related to Infusion: No  - Provider notified: no      Document Below Only If Indicated:   New Patient Education:    N/A (returning patient for continuation of therapy. Ongoing education provided as needed.)        Treatment Conditions & Drug Specific Questions:            Weight Based Drug Calculations:    WEIGHT BASED DRUGS: NOT APPLICABLE / FLAT DOSE          Initiated By: Rosa M Swenson RN   Time: 9:27 AM     We administered iron sucrose (Venofer) 200 mg in sodium chloride 0.9% 100 mL IV.   1035 30 minute observation complete.  VSS.  No s/s adverse reaction noted.  RTC 3/27/2024.

## 2024-03-21 ENCOUNTER — OFFICE VISIT (OUTPATIENT)
Dept: PRIMARY CARE | Facility: CLINIC | Age: 79
End: 2024-03-21
Payer: MEDICARE

## 2024-03-21 VITALS
WEIGHT: 164 LBS | DIASTOLIC BLOOD PRESSURE: 66 MMHG | HEART RATE: 50 BPM | BODY MASS INDEX: 25.69 KG/M2 | TEMPERATURE: 97.7 F | SYSTOLIC BLOOD PRESSURE: 138 MMHG | OXYGEN SATURATION: 99 %

## 2024-03-21 DIAGNOSIS — H60.501 ACUTE OTITIS EXTERNA OF RIGHT EAR, UNSPECIFIED TYPE: Primary | ICD-10-CM

## 2024-03-21 PROCEDURE — 1159F MED LIST DOCD IN RCRD: CPT | Performed by: REGISTERED NURSE

## 2024-03-21 PROCEDURE — 99212 OFFICE O/P EST SF 10 MIN: CPT | Performed by: REGISTERED NURSE

## 2024-03-21 PROCEDURE — 3078F DIAST BP <80 MM HG: CPT | Performed by: REGISTERED NURSE

## 2024-03-21 PROCEDURE — 1160F RVW MEDS BY RX/DR IN RCRD: CPT | Performed by: REGISTERED NURSE

## 2024-03-21 PROCEDURE — 3075F SYST BP GE 130 - 139MM HG: CPT | Performed by: REGISTERED NURSE

## 2024-03-21 PROCEDURE — 1125F AMNT PAIN NOTED PAIN PRSNT: CPT | Performed by: REGISTERED NURSE

## 2024-03-21 PROCEDURE — 1036F TOBACCO NON-USER: CPT | Performed by: REGISTERED NURSE

## 2024-03-21 RX ORDER — NEOMYCIN SULFATE, POLYMYXIN B SULFATE, HYDROCORTISONE 3.5; 10000; 1 MG/ML; [USP'U]/ML; MG/ML
2 SOLUTION/ DROPS AURICULAR (OTIC) 4 TIMES DAILY
Qty: 2.8 ML | Refills: 0 | Status: SHIPPED | OUTPATIENT
Start: 2024-03-21 | End: 2024-03-28

## 2024-03-21 ASSESSMENT — ENCOUNTER SYMPTOMS
DEPRESSION: 0
LOSS OF SENSATION IN FEET: 0
OCCASIONAL FEELINGS OF UNSTEADINESS: 0

## 2024-03-21 ASSESSMENT — COLUMBIA-SUICIDE SEVERITY RATING SCALE - C-SSRS
2. HAVE YOU ACTUALLY HAD ANY THOUGHTS OF KILLING YOURSELF?: NO
1. IN THE PAST MONTH, HAVE YOU WISHED YOU WERE DEAD OR WISHED YOU COULD GO TO SLEEP AND NOT WAKE UP?: NO
6. HAVE YOU EVER DONE ANYTHING, STARTED TO DO ANYTHING, OR PREPARED TO DO ANYTHING TO END YOUR LIFE?: NO

## 2024-03-21 ASSESSMENT — PAIN SCALES - GENERAL: PAINLEVEL: 8

## 2024-03-21 ASSESSMENT — PATIENT HEALTH QUESTIONNAIRE - PHQ9
SUM OF ALL RESPONSES TO PHQ9 QUESTIONS 1 AND 2: 0
1. LITTLE INTEREST OR PLEASURE IN DOING THINGS: NOT AT ALL
2. FEELING DOWN, DEPRESSED OR HOPELESS: NOT AT ALL

## 2024-03-21 NOTE — PROGRESS NOTES
Subjective   Patient ID: George Rowan is a 78 y.o. male who presents for Earache (Bilateral ear pain x 3-4 days, maybe a week. ).    Earache        Ear pain bilaterally  Review of Systems   HENT:  Positive for ear pain.    All other systems reviewed and are negative.      Objective   /66   Pulse 50   Temp 36.5 °C (97.7 °F)   Wt 74.4 kg (164 lb)   SpO2 99%   BMI 25.69 kg/m²     Physical Exam  Vitals reviewed.   Constitutional:       Appearance: Normal appearance.   HENT:      Right Ear: Tympanic membrane and external ear normal.      Left Ear: Tympanic membrane, ear canal and external ear normal.   Neurological:      Mental Status: He is alert.   Psychiatric:         Mood and Affect: Mood normal.         Behavior: Behavior normal.         Assessment/Plan   Problem List Items Addressed This Visit    None  Visit Diagnoses         Codes    Acute otitis externa of right ear, unspecified type    -  Primary H60.501    Relevant Medications    neomycin-polymyxin-HC (Cortisporin) otic solution

## 2024-03-27 ENCOUNTER — APPOINTMENT (OUTPATIENT)
Dept: INFUSION THERAPY | Facility: CLINIC | Age: 79
End: 2024-03-27
Payer: MEDICARE

## 2024-03-27 ENCOUNTER — APPOINTMENT (OUTPATIENT)
Dept: PRIMARY CARE | Facility: CLINIC | Age: 79
End: 2024-03-27
Payer: MEDICARE

## 2024-03-27 ENCOUNTER — INFUSION (OUTPATIENT)
Dept: INFUSION THERAPY | Facility: CLINIC | Age: 79
End: 2024-03-27
Payer: MEDICARE

## 2024-03-27 VITALS
HEART RATE: 48 BPM | TEMPERATURE: 98.2 F | WEIGHT: 159 LBS | SYSTOLIC BLOOD PRESSURE: 150 MMHG | DIASTOLIC BLOOD PRESSURE: 70 MMHG | RESPIRATION RATE: 16 BRPM | OXYGEN SATURATION: 96 % | BODY MASS INDEX: 24.9 KG/M2

## 2024-03-27 DIAGNOSIS — N18.9 ANEMIA OF CHRONIC RENAL FAILURE, UNSPECIFIED CKD STAGE: ICD-10-CM

## 2024-03-27 DIAGNOSIS — D63.8 ANEMIA OF CHRONIC DISEASE: ICD-10-CM

## 2024-03-27 DIAGNOSIS — D63.1 ANEMIA OF CHRONIC RENAL FAILURE, UNSPECIFIED CKD STAGE: ICD-10-CM

## 2024-03-27 LAB — POC HEMOGLOBIN: 9.6 G/DL (ref 13.5–17.5)

## 2024-03-27 PROCEDURE — 96365 THER/PROPH/DIAG IV INF INIT: CPT | Performed by: NURSE PRACTITIONER

## 2024-03-27 PROCEDURE — 85018 HEMOGLOBIN: CPT | Performed by: NURSE PRACTITIONER

## 2024-03-27 RX ORDER — FAMOTIDINE 10 MG/ML
20 INJECTION INTRAVENOUS ONCE AS NEEDED
Status: CANCELLED | OUTPATIENT
Start: 2024-04-03

## 2024-03-27 RX ORDER — DIPHENHYDRAMINE HYDROCHLORIDE 50 MG/ML
50 INJECTION INTRAMUSCULAR; INTRAVENOUS AS NEEDED
Status: CANCELLED | OUTPATIENT
Start: 2024-04-03

## 2024-03-27 RX ORDER — ALBUTEROL SULFATE 0.83 MG/ML
3 SOLUTION RESPIRATORY (INHALATION) AS NEEDED
Status: CANCELLED | OUTPATIENT
Start: 2024-04-03

## 2024-03-27 RX ORDER — EPINEPHRINE 0.3 MG/.3ML
0.3 INJECTION SUBCUTANEOUS EVERY 5 MIN PRN
Status: CANCELLED | OUTPATIENT
Start: 2024-04-03

## 2024-03-27 ASSESSMENT — ENCOUNTER SYMPTOMS: FATIGUE: 1

## 2024-03-27 ASSESSMENT — PAIN SCALES - GENERAL: PAINLEVEL: 0-NO PAIN

## 2024-03-27 NOTE — PATIENT INSTRUCTIONS
Today :We administered iron sucrose (Venofer) 200 mg in sodium chloride 0.9% 100 mL IV.     For:   1. Anemia of chronic disease    2. Anemia of chronic renal failure, unspecified CKD stage         Your next appointment is due in:  one week        Please read the  Medication Guide that was given to you and reviewed during todays visit.     (Tell all doctors including dentists that you are taking this medication)     Go to the emergency room or call 911 if:  -You have signs of allergic reaction:   -Rash, hives, itching.   -Swollen, blistered, peeling skin.   -Swelling of face, lips, mouth, tongue or throat.   -Tightness of chest, trouble breathing, swallowing or talking     Call your doctor:  - If IV / injection site gets red, warm, swollen, itchy or leaks fluid or pus.     (Leave dressing on your IV site for at least 2 hours and keep area clean and dry  - If you get sick or have symptoms of infection or are not feeling well for any reason.    (Wash your hands often, stay away from people who are sick)  - If you have side effects from your medication that do not go away or are bothersome.     (Refer to the teaching your nurse gave you for side effects to call your doctor about)    - Common side effects may include:  stuffy nose, headache, feeling tired, muscle aches, upset stomach  - Before receiving any vaccines     - Call [facility] at   If:  - You get sick, are on antibiotics, have had a recent vaccine, have surgery or dental work and your doctor wants your visit rescheduled.  - You need to cancel and reschedule your visit for any reason. Call at least 2 days before your visit if you need to cancel.   - Your insurance changes before your next visit.    (We will need to get approval from your new insurance. This can take up to two weeks.)     [facility] is opened Monday thru Friday. We are closed on weekends and holidays.   Voice mail will take your call if the center is  closed. If you leave a message please allow 24 hours for a call back during weekdays. If you leave a message on a weekend/holiday, we will call you back the next business day.

## 2024-03-27 NOTE — PROGRESS NOTES
1030: Spoke to Dr. Daily about heart rate in the 40's.  Dr. Mckoy instructed patient to hold carvedilol tonight and then each day if heart rate less than 60.  Patient educated on this. Pito said he checks his blood pressure and heart rate at home.

## 2024-03-27 NOTE — PROGRESS NOTES
[facility]   Infusion Clinic Note   Date: 2024   Name: George Rowan  : 1945   MRN: [id number]         Reason for Visit: OP Infusion (Venofer)   Today is day 3 for patient's venofer infusions      Visit Type: INFUSION       Ordered By: Dr. Mckoy      Accompanied by:Self      Diagnosis: Anemia of chronic disease    Anemia of chronic renal failure, unspecified CKD stage         Allergies:   Allergies as of 2024 - Reviewed 2024   Allergen Reaction Noted    Azathioprine Other 2023    Fluorescein-benoxinate Swelling 2023    Other Unknown 2023    Tetracaine Other 2023         Current Medications has a current medication list which includes the following prescription(s): allopurinol, aspirin, atorvastatin, calcitriol, carvedilol, procrit, eszopiclone, fluticasone, gabapentin, hydralazine/hydrochlorothiazid, hydroxychloroquine, levothyroxine, losartan, neomycin-polymyxin-hc, NON FORMULARY, tamsulosin, vit a/vit c/vit e/zinc/copper, and vitamin b complex.       Vitals:   Vitals:    24 0907 24 1000   BP: 161/69 150/70   Pulse: 50 (!) 48   Resp: 16 16   Temp: 36.8 °C (98.2 °F) 36.8 °C (98.2 °F)   TempSrc: Temporal    SpO2: 98% 96%   Weight: 72.1 kg (159 lb)    PainSc: 0-No pain              Infusion Pre-procedure Checklist:   - Allergies reviewed: yes   - Medications reviewed: yes       - Previous reaction to current treatment: no      Assess patient for the concerns below. Document provider notification as appropriate.  - Active or recent infection with/without current antibiotic use: Yes. Patient states is on ear drops for an ear infection. He started these a few days ago and reports improvement of symptoms. Denies ear pain.  - Recent or planned invasive dental work: n/a  - Recent or planned surgeries: n/a  - Recently received or plans to receive vaccinations: n/a  - Has treatment related toxicities: n/a  - Is pregnant:  n/a       Pain: 0   - Is the pain different from normal: n/a   - Is the pain tolerable: n/a   - Is your Doctor aware:  n/a      Labs: hemocue done today 9.6. Patient reporting more fatigue.         Fall Risk Screening: Chris Fall Risk  History of Falling, Immediate or Within 3 Months: No  Secondary Diagnosis: Yes  Ambulatory Aid: Walks without aid/bedrest/nurse assist  Intravenous Therapy/Heparin Lock: Yes  Gait/Transferring: Normal/bedrest/immobile  Mental Status: Oriented to own ability  Perez Fall Risk Score: 35       Review Of Systems:  Review of Systems   Constitutional:  Positive for fatigue.     Patient reports more fatigue the last few days. He reported symptoms to Dr. Mckoy office yesterday.       Infusion Readiness:   - Assessment Concerns Related to Infusion: No  - Provider notified: no      Document Below Only If Indicated:   New Patient Education:    N/A (returning patient for continuation of therapy. Ongoing education provided as needed.)        Treatment Conditions & Drug Specific Questions:    Venofer        Weight Based Drug Calculations:    WEIGHT BASED DRUGS: NOT APPLICABLE / FLAT DOSE          Initiated By: Terrell Alanis RN   Time: 10:35 AM     We administered iron sucrose (Venofer) 200 mg in sodium chloride 0.9% 100 mL IV.   1015 line flushed with NS 25 ml when infusion completed.  Patient tolerated well.

## 2024-03-28 ENCOUNTER — TELEPHONE (OUTPATIENT)
Dept: CARDIOLOGY | Facility: CLINIC | Age: 79
End: 2024-03-28
Payer: MEDICARE

## 2024-03-28 NOTE — TELEPHONE ENCOUNTER
Patient calling to let you know that dr bucio stopped her carvedilol and increased his losartan to 100 mg once a day due to low pulse , he wanted to make sure this was ok with you

## 2024-04-03 ENCOUNTER — INFUSION (OUTPATIENT)
Dept: INFUSION THERAPY | Facility: CLINIC | Age: 79
End: 2024-04-03
Payer: MEDICARE

## 2024-04-03 VITALS
TEMPERATURE: 98 F | RESPIRATION RATE: 17 BRPM | OXYGEN SATURATION: 100 % | SYSTOLIC BLOOD PRESSURE: 130 MMHG | HEART RATE: 54 BPM | DIASTOLIC BLOOD PRESSURE: 49 MMHG

## 2024-04-03 DIAGNOSIS — N18.9 ANEMIA OF CHRONIC RENAL FAILURE, UNSPECIFIED CKD STAGE: ICD-10-CM

## 2024-04-03 DIAGNOSIS — D63.8 ANEMIA OF CHRONIC DISEASE: ICD-10-CM

## 2024-04-03 DIAGNOSIS — D63.1 ANEMIA OF CHRONIC RENAL FAILURE, UNSPECIFIED CKD STAGE: ICD-10-CM

## 2024-04-03 PROCEDURE — 96365 THER/PROPH/DIAG IV INF INIT: CPT | Performed by: NURSE PRACTITIONER

## 2024-04-03 RX ORDER — FAMOTIDINE 10 MG/ML
20 INJECTION INTRAVENOUS ONCE AS NEEDED
Status: CANCELLED | OUTPATIENT
Start: 2024-04-10

## 2024-04-03 RX ORDER — ALBUTEROL SULFATE 0.83 MG/ML
3 SOLUTION RESPIRATORY (INHALATION) AS NEEDED
Status: CANCELLED | OUTPATIENT
Start: 2024-04-10

## 2024-04-03 RX ORDER — DIPHENHYDRAMINE HYDROCHLORIDE 50 MG/ML
50 INJECTION INTRAMUSCULAR; INTRAVENOUS AS NEEDED
Status: CANCELLED | OUTPATIENT
Start: 2024-04-10

## 2024-04-03 RX ORDER — EPINEPHRINE 0.3 MG/.3ML
0.3 INJECTION SUBCUTANEOUS EVERY 5 MIN PRN
Status: CANCELLED | OUTPATIENT
Start: 2024-04-10

## 2024-04-03 ASSESSMENT — ENCOUNTER SYMPTOMS
FATIGUE: 1
LIGHT-HEADEDNESS: 1
SLEEP DISTURBANCE: 1
DIZZINESS: 1

## 2024-04-03 ASSESSMENT — PAIN SCALES - GENERAL: PAINLEVEL: 0-NO PAIN

## 2024-04-03 NOTE — PROGRESS NOTES
[facility]   Infusion Clinic Note   Date: April 3, 2024   Name: George Rowan  : 1945   MRN: [id number]         Reason for Visit: OP Infusion (Iron Sucrose Infusion)         Visit Type: INFUSION-4 of 5 doses       Ordered By: Dr. Mckoy      Accompanied by:Wife      Diagnosis: Anemia of chronic disease    Anemia of chronic renal failure, unspecified CKD stage       Allergies:   Allergies as of 2024 - Reviewed 2024   Allergen Reaction Noted    Azathioprine Other 2023    Fluorescein-benoxinate Swelling 2023    Other Unknown 2023    Tetracaine Other 2023         Current Medications has a current medication list which includes the following prescription(s): allopurinol, aspirin, atorvastatin, calcitriol, carvedilol, procrit, eszopiclone, fluticasone, gabapentin, hydralazine/hydrochlorothiazid, hydroxychloroquine, levothyroxine, losartan, NON FORMULARY, tamsulosin, vit a/vit c/vit e/zinc/copper, and vitamin b complex.       Vitals:   Vitals:    24 0909 24 1000 24 1030   BP: 123/53 124/50 (!) 130/49   Pulse: 63 58 54   Resp: 17 17 17   Temp: 36.7 °C (98 °F)     SpO2: 98% 99% 100%   PainSc: 0-No pain               Infusion Pre-procedure Checklist:   - Allergies reviewed: yes   - Medications reviewed: yes       - Previous reaction to current treatment: no      Assess patient for the concerns below. Document provider notification as appropriate.  - Active or recent infection with/without current antibiotic use: n/a  - Recent or planned invasive dental work: n/a  - Recent or planned surgeries: n/a  - Recently received or plans to receive vaccinations: n/a  - Has treatment related toxicities: n/a  - Is pregnant:  n/a      Pain: 0   - Is the pain different from normal: n/a   - Is the pain tolerable: n/a   - Is your Doctor aware:  n/a      Labs:  reviewed         Fall Risk Screening: Chris Fall Risk  History of Falling, Immediate or Within 3  Months: No  Secondary Diagnosis: Yes  Ambulatory Aid: Walks without aid/bedrest/nurse assist  Intravenous Therapy/Heparin Lock: No  Gait/Transferring: Weak  Mental Status: Oriented to own ability  Perez Fall Risk Score: 25       Review Of Systems:  Review of Systems   Constitutional:  Positive for fatigue.   Neurological:  Positive for dizziness and light-headedness.   Psychiatric/Behavioral:  Positive for sleep disturbance.    All other systems reviewed and are negative.        Infusion Readiness:   - Assessment Concerns Related to Infusion: No  - Provider notified: no      Document Below Only If Indicated:   New Patient Education:    N/A (returning patient for continuation of therapy. Ongoing education provided as needed.)        Treatment Conditions & Drug Specific Questions:            Weight Based Drug Calculations:    WEIGHT BASED DRUGS: NOT APPLICABLE / FLAT DOSE          Initiated By: Rosa M Swenson RN   Time: 10:35 AM     We administered iron sucrose (Venofer) 200 mg in sodium chloride 0.9% 100 mL IV.   1030 30 minute observation complete.  VSS. No s/s adverse reaction noted.  RTC 4/10/2024.

## 2024-04-03 NOTE — PATIENT INSTRUCTIONS
Today :We administered iron sucrose (Venofer) 200 mg in sodium chloride 0.9% 100 mL IV.     For:   1. Anemia of chronic disease    2. Anemia of chronic renal failure, unspecified CKD stage         Your next appointment is due in:  4/10/2024        Please read the  Medication Guide that was given to you and reviewed during todays visit.     (Tell all doctors including dentists that you are taking this medication)     Go to the emergency room or call 911 if:  -You have signs of allergic reaction:   -Rash, hives, itching.   -Swollen, blistered, peeling skin.   -Swelling of face, lips, mouth, tongue or throat.   -Tightness of chest, trouble breathing, swallowing or talking     Call your doctor:  - If IV / injection site gets red, warm, swollen, itchy or leaks fluid or pus.     (Leave dressing on your IV site for at least 2 hours and keep area clean and dry  - If you get sick or have symptoms of infection or are not feeling well for any reason.    (Wash your hands often, stay away from people who are sick)  - If you have side effects from your medication that do not go away or are bothersome.     (Refer to the teaching your nurse gave you for side effects to call your doctor about)    - Common side effects may include:  stuffy nose, headache, feeling tired, muscle aches, upset stomach  - Before receiving any vaccines     - Call [facility] at   If:  - You get sick, are on antibiotics, have had a recent vaccine, have surgery or dental work and your doctor wants your visit rescheduled.  - You need to cancel and reschedule your visit for any reason. Call at least 2 days before your visit if you need to cancel.   - Your insurance changes before your next visit.    (We will need to get approval from your new insurance. This can take up to two weeks.)     [facility] is opened Monday thru Friday. We are closed on weekends and holidays.   Voice mail will take your call if the center is  closed. If you leave a message please allow 24 hours for a call back during weekdays. If you leave a message on a weekend/holiday, we will call you back the next business day.

## 2024-04-08 DIAGNOSIS — D63.8 ANEMIA OF CHRONIC DISEASE: Primary | ICD-10-CM

## 2024-04-08 DIAGNOSIS — N18.4 CKD (CHRONIC KIDNEY DISEASE) STAGE 4, GFR 15-29 ML/MIN (MULTI): ICD-10-CM

## 2024-04-08 RX ORDER — DIPHENHYDRAMINE HYDROCHLORIDE 50 MG/ML
50 INJECTION INTRAMUSCULAR; INTRAVENOUS AS NEEDED
Status: CANCELLED | OUTPATIENT
Start: 2024-04-15

## 2024-04-08 RX ORDER — ALBUTEROL SULFATE 0.83 MG/ML
3 SOLUTION RESPIRATORY (INHALATION) AS NEEDED
Status: CANCELLED | OUTPATIENT
Start: 2024-04-15

## 2024-04-08 RX ORDER — FAMOTIDINE 10 MG/ML
20 INJECTION INTRAVENOUS ONCE AS NEEDED
Status: CANCELLED | OUTPATIENT
Start: 2024-04-15

## 2024-04-08 RX ORDER — EPINEPHRINE 0.3 MG/.3ML
0.3 INJECTION SUBCUTANEOUS EVERY 5 MIN PRN
Status: CANCELLED | OUTPATIENT
Start: 2024-04-15

## 2024-04-09 ENCOUNTER — INFUSION (OUTPATIENT)
Dept: INFUSION THERAPY | Facility: CLINIC | Age: 79
End: 2024-04-09
Payer: MEDICARE

## 2024-04-09 VITALS
TEMPERATURE: 98.2 F | WEIGHT: 159.6 LBS | DIASTOLIC BLOOD PRESSURE: 64 MMHG | BODY MASS INDEX: 25 KG/M2 | OXYGEN SATURATION: 98 % | SYSTOLIC BLOOD PRESSURE: 125 MMHG | RESPIRATION RATE: 16 BRPM | HEART RATE: 72 BPM

## 2024-04-09 DIAGNOSIS — N18.9 ANEMIA OF CHRONIC RENAL FAILURE, UNSPECIFIED CKD STAGE: Primary | ICD-10-CM

## 2024-04-09 DIAGNOSIS — D63.1 ANEMIA OF CHRONIC RENAL FAILURE, UNSPECIFIED CKD STAGE: Primary | ICD-10-CM

## 2024-04-09 PROCEDURE — 85018 HEMOGLOBIN: CPT | Performed by: NURSE PRACTITIONER

## 2024-04-09 NOTE — PROGRESS NOTES
[facility]   Infusion Clinic Note   Date: 2024   Name: George Rowan  : 1945   MRN: [id number]         Reason for Visit: Injection (Retacrit)    Patients  hgb today is 11.1 - no injection given   Visit Type: INJECTION       Ordered By: Dr. Mckoy      Accompanied by:Wife      Diagnosis: No diagnosis found.       Allergies:   Allergies as of 2024 - Reviewed 2024   Allergen Reaction Noted    Azathioprine Other 2023    Fluorescein-benoxinate Swelling 2023    Other Unknown 2023    Tetracaine Other 2023         Current Medications has a current medication list which includes the following prescription(s): allopurinol, aspirin, atorvastatin, calcitriol, carvedilol, procrit, eszopiclone, fluticasone, gabapentin, hydralazine/hydrochlorothiazid, hydroxychloroquine, levothyroxine, losartan, NON FORMULARY, tamsulosin, vit a/vit c/vit e/zinc/copper, and vitamin b complex.       Vitals:   There were no vitals filed for this visit.          Infusion Pre-procedure Checklist:   - Allergies reviewed: yes   - Medications reviewed: yes       - Previous reaction to current treatment: no      Assess patient for the concerns below. Document provider notification as appropriate.  - Active or recent infection with/without current antibiotic use: no  - Recent or planned invasive dental work: no  - Recent or planned surgeries: no  - Recently received or plans to receive vaccinations: no  - Has treatment related toxicities: no  - Is pregnant:  n/a      Pain: 5   - Is the pain different from normal: yes   - Is the pain tolerable: yes   - Is your Doctor aware:  yes   NP will call Dr. York's office to make him aware.   Labs: N/A         Fall Risk Screening:         Review Of Systems:  Review of Systems - Oncology      Infusion Readiness:   - Assessment Concerns Related to Infusion: Yes  - Provider notified: n/a      Document Below Only If Indicated:   New Patient  "Education:    N/A (returning patient for continuation of therapy. Ongoing education provided as needed.)        Treatment Conditions & Drug Specific Questions:    Epoetin Burak (EPOGEN. PROCRIT, RETACRIT)    TREATMENT CONDITIONS:    Unless otherwise specified on patient specific therapy plan HOLD and notify provider prior to proceeding with today's injection if:  o Hemoglobin GREATER THAN 11 g/dL (parameter set by prescribing provider)  o Increase in hemoglobin GREATER THAN 1 g/dL   o SBP GREATER THAN 160 mmHg    Lab Results   Component Value Date/Time    HGB 9.6 (A) 03/27/2024 1005    HGB 9.9 (A) 02/22/2024 0911    HGB 9.3 (A) 02/08/2024 0953    HGB 9.3 (A) 02/08/2024 0953     No results found for: \"HGBCAP\"     Labs reviewed and patient meets treatment conditions? Yes    DRUG SPECIFIC QUESTIONS:  - Does the patient have uncontrolled hypertension?  No    (Use is contraindicated in patients with uncontrolled hypertension)     - Educate on the following:? Yes       - Increased risk of serious cardiovascular reactions, thromboembolic reactions, stroke and tumor progression.        - Need to undergo regular blood pressure monitoring. Adhere to prescribed anti-hypertensive regimen and follow recommended dietary restrictions.        - Need to contact their healthcare provider for new onset neurologic symptoms or change in seizure frequency.        - Need for  regular laboratory tests to monitor hemoglobin levels.          Weight Based Drug Calculations:    WEIGHT BASED DRUGS: NOT APPLICABLE / FLAT DOSE          Initiated By: PRINCESS Hodgson   Time: 10:34 AM     George Rowan had no medications administered during this visit.      "

## 2024-04-10 ENCOUNTER — OFFICE VISIT (OUTPATIENT)
Dept: PRIMARY CARE | Facility: CLINIC | Age: 79
End: 2024-04-10
Payer: MEDICARE

## 2024-04-10 ENCOUNTER — INFUSION (OUTPATIENT)
Dept: INFUSION THERAPY | Facility: CLINIC | Age: 79
End: 2024-04-10
Payer: MEDICARE

## 2024-04-10 VITALS
SYSTOLIC BLOOD PRESSURE: 118 MMHG | RESPIRATION RATE: 16 BRPM | TEMPERATURE: 97.5 F | WEIGHT: 160 LBS | HEIGHT: 67 IN | OXYGEN SATURATION: 96 % | BODY MASS INDEX: 25.11 KG/M2 | DIASTOLIC BLOOD PRESSURE: 54 MMHG | HEART RATE: 58 BPM

## 2024-04-10 VITALS
HEART RATE: 54 BPM | DIASTOLIC BLOOD PRESSURE: 55 MMHG | RESPIRATION RATE: 16 BRPM | OXYGEN SATURATION: 98 % | BODY MASS INDEX: 25.09 KG/M2 | WEIGHT: 160.2 LBS | SYSTOLIC BLOOD PRESSURE: 121 MMHG | TEMPERATURE: 98.2 F

## 2024-04-10 DIAGNOSIS — I10 ESSENTIAL HYPERTENSION: ICD-10-CM

## 2024-04-10 DIAGNOSIS — G89.29 CHRONIC PAIN OF RIGHT KNEE: ICD-10-CM

## 2024-04-10 DIAGNOSIS — D63.1 ANEMIA OF CHRONIC RENAL FAILURE, STAGE 4 (SEVERE) (MULTI): ICD-10-CM

## 2024-04-10 DIAGNOSIS — N18.4 CKD (CHRONIC KIDNEY DISEASE) STAGE 4, GFR 15-29 ML/MIN (MULTI): ICD-10-CM

## 2024-04-10 DIAGNOSIS — D63.8 ANEMIA IN OTHER CHRONIC DISEASES CLASSIFIED ELSEWHERE: Primary | ICD-10-CM

## 2024-04-10 DIAGNOSIS — D63.1 ANEMIA OF CHRONIC RENAL FAILURE, UNSPECIFIED CKD STAGE: ICD-10-CM

## 2024-04-10 DIAGNOSIS — D63.8 ANEMIA OF CHRONIC DISEASE: ICD-10-CM

## 2024-04-10 DIAGNOSIS — I35.9 AORTIC VALVE DISEASE: ICD-10-CM

## 2024-04-10 DIAGNOSIS — N18.4 ANEMIA OF CHRONIC RENAL FAILURE, STAGE 4 (SEVERE) (MULTI): ICD-10-CM

## 2024-04-10 DIAGNOSIS — R53.1 WEAKNESS: Primary | ICD-10-CM

## 2024-04-10 DIAGNOSIS — N18.9 ANEMIA OF CHRONIC RENAL FAILURE, UNSPECIFIED CKD STAGE: ICD-10-CM

## 2024-04-10 DIAGNOSIS — M25.561 CHRONIC PAIN OF RIGHT KNEE: ICD-10-CM

## 2024-04-10 PROCEDURE — 1159F MED LIST DOCD IN RCRD: CPT | Performed by: FAMILY MEDICINE

## 2024-04-10 PROCEDURE — 1036F TOBACCO NON-USER: CPT | Performed by: FAMILY MEDICINE

## 2024-04-10 PROCEDURE — 99214 OFFICE O/P EST MOD 30 MIN: CPT | Performed by: FAMILY MEDICINE

## 2024-04-10 PROCEDURE — 96365 THER/PROPH/DIAG IV INF INIT: CPT | Performed by: NURSE PRACTITIONER

## 2024-04-10 PROCEDURE — 1160F RVW MEDS BY RX/DR IN RCRD: CPT | Performed by: FAMILY MEDICINE

## 2024-04-10 PROCEDURE — 3074F SYST BP LT 130 MM HG: CPT | Performed by: FAMILY MEDICINE

## 2024-04-10 PROCEDURE — 1125F AMNT PAIN NOTED PAIN PRSNT: CPT | Performed by: FAMILY MEDICINE

## 2024-04-10 PROCEDURE — 3078F DIAST BP <80 MM HG: CPT | Performed by: FAMILY MEDICINE

## 2024-04-10 RX ORDER — HYDRALAZINE HYDROCHLORIDE 50 MG/1
TABLET, FILM COATED ORAL
COMMUNITY
Start: 2024-01-03 | End: 2024-04-10 | Stop reason: ALTCHOICE

## 2024-04-10 RX ORDER — EPINEPHRINE 0.3 MG/.3ML
0.3 INJECTION SUBCUTANEOUS EVERY 5 MIN PRN
OUTPATIENT
Start: 2024-04-10

## 2024-04-10 RX ORDER — DIPHENHYDRAMINE HYDROCHLORIDE 50 MG/ML
50 INJECTION INTRAMUSCULAR; INTRAVENOUS AS NEEDED
OUTPATIENT
Start: 2024-04-10

## 2024-04-10 RX ORDER — ALBUTEROL SULFATE 0.83 MG/ML
3 SOLUTION RESPIRATORY (INHALATION) AS NEEDED
OUTPATIENT
Start: 2024-04-10

## 2024-04-10 RX ORDER — FAMOTIDINE 10 MG/ML
20 INJECTION INTRAVENOUS ONCE AS NEEDED
OUTPATIENT
Start: 2024-04-10

## 2024-04-10 ASSESSMENT — PAIN SCALES - GENERAL
PAINLEVEL: 2
PAINLEVEL: 8

## 2024-04-10 ASSESSMENT — COLUMBIA-SUICIDE SEVERITY RATING SCALE - C-SSRS
6. HAVE YOU EVER DONE ANYTHING, STARTED TO DO ANYTHING, OR PREPARED TO DO ANYTHING TO END YOUR LIFE?: NO
2. HAVE YOU ACTUALLY HAD ANY THOUGHTS OF KILLING YOURSELF?: NO
1. IN THE PAST MONTH, HAVE YOU WISHED YOU WERE DEAD OR WISHED YOU COULD GO TO SLEEP AND NOT WAKE UP?: NO

## 2024-04-10 ASSESSMENT — ENCOUNTER SYMPTOMS
SLEEP DISTURBANCE: 1
FATIGUE: 1
OCCASIONAL FEELINGS OF UNSTEADINESS: 0
DEPRESSION: 0
LOSS OF SENSATION IN FEET: 0
LIGHT-HEADEDNESS: 1

## 2024-04-10 NOTE — PATIENT INSTRUCTIONS
Today :We administered iron sucrose (Venofer) 200 mg in sodium chloride 0.9% 100 mL IV.     For:   1. Anemia of chronic disease    2. Anemia of chronic renal failure, unspecified CKD stage         Your next appointment is due in:  two weeks        Please read the  Medication Guide that was given to you and reviewed during todays visit.     (Tell all doctors including dentists that you are taking this medication)     Go to the emergency room or call 911 if:  -You have signs of allergic reaction:   -Rash, hives, itching.   -Swollen, blistered, peeling skin.   -Swelling of face, lips, mouth, tongue or throat.   -Tightness of chest, trouble breathing, swallowing or talking     Call your doctor:  - If IV / injection site gets red, warm, swollen, itchy or leaks fluid or pus.     (Leave dressing on your IV site for at least 2 hours and keep area clean and dry  - If you get sick or have symptoms of infection or are not feeling well for any reason.    (Wash your hands often, stay away from people who are sick)  - If you have side effects from your medication that do not go away or are bothersome.     (Refer to the teaching your nurse gave you for side effects to call your doctor about)    - Common side effects may include:  stuffy nose, headache, feeling tired, muscle aches, upset stomach  - Before receiving any vaccines     - Call the [facility] at   If:  - You get sick, are on antibiotics, have had a recent vaccine, have surgery or dental work and your doctor wants your visit rescheduled.  - You need to cancel and reschedule your visit for any reason. Call at least 2 days before your visit if you need to cancel.   - Your insurance changes before your next visit.    (We will need to get approval from your new insurance. This can take up to two weeks.)     The [facility] is opened Monday thru Friday. We are closed on weekends and holidays.   Voice mail will take your call if the center is  closed. If you leave a message please allow 24 hours for a call back during weekdays. If you leave a message on a weekend/holiday, we will call you back the next business day.

## 2024-04-10 NOTE — PROGRESS NOTES
"6Subjective   Patient ID: George Rowan is a 78 y.o. male who presents for Knee Pain and Mass (Pt is here with complaints of right knee pain increased with pressure, pt would also like a lump on the back head checked. ).    HPI he has been having iron infusions and will be changing to procrit.  Hx of renal failure also.    Some fatigue.  Insomnia and lunesta no longer working.    Pain with wt bearing on his right knee.   He has been having some weakness and shaking of his legs bilaterally.  His wife states he seems to be speaking differently and unengaged/some hypersomnia and just not himself as he used to be.  Some memory loss.     Review of Systems  See HPI  Objective   /54 (BP Location: Left arm, Patient Position: Sitting, BP Cuff Size: Adult)   Pulse 58   Temp 36.4 °C (97.5 °F) (Temporal)   Resp 16   Ht 1.702 m (5' 7\")   Wt 72.6 kg (160 lb)   SpO2 96%   BMI 25.06 kg/m²     Physical Exam  Constitutional:       General: He is not in acute distress.     Appearance: Normal appearance.   Cardiovascular:      Rate and Rhythm: Normal rate and regular rhythm.      Heart sounds: No murmur heard.  Pulmonary:      Breath sounds: Normal breath sounds. No wheezing.   Musculoskeletal:      Comments: Some mild weakness of upper thighs.  Some right knee pain anteriorly going from sitting to standing.  Mild jt line tenderness. No effusion.    Neurological:      Mental Status: He is alert.         Assessment/Plan   Problem List Items Addressed This Visit             ICD-10-CM    Essential hypertension I10    Aortic valve disease I35.9    Anemia of chronic renal failure N18.9, D63.1    CKD (chronic kidney disease) stage 4, GFR 15-29 ml/min (CMS/HCC) N18.4     Other Visit Diagnoses         Codes    Weakness    -  Primary R53.1    Relevant Orders    Referral to Neurology    Chronic pain of right knee     M25.561, G89.29        He will be changing back to procrit near future and continue regular follow up with " nephrology.  He has follow up with ortho in the near future for knee pain.  Disc prior xrs with pt that did show some mild oa.    He get neuro eval and follow up after this.  Disc staying active and keep bp log.

## 2024-04-10 NOTE — PROGRESS NOTES
[facility]   Infusion Clinic Note   Date: April 10, 2024   Name: George Rowan  : 1945   MRN: [id number]         Reason for Visit: OP Infusion (Venofer)         Visit Type: INFUSION-5 of 5 doses       Ordered By: Dr. Mckoy      Accompanied by:Wife      Diagnosis: Anemia of chronic disease    Anemia of chronic renal failure, unspecified CKD stage       Allergies:   Allergies as of 04/10/2024 - Reviewed 04/10/2024   Allergen Reaction Noted    Azathioprine Other 2023    Fluorescein-benoxinate Swelling 2023    Other Unknown 2023    Tetracaine Other 2023         Current Medications has a current medication list which includes the following prescription(s): allopurinol, aspirin, atorvastatin, calcitriol, carvedilol, procrit, eszopiclone, fluticasone, gabapentin, hydralazine/hydrochlorothiazid, hydroxychloroquine, levothyroxine, losartan, NON FORMULARY, tamsulosin, vit a/vit c/vit e/zinc/copper, and vitamin b complex.       Vitals:   Vitals:    04/10/24 0908 04/10/24 1007   BP: 116/53 121/55   Pulse: 67 54   Resp: 16 16   Temp: 36.7 °C (98 °F) 36.8 °C (98.2 °F)   TempSrc: Temporal    SpO2: 96% 98%   Weight: 72.7 kg (160 lb 3.2 oz)    PainSc:   8    PainLoc: Knee              Infusion Pre-procedure Checklist:   - Allergies reviewed: yes   - Medications reviewed: yes       - Previous reaction to current treatment: no      Assess patient for the concerns below. Document provider notification as appropriate.  - Active or recent infection with/without current antibiotic use: n/a  - Recent or planned invasive dental work: n/a  - Recent or planned surgeries: n/a  - Recently received or plans to receive vaccinations: n/a  - Has treatment related toxicities: n/a  - Is pregnant:  n/a      Pain: 8   - Is the pain different from normal: no   - Is the pain tolerable: yes   - Is your Doctor aware:  Yes. Seeing Dr. York today   Patient c/o right knee pain x 2 weeks with steps  and walking. Also, reporting a bump on the left side of his head. Seeing Dr. York today for both concerns.    Labs:  reviewed         Fall Risk Screening: Perez Fall Risk  History of Falling, Immediate or Within 3 Months: No  Secondary Diagnosis: Yes  Ambulatory Aid: Walks without aid/bedrest/nurse assist  Intravenous Therapy/Heparin Lock: Yes  Gait/Transferring: Normal/bedrest/immobile  Mental Status: Oriented to own ability  Perez Fall Risk Score: 35       Review Of Systems:  Review of Systems   Constitutional:  Positive for fatigue.   Neurological:  Positive for light-headedness.        Reports feeling slightly lightheaded when he stands up at times.   Psychiatric/Behavioral:  Positive for sleep disturbance.    All other systems reviewed and are negative.        Infusion Readiness:   - Assessment Concerns Related to Infusion: No  - Provider notified: no      Document Below Only If Indicated:   New Patient Education:    N/A (returning patient for continuation of therapy. Ongoing education provided as needed.)        Treatment Conditions & Drug Specific Questions:    Venofer        Weight Based Drug Calculations:    WEIGHT BASED DRUGS: NOT APPLICABLE / FLAT DOSE          Initiated By: Terrell Alanis RN   Time: 10:46 AM     We administered iron sucrose (Venofer) 200 mg in sodium chloride 0.9% 100 mL IV.   1010 Line flushed with NS 25 ml when infusion completed. VSS  1020 Patient tolerated well. Has appointment at 1030 with Dr. York. Discharged with his wife to go to appointment.

## 2024-04-11 ENCOUNTER — TELEPHONE (OUTPATIENT)
Dept: PRIMARY CARE | Facility: CLINIC | Age: 79
End: 2024-04-11
Payer: MEDICARE

## 2024-04-11 DIAGNOSIS — I63.9 CEREBROVASCULAR ACCIDENT (CVA), UNSPECIFIED MECHANISM (MULTI): ICD-10-CM

## 2024-04-11 DIAGNOSIS — N19 RENAL FAILURE, UNSPECIFIED CHRONICITY: ICD-10-CM

## 2024-04-11 DIAGNOSIS — G62.89 PERIPHERAL MOTOR NEUROPATHY: ICD-10-CM

## 2024-04-15 ENCOUNTER — TELEPHONE (OUTPATIENT)
Dept: PRIMARY CARE | Facility: CLINIC | Age: 79
End: 2024-04-15
Payer: MEDICARE

## 2024-04-15 DIAGNOSIS — N23 KIDNEY PAIN: ICD-10-CM

## 2024-04-16 ENCOUNTER — APPOINTMENT (OUTPATIENT)
Dept: RADIOLOGY | Facility: HOSPITAL | Age: 79
End: 2024-04-16
Payer: MEDICARE

## 2024-04-16 ENCOUNTER — APPOINTMENT (OUTPATIENT)
Dept: CARDIOLOGY | Facility: HOSPITAL | Age: 79
End: 2024-04-16
Payer: MEDICARE

## 2024-04-17 ENCOUNTER — APPOINTMENT (OUTPATIENT)
Dept: INFUSION THERAPY | Facility: CLINIC | Age: 79
End: 2024-04-17
Payer: MEDICARE

## 2024-04-17 ENCOUNTER — APPOINTMENT (OUTPATIENT)
Dept: RHEUMATOLOGY | Facility: CLINIC | Age: 79
End: 2024-04-17
Payer: MEDICARE

## 2024-04-18 ENCOUNTER — TELEPHONE (OUTPATIENT)
Dept: PRIMARY CARE | Facility: CLINIC | Age: 79
End: 2024-04-18

## 2024-04-18 ENCOUNTER — LAB (OUTPATIENT)
Dept: LAB | Facility: LAB | Age: 79
End: 2024-04-18
Payer: MEDICARE

## 2024-04-18 DIAGNOSIS — D63.8 ANEMIA IN OTHER CHRONIC DISEASES CLASSIFIED ELSEWHERE: ICD-10-CM

## 2024-04-18 DIAGNOSIS — G47.00 INSOMNIA, UNSPECIFIED TYPE: Primary | ICD-10-CM

## 2024-04-18 LAB
ERYTHROCYTE [DISTWIDTH] IN BLOOD BY AUTOMATED COUNT: 14.1 % (ref 11.5–14.5)
HCT VFR BLD AUTO: 33.6 % (ref 41–52)
HGB BLD-MCNC: 10.9 G/DL (ref 13.5–17.5)
MCH RBC QN AUTO: 32 PG (ref 26–34)
MCHC RBC AUTO-ENTMCNC: 32.4 G/DL (ref 32–36)
MCV RBC AUTO: 99 FL (ref 80–100)
NRBC BLD-RTO: 0 /100 WBCS (ref 0–0)
PLATELET # BLD AUTO: 216 X10*3/UL (ref 150–450)
RBC # BLD AUTO: 3.41 X10*6/UL (ref 4.5–5.9)
WBC # BLD AUTO: 8.6 X10*3/UL (ref 4.4–11.3)

## 2024-04-18 PROCEDURE — 82728 ASSAY OF FERRITIN: CPT

## 2024-04-18 PROCEDURE — 36415 COLL VENOUS BLD VENIPUNCTURE: CPT

## 2024-04-18 PROCEDURE — 83550 IRON BINDING TEST: CPT

## 2024-04-18 PROCEDURE — 83540 ASSAY OF IRON: CPT

## 2024-04-18 PROCEDURE — 85027 COMPLETE CBC AUTOMATED: CPT

## 2024-04-18 PROCEDURE — 80069 RENAL FUNCTION PANEL: CPT

## 2024-04-18 RX ORDER — TRAZODONE HYDROCHLORIDE 50 MG/1
50 TABLET ORAL NIGHTLY PRN
Qty: 30 TABLET | Refills: 5 | Status: SHIPPED | OUTPATIENT
Start: 2024-04-18 | End: 2025-04-18

## 2024-04-19 LAB
ALBUMIN SERPL BCP-MCNC: 3.9 G/DL (ref 3.4–5)
ANION GAP SERPL CALC-SCNC: 16 MMOL/L (ref 10–20)
BUN SERPL-MCNC: 82 MG/DL (ref 6–23)
CALCIUM SERPL-MCNC: 13.2 MG/DL (ref 8.6–10.3)
CHLORIDE SERPL-SCNC: 100 MMOL/L (ref 98–107)
CO2 SERPL-SCNC: 27 MMOL/L (ref 21–32)
CREAT SERPL-MCNC: 6.37 MG/DL (ref 0.5–1.3)
EGFRCR SERPLBLD CKD-EPI 2021: 8 ML/MIN/1.73M*2
FERRITIN SERPL-MCNC: 1052 NG/ML (ref 20–300)
GLUCOSE SERPL-MCNC: 140 MG/DL (ref 74–99)
IRON SATN MFR SERPL: 47 % (ref 25–45)
IRON SERPL-MCNC: 110 UG/DL (ref 35–150)
PHOSPHATE SERPL-MCNC: 5.9 MG/DL (ref 2.5–4.9)
POTASSIUM SERPL-SCNC: 4.9 MMOL/L (ref 3.5–5.3)
SODIUM SERPL-SCNC: 138 MMOL/L (ref 136–145)
TIBC SERPL-MCNC: 236 UG/DL (ref 240–445)
UIBC SERPL-MCNC: 126 UG/DL (ref 110–370)

## 2024-04-23 ENCOUNTER — OFFICE VISIT (OUTPATIENT)
Dept: RHEUMATOLOGY | Facility: CLINIC | Age: 79
End: 2024-04-23
Payer: MEDICARE

## 2024-04-23 VITALS
DIASTOLIC BLOOD PRESSURE: 60 MMHG | BODY MASS INDEX: 24.61 KG/M2 | SYSTOLIC BLOOD PRESSURE: 124 MMHG | HEART RATE: 53 BPM | OXYGEN SATURATION: 99 % | HEIGHT: 67 IN | WEIGHT: 156.8 LBS

## 2024-04-23 DIAGNOSIS — I15.1 HYPERTENSION SECONDARY TO OTHER RENAL DISORDERS: Primary | ICD-10-CM

## 2024-04-23 DIAGNOSIS — E83.52 HYPERCALCEMIA: ICD-10-CM

## 2024-04-23 DIAGNOSIS — M32.19 OTHER SYSTEMIC LUPUS ERYTHEMATOSUS WITH OTHER ORGAN INVOLVEMENT (MULTI): ICD-10-CM

## 2024-04-23 PROCEDURE — 1159F MED LIST DOCD IN RCRD: CPT | Performed by: INTERNAL MEDICINE

## 2024-04-23 PROCEDURE — 3078F DIAST BP <80 MM HG: CPT | Performed by: INTERNAL MEDICINE

## 2024-04-23 PROCEDURE — 1036F TOBACCO NON-USER: CPT | Performed by: INTERNAL MEDICINE

## 2024-04-23 PROCEDURE — 1160F RVW MEDS BY RX/DR IN RCRD: CPT | Performed by: INTERNAL MEDICINE

## 2024-04-23 PROCEDURE — 3074F SYST BP LT 130 MM HG: CPT | Performed by: INTERNAL MEDICINE

## 2024-04-23 PROCEDURE — 99214 OFFICE O/P EST MOD 30 MIN: CPT | Performed by: INTERNAL MEDICINE

## 2024-04-23 ASSESSMENT — ROUTINE ASSESSMENT OF PATIENT INDEX DATA (RAPID3)
ON A SCALE OF ONE TO TEN, HOW MUCH PAIN HAVE YOU HAD BECAUSE OF YOUR CONDITION OVER THE PAST WEEK?: 0
FN_SCORE: 0
TURN_FAUCETS_OFF: WITHOUT ANY DIFFICULTY
PARTIPATE_RECREATIONAL_ACTIVITIES: WITHOUT ANY DIFFICULTY
TOTAL RAPID3 SCORE: 8
WEIGHTED_TOTAL_SCORE: 2.67
SUM OF QUESTIONS A TO J: 0
ON A SCALE OF ONE TO TEN, CONSIDERING ALL THE WAYS IN WHICH ILLNESS AND HEALTH CONDITIONS MAY AFFECT YOU AT THIS TIME, PLEASE INDICATE BELOW HOW YOU ARE DOING:: 8
IN_OUT_BED: WITHOUT ANY DIFFICULTY
WALK_FLAT_GROUND: WITHOUT ANY DIFFICULTY
IN_OUT_TRANSPORT: WITHOUT ANY DIFFICULTY
WALK_KILOMETERS: WITHOUT ANY DIFFICULTY
DRESS_YOURSELF: WITHOUT ANY DIFFICULTY
FEELINGS_DEPRESSION: WITHOUT ANY DIFFICULTY
PICK_CLOTHES_OFF_FLOOR: WITHOUT ANY DIFFICULTY
SEVERITY_SCORE: MODERATE SEVERITY (MS)
WASH_DRY_BODY: WITHOUT ANY DIFFICULTY
FEELINGS_ANXIETY_NERVOUS: WITH SOME DIFFICULTY
LIFT_CUP_TO_MOUTH: WITHOUT ANY DIFFICULTY
SEVERITY_SCORE: 0
ON A SCALE OF ONE TO TEN, CONSIDERING ALL THE WAYS IN WHICH ILLNESS AND HEALTH CONDITIONS MAY AFFECT YOU AT THIS TIME, PLEASE INDICATE BELOW HOW YOU ARE DOING:: 8
GOOD_NIGHTS_SLEEP: WITH MUCH DIFFICULTY
ON A SCALE OF ONE TO TEN, HOW MUCH PAIN HAVE YOU HAD BECAUSE OF YOUR CONDITION OVER THE PAST WEEK?: 0

## 2024-04-23 ASSESSMENT — PAIN - FUNCTIONAL ASSESSMENT: PAIN_FUNCTIONAL_ASSESSMENT: 0-10

## 2024-04-23 ASSESSMENT — PAIN SCALES - GENERAL: PAINLEVEL_OUTOF10: 0 - NO PAIN

## 2024-04-23 ASSESSMENT — ENCOUNTER SYMPTOMS
DEPRESSION: 0
OCCASIONAL FEELINGS OF UNSTEADINESS: 0
LOSS OF SENSATION IN FEET: 0

## 2024-04-23 ASSESSMENT — PATIENT HEALTH QUESTIONNAIRE - PHQ9: 2. FEELING DOWN, DEPRESSED OR HOPELESS: SEVERAL DAYS

## 2024-04-23 NOTE — PROGRESS NOTES
"Chief Complaint:    This 78 y.o. male presents with the chief complaint of systemic lupus erythematosus (SLE) and chronic renal failure.     Subjective:   HPI   Problem:  1: SLE       Today the patient reports that his renal failure has progressed. He states that the GFR is now 8, according to the data in MyChart. He has an appointment with Dr. Mckoy to discuss plans going forward.        The patient again denies new or ongoing symptoms of SLE based on the queries I addressed. In particularly, he has had no new CNS symptoms, including headaches, seizures, impaired memory, or dysphasia.          The patient continues on treatment of SLE with HYDROXYCHLOROQUINE 200 mg/day.   2.  Hypercalcemia         The patient alerted me to the elevated serum calcium level he found in MyChart.          This is verified; however, at this moment, patient denies any hypercalcemic symptoms whatsoever.                ROS:        The patient's ROS remain unchanged with exceptions of worsening fatigability, difficulty with sleep quality and duration, and some modest difficulty with short-term memory.     Objective:   /60   Pulse 53   Ht 1.702 m (5' 7\")   Wt 71.1 kg (156 lb 12.8 oz)   SpO2 99%   BMI 24.56 kg/m²      Physical Exam  Vitals and nursing note reviewed.   Constitutional:       General: He is not in acute distress.     Appearance: He is normal weight. He is ill-appearing.   HENT:      Head: Normocephalic.   Eyes:      Extraocular Movements: Extraocular movements intact.      Conjunctiva/sclera: Conjunctivae normal.      Pupils: Pupils are equal, round, and reactive to light.   Neck:      Vascular: Carotid bruit present.   Cardiovascular:      Rate and Rhythm: Normal rate and regular rhythm.      Pulses: Normal pulses.      Heart sounds: Murmur heard.      Crescendo decrescendo systolic murmur is present with a grade of 3/6.      No S3 or S4 sounds.   Musculoskeletal:      Cervical back: Normal range of motion and " neck supple. No rigidity.      Right lower leg: No edema.      Left lower leg: No edema.   Lymphadenopathy:      Cervical: No cervical adenopathy.   Skin:     General: Skin is warm.      Capillary Refill: Capillary refill takes 2 to 3 seconds.   Neurological:      Mental Status: He is alert and oriented to person, place, and time.      Cranial Nerves: Cranial nerves 2-12 are intact.      Motor: No weakness or tremor.      Coordination: Coordination is intact.      Deep Tendon Reflexes:      Reflex Scores:       Tricep reflexes are 1+ on the right side and 1+ on the left side.       Bicep reflexes are 1+ on the right side and 1+ on the left side.       Brachioradialis reflexes are 1+ on the right side and 1+ on the left side.       Patellar reflexes are 1+ on the right side and 1+ on the left side.       Achilles reflexes are 1+ on the right side and 1+ on the left side.  Psychiatric:         Mood and Affect: Mood normal.         Behavior: Behavior normal.          Assessment:   Systemic lupus erythematosus - M32.9  Renal insufficiency with chronic kidney disease stage 5 -N18.5  3.   Query hypercalcemia - E83.52  4.   Left carotid atherosclerosis - I65.22  5.   Anemia of chronic kidney disease - D63.1  6.   Dyslipidemia - E78.5  7.   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 Continue HYDROXYCHLOROQUINE.    Hypercalcemia- Patient is being referred to [facility] lab for repeat assessment of serum calcium and     Return to office in 3 months  4.    Education/counseling: I answered all queries regarding SLE, hypercalcemia, and treatment options. I strongly urged patient to have repeat calcium and phosphorus levels; no evidence of hypercalcemia is identified at today's evaluation. Patient is in agreement, and son is supportive. Finally, patient also urged to follow-up with Dr. Mckoy regarding his renal assessment. Patient agrees.       Maurice Felix MD

## 2024-04-24 ENCOUNTER — TELEPHONE (OUTPATIENT)
Dept: RHEUMATOLOGY | Facility: CLINIC | Age: 79
End: 2024-04-24

## 2024-04-24 ENCOUNTER — APPOINTMENT (OUTPATIENT)
Dept: INFUSION THERAPY | Facility: CLINIC | Age: 79
End: 2024-04-24
Payer: MEDICARE

## 2024-04-24 ENCOUNTER — LAB (OUTPATIENT)
Dept: LAB | Facility: LAB | Age: 79
End: 2024-04-24
Payer: MEDICARE

## 2024-04-24 ENCOUNTER — INFUSION (OUTPATIENT)
Dept: INFUSION THERAPY | Facility: CLINIC | Age: 79
End: 2024-04-24
Payer: MEDICARE

## 2024-04-24 VITALS
DIASTOLIC BLOOD PRESSURE: 59 MMHG | TEMPERATURE: 97.8 F | OXYGEN SATURATION: 98 % | RESPIRATION RATE: 16 BRPM | HEART RATE: 56 BPM | WEIGHT: 155 LBS | SYSTOLIC BLOOD PRESSURE: 136 MMHG | BODY MASS INDEX: 24.28 KG/M2

## 2024-04-24 DIAGNOSIS — N18.5 CHRONIC KIDNEY DISEASE, STAGE 5 (MULTI): Primary | ICD-10-CM

## 2024-04-24 DIAGNOSIS — E83.52 HYPERCALCEMIA: ICD-10-CM

## 2024-04-24 DIAGNOSIS — N18.4 CKD (CHRONIC KIDNEY DISEASE) STAGE 4, GFR 15-29 ML/MIN (MULTI): ICD-10-CM

## 2024-04-24 DIAGNOSIS — D63.8 ANEMIA OF CHRONIC DISEASE: Primary | ICD-10-CM

## 2024-04-24 DIAGNOSIS — N18.5 CHRONIC KIDNEY DISEASE, STAGE 5 (MULTI): ICD-10-CM

## 2024-04-24 LAB
ALBUMIN SERPL-MCNC: 4 G/DL (ref 3.5–5)
ANION GAP SERPL CALC-SCNC: 12 MMOL/L
BUN SERPL-MCNC: 76 MG/DL (ref 8–25)
CA-I BLD-SCNC: 1.54 MMOL/L (ref 1.1–1.33)
CALCIUM SERPL-MCNC: 11.9 MG/DL (ref 8.5–10.4)
CHLORIDE SERPL-SCNC: 101 MMOL/L (ref 97–107)
CO2 SERPL-SCNC: 23 MMOL/L (ref 24–31)
CREAT SERPL-MCNC: 5.9 MG/DL (ref 0.4–1.6)
EGFRCR SERPLBLD CKD-EPI 2021: 9 ML/MIN/1.73M*2
ERYTHROCYTE [DISTWIDTH] IN BLOOD BY AUTOMATED COUNT: 14.1 % (ref 11.5–14.5)
GLUCOSE SERPL-MCNC: 102 MG/DL (ref 65–99)
HBV CORE AB SER QL: NONREACTIVE
HBV SURFACE AB SER-ACNC: <3.1 MIU/ML
HBV SURFACE AG SERPL QL IA: NONREACTIVE
HCT VFR BLD AUTO: 32.3 % (ref 41–52)
HGB BLD-MCNC: 10.6 G/DL (ref 13.5–17.5)
MCH RBC QN AUTO: 31.7 PG (ref 26–34)
MCHC RBC AUTO-ENTMCNC: 32.8 G/DL (ref 32–36)
MCV RBC AUTO: 97 FL (ref 80–100)
NRBC BLD-RTO: 0 /100 WBCS (ref 0–0)
PHOSPHATE SERPL-MCNC: 4.9 MG/DL (ref 2.5–4.5)
PLATELET # BLD AUTO: 203 X10*3/UL (ref 150–450)
POTASSIUM SERPL-SCNC: 5 MMOL/L (ref 3.4–5.1)
PTH-INTACT SERPL-MCNC: 29.1 PG/ML (ref 18.5–88)
RBC # BLD AUTO: 3.34 X10*6/UL (ref 4.5–5.9)
SODIUM SERPL-SCNC: 136 MMOL/L (ref 133–145)
WBC # BLD AUTO: 6.6 X10*3/UL (ref 4.4–11.3)

## 2024-04-24 PROCEDURE — 80069 RENAL FUNCTION PANEL: CPT

## 2024-04-24 PROCEDURE — 36415 COLL VENOUS BLD VENIPUNCTURE: CPT

## 2024-04-24 PROCEDURE — 83970 ASSAY OF PARATHORMONE: CPT

## 2024-04-24 PROCEDURE — 87340 HEPATITIS B SURFACE AG IA: CPT

## 2024-04-24 PROCEDURE — 96372 THER/PROPH/DIAG INJ SC/IM: CPT | Performed by: NURSE PRACTITIONER

## 2024-04-24 PROCEDURE — 86704 HEP B CORE ANTIBODY TOTAL: CPT

## 2024-04-24 PROCEDURE — 86706 HEP B SURFACE ANTIBODY: CPT

## 2024-04-24 PROCEDURE — 85027 COMPLETE CBC AUTOMATED: CPT

## 2024-04-24 PROCEDURE — 82330 ASSAY OF CALCIUM: CPT

## 2024-04-24 RX ORDER — ALBUTEROL SULFATE 0.83 MG/ML
3 SOLUTION RESPIRATORY (INHALATION) AS NEEDED
Status: CANCELLED | OUTPATIENT
Start: 2024-05-08

## 2024-04-24 RX ORDER — EPINEPHRINE 0.3 MG/.3ML
0.3 INJECTION SUBCUTANEOUS EVERY 5 MIN PRN
Status: CANCELLED | OUTPATIENT
Start: 2024-05-08

## 2024-04-24 RX ORDER — DIPHENHYDRAMINE HYDROCHLORIDE 50 MG/ML
50 INJECTION INTRAMUSCULAR; INTRAVENOUS AS NEEDED
Status: CANCELLED | OUTPATIENT
Start: 2024-05-08

## 2024-04-24 RX ORDER — FAMOTIDINE 10 MG/ML
20 INJECTION INTRAVENOUS ONCE AS NEEDED
Status: CANCELLED | OUTPATIENT
Start: 2024-05-08

## 2024-04-24 ASSESSMENT — PAIN SCALES - GENERAL: PAINLEVEL: 0-NO PAIN

## 2024-04-24 NOTE — TELEPHONE ENCOUNTER
PT CALLED & STATES AFTER HE WAS SEEN YESTERDAY LABS WERE TO BE ORDERED FOR GFR.  PLEASE ORDER IF NEEDED. DR CESAR ALREADY ORDERED

## 2024-04-24 NOTE — PROGRESS NOTES
[facility]   Infusion Clinic Note   Date: 2024   Name: George Rowan  : 1945   MRN: [id number]         Reason for Visit: Injections (Retacrit 15,000 u)   Hgb was 10.9 on 24      Visit Type: INJECTION       Ordered By: Dr Mckoy      Accompanied by:Wife      Diagnosis: Anemia of chronic disease    CKD (chronic kidney disease) stage 4, GFR 15-29 ml/min (Multi)       Allergies:   Allergies as of 2024 - Reviewed 2024   Allergen Reaction Noted    Azathioprine Other 2023    Fluorescein-benoxinate Swelling 2023    Other Unknown 2023    Tetracaine Other 2023         Current Medications has a current medication list which includes the following prescription(s): allopurinol, aspirin, atorvastatin, calcitriol, carvedilol, procrit, fluticasone, gabapentin, hydralazine/hydrochlorothiazid, hydroxychloroquine, levothyroxine, losartan, NON FORMULARY, tamsulosin, trazodone, vit a/vit c/vit e/zinc/copper, and vitamin b complex.       Vitals:   Vitals:    24 0903   BP: 136/59   Pulse: 56   Resp: 16   Temp: 36.6 °C (97.8 °F)   TempSrc: Temporal   SpO2: 98%   Weight: 70.3 kg (155 lb)   PainSc: 0-No pain             Infusion Pre-procedure Checklist:   - Allergies reviewed: yes   - Medications reviewed: yes       - Previous reaction to current treatment: no      Assess patient for the concerns below. Document provider notification as appropriate.  - Active or recent infection with/without current antibiotic use: no  - Recent or planned invasive dental work: no  - Recent or planned surgeries: no  - Recently received or plans to receive vaccinations: no  - Has treatment related toxicities: no  - Is pregnant:  n/a      Pain: 0   - Is the pain different from normal: n/a   - Is the pain tolerable: n/a   - Is your Doctor aware:  n/a      Labs: N/A           Fall Risk Screening: Chris Fall Risk  Secondary Diagnosis: Yes  Ambulatory Aid: Walks without  "aid/bedrest/nurse assist  Intravenous Therapy/Heparin Lock: No  Mental Status: Oriented to own ability       Review Of Systems:  Review of Systems - Oncology      Infusion Readiness:   - Assessment Concerns Related to Infusion: No  - Provider notified: n/a      Document Below Only If Indicated:   New Patient Education:    N/A (returning patient for continuation of therapy. Ongoing education provided as needed.)        Treatment Conditions & Drug Specific Questions:    Epoetin Burak (EPOGEN. PROCRIT, RETACRIT)    TREATMENT CONDITIONS:    Unless otherwise specified on patient specific therapy plan HOLD and notify provider prior to proceeding with today's injection if:  o Hemoglobin GREATER THAN 11 g/dL (parameter set by prescribing provider)  o Increase in hemoglobin GREATER THAN 1 g/dL   o SBP GREATER THAN 160 mmHg    Lab Results   Component Value Date/Time    HGB 10.9 (L) 04/18/2024 1237    HGB 9.6 (A) 03/27/2024 1005    HGB 9.9 (A) 02/22/2024 0911    HGB 9.3 (A) 02/08/2024 0953    HGB 9.3 (A) 02/08/2024 0953     No results found for: \"HGBCAP\"     Labs reviewed and patient meets treatment conditions? Yes    DRUG SPECIFIC QUESTIONS:  - Does the patient have uncontrolled hypertension?  No    (Use is contraindicated in patients with uncontrolled hypertension)     - Educate on the following:? Yes       - Increased risk of serious cardiovascular reactions, thromboembolic reactions, stroke and tumor progression.        - Need to undergo regular blood pressure monitoring. Adhere to prescribed anti-hypertensive regimen and follow recommended dietary restrictions.        - Need to contact their healthcare provider for new onset neurologic symptoms or change in seizure frequency.        - Need for  regular laboratory tests to monitor hemoglobin levels.          Weight Based Drug Calculations:    WEIGHT BASED DRUGS: NOT APPLICABLE / FLAT DOSE          Initiated By: PRINCESS Hodgson   Time: 9:33 AM     We administered " epoetin clyde-epbx.

## 2024-05-01 ENCOUNTER — APPOINTMENT (OUTPATIENT)
Dept: INFUSION THERAPY | Facility: CLINIC | Age: 79
End: 2024-05-01
Payer: MEDICARE

## 2024-05-07 ENCOUNTER — HOSPITAL ENCOUNTER (OUTPATIENT)
Dept: RADIOLOGY | Facility: HOSPITAL | Age: 79
Discharge: HOME | End: 2024-05-07
Payer: MEDICARE

## 2024-05-07 ENCOUNTER — HOSPITAL ENCOUNTER (OUTPATIENT)
Dept: CARDIOLOGY | Facility: HOSPITAL | Age: 79
Discharge: HOME | End: 2024-05-07
Payer: MEDICARE

## 2024-05-07 DIAGNOSIS — I35.0 NONRHEUMATIC AORTIC VALVE STENOSIS: ICD-10-CM

## 2024-05-07 DIAGNOSIS — I25.10 CORONARY ARTERY DISEASE INVOLVING NATIVE CORONARY ARTERY OF NATIVE HEART WITHOUT ANGINA PECTORIS: ICD-10-CM

## 2024-05-07 PROCEDURE — A9502 TC99M TETROFOSMIN: HCPCS | Performed by: INTERNAL MEDICINE

## 2024-05-07 PROCEDURE — 78452 HT MUSCLE IMAGE SPECT MULT: CPT

## 2024-05-07 PROCEDURE — 3430000001 HC RX 343 DIAGNOSTIC RADIOPHARMACEUTICALS: Performed by: INTERNAL MEDICINE

## 2024-05-07 PROCEDURE — 2500000004 HC RX 250 GENERAL PHARMACY W/ HCPCS (ALT 636 FOR OP/ED): Performed by: INTERNAL MEDICINE

## 2024-05-07 PROCEDURE — 78452 HT MUSCLE IMAGE SPECT MULT: CPT | Performed by: RADIOLOGY

## 2024-05-07 PROCEDURE — 93017 CV STRESS TEST TRACING ONLY: CPT

## 2024-05-07 RX ORDER — REGADENOSON 0.08 MG/ML
0.4 INJECTION, SOLUTION INTRAVENOUS ONCE
Status: COMPLETED | OUTPATIENT
Start: 2024-05-07 | End: 2024-05-07

## 2024-05-07 RX ADMIN — REGADENOSON 0.4 MG: 0.08 INJECTION, SOLUTION INTRAVENOUS at 08:45

## 2024-05-07 RX ADMIN — TETROFOSMIN 34.3 MILLICURIE: 0.23 INJECTION, POWDER, LYOPHILIZED, FOR SOLUTION INTRAVENOUS at 09:18

## 2024-05-07 RX ADMIN — TETROFOSMIN 11.4 MILLICURIE: 0.23 INJECTION, POWDER, LYOPHILIZED, FOR SOLUTION INTRAVENOUS at 08:12

## 2024-05-08 ENCOUNTER — INFUSION (OUTPATIENT)
Dept: INFUSION THERAPY | Facility: CLINIC | Age: 79
End: 2024-05-08
Payer: MEDICARE

## 2024-05-08 VITALS
WEIGHT: 158 LBS | TEMPERATURE: 98 F | RESPIRATION RATE: 18 BRPM | SYSTOLIC BLOOD PRESSURE: 134 MMHG | DIASTOLIC BLOOD PRESSURE: 66 MMHG | OXYGEN SATURATION: 97 % | HEART RATE: 67 BPM | BODY MASS INDEX: 24.75 KG/M2

## 2024-05-08 DIAGNOSIS — N18.4 CKD (CHRONIC KIDNEY DISEASE) STAGE 4, GFR 15-29 ML/MIN (MULTI): ICD-10-CM

## 2024-05-08 DIAGNOSIS — D63.8 ANEMIA OF CHRONIC DISEASE: ICD-10-CM

## 2024-05-08 LAB — POC HEMOGLOBIN: 9.9 G/DL (ref 13.5–17.5)

## 2024-05-08 PROCEDURE — 85018 HEMOGLOBIN: CPT | Performed by: NURSE PRACTITIONER

## 2024-05-08 PROCEDURE — 96372 THER/PROPH/DIAG INJ SC/IM: CPT | Performed by: NURSE PRACTITIONER

## 2024-05-08 RX ORDER — DIPHENHYDRAMINE HYDROCHLORIDE 50 MG/ML
50 INJECTION INTRAMUSCULAR; INTRAVENOUS AS NEEDED
Status: CANCELLED | OUTPATIENT
Start: 2024-05-22

## 2024-05-08 RX ORDER — ALBUTEROL SULFATE 0.83 MG/ML
3 SOLUTION RESPIRATORY (INHALATION) AS NEEDED
Status: CANCELLED | OUTPATIENT
Start: 2024-05-22

## 2024-05-08 RX ORDER — EPINEPHRINE 0.3 MG/.3ML
0.3 INJECTION SUBCUTANEOUS EVERY 5 MIN PRN
Status: CANCELLED | OUTPATIENT
Start: 2024-05-22

## 2024-05-08 RX ORDER — FAMOTIDINE 10 MG/ML
20 INJECTION INTRAVENOUS ONCE AS NEEDED
Status: CANCELLED | OUTPATIENT
Start: 2024-05-22

## 2024-05-08 ASSESSMENT — PAIN SCALES - GENERAL: PAINLEVEL: 0-NO PAIN

## 2024-05-08 NOTE — PROGRESS NOTES
[facility]   Infusion Clinic Note   Date: May 8, 2024   Name: George Rowan  : 1945   MRN: [id number]         Reason for Visit: OP Infusion (Retacrit)        Visit Type: INJECTION       Ordered By: Dr Mckoy      Accompanied by:Wife      Diagnosis: Anemia of chronic disease    CKD (chronic kidney disease) stage 4, GFR 15-29 ml/min (Multi)       Allergies:   Allergies as of 2024 - Reviewed 2024   Allergen Reaction Noted    Azathioprine Other 2023    Fluorescein-benoxinate Swelling 2023    Other Unknown 2023    Tetracaine Other 2023         Current Medications has a current medication list which includes the following prescription(s): allopurinol, aspirin, atorvastatin, calcitriol, carvedilol, procrit, fluticasone, gabapentin, hydralazine/hydrochlorothiazid, hydroxychloroquine, levothyroxine, losartan, NON FORMULARY, tamsulosin, trazodone, vit a/vit c/vit e/zinc/copper, and vitamin b complex.       Vitals:   Vitals:    24 0858   BP: 134/66   Pulse: 67   Resp: 18   Temp: 36.7 °C (98 °F)   TempSrc: Temporal   SpO2: 97%   Weight: 71.7 kg (158 lb)   PainSc: 0-No pain             Infusion Pre-procedure Checklist:   - Allergies reviewed: yes   - Medications reviewed: yes       - Previous reaction to current treatment: no      Assess patient for the concerns below. Document provider notification as appropriate.  - Active or recent infection with/without current antibiotic use: no  - Recent or planned invasive dental work: no  - Recent or planned surgeries: no  - Recently received or plans to receive vaccinations: no  - Has treatment related toxicities: no  - Is pregnant:  n/a      Pain: 0   - Is the pain different from normal: n/a   - Is the pain tolerable: n/a   - Is your Doctor aware:  n/a      Labs: Hemocue 9.9 today 24           Fall Risk Screening: Chris Fall Risk  History of Falling, Immediate or Within 3 Months: No  Secondary Diagnosis:  "Yes  Ambulatory Aid: Walks without aid/bedrest/nurse assist  Intravenous Therapy/Heparin Lock: No  Gait/Transferring: Normal/bedrest/immobile  Mental Status: Oriented to own ability  Perez Fall Risk Score: 15       Review Of Systems:  Review of Systems   All other systems reviewed and are negative.        Infusion Readiness:   - Assessment Concerns Related to Infusion: No  - Provider notified: n/a      Document Below Only If Indicated:   New Patient Education:    N/A (returning patient for continuation of therapy. Ongoing education provided as needed.)        Treatment Conditions & Drug Specific Questions:    Epoetin Burak (EPOGEN. PROCRIT, RETACRIT)    TREATMENT CONDITIONS:    Unless otherwise specified on patient specific therapy plan HOLD and notify provider prior to proceeding with today's injection if:  o Hemoglobin GREATER THAN 11 g/dL (parameter set by prescribing provider)  o Increase in hemoglobin GREATER THAN 1 g/dL   o SBP GREATER THAN 160 mmHg    Lab Results   Component Value Date/Time    HGB 9.9 (A) 05/08/2024 0909    HGB 10.6 (L) 04/24/2024 1051    HGB 10.9 (L) 04/18/2024 1237    HGB 9.6 (A) 03/27/2024 1005    HGB 9.9 (A) 02/22/2024 0911     No results found for: \"HGBCAP\"     Labs reviewed and patient meets treatment conditions? Yes    DRUG SPECIFIC QUESTIONS:  - Does the patient have uncontrolled hypertension?  No    (Use is contraindicated in patients with uncontrolled hypertension)     - Educate on the following:? Yes       - Increased risk of serious cardiovascular reactions, thromboembolic reactions, stroke and tumor progression.        - Need to undergo regular blood pressure monitoring. Adhere to prescribed anti-hypertensive regimen and follow recommended dietary restrictions.        - Need to contact their healthcare provider for new onset neurologic symptoms or change in seizure frequency.        - Need for  regular laboratory tests to monitor hemoglobin levels.          Weight Based Drug " Calculations:    WEIGHT BASED DRUGS: NOT APPLICABLE / FLAT DOSE          Initiated By: Terrell Alanis RN   Time: 9:17 AM     We administered epoetin clyde-epbx.

## 2024-05-08 NOTE — PATIENT INSTRUCTIONS
Today :We administered epoetin clyde-epbx.     For:   1. Anemia of chronic disease    2. CKD (chronic kidney disease) stage 4, GFR 15-29 ml/min (Multi)         Your next appointment is due in:  May 22, 2024        Please read the  Medication Guide that was given to you and reviewed during todays visit.     (Tell all doctors including dentists that you are taking this medication)     Go to the emergency room or call 911 if:  -You have signs of allergic reaction:   -Rash, hives, itching.   -Swollen, blistered, peeling skin.   -Swelling of face, lips, mouth, tongue or throat.   -Tightness of chest, trouble breathing, swallowing or talking     Call your doctor:  - If IV / injection site gets red, warm, swollen, itchy or leaks fluid or pus.     (Leave dressing on your IV site for at least 2 hours and keep area clean and dry  - If you get sick or have symptoms of infection or are not feeling well for any reason.    (Wash your hands often, stay away from people who are sick)  - If you have side effects from your medication that do not go away or are bothersome.     (Refer to the teaching your nurse gave you for side effects to call your doctor about)    - Common side effects may include:  stuffy nose, headache, feeling tired, muscle aches, upset stomach  - Before receiving any vaccines     - Call the [facility] at   If:  - You get sick, are on antibiotics, have had a recent vaccine, have surgery or dental work and your doctor wants your visit rescheduled.  - You need to cancel and reschedule your visit for any reason. Call at least 2 days before your visit if you need to cancel.   - Your insurance changes before your next visit.    (We will need to get approval from your new insurance. This can take up to two weeks.)     The [facility] is opened Monday thru Friday. We are closed on weekends and holidays.   Voice mail will take your call if the center is closed. If you leave a message please  allow 24 hours for a call back during weekdays. If you leave a message on a weekend/holiday, we will call you back the next business day.

## 2024-05-16 ENCOUNTER — APPOINTMENT (OUTPATIENT)
Dept: INFUSION THERAPY | Facility: CLINIC | Age: 79
End: 2024-05-16
Payer: MEDICARE

## 2024-05-21 ENCOUNTER — PATIENT MESSAGE (OUTPATIENT)
Dept: PRIMARY CARE | Facility: CLINIC | Age: 79
End: 2024-05-21
Payer: MEDICARE

## 2024-05-21 DIAGNOSIS — G47.00 INSOMNIA, UNSPECIFIED TYPE: Primary | ICD-10-CM

## 2024-05-21 RX ORDER — RAMELTEON 8 MG/1
8 TABLET ORAL NIGHTLY
Qty: 30 TABLET | Refills: 2 | Status: SHIPPED | OUTPATIENT
Start: 2024-05-21 | End: 2025-05-21

## 2024-05-22 ENCOUNTER — INFUSION (OUTPATIENT)
Dept: INFUSION THERAPY | Facility: CLINIC | Age: 79
End: 2024-05-22
Payer: MEDICARE

## 2024-05-22 VITALS
BODY MASS INDEX: 24.79 KG/M2 | SYSTOLIC BLOOD PRESSURE: 131 MMHG | HEART RATE: 64 BPM | OXYGEN SATURATION: 98 % | DIASTOLIC BLOOD PRESSURE: 50 MMHG | TEMPERATURE: 97.9 F | RESPIRATION RATE: 16 BRPM | WEIGHT: 158.3 LBS

## 2024-05-22 DIAGNOSIS — N18.4 CKD (CHRONIC KIDNEY DISEASE) STAGE 4, GFR 15-29 ML/MIN (MULTI): ICD-10-CM

## 2024-05-22 DIAGNOSIS — D63.8 ANEMIA OF CHRONIC DISEASE: ICD-10-CM

## 2024-05-22 LAB — POC HEMOGLOBIN: 8.7 G/DL (ref 13.5–17.5)

## 2024-05-22 PROCEDURE — 96372 THER/PROPH/DIAG INJ SC/IM: CPT | Performed by: NURSE PRACTITIONER

## 2024-05-22 PROCEDURE — 85018 HEMOGLOBIN: CPT | Performed by: NURSE PRACTITIONER

## 2024-05-22 RX ORDER — EPINEPHRINE 0.3 MG/.3ML
0.3 INJECTION SUBCUTANEOUS EVERY 5 MIN PRN
Status: CANCELLED | OUTPATIENT
Start: 2024-06-05

## 2024-05-22 RX ORDER — ALBUTEROL SULFATE 0.83 MG/ML
3 SOLUTION RESPIRATORY (INHALATION) AS NEEDED
Status: CANCELLED | OUTPATIENT
Start: 2024-06-05

## 2024-05-22 RX ORDER — FAMOTIDINE 10 MG/ML
20 INJECTION INTRAVENOUS ONCE AS NEEDED
Status: CANCELLED | OUTPATIENT
Start: 2024-06-05

## 2024-05-22 RX ORDER — DIPHENHYDRAMINE HYDROCHLORIDE 50 MG/ML
50 INJECTION INTRAMUSCULAR; INTRAVENOUS AS NEEDED
Status: CANCELLED | OUTPATIENT
Start: 2024-06-05

## 2024-05-22 ASSESSMENT — PAIN SCALES - GENERAL: PAINLEVEL: 0-NO PAIN

## 2024-05-22 NOTE — PROGRESS NOTES
[facility]   Infusion Clinic Note   Date: May 22, 2024   Name: George Rowan  : 1945   MRN: [id number]         Reason for Visit: OP Infusion (Procrit)        Visit Type: INJECTION       Ordered By: Dr Mckoy      Accompanied by:Wife      Diagnosis: Anemia of chronic disease    CKD (chronic kidney disease) stage 4, GFR 15-29 ml/min (Multi)       Allergies:   Allergies as of 2024 - Reviewed 2024   Allergen Reaction Noted    Azathioprine Other 2023    Fluorescein-benoxinate Swelling 2023    Other Unknown 2023    Tetracaine Other 2023         Current Medications has a current medication list which includes the following prescription(s): allopurinol, aspirin, atorvastatin, calcitriol, carvedilol, procrit, fluticasone, gabapentin, hydralazine/hydrochlorothiazid, hydroxychloroquine, levothyroxine, losartan, NON FORMULARY, ramelteon, tamsulosin, trazodone, vit a/vit c/vit e/zinc/copper, and vitamin b complex.       Vitals:   Vitals:    24 0904   BP: 131/50   Pulse: 64   Resp: 16   Temp: 36.6 °C (97.9 °F)   SpO2: 98%   Weight: 71.8 kg (158 lb 4.8 oz)   PainSc: 0-No pain             Infusion Pre-procedure Checklist:   - Allergies reviewed: yes   - Medications reviewed: yes       - Previous reaction to current treatment: no      Assess patient for the concerns below. Document provider notification as appropriate.  - Active or recent infection with/without current antibiotic use: no  - Recent or planned invasive dental work: no  - Recent or planned surgeries: no  - Recently received or plans to receive vaccinations: no  - Has treatment related toxicities: no  - Is pregnant:  n/a      Pain: 0   - Is the pain different from normal: n/a   - Is the pain tolerable: n/a   - Is your Doctor aware:  n/a      Labs: Hemocue 9.9 today 24           Fall Risk Screening: Chris Fall Risk  History of Falling, Immediate or Within 3 Months: No  Secondary Diagnosis:  "Yes  Ambulatory Aid: Walks without aid/bedrest/nurse assist  Intravenous Therapy/Heparin Lock: No  Gait/Transferring: Normal/bedrest/immobile  Mental Status: Oriented to own ability  Perez Fall Risk Score: 15       Review Of Systems:  Review of Systems   All other systems reviewed and are negative.        Infusion Readiness:   - Assessment Concerns Related to Infusion: No  - Provider notified: n/a      Document Below Only If Indicated:   New Patient Education:    N/A (returning patient for continuation of therapy. Ongoing education provided as needed.)        Treatment Conditions & Drug Specific Questions:    Epoetin Burak (EPOGEN. PROCRIT, RETACRIT)    TREATMENT CONDITIONS:    Unless otherwise specified on patient specific therapy plan HOLD and notify provider prior to proceeding with today's injection if:  o Hemoglobin GREATER THAN 11 g/dL (parameter set by prescribing provider)  o Increase in hemoglobin GREATER THAN 1 g/dL   o SBP GREATER THAN 160 mmHg    Lab Results   Component Value Date/Time    HGB 8.7 (A) 05/22/2024 0940    HGB 9.9 (A) 05/08/2024 0909    HGB 10.6 (L) 04/24/2024 1051    HGB 10.9 (L) 04/18/2024 1237    HGB 9.6 (A) 03/27/2024 1005     No results found for: \"HGBCAP\"     Labs reviewed and patient meets treatment conditions? Yes    DRUG SPECIFIC QUESTIONS:  - Does the patient have uncontrolled hypertension?  No    (Use is contraindicated in patients with uncontrolled hypertension)     - Educate on the following:? Yes       - Increased risk of serious cardiovascular reactions, thromboembolic reactions, stroke and tumor progression.        - Need to undergo regular blood pressure monitoring. Adhere to prescribed anti-hypertensive regimen and follow recommended dietary restrictions.        - Need to contact their healthcare provider for new onset neurologic symptoms or change in seizure frequency.        - Need for  regular laboratory tests to monitor hemoglobin levels.          Weight Based Drug " Calculations:    WEIGHT BASED DRUGS: NOT APPLICABLE / FLAT DOSE          Initiated By: Soila Townsend RN   Time: 9:41 AM     We administered epoetin clyde-epbx. Call placed to Dr. Mckoy by NP regarding trends in Hemoglobin to see if he wanted to increase the dose of Retacrit.  Dr. Mckoy wishes to leave him where he is right now as he is having the procedure to start peritoneal dialysis on 6/7/2024.  He will follow him there.

## 2024-05-22 NOTE — PATIENT INSTRUCTIONS
Today :We administered epoetin clyde-epbx.     For:   1. Anemia of chronic disease    2. CKD (chronic kidney disease) stage 4, GFR 15-29 ml/min (Multi)         Your next appointment is due in:  1 week       Please read the  Medication Guide that was given to you and reviewed during todays visit.     (Tell all doctors including dentists that you are taking this medication)     Go to the emergency room or call 911 if:  -You have signs of allergic reaction:   -Rash, hives, itching.   -Swollen, blistered, peeling skin.   -Swelling of face, lips, mouth, tongue or throat.   -Tightness of chest, trouble breathing, swallowing or talking     Call your doctor:  - If IV / injection site gets red, warm, swollen, itchy or leaks fluid or pus.     (Leave dressing on your IV site for at least 2 hours and keep area clean and dry  - If you get sick or have symptoms of infection or are not feeling well for any reason.    (Wash your hands often, stay away from people who are sick)  - If you have side effects from your medication that do not go away or are bothersome.     (Refer to the teaching your nurse gave you for side effects to call your doctor about)    - Common side effects may include:  stuffy nose, headache, feeling tired, muscle aches, upset stomach  - Before receiving any vaccines     - Call [facility] at   If:  - You get sick, are on antibiotics, have had a recent vaccine, have surgery or dental work and your doctor wants your visit rescheduled.  - You need to cancel and reschedule your visit for any reason. Call at least 2 days before your visit if you need to cancel.   - Your insurance changes before your next visit.    (We will need to get approval from your new insurance. This can take up to two weeks.)     [facility] is opened Monday thru Friday. We are closed on weekends and holidays.   Voice mail will take your call if the center is closed. If you leave a message please allow  24 hours for a call back during weekdays. If you leave a message on a weekend/holiday, we will call you back the next business day.

## 2024-05-23 ENCOUNTER — DOCUMENTATION (OUTPATIENT)
Dept: VASCULAR SURGERY | Facility: HOSPITAL | Age: 79
End: 2024-05-23
Payer: MEDICARE

## 2024-05-23 NOTE — PROGRESS NOTES
Spoke with patient's daughter regarding Mr. George Rowan.  Patient is asymptomatic of stroke.  He is about to undergo placement of a peritoneal dialysis catheter.  He had duplex done last year showing elevated velocities in his distal common carotid artery.  Because of his renal dysfunction this places him at high risk for stroke with elective prophylactic revascularization of the left carotid system.  The general recommendations are to manage these medically.  With unilateral stenosis, he is at no greater risk undergoing his upcoming surgery without prior revascularization.  He is due to see me after his surgery on 11 June.  General recommendations for surgery at the clinic would be for him to avoid hypotension during the operation.  Otherwise he is cleared for peritoneal dialysis catheter placement.      05/23/24 at 11:12 AM - Karan Reid MD

## 2024-05-23 NOTE — PROGRESS NOTES
Reason for Nutrition Visit:  Pt is a 78 y.o. male being seen virtually referred for   1. Renal failure, unspecified chronicity  Referral to Nutrition Services      2. CKD (chronic kidney disease) stage 4, GFR 15-29 ml/min (Multi)           Past Medical Hx:  Patient Active Problem List   Diagnosis    Vitamin D deficiency    Venous insufficiency    Systemic lupus erythematosus (Multi)    Systemic involvement of connective tissue (Multi)    Sinus bradycardia    Secondary hyperparathyroidism (Multi)    Rhinitis    Renal failure    Polyneuropathy associated with underlying disease (Multi)    Peripheral motor neuropathy    Other specified disorders of kidney and ureter    Otalgia of both ears    Occlusion of left internal carotid artery    Nonsustained ventricular tachycardia (Multi)    Nephrosclerosis    Myocardial infarction (Multi)    Mixed hyperlipidemia    Mild aortic valve stenosis    Insomnia due to medical condition    Inflammatory polyarthropathy (Multi)    Hypoglycemia    Hypertension secondary to other renal disorders    History of myocardial infarction    History of coronary artery stent placement    Glomerular disease in systemic lupus erythematosus (Multi)    Essential hypertension    Dyslipidemia    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)    Chronic renal insufficiency, stage III (moderate) (Multi)    Carotid artery stenosis    Blood glucose abnormal    Bilateral sensorineural hearing loss    Autoimmune hemolytic anemia (Multi)    Aortic valve sclerosis    Aortic valve disease    Anemia of chronic renal failure    Anemia of chronic disease    Acute ST elevation myocardial infarction (STEMI) (Multi)    Abscess of upper limb    CKD (chronic kidney disease) stage 4, GFR 15-29 ml/min (Multi)    Coronary artery disease    Electrocardiogram abnormal    Breast tenderness    Chest pain    Dizziness    History of hypertension    History  of immune disorder    Overweight with body mass index (BMI) 25.0-29.9    Reactive depression    Seasonal allergies      Nutrition Assessment    Food & Nutrition Related History:  The visit was conducted with George, his son Juan and his daughter Oralia. George lives with his wife.    Will be getting a PD dialysis catheter on the 7th, is not on dialysis yet.   He is of [place] heritage and consumes some traditional foods - rice, oysters, chayote for example.     Food Allergies: None  Intolerance: None  Appetite: interest in meat has diminished, but he occasionally is interested in fried chicken/wings prepared in the air fryer.   GI Symptoms : None, denies nausea.   Swallowing Difficulty: No problems with swallowing  Chewing no problems chewing  Food Preparation: Children (Juan)  Cooking Skills/Barriers: None reported  Grocery Shopping: Partner/Spouse  No difficulty affording food  Supplements: B-Complex, no longer taking preservision vitamin. Has had iron infusions.     Dietary Recall:   Meal 1: 9  - [employer] 21 whole grain bread (170 mg sodium/slice), plain black coffee without sugar or cream  - sometimes with scrambled eggs, or pancake  - not very hungry, but eats because he has to  Meal 2: snack instead of lunch, 1:30  - carrot or fruit (elizabeth, pineapple, watermelon)  Meal 3: 6:30 pm  - salmon with vegetables (sweet potato)  - sometimes a little ice cream / Thai. His children encourage him to control how much sweets he eats, he has a sweet tooth. Sometimes a bowl of cereal.     Beverages: water, coffee, doesn't drink pop. Occasionally drinks [place] - richard/lime flavor.    Eating out: has stopped dining out.   Alcohol Intake: rarely, every few months.     Physical Activity: used to use the exercise bike a little, hasn't been using it lately.     Labs:  Lab Results   Component Value Date    HGBA1C 6.0 08/22/2023    HGBA1C 5.8 03/29/2023    HGBA1C 5.2 06/30/2021     04/24/2024    K 5.0 04/24/2024    CL  "101 04/24/2024    CO2 23 (L) 04/24/2024    BUN 76 (H) 04/24/2024    CREATININE 5.90 (H) 04/24/2024    CALCIUM 11.9 (H) 04/24/2024    ALBUMIN 4.0 04/24/2024    PROT 6.5 10/05/2023    BILITOT 0.4 10/05/2023    ALKPHOS 109 10/05/2023    ALT 10 10/05/2023    AST 10 10/05/2023    GLUCOSE 102 (H) 04/24/2024     Lab Results   Component Value Date    CHOL 139 04/24/2023    LDLCALC 76 04/24/2023    TRIG 109 04/24/2023    HDL 41 04/24/2023      Noted he has a diagnosis of diabetes in his medical record, A1C values since 2018 have been <6.5%.     Nutrition Focused Physical Exam:    Performed/Deferred: Deferred due to be being virtual visit    Anthropometrics:  Ht Readings from Last 1 Encounters:   04/23/24 1.702 m (5' 7\")     BMI Readings from Last 1 Encounters:   05/22/24 24.79 kg/m²     Wt Readings from Last 10 Encounters:   05/22/24 71.8 kg (158 lb 4.8 oz)   05/08/24 71.7 kg (158 lb)   04/24/24 70.3 kg (155 lb)   04/23/24 71.1 kg (156 lb 12.8 oz)   04/10/24 72.6 kg (160 lb)   04/10/24 72.7 kg (160 lb 3.2 oz)   04/09/24 72.4 kg (159 lb 9.6 oz)   03/27/24 72.1 kg (159 lb)   03/21/24 74.4 kg (164 lb)   03/20/24 73.5 kg (162 lb)   174# (6/2023)    Weight change: used to weigh 176# in the past, has lost weight/muscle mass.   Significant weight change: lost 16# / 9% weight in the past year, which is not a significant percentage but is not a healthy trend since he has also noticed loss of muscle mass.     Nutrition Diagnosis     Patient has Malnutrition Diagnosis: No        Patient has Nutrition Diagnosis: Yes Diagnosis Status (1): New  Nutrition Diagnosis 1: Food and nutrition related knowledge deficit Related to (1): lack of adequate prior exposure to information, and observation of conflicting information when reading about renal diet online As Evidenced by (1): patient and his children have questions about eating on a renal diet     Diagnosis Status (2): New Nutrition Diagnosis 2: Altered nutrition related to laboratory " "values  Nutrition Diagnosis 2: Altered nutrition related to laboratory values Related to (2): renal dysfunction As Evidenced by (2): K+ has been at the upper end of normal and phosphorus is elevated     Nutrition Interventions/Recommendations   Nutrition education on the following diet topic(s): Renal     Coordination of Care: None    Education:  Explained diet for Chronic Kidney Disease:  - Salt needs to be limited. Sodium is the same thing as salt. Sodium intake should be less than 2,000 milligrams (mg) per day. When reading food labels, look for products with 5% or less Daily Value (DV) sodium, and don't buy products with 20% or more DV of sodium. If a food contains between 5-20 %DV sodium, then be careful with the portion size when eating it so that the daily intake can remain within 2,000 milligrams. At mealtimes, focus on the milligrams sodium. Between 3 meals per day, aim for each meal to have about 600-700 mg sodium at most.   - Drink fluids to keep well-hydrated. Water is best. Caffeinated fluids aren't as well at hydrating compared to water. High sugar fluids are not great choices due to their calories and also they can make blood sugars high for someone with diabetes.   - Potassium might need to be limited, it depends on how much potassium is in the blood. Told the patient that his blood potassium level has been at the high end of normal and therefore intake of potassium needs to be limited. Potassium that is added by food manufacturers is more harmful than the potassium that naturally occurs in fruits, vegetables, beans, nuts, seeds and dairy products. The added potassium can be identified by reading labels for any ingredient that says \"potassium.\" Foods with 200 milligrams of potassium or less per serving are OK to eat on a low potassium diet, because the daily budget is a total of 2,000 milligrams potassium. Try to eat fruits and vegetables in their whole form, because the fiber that is in them helps " "to decrease the amount of potassium that gets absorbed into the blood. Fruits and vegetables in liquid form can get absorbed more easily into the blood, like tomato soup, potato soup, tomato juice, and orange juice.   - Phosphorus might need to be limited, it depends on how much phosphorus is in the blood. Told the patient that his phosphorus has been high and therefore intake of phosphorus needs to be limited. Phosphorus that is added by food manufacturers is more harmful than phosphorus that occurs naturally in foods like dairy products, nuts, seeds, whole grains. Since phosphorus needs to be limited, try to carefully read labels and avoid foods with added phosphorus. This can be identified by looking for \"phos\" in the ingredients panel. If phosphorus remains high despite avoiding the additives, then the total amount of dairy, nuts, seeds, beans and whole grains may need to be limited. The doctor also might offer phosphorus binders, which are medications that help to decrease the absorption of phosphorus from foods.   - Protein is a nutrient that is important for a healthy nutrition status, but too much protein is not good for kidney disease. Protein foods are animal flesh such as beef, lamb, chicken, pork, fish, shellfish, and also eggs and dairy products. Plant forms of protein are foods like nuts, seeds, beans, and soy. In general, limiting protein food intake to about 1/4 of the plate, or about the size of a palm of hand at every meal is a good guideline with kidney disease so that protein portions stay small. Advised against eating less than 1/4 of the plate protein food because he has been experiencing some weight loss and loss of muscle, and eating too little protein can contribute to losing more weight/muscle mass.     2. Advised that when dialysis begins in the future, a dialysis dietitian will meet with him every month to  him on how his labs are going and how to adjust his diet. I will be happy to " have follow up with him as well as desired.     3. Although diabetes is listed in his problem list, his A1C has been well-controlled and at this time it does not appear he needs to adjust his diet for carbohydrate or sugar intake. Explained the Plate Method for meal planning with chronic kidney disease, it will help him get the right amount of protein and carbohydrate at his meals:  - 1/4 plate protein food  - 1/4 plate vegetables  - 1/4 plate grains (or more like 1/3 if he isn't filling the other half of his plate with fruit & vegetables, grains can be a good source of calories)  - 1/4 plate fruit    RPG Diet for CKD, RPG Power of Potassium, RPG Phosphorus, RPG Phosphorus Additives, RPG High/Low Potassium Fruits & Vegetables, RPG Simple Meal Ideas, RPG Dining out with CKD, and NKDEP Sodium, grocery list for renal diet, and the picture of renal plate    *Patient expressed understanding of the education provided and denied any additional questions/concerns.     Monitoring & Evaluation:  Monitoring & Evaluation: Amount of Food - Estimated, Types of Food, and Meal/Snack Pattern - Estimated, estimated protein intake, weight, labs (serum phosphorus, potassium, glucose/A1C)    Nutrition Goals:  He will eat at least 3 times per day to be able to take in enough calories to stop weight loss  He will fill 1/4 of his plate with protein food, 1/4-1/3 with starchy food, and the remainder with lower potassium fruit/vegetables  He and his family will read food labels to help avoid high sodium foods, phosphorus additives and potassium additives      Follow up Plan:   PRN, patient will call to schedule follow up if desired    Readiness to Change : Good  Level of Understanding : Good  Anticipated Compliant : Good

## 2024-05-24 ENCOUNTER — TELEMEDICINE CLINICAL SUPPORT (OUTPATIENT)
Dept: NUTRITION | Facility: CLINIC | Age: 79
End: 2024-05-24
Payer: MEDICARE

## 2024-05-24 DIAGNOSIS — N19 RENAL FAILURE, UNSPECIFIED CHRONICITY: Primary | ICD-10-CM

## 2024-05-24 DIAGNOSIS — N18.4 CKD (CHRONIC KIDNEY DISEASE) STAGE 4, GFR 15-29 ML/MIN (MULTI): ICD-10-CM

## 2024-05-24 PROCEDURE — 97802 MEDICAL NUTRITION INDIV IN: CPT | Performed by: DIETITIAN, REGISTERED

## 2024-05-24 NOTE — PATIENT INSTRUCTIONS
"Explained diet for Chronic Kidney Disease:  - Salt needs to be limited. Sodium is the same thing as salt. Sodium intake should be less than 2,000 milligrams (mg) per day. When reading food labels, look for products with 5% or less Daily Value (DV) sodium, and don't buy products with 20% or more DV of sodium. If a food contains between 5-20 %DV sodium, then be careful with the portion size when eating it so that the daily intake can remain within 2,000 milligrams. At mealtimes, focus on the milligrams sodium. Between 3 meals per day, aim for each meal to have about 600-700 mg sodium at most.   - Drink fluids to keep well-hydrated. Water is best. Caffeinated fluids aren't as well at hydrating compared to water. High sugar fluids are not great choices due to their calories and also they can make blood sugars high for someone with diabetes.   - Potassium might need to be limited, it depends on how much potassium is in the blood. Told the patient that his blood potassium level has been  at the high end of normal  and therefore intake of potassium needs to be limited. Potassium that is added by food manufacturers is more harmful than the potassium that naturally occurs in fruits, vegetables, beans, nuts, seeds and dairy products. The added potassium can be identified by reading labels for any ingredient that says \"potassium.\" Foods with 200 milligrams of potassium or less per serving are OK to eat on a low potassium diet, because the daily budget is a total of 2,000 milligrams potassium. Try to eat fruits and vegetables in their whole form, because the fiber that is in them helps to decrease the amount of potassium that gets absorbed into the blood. Fruits and vegetables in liquid form can get absorbed more easily into the blood, like tomato soup, potato soup, tomato juice, and orange juice.   - Phosphorus might need to be limited, it depends on how much phosphorus is in the blood. Told the patient that his phosphorus has " "been high and therefore intake of phosphorus needs to be limited. Phosphorus that is added by food manufacturers is more harmful than phosphorus that occurs naturally in foods like dairy products, nuts, seeds, whole grains. Since phosphorus needs to be limited, try to carefully read labels and avoid foods with added phosphorus. This can be identified by looking for \"phos\" in the ingredients panel. If phosphorus remains high despite avoiding the additives, then the total amount of dairy, nuts, seeds, beans and whole grains may need to be limited. The doctor also might offer phosphorus binders, which are medications that help to decrease the absorption of phosphorus from foods.   - Protein is a nutrient that is important for a healthy nutrition status, but too much protein is not good for kidney disease. Protein foods are animal flesh such as beef, lamb, chicken, pork, fish, shellfish, and also eggs and dairy products. Plant forms of protein are foods like nuts, seeds, beans, and soy. In general, limiting protein food intake to about 1/4 of the plate, or about the size of a palm of hand at every meal is a good guideline with kidney disease so that protein portions stay small. Advised against eating less than 1/4 of the plate protein food because he has been experiencing some weight loss and loss of muscle, and eating too little protein can contribute to losing more weight/muscle mass.     2. Advised that when dialysis begins in the future, a dialysis dietitian will meet with him every month to  him on how his labs are going and how to adjust his diet. I will be happy to have follow up with him as well as desired.     3. Although diabetes is listed in his problem list, his A1C has been well-controlled and at this time it does not appear he needs to adjust his diet for carbohydrate or sugar intake. Explained the Plate Method for meal planning with chronic kidney disease, it will help him get the right amount of " protein and carbohydrate at his meals:  - 1/4 plate protein food  - 1/4 plate vegetables  - 1/4 plate grains (or more like 1/3 if he isn't filling the other half of his plate with fruit & vegetables, grains can be a good source of calories)  - 1/4 plate fruit

## 2024-05-29 ENCOUNTER — APPOINTMENT (OUTPATIENT)
Dept: INFUSION THERAPY | Facility: CLINIC | Age: 79
End: 2024-05-29
Payer: MEDICARE

## 2024-06-01 DIAGNOSIS — G62.89 PERIPHERAL MOTOR NEUROPATHY: ICD-10-CM

## 2024-06-04 DIAGNOSIS — G62.89 PERIPHERAL MOTOR NEUROPATHY: ICD-10-CM

## 2024-06-04 RX ORDER — GABAPENTIN 300 MG/1
300 CAPSULE ORAL DAILY
Qty: 100 CAPSULE | Refills: 2 | Status: SHIPPED | OUTPATIENT
Start: 2024-06-04

## 2024-06-04 RX ORDER — GABAPENTIN 300 MG/1
300 CAPSULE ORAL DAILY
Qty: 90 CAPSULE | Refills: 3 | Status: SHIPPED | OUTPATIENT
Start: 2024-06-04 | End: 2024-06-04 | Stop reason: SDUPTHER

## 2024-06-05 ENCOUNTER — INFUSION (OUTPATIENT)
Dept: INFUSION THERAPY | Facility: CLINIC | Age: 79
End: 2024-06-05
Payer: MEDICARE

## 2024-06-05 VITALS
RESPIRATION RATE: 14 BRPM | HEART RATE: 71 BPM | TEMPERATURE: 98.1 F | SYSTOLIC BLOOD PRESSURE: 112 MMHG | BODY MASS INDEX: 25.24 KG/M2 | DIASTOLIC BLOOD PRESSURE: 60 MMHG | OXYGEN SATURATION: 98 % | WEIGHT: 161.14 LBS

## 2024-06-05 DIAGNOSIS — D63.8 ANEMIA OF CHRONIC DISEASE: ICD-10-CM

## 2024-06-05 DIAGNOSIS — N18.4 CKD (CHRONIC KIDNEY DISEASE) STAGE 4, GFR 15-29 ML/MIN (MULTI): ICD-10-CM

## 2024-06-05 LAB — POC HEMOGLOBIN: 9.9 G/DL (ref 13.5–17.5)

## 2024-06-05 PROCEDURE — 96372 THER/PROPH/DIAG INJ SC/IM: CPT | Performed by: NURSE PRACTITIONER

## 2024-06-05 PROCEDURE — 85018 HEMOGLOBIN: CPT | Performed by: NURSE PRACTITIONER

## 2024-06-05 RX ORDER — EPINEPHRINE 0.3 MG/.3ML
0.3 INJECTION SUBCUTANEOUS EVERY 5 MIN PRN
OUTPATIENT
Start: 2024-06-19

## 2024-06-05 RX ORDER — FAMOTIDINE 10 MG/ML
20 INJECTION INTRAVENOUS ONCE AS NEEDED
OUTPATIENT
Start: 2024-06-19

## 2024-06-05 RX ORDER — DIPHENHYDRAMINE HYDROCHLORIDE 50 MG/ML
50 INJECTION INTRAMUSCULAR; INTRAVENOUS AS NEEDED
OUTPATIENT
Start: 2024-06-19

## 2024-06-05 RX ORDER — ALBUTEROL SULFATE 0.83 MG/ML
3 SOLUTION RESPIRATORY (INHALATION) AS NEEDED
OUTPATIENT
Start: 2024-06-19

## 2024-06-05 ASSESSMENT — PAIN SCALES - GENERAL: PAINLEVEL: 0-NO PAIN

## 2024-06-05 NOTE — PATIENT INSTRUCTIONS
Today :George Rowan had no medications administered during this visit.     For:   1. Anemia of chronic disease    2. CKD (chronic kidney disease) stage 4, GFR 15-29 ml/min (Multi)         Your next appointment is due in:  6/19/2024        Please read the  Medication Guide that was given to you and reviewed during todays visit.     (Tell all doctors including dentists that you are taking this medication)     Go to the emergency room or call 911 if:  -You have signs of allergic reaction:   -Rash, hives, itching.   -Swollen, blistered, peeling skin.   -Swelling of face, lips, mouth, tongue or throat.   -Tightness of chest, trouble breathing, swallowing or talking     Call your doctor:  - If IV / injection site gets red, warm, swollen, itchy or leaks fluid or pus.     (Leave dressing on your IV site for at least 2 hours and keep area clean and dry  - If you get sick or have symptoms of infection or are not feeling well for any reason.    (Wash your hands often, stay away from people who are sick)  - If you have side effects from your medication that do not go away or are bothersome.     (Refer to the teaching your nurse gave you for side effects to call your doctor about)    - Common side effects may include:  stuffy nose, headache, feeling tired, muscle aches, upset stomach  - Before receiving any vaccines     - Call the [facility] at   If:  - You get sick, are on antibiotics, have had a recent vaccine, have surgery or dental work and your doctor wants your visit rescheduled.  - You need to cancel and reschedule your visit for any reason. Call at least 2 days before your visit if you need to cancel.   - Your insurance changes before your next visit.    (We will need to get approval from your new insurance. This can take up to two weeks.)     The [facility] is opened Monday thru Friday. We are closed on weekends and holidays.   Voice mail will take your call if the center is closed.  If you leave a message please allow 24 hours for a call back during weekdays. If you leave a message on a weekend/holiday, we will call you back the next business day.

## 2024-06-05 NOTE — PROGRESS NOTES
[facility]   Infusion Clinic Note   Date: 2024   Name: George Rowan  : 1945   MRN: [id number]         Reason for Visit: OP Infusion (Retacrit and POCT hemoglobin)         Today: Hgb of 9.9 today. Retacrit administered and will return to the clinic next week.       Visit Type: INJECTION       Ordered By: Dr. Mckoy      Accompanied by:Self      Diagnosis: Anemia of chronic disease    CKD (chronic kidney disease) stage 4, GFR 15-29 ml/min (Multi)       Allergies:   Allergies as of 2024 - Reviewed 2024   Allergen Reaction Noted    Azathioprine Other 2023    Fluorescein-benoxinate Swelling 2023    Other Unknown 2023    Tetracaine Other 2023         Current Medications has a current medication list which includes the following prescription(s): allopurinol, aspirin, atorvastatin, calcitriol, carvedilol, procrit, fluticasone, gabapentin, hydralazine/hydrochlorothiazid, hydroxychloroquine, levothyroxine, losartan, NON FORMULARY, ramelteon, tamsulosin, trazodone, vit a/vit c/vit e/zinc/copper, and vitamin b complex.       Vitals:   Vitals:    24 0859   BP: 112/60   Pulse: 71   Resp: 14   Temp: 36.7 °C (98.1 °F)   SpO2: 98%   Weight: 73.1 kg (161 lb 2.2 oz)   PainSc: 0-No pain             Infusion Pre-procedure Checklist:   - Allergies reviewed: yes   - Medications reviewed: yes       - Previous reaction to current treatment: no      Assess patient for the concerns below. Document provider notification as appropriate.  - Active or recent infection with/without current antibiotic use: no  - Recent or planned invasive dental work: n/a  - Recent or planned surgeries: n/a  - Recently received or plans to receive vaccinations: n/a  - Has treatment related toxicities: n/a  - Is pregnant:  n/a      Pain: 0   - Is the pain different from normal: no   - Is the pain tolerable: n/a   - Is your Doctor aware:  n/a      Labs:  reviewed         Fall Risk  "Screening: Perez Fall Risk  History of Falling, Immediate or Within 3 Months: No  Secondary Diagnosis: Yes  Ambulatory Aid: Walks without aid/bedrest/nurse assist  Intravenous Therapy/Heparin Lock: No  Gait/Transferring: Normal/bedrest/immobile  Mental Status: Oriented to own ability  Perez Fall Risk Score: 15       Review Of Systems:  Review of Systems   All other systems reviewed and are negative.        Infusion Readiness:   - Assessment Concerns Related to Infusion: No  - Provider notified: no      Document Below Only If Indicated:   New Patient Education:    N/A (returning patient for continuation of therapy. Ongoing education provided as needed.)        Treatment Conditions & Drug Specific Questions:    Epoetin Burak (EPOGEN. PROCRIT, RETACRIT)    TREATMENT CONDITIONS:    Unless otherwise specified on patient specific therapy plan HOLD and notify provider prior to proceeding with today's injection if:  o Hemoglobin GREATER THAN 11 g/dL (parameter set by prescribing provider)  o Increase in hemoglobin GREATER THAN 1 g/dL   o SBP GREATER THAN 160 mmHg    Lab Results   Component Value Date/Time    HGB 9.9 (A) 06/05/2024 0908    HGB 8.7 (A) 05/22/2024 0940    HGB 9.9 (A) 05/08/2024 0909     No results found for: \"HGBCAP\"     Labs reviewed and patient meets treatment conditions? Yes    DRUG SPECIFIC QUESTIONS:  - Does the patient have uncontrolled hypertension?  No    (Use is contraindicated in patients with uncontrolled hypertension)     - Educate on the following:? Yes       - Increased risk of serious cardiovascular reactions, thromboembolic reactions, stroke and tumor progression.        - Need to undergo regular blood pressure monitoring. Adhere to prescribed anti-hypertensive regimen and follow recommended dietary restrictions.        - Need to contact their healthcare provider for new onset neurologic symptoms or change in seizure frequency.        - Need for  regular laboratory tests to monitor hemoglobin " levels.          Weight Based Drug Calculations:    WEIGHT BASED DRUGS: NOT APPLICABLE / FLAT DOSE    0910 - Patient tolerated injection well.  POCT hemoglobin 9.9 today.  RTC on 6/19/2024.        Initiated By: Rosa M Swenson RN

## 2024-06-11 ENCOUNTER — OFFICE VISIT (OUTPATIENT)
Dept: VASCULAR SURGERY | Facility: CLINIC | Age: 79
End: 2024-06-11
Payer: MEDICARE

## 2024-06-11 VITALS — SYSTOLIC BLOOD PRESSURE: 120 MMHG | HEART RATE: 61 BPM | DIASTOLIC BLOOD PRESSURE: 66 MMHG

## 2024-06-11 DIAGNOSIS — I65.23 BILATERAL CAROTID ARTERY STENOSIS: Primary | ICD-10-CM

## 2024-06-11 DIAGNOSIS — N18.5 CHRONIC KIDNEY DISEASE, STAGE 5 (MULTI): Primary | ICD-10-CM

## 2024-06-11 PROCEDURE — 1159F MED LIST DOCD IN RCRD: CPT | Performed by: SURGERY

## 2024-06-11 PROCEDURE — 99212 OFFICE O/P EST SF 10 MIN: CPT | Performed by: SURGERY

## 2024-06-11 PROCEDURE — 3078F DIAST BP <80 MM HG: CPT | Performed by: SURGERY

## 2024-06-11 PROCEDURE — 1126F AMNT PAIN NOTED NONE PRSNT: CPT | Performed by: SURGERY

## 2024-06-11 PROCEDURE — 3074F SYST BP LT 130 MM HG: CPT | Performed by: SURGERY

## 2024-06-11 PROCEDURE — 1036F TOBACCO NON-USER: CPT | Performed by: SURGERY

## 2024-06-11 ASSESSMENT — PATIENT HEALTH QUESTIONNAIRE - PHQ9
SUM OF ALL RESPONSES TO PHQ9 QUESTIONS 1 AND 2: 0
2. FEELING DOWN, DEPRESSED OR HOPELESS: NOT AT ALL
1. LITTLE INTEREST OR PLEASURE IN DOING THINGS: NOT AT ALL

## 2024-06-11 ASSESSMENT — PAIN SCALES - GENERAL: PAINLEVEL: 0-NO PAIN

## 2024-06-11 NOTE — PROGRESS NOTES
Patient comes for in person follow-up of his carotid atherosclerotic disease.  He did not get his scan today.  He does not report any specific motor symptoms that could be stroke but he has had lapses in memory regarding important things this is medication which concern his daughter who accompanied him.  I am going to order a carotid duplex and follow-up via [employer].  He is scheduled to see a neurologist in August.  Total time with patient was 12 minutes.  Neurologically he is intact on motor and sensory examination. Just got his PD port.

## 2024-06-12 ENCOUNTER — HOSPITAL ENCOUNTER (OUTPATIENT)
Dept: RADIOLOGY | Facility: CLINIC | Age: 79
Discharge: HOME | End: 2024-06-12
Payer: MEDICARE

## 2024-06-12 ENCOUNTER — LAB (OUTPATIENT)
Dept: LAB | Facility: LAB | Age: 79
End: 2024-06-12
Payer: MEDICARE

## 2024-06-12 DIAGNOSIS — Z11.9 ENCOUNTER FOR SCREENING FOR INFECTIOUS AND PARASITIC DISEASES, UNSPECIFIED: ICD-10-CM

## 2024-06-12 DIAGNOSIS — N18.5 CHRONIC KIDNEY DISEASE, STAGE 5 (MULTI): ICD-10-CM

## 2024-06-12 LAB — HBV SURFACE AG SERPL QL IA: NONREACTIVE

## 2024-06-12 PROCEDURE — 87340 HEPATITIS B SURFACE AG IA: CPT

## 2024-06-12 PROCEDURE — 71046 X-RAY EXAM CHEST 2 VIEWS: CPT

## 2024-06-12 PROCEDURE — 36415 COLL VENOUS BLD VENIPUNCTURE: CPT

## 2024-06-12 PROCEDURE — 71046 X-RAY EXAM CHEST 2 VIEWS: CPT | Performed by: RADIOLOGY

## 2024-06-17 ENCOUNTER — HOSPITAL ENCOUNTER (OUTPATIENT)
Dept: CARDIOLOGY | Facility: HOSPITAL | Age: 79
Discharge: HOME | End: 2024-06-17
Payer: MEDICARE

## 2024-06-17 ENCOUNTER — HOSPITAL ENCOUNTER (INPATIENT)
Facility: HOSPITAL | Age: 79
LOS: 2 days | Discharge: HOME HEALTH | End: 2024-06-19
Attending: STUDENT IN AN ORGANIZED HEALTH CARE EDUCATION/TRAINING PROGRAM | Admitting: INTERNAL MEDICINE
Payer: MEDICARE

## 2024-06-17 ENCOUNTER — APPOINTMENT (OUTPATIENT)
Dept: CARDIOLOGY | Facility: HOSPITAL | Age: 79
End: 2024-06-17
Payer: MEDICARE

## 2024-06-17 ENCOUNTER — APPOINTMENT (OUTPATIENT)
Dept: RADIOLOGY | Facility: HOSPITAL | Age: 79
End: 2024-06-17
Payer: MEDICARE

## 2024-06-17 DIAGNOSIS — I50.9 ACUTE ON CHRONIC CONGESTIVE HEART FAILURE, UNSPECIFIED HEART FAILURE TYPE (MULTI): ICD-10-CM

## 2024-06-17 DIAGNOSIS — I35.0 NONRHEUMATIC AORTIC VALVE STENOSIS: ICD-10-CM

## 2024-06-17 DIAGNOSIS — N18.6 END STAGE RENAL DISEASE (MULTI): ICD-10-CM

## 2024-06-17 DIAGNOSIS — J96.01 ACUTE HYPOXIC RESPIRATORY FAILURE (MULTI): Primary | ICD-10-CM

## 2024-06-17 DIAGNOSIS — R09.02 HYPOXIA: ICD-10-CM

## 2024-06-17 DIAGNOSIS — I25.118 CORONARY ARTERY DISEASE OF NATIVE ARTERY OF NATIVE HEART WITH STABLE ANGINA PECTORIS (CMS-HCC): ICD-10-CM

## 2024-06-17 DIAGNOSIS — I12.0 CKD STAGE 5 SECONDARY TO HYPERTENSION (MULTI): ICD-10-CM

## 2024-06-17 DIAGNOSIS — N18.5 CKD STAGE 5 SECONDARY TO HYPERTENSION (MULTI): ICD-10-CM

## 2024-06-17 LAB
ALBUMIN SERPL-MCNC: 3.5 G/DL (ref 3.5–5)
ALP BLD-CCNC: 93 U/L (ref 35–125)
ALT SERPL-CCNC: 7 U/L (ref 5–40)
ANION GAP SERPL CALC-SCNC: 12 MMOL/L
AST SERPL-CCNC: 15 U/L (ref 5–40)
BASOPHILS # BLD AUTO: 0.06 X10*3/UL (ref 0–0.1)
BASOPHILS NFR BLD AUTO: 0.7 %
BILIRUB SERPL-MCNC: 0.4 MG/DL (ref 0.1–1.2)
BUN SERPL-MCNC: 58 MG/DL (ref 8–25)
CALCIUM SERPL-MCNC: 8 MG/DL (ref 8.5–10.4)
CHLORIDE SERPL-SCNC: 107 MMOL/L (ref 97–107)
CO2 SERPL-SCNC: 14 MMOL/L (ref 24–31)
CREAT SERPL-MCNC: 4.4 MG/DL (ref 0.4–1.6)
EGFRCR SERPLBLD CKD-EPI 2021: 13 ML/MIN/1.73M*2
EOSINOPHIL # BLD AUTO: 0.19 X10*3/UL (ref 0–0.4)
EOSINOPHIL NFR BLD AUTO: 2.1 %
ERYTHROCYTE [DISTWIDTH] IN BLOOD BY AUTOMATED COUNT: 18.4 % (ref 11.5–14.5)
FLUAV RNA RESP QL NAA+PROBE: NOT DETECTED
FLUBV RNA RESP QL NAA+PROBE: NOT DETECTED
GLUCOSE SERPL-MCNC: 273 MG/DL (ref 65–99)
HCT VFR BLD AUTO: 31.9 % (ref 41–52)
HGB BLD-MCNC: 10 G/DL (ref 13.5–17.5)
IMM GRANULOCYTES # BLD AUTO: 0.02 X10*3/UL (ref 0–0.5)
IMM GRANULOCYTES NFR BLD AUTO: 0.2 % (ref 0–0.9)
LYMPHOCYTES # BLD AUTO: 1.97 X10*3/UL (ref 0.8–3)
LYMPHOCYTES NFR BLD AUTO: 22.2 %
MCH RBC QN AUTO: 32.2 PG (ref 26–34)
MCHC RBC AUTO-ENTMCNC: 31.3 G/DL (ref 32–36)
MCV RBC AUTO: 103 FL (ref 80–100)
MONOCYTES # BLD AUTO: 0.26 X10*3/UL (ref 0.05–0.8)
MONOCYTES NFR BLD AUTO: 2.9 %
NEUTROPHILS # BLD AUTO: 6.36 X10*3/UL (ref 1.6–5.5)
NEUTROPHILS NFR BLD AUTO: 71.9 %
NRBC BLD-RTO: 0.2 /100 WBCS (ref 0–0)
NT-PROBNP SERPL-MCNC: ABNORMAL PG/ML (ref 0–852)
PLATELET # BLD AUTO: 231 X10*3/UL (ref 150–450)
POTASSIUM SERPL-SCNC: 4.9 MMOL/L (ref 3.4–5.1)
PROT SERPL-MCNC: 6.3 G/DL (ref 5.9–7.9)
RBC # BLD AUTO: 3.11 X10*6/UL (ref 4.5–5.9)
SARS-COV-2 RNA RESP QL NAA+PROBE: NOT DETECTED
SODIUM SERPL-SCNC: 133 MMOL/L (ref 133–145)
TROPONIN T SERPL-MCNC: 312 NG/L
TROPONIN T SERPL-MCNC: 352 NG/L
TROPONIN T SERPL-MCNC: 577 NG/L
WBC # BLD AUTO: 8.9 X10*3/UL (ref 4.4–11.3)

## 2024-06-17 PROCEDURE — 99291 CRITICAL CARE FIRST HOUR: CPT | Mod: 25

## 2024-06-17 PROCEDURE — 93010 ELECTROCARDIOGRAM REPORT: CPT | Performed by: INTERNAL MEDICINE

## 2024-06-17 PROCEDURE — 84484 ASSAY OF TROPONIN QUANT: CPT | Mod: 91 | Performed by: STUDENT IN AN ORGANIZED HEALTH CARE EDUCATION/TRAINING PROGRAM

## 2024-06-17 PROCEDURE — 36415 COLL VENOUS BLD VENIPUNCTURE: CPT | Performed by: STUDENT IN AN ORGANIZED HEALTH CARE EDUCATION/TRAINING PROGRAM

## 2024-06-17 PROCEDURE — 85025 COMPLETE CBC W/AUTO DIFF WBC: CPT | Performed by: STUDENT IN AN ORGANIZED HEALTH CARE EDUCATION/TRAINING PROGRAM

## 2024-06-17 PROCEDURE — 80053 COMPREHEN METABOLIC PANEL: CPT | Performed by: STUDENT IN AN ORGANIZED HEALTH CARE EDUCATION/TRAINING PROGRAM

## 2024-06-17 PROCEDURE — 84484 ASSAY OF TROPONIN QUANT: CPT | Performed by: STUDENT IN AN ORGANIZED HEALTH CARE EDUCATION/TRAINING PROGRAM

## 2024-06-17 PROCEDURE — 2500000004 HC RX 250 GENERAL PHARMACY W/ HCPCS (ALT 636 FOR OP/ED): Performed by: INTERNAL MEDICINE

## 2024-06-17 PROCEDURE — 2500000005 HC RX 250 GENERAL PHARMACY W/O HCPCS: Performed by: INTERNAL MEDICINE

## 2024-06-17 PROCEDURE — 2500000004 HC RX 250 GENERAL PHARMACY W/ HCPCS (ALT 636 FOR OP/ED): Performed by: STUDENT IN AN ORGANIZED HEALTH CARE EDUCATION/TRAINING PROGRAM

## 2024-06-17 PROCEDURE — 2500000002 HC RX 250 W HCPCS SELF ADMINISTERED DRUGS (ALT 637 FOR MEDICARE OP, ALT 636 FOR OP/ED): Mod: MUE | Performed by: INTERNAL MEDICINE

## 2024-06-17 PROCEDURE — 83880 ASSAY OF NATRIURETIC PEPTIDE: CPT | Performed by: STUDENT IN AN ORGANIZED HEALTH CARE EDUCATION/TRAINING PROGRAM

## 2024-06-17 PROCEDURE — 2060000001 HC INTERMEDIATE ICU ROOM DAILY

## 2024-06-17 PROCEDURE — 93005 ELECTROCARDIOGRAM TRACING: CPT

## 2024-06-17 PROCEDURE — 96374 THER/PROPH/DIAG INJ IV PUSH: CPT

## 2024-06-17 PROCEDURE — 87636 SARSCOV2 & INF A&B AMP PRB: CPT | Performed by: STUDENT IN AN ORGANIZED HEALTH CARE EDUCATION/TRAINING PROGRAM

## 2024-06-17 PROCEDURE — 2500000001 HC RX 250 WO HCPCS SELF ADMINISTERED DRUGS (ALT 637 FOR MEDICARE OP): Performed by: INTERNAL MEDICINE

## 2024-06-17 PROCEDURE — 71045 X-RAY EXAM CHEST 1 VIEW: CPT | Performed by: RADIOLOGY

## 2024-06-17 PROCEDURE — 71045 X-RAY EXAM CHEST 1 VIEW: CPT

## 2024-06-17 RX ORDER — ONDANSETRON HYDROCHLORIDE 2 MG/ML
4 INJECTION, SOLUTION INTRAVENOUS EVERY 8 HOURS PRN
Status: DISCONTINUED | OUTPATIENT
Start: 2024-06-17 | End: 2024-06-18

## 2024-06-17 RX ORDER — LEVOTHYROXINE SODIUM 50 UG/1
50 TABLET ORAL DAILY
Status: DISCONTINUED | OUTPATIENT
Start: 2024-06-18 | End: 2024-06-19 | Stop reason: HOSPADM

## 2024-06-17 RX ORDER — TAMSULOSIN HYDROCHLORIDE 0.4 MG/1
0.4 CAPSULE ORAL DAILY
Status: DISCONTINUED | OUTPATIENT
Start: 2024-06-17 | End: 2024-06-17

## 2024-06-17 RX ORDER — ACETAMINOPHEN 160 MG/5ML
650 SOLUTION ORAL EVERY 4 HOURS PRN
Status: DISCONTINUED | OUTPATIENT
Start: 2024-06-17 | End: 2024-06-18

## 2024-06-17 RX ORDER — HYDROXYCHLOROQUINE SULFATE 200 MG/1
200 TABLET, FILM COATED ORAL DAILY
Status: DISCONTINUED | OUTPATIENT
Start: 2024-06-17 | End: 2024-06-19 | Stop reason: HOSPADM

## 2024-06-17 RX ORDER — PANTOPRAZOLE SODIUM 20 MG/1
20 TABLET, DELAYED RELEASE ORAL
Status: DISCONTINUED | OUTPATIENT
Start: 2024-06-18 | End: 2024-06-19 | Stop reason: HOSPADM

## 2024-06-17 RX ORDER — NAPROXEN SODIUM 220 MG/1
81 TABLET, FILM COATED ORAL DAILY
Status: DISCONTINUED | OUTPATIENT
Start: 2024-06-17 | End: 2024-06-19 | Stop reason: HOSPADM

## 2024-06-17 RX ORDER — ZOLPIDEM TARTRATE 5 MG/1
2.5 TABLET ORAL NIGHTLY PRN
Status: COMPLETED | OUTPATIENT
Start: 2024-06-17 | End: 2024-06-18

## 2024-06-17 RX ORDER — FUROSEMIDE 10 MG/ML
40 INJECTION INTRAMUSCULAR; INTRAVENOUS EVERY 12 HOURS
Status: DISCONTINUED | OUTPATIENT
Start: 2024-06-18 | End: 2024-06-18

## 2024-06-17 RX ORDER — FUROSEMIDE 10 MG/ML
80 INJECTION INTRAMUSCULAR; INTRAVENOUS ONCE
Status: COMPLETED | OUTPATIENT
Start: 2024-06-17 | End: 2024-06-17

## 2024-06-17 RX ORDER — GABAPENTIN 300 MG/1
300 CAPSULE ORAL DAILY
Status: DISCONTINUED | OUTPATIENT
Start: 2024-06-17 | End: 2024-06-18

## 2024-06-17 RX ORDER — ACETAMINOPHEN 650 MG/1
650 SUPPOSITORY RECTAL EVERY 4 HOURS PRN
Status: DISCONTINUED | OUTPATIENT
Start: 2024-06-17 | End: 2024-06-18

## 2024-06-17 RX ORDER — ONDANSETRON 4 MG/1
4 TABLET, ORALLY DISINTEGRATING ORAL EVERY 8 HOURS PRN
Status: DISCONTINUED | OUTPATIENT
Start: 2024-06-17 | End: 2024-06-19 | Stop reason: HOSPADM

## 2024-06-17 RX ORDER — HYDROCODONE BITARTRATE AND ACETAMINOPHEN 5; 325 MG/1; MG/1
TABLET ORAL
Status: ON HOLD | COMMUNITY
Start: 2024-06-07 | End: 2024-06-18

## 2024-06-17 RX ORDER — CALCITRIOL 0.25 UG/1
0.5 CAPSULE ORAL DAILY
Status: DISCONTINUED | OUTPATIENT
Start: 2024-06-18 | End: 2024-06-19 | Stop reason: HOSPADM

## 2024-06-17 RX ORDER — RAMELTEON 8 MG/1
8 TABLET ORAL NIGHTLY PRN
Status: DISCONTINUED | OUTPATIENT
Start: 2024-06-17 | End: 2024-06-17

## 2024-06-17 RX ORDER — HYDRALAZINE HYDROCHLORIDE 50 MG/1
50 TABLET, FILM COATED ORAL 3 TIMES DAILY
Status: DISCONTINUED | OUTPATIENT
Start: 2024-06-17 | End: 2024-06-19 | Stop reason: HOSPADM

## 2024-06-17 RX ORDER — ACETAMINOPHEN 325 MG/1
650 TABLET ORAL EVERY 4 HOURS PRN
Status: DISCONTINUED | OUTPATIENT
Start: 2024-06-17 | End: 2024-06-19 | Stop reason: HOSPADM

## 2024-06-17 RX ORDER — CARVEDILOL 6.25 MG/1
6.25 TABLET ORAL
Status: DISCONTINUED | OUTPATIENT
Start: 2024-06-18 | End: 2024-06-19 | Stop reason: HOSPADM

## 2024-06-17 RX ORDER — POLYETHYLENE GLYCOL 3350 17 G/17G
17 POWDER, FOR SOLUTION ORAL DAILY PRN
Status: DISCONTINUED | OUTPATIENT
Start: 2024-06-17 | End: 2024-06-19 | Stop reason: HOSPADM

## 2024-06-17 RX ORDER — HEPARIN SODIUM 5000 [USP'U]/ML
5000 INJECTION, SOLUTION INTRAVENOUS; SUBCUTANEOUS EVERY 8 HOURS
Status: DISCONTINUED | OUTPATIENT
Start: 2024-06-17 | End: 2024-06-19 | Stop reason: HOSPADM

## 2024-06-17 RX ORDER — LOSARTAN POTASSIUM 100 MG/1
100 TABLET ORAL DAILY
Status: DISCONTINUED | OUTPATIENT
Start: 2024-06-17 | End: 2024-06-19 | Stop reason: HOSPADM

## 2024-06-17 RX ORDER — ATORVASTATIN CALCIUM 20 MG/1
20 TABLET, FILM COATED ORAL NIGHTLY
Status: DISCONTINUED | OUTPATIENT
Start: 2024-06-17 | End: 2024-06-19 | Stop reason: HOSPADM

## 2024-06-17 ASSESSMENT — PAIN SCALES - GENERAL
PAINLEVEL_OUTOF10: 0 - NO PAIN

## 2024-06-17 ASSESSMENT — PAIN - FUNCTIONAL ASSESSMENT
PAIN_FUNCTIONAL_ASSESSMENT: 0-10
PAIN_FUNCTIONAL_ASSESSMENT: 0-10

## 2024-06-17 NOTE — ED PROVIDER NOTES
HPI   Chief Complaint   Patient presents with    Shortness of Breath     Patient coming from home. New onset SOB. No home O2. On 4L O2 via [place]. Lung sounds with rales. PD port noted to abdomen recently placed per patient.       Patient is a 78-year-old male that presents emergency department for evaluation of shortness of breath.  Patient does note that he has a history of previous myocardial infarction, congestive heart failure, diabetes.  He notes that for the last few days he has been having increasing shortness of breath specifically worse with exertion.  Nothing seems to make it better.  He is not on oxygen at baseline.  He notes that he was a former smoker however quit many years ago.  He denies any history of COPD or asthma.  Patient is adamant he does not have any chest pain.  He does note that he recently had a peritoneal dialysis catheter placed however is not currently undergoing dialysis.      History provided by:  Patient                      No data recorded                   Patient History   Past Medical History:   Diagnosis Date    Cataract     Hyperlipidemia     Hypertension     Macular hole of left eye     Nephrosclerosis     Other seasonal allergic rhinitis     Seasonal allergies    Other specified diabetes mellitus without complications (Multi)     Diabetes mellitus of other type without complication    Personal history of diseases of the blood and blood-forming organs and certain disorders involving the immune mechanism     History of autoimmune disorder    Personal history of other diseases of urinary system     History of kidney disease    Polyarticular gout     Pre-diabetes     Renal disorder     Spinal stenosis     STEMI (ST elevation myocardial infarction) (Multi) 01/2019     Past Surgical History:   Procedure Laterality Date    CARDIAC CATHETERIZATION  01/20/2019    CATARACT EXTRACTION      CT GUIDED IMAGING FOR NEEDLE PLACEMENT  06/12/2018    CT GUIDED IMAGING FOR NEEDLE PLACEMENT [facility]    CT GUIDED IMAGING FOR NEEDLE PLACEMENT  2022    CT GUIDED IMAGING FOR NEEDLE PLACEMENT [facility]    RENAL BIOPSY  2022    RETINAL DETACHMENT REPAIR W/ SCLERAL BUCKLE LE Left 2019    VASECTOMY       Family History   Problem Relation Name Age of Onset    Kidney failure Mother      Heart failure Mother      Heart disease Mother      Prostate cancer Father      Other (stomach mass) Sister Sister 1     Cancer Sister Sister 1      Social History     Tobacco Use    Smoking status: Former     Current packs/day: 0.00     Types: Cigarettes     Quit date: 2013     Years since quittin.4    Smokeless tobacco: Never   Vaping Use    Vaping status: Never Used   Substance Use Topics    Alcohol use: Not Currently    Drug use: Never       Physical Exam   ED Triage Vitals   Temperature Heart Rate Respirations BP   24 1603 24 1603 24 1603 24 1603   36.1 °C (97 °F) 95 (!) 24 133/75      Pulse Ox Temp Source Heart Rate Source Patient Position   24 1601 24 1603 24 1603 24 1603   (!) 92 % Temporal Monitor Sitting      BP Location FiO2 (%)     24 1603 --     Left arm        Physical Exam  Vitals and nursing note reviewed.   Constitutional:       General: He is not in acute distress.     Appearance: He is well-developed. He is ill-appearing.   HENT:      Head: Normocephalic and atraumatic.      Mouth/Throat:      Mouth: Mucous membranes are moist.   Eyes:      Extraocular Movements: Extraocular movements intact.      Pupils: Pupils are equal, round, and reactive to light.   Neck:      Vascular: No JVD.   Cardiovascular:      Rate and Rhythm: Normal rate and regular rhythm.   Pulmonary:      Effort: Pulmonary effort is normal. Tachypnea present.      Breath sounds: Rales present. No decreased breath sounds, wheezing or rhonchi.   Abdominal:      Palpations: Abdomen is soft.   Musculoskeletal:      Cervical back: Normal range of motion.       Right lower leg: No edema.      Left lower leg: No edema.   Skin:     General: Skin is warm and dry.   Neurological:      General: No focal deficit present.      Mental Status: He is alert.           ED Course & MDM   ED Course as of 06/17/24 2349 Mon Jun 17, 2024 1916 EKG Time:1914  EKG Interpretation time:1916  EKG Interpretation: EKG shows sinus rhythm with first-degree block with a rate of 88 beats minute, normal axis, QTc 479, nonspecific ST and T wave changes however no evidence of STEMI.    EKG was interpreted by myself independently [JL]      ED Course User Index  [JL] Nicholas Diego,          Diagnoses as of 06/17/24 2349   Acute on chronic congestive heart failure, unspecified heart failure type (Multi)   Hypoxia   End stage renal disease (Multi)   Acute hypoxic respiratory failure (Multi)       Medical Decision Making  Patient is a 78-year-old male that presents emergency room for evaluation of shortness of breath.  Patient uncomfortable appearing and tachypneic on initial presentation.  He was found to be hypoxic with an oxygen saturation down into the mid 80s on room air.  He was placed on 4 L nasal cannula.  Blood work ordered including CBC, CMP, troponin, BNP, testing for COVID-19, influenza.  Chest x-ray also ordered which showed moderate vascular congestion consistent with pulmonary edema as well as small bilateral pleural effusions.  Blood work was remarkable for significantly elevated troponin of 312 however EKG shows no evidence of STEMI at this time.  I did discuss case with cardiologist Dr. Donaldson who recommends awaiting repeat troponin at this time prior to any anticoagulation decision as this is more likely related to patient's chronic kidney disease in the setting of heart failure.  He does agree with giving patient Lasix at this time and patient was given IV Lasix 80 mg.  I discussed case with patient's nephrology team who also recommended diuresis at this time and do not feel  patient requires emergent dialysis as his electrolytes are stable.  Repeat troponin did increase up to 352 and I discussed case with patient's cardiologist Dr. Valdez and thoroughly reviewed the case as well as repeat EKG which appears similar to initial EKG with no evidence of STEMI.  He feels that while the troponin is increasing that patient should be admitted for IV diuresis and this is more likely related to the combination of congestive heart failure and chronic renal failure.  He does not feel the patient requires anticoagulation at this time as patient not having any active chest pain.  Patient to be admitted for continued IV diuresis and further treatment of patient's hypoxia and congestive heart failure.  I discussed case with hospitalist who accepted patient for admission.    CRITICAL CARE NOTE:   Upon my evaluation, this patient had a high probability of imminent or life-threatening deterioration due to acute hypoxic respiratory failure, congestive heart failure, elevated troponin, which required my direct attention, intervention, and personal management    40 total minutes of critical care were personally provided which excludes all other billable procedures. This was for time at the bedside, re-evaluations, interpretation of lab and imaging results, discussions with consultants, and monitoring for potential decompensation. Intervention were performed as documented above.        Procedure  Procedures     Nicholas Diego, DO  06/17/24 6833

## 2024-06-18 ENCOUNTER — APPOINTMENT (OUTPATIENT)
Dept: CARDIOLOGY | Facility: HOSPITAL | Age: 79
End: 2024-06-18
Payer: MEDICARE

## 2024-06-18 PROBLEM — N18.5 CKD STAGE 5 SECONDARY TO HYPERTENSION (MULTI): Status: ACTIVE | Noted: 2024-06-18

## 2024-06-18 PROBLEM — I12.0 CKD STAGE 5 SECONDARY TO HYPERTENSION (MULTI): Status: ACTIVE | Noted: 2024-06-18

## 2024-06-18 PROBLEM — I42.9 CONGESTIVE HEART FAILURE DUE TO CARDIOMYOPATHY (MULTI): Status: ACTIVE | Noted: 2024-06-18

## 2024-06-18 PROBLEM — I50.9 CONGESTIVE HEART FAILURE DUE TO CARDIOMYOPATHY (MULTI): Status: ACTIVE | Noted: 2024-06-18

## 2024-06-18 LAB
ALBUMIN SERPL-MCNC: 3.5 G/DL (ref 3.5–5)
ALP BLD-CCNC: 88 U/L (ref 35–125)
ALT SERPL-CCNC: 8 U/L (ref 5–40)
ANION GAP SERPL CALC-SCNC: 10 MMOL/L
AST SERPL-CCNC: 35 U/L (ref 5–40)
BASOPHILS # BLD AUTO: 0.06 X10*3/UL (ref 0–0.1)
BASOPHILS NFR BLD AUTO: 0.7 %
BILIRUB SERPL-MCNC: 0.4 MG/DL (ref 0.1–1.2)
BUN SERPL-MCNC: 63 MG/DL (ref 8–25)
CALCIUM SERPL-MCNC: 8.3 MG/DL (ref 8.5–10.4)
CHLORIDE SERPL-SCNC: 107 MMOL/L (ref 97–107)
CO2 SERPL-SCNC: 20 MMOL/L (ref 24–31)
CREAT SERPL-MCNC: 4.7 MG/DL (ref 0.4–1.6)
EGFRCR SERPLBLD CKD-EPI 2021: 12 ML/MIN/1.73M*2
EOSINOPHIL # BLD AUTO: 0.2 X10*3/UL (ref 0–0.4)
EOSINOPHIL NFR BLD AUTO: 2.4 %
ERYTHROCYTE [DISTWIDTH] IN BLOOD BY AUTOMATED COUNT: 17.9 % (ref 11.5–14.5)
GLUCOSE SERPL-MCNC: 101 MG/DL (ref 65–99)
HCT VFR BLD AUTO: 30.1 % (ref 41–52)
HGB BLD-MCNC: 9.6 G/DL (ref 13.5–17.5)
IMM GRANULOCYTES # BLD AUTO: 0.04 X10*3/UL (ref 0–0.5)
IMM GRANULOCYTES NFR BLD AUTO: 0.5 % (ref 0–0.9)
LYMPHOCYTES # BLD AUTO: 1.53 X10*3/UL (ref 0.8–3)
LYMPHOCYTES NFR BLD AUTO: 18.7 %
MCH RBC QN AUTO: 32 PG (ref 26–34)
MCHC RBC AUTO-ENTMCNC: 31.9 G/DL (ref 32–36)
MCV RBC AUTO: 100 FL (ref 80–100)
MONOCYTES # BLD AUTO: 0.71 X10*3/UL (ref 0.05–0.8)
MONOCYTES NFR BLD AUTO: 8.7 %
NEUTROPHILS # BLD AUTO: 5.64 X10*3/UL (ref 1.6–5.5)
NEUTROPHILS NFR BLD AUTO: 69 %
NRBC BLD-RTO: 0 /100 WBCS (ref 0–0)
PLATELET # BLD AUTO: 205 X10*3/UL (ref 150–450)
POTASSIUM SERPL-SCNC: 5 MMOL/L (ref 3.4–5.1)
PROT SERPL-MCNC: 6.4 G/DL (ref 5.9–7.9)
RBC # BLD AUTO: 3 X10*6/UL (ref 4.5–5.9)
SODIUM SERPL-SCNC: 137 MMOL/L (ref 133–145)
WBC # BLD AUTO: 8.2 X10*3/UL (ref 4.4–11.3)

## 2024-06-18 PROCEDURE — 97165 OT EVAL LOW COMPLEX 30 MIN: CPT | Mod: GO

## 2024-06-18 PROCEDURE — 93306 TTE W/DOPPLER COMPLETE: CPT | Performed by: INTERNAL MEDICINE

## 2024-06-18 PROCEDURE — 2500000004 HC RX 250 GENERAL PHARMACY W/ HCPCS (ALT 636 FOR OP/ED): Performed by: INTERNAL MEDICINE

## 2024-06-18 PROCEDURE — 36415 COLL VENOUS BLD VENIPUNCTURE: CPT | Performed by: INTERNAL MEDICINE

## 2024-06-18 PROCEDURE — 2500000001 HC RX 250 WO HCPCS SELF ADMINISTERED DRUGS (ALT 637 FOR MEDICARE OP): Performed by: INTERNAL MEDICINE

## 2024-06-18 PROCEDURE — 97161 PT EVAL LOW COMPLEX 20 MIN: CPT | Mod: GP

## 2024-06-18 PROCEDURE — 9420000001 HC RT PATIENT EDUCATION 5 MIN

## 2024-06-18 PROCEDURE — 2060000001 HC INTERMEDIATE ICU ROOM DAILY

## 2024-06-18 PROCEDURE — 94762 N-INVAS EAR/PLS OXIMTRY CONT: CPT

## 2024-06-18 PROCEDURE — 2500000002 HC RX 250 W HCPCS SELF ADMINISTERED DRUGS (ALT 637 FOR MEDICARE OP, ALT 636 FOR OP/ED): Mod: MUE | Performed by: INTERNAL MEDICINE

## 2024-06-18 PROCEDURE — 2500000005 HC RX 250 GENERAL PHARMACY W/O HCPCS: Performed by: INTERNAL MEDICINE

## 2024-06-18 PROCEDURE — 84075 ASSAY ALKALINE PHOSPHATASE: CPT | Performed by: INTERNAL MEDICINE

## 2024-06-18 PROCEDURE — 85025 COMPLETE CBC W/AUTO DIFF WBC: CPT | Performed by: INTERNAL MEDICINE

## 2024-06-18 PROCEDURE — 2500000001 HC RX 250 WO HCPCS SELF ADMINISTERED DRUGS (ALT 637 FOR MEDICARE OP)

## 2024-06-18 PROCEDURE — 99222 1ST HOSP IP/OBS MODERATE 55: CPT | Performed by: INTERNAL MEDICINE

## 2024-06-18 PROCEDURE — 93306 TTE W/DOPPLER COMPLETE: CPT

## 2024-06-18 RX ORDER — GABAPENTIN 300 MG/1
300 CAPSULE ORAL NIGHTLY
Status: DISCONTINUED | OUTPATIENT
Start: 2024-06-18 | End: 2024-06-19 | Stop reason: HOSPADM

## 2024-06-18 SDOH — SOCIAL STABILITY: SOCIAL NETWORK: ARE YOU MARRIED, WIDOWED, DIVORCED, SEPARATED, NEVER MARRIED, OR LIVING WITH A PARTNER?: MARRIED

## 2024-06-18 SDOH — SOCIAL STABILITY: SOCIAL INSECURITY: HAVE YOU HAD ANY THOUGHTS OF HARMING ANYONE ELSE?: NO

## 2024-06-18 SDOH — ECONOMIC STABILITY: HOUSING INSECURITY
IN THE LAST 12 MONTHS, WAS THERE A TIME WHEN YOU DID NOT HAVE A STEADY PLACE TO SLEEP OR SLEPT IN A SHELTER (INCLUDING NOW)?: NO

## 2024-06-18 SDOH — SOCIAL STABILITY: SOCIAL INSECURITY: ARE YOU OR HAVE YOU BEEN THREATENED OR ABUSED PHYSICALLY, EMOTIONALLY, OR SEXUALLY BY ANYONE?: NO

## 2024-06-18 SDOH — ECONOMIC STABILITY: INCOME INSECURITY: HOW HARD IS IT FOR YOU TO PAY FOR THE VERY BASICS LIKE FOOD, HOUSING, MEDICAL CARE, AND HEATING?: NOT VERY HARD

## 2024-06-18 SDOH — SOCIAL STABILITY: SOCIAL INSECURITY: ARE THERE ANY APPARENT SIGNS OF INJURIES/BEHAVIORS THAT COULD BE RELATED TO ABUSE/NEGLECT?: NO

## 2024-06-18 SDOH — SOCIAL STABILITY: SOCIAL INSECURITY: DO YOU FEEL ANYONE HAS EXPLOITED OR TAKEN ADVANTAGE OF YOU FINANCIALLY OR OF YOUR PERSONAL PROPERTY?: NO

## 2024-06-18 SDOH — ECONOMIC STABILITY: HOUSING INSECURITY: IN THE LAST 12 MONTHS, HOW MANY PLACES HAVE YOU LIVED?: 1

## 2024-06-18 SDOH — HEALTH STABILITY: MENTAL HEALTH
STRESS IS WHEN SOMEONE FEELS TENSE, NERVOUS, ANXIOUS, OR CAN'T SLEEP AT NIGHT BECAUSE THEIR MIND IS TROUBLED. HOW STRESSED ARE YOU?: ONLY A LITTLE

## 2024-06-18 SDOH — HEALTH STABILITY: MENTAL HEALTH
HOW OFTEN DO YOU NEED TO HAVE SOMEONE HELP YOU WHEN YOU READ INSTRUCTIONS, PAMPHLETS, OR OTHER WRITTEN MATERIAL FROM YOUR DOCTOR OR PHARMACY?: NEVER

## 2024-06-18 SDOH — ECONOMIC STABILITY: TRANSPORTATION INSECURITY
IN THE PAST 12 MONTHS, HAS THE LACK OF TRANSPORTATION KEPT YOU FROM MEDICAL APPOINTMENTS OR FROM GETTING MEDICATIONS?: NO

## 2024-06-18 SDOH — HEALTH STABILITY: MENTAL HEALTH: HOW OFTEN DO YOU HAVE 6 OR MORE DRINKS ON ONE OCCASION?: NEVER

## 2024-06-18 SDOH — SOCIAL STABILITY: SOCIAL INSECURITY: DO YOU FEEL UNSAFE GOING BACK TO THE PLACE WHERE YOU ARE LIVING?: NO

## 2024-06-18 SDOH — ECONOMIC STABILITY: INCOME INSECURITY: IN THE LAST 12 MONTHS, WAS THERE A TIME WHEN YOU WERE NOT ABLE TO PAY THE MORTGAGE OR RENT ON TIME?: NO

## 2024-06-18 SDOH — SOCIAL STABILITY: SOCIAL INSECURITY
WITHIN THE LAST YEAR, HAVE YOU BEEN KICKED, HIT, SLAPPED, OR OTHERWISE PHYSICALLY HURT BY YOUR PARTNER OR EX-PARTNER?: NO

## 2024-06-18 SDOH — ECONOMIC STABILITY: INCOME INSECURITY: IN THE PAST 12 MONTHS, HAS THE ELECTRIC, GAS, OIL, OR WATER COMPANY THREATENED TO SHUT OFF SERVICE IN YOUR HOME?: NO

## 2024-06-18 SDOH — SOCIAL STABILITY: SOCIAL NETWORK: HOW OFTEN DO YOU ATTEND CHURCH OR RELIGIOUS SERVICES?: NEVER

## 2024-06-18 SDOH — SOCIAL STABILITY: SOCIAL INSECURITY: DOES ANYONE TRY TO KEEP YOU FROM HAVING/CONTACTING OTHER FRIENDS OR DOING THINGS OUTSIDE YOUR HOME?: NO

## 2024-06-18 SDOH — ECONOMIC STABILITY: FOOD INSECURITY: WITHIN THE PAST 12 MONTHS, YOU WORRIED THAT YOUR FOOD WOULD RUN OUT BEFORE YOU GOT MONEY TO BUY MORE.: NEVER TRUE

## 2024-06-18 SDOH — SOCIAL STABILITY: SOCIAL INSECURITY: WITHIN THE LAST YEAR, HAVE YOU BEEN AFRAID OF YOUR PARTNER OR EX-PARTNER?: NO

## 2024-06-18 SDOH — ECONOMIC STABILITY: FOOD INSECURITY: WITHIN THE PAST 12 MONTHS, THE FOOD YOU BOUGHT JUST DIDN'T LAST AND YOU DIDN'T HAVE MONEY TO GET MORE.: NEVER TRUE

## 2024-06-18 SDOH — SOCIAL STABILITY: SOCIAL INSECURITY
WITHIN THE LAST YEAR, HAVE TO BEEN RAPED OR FORCED TO HAVE ANY KIND OF SEXUAL ACTIVITY BY YOUR PARTNER OR EX-PARTNER?: NO

## 2024-06-18 SDOH — SOCIAL STABILITY: SOCIAL INSECURITY: ABUSE: ADULT

## 2024-06-18 SDOH — SOCIAL STABILITY: SOCIAL INSECURITY: WERE YOU ABLE TO COMPLETE ALL THE BEHAVIORAL HEALTH SCREENINGS?: YES

## 2024-06-18 SDOH — SOCIAL STABILITY: SOCIAL INSECURITY: WITHIN THE LAST YEAR, HAVE YOU BEEN HUMILIATED OR EMOTIONALLY ABUSED IN OTHER WAYS BY YOUR PARTNER OR EX-PARTNER?: NO

## 2024-06-18 SDOH — HEALTH STABILITY: PHYSICAL HEALTH: ON AVERAGE, HOW MANY MINUTES DO YOU ENGAGE IN EXERCISE AT THIS LEVEL?: 30 MIN

## 2024-06-18 SDOH — SOCIAL STABILITY: SOCIAL INSECURITY: HAVE YOU HAD THOUGHTS OF HARMING ANYONE ELSE?: NO

## 2024-06-18 SDOH — SOCIAL STABILITY: SOCIAL INSECURITY: HAS ANYONE EVER THREATENED TO HURT YOUR FAMILY OR YOUR PETS?: NO

## 2024-06-18 SDOH — SOCIAL STABILITY: SOCIAL NETWORK: IN A TYPICAL WEEK, HOW MANY TIMES DO YOU TALK ON THE PHONE WITH FAMILY, FRIENDS, OR NEIGHBORS?: THREE TIMES A WEEK

## 2024-06-18 SDOH — HEALTH STABILITY: MENTAL HEALTH: EXPERIENCED ANY OF THE FOLLOWING LIFE EVENTS: DEATH OF FAMILY/FRIEND

## 2024-06-18 SDOH — HEALTH STABILITY: PHYSICAL HEALTH: ON AVERAGE, HOW MANY DAYS PER WEEK DO YOU ENGAGE IN MODERATE TO STRENUOUS EXERCISE (LIKE A BRISK WALK)?: 2 DAYS

## 2024-06-18 SDOH — ECONOMIC STABILITY: TRANSPORTATION INSECURITY
IN THE PAST 12 MONTHS, HAS LACK OF TRANSPORTATION KEPT YOU FROM MEETINGS, WORK, OR FROM GETTING THINGS NEEDED FOR DAILY LIVING?: NO

## 2024-06-18 SDOH — SOCIAL STABILITY: SOCIAL NETWORK: HOW OFTEN DO YOU GET TOGETHER WITH FRIENDS OR RELATIVES?: ONCE A WEEK

## 2024-06-18 SDOH — SOCIAL STABILITY: SOCIAL NETWORK: HOW OFTEN DO YOU ATTENT MEETINGS OF THE CLUB OR ORGANIZATION YOU BELONG TO?: NEVER

## 2024-06-18 SDOH — SOCIAL STABILITY: SOCIAL NETWORK
DO YOU BELONG TO ANY CLUBS OR ORGANIZATIONS SUCH AS CHURCH GROUPS UNIONS, FRATERNAL OR ATHLETIC GROUPS, OR SCHOOL GROUPS?: NO

## 2024-06-18 SDOH — HEALTH STABILITY: MENTAL HEALTH: HOW MANY STANDARD DRINKS CONTAINING ALCOHOL DO YOU HAVE ON A TYPICAL DAY?: PATIENT DOES NOT DRINK

## 2024-06-18 ASSESSMENT — COGNITIVE AND FUNCTIONAL STATUS - GENERAL
MOBILITY SCORE: 24
DRESSING REGULAR LOWER BODY CLOTHING: A LITTLE
PATIENT BASELINE BEDBOUND: NO
CLIMB 3 TO 5 STEPS WITH RAILING: A LITTLE
MOBILITY SCORE: 21
TOILETING: A LITTLE
STANDING UP FROM CHAIR USING ARMS: A LITTLE
DAILY ACTIVITIY SCORE: 24
WALKING IN HOSPITAL ROOM: A LITTLE
DAILY ACTIVITIY SCORE: 24
MOBILITY SCORE: 24
HELP NEEDED FOR BATHING: A LITTLE
DAILY ACTIVITIY SCORE: 21

## 2024-06-18 ASSESSMENT — LIFESTYLE VARIABLES
HOW MANY STANDARD DRINKS CONTAINING ALCOHOL DO YOU HAVE ON A TYPICAL DAY: PATIENT DOES NOT DRINK
SKIP TO QUESTIONS 9-10: 1
AUDIT-C TOTAL SCORE: 0
SUBSTANCE_ABUSE_PAST_12_MONTHS: NO
AUDIT-C TOTAL SCORE: 0
PRESCIPTION_ABUSE_PAST_12_MONTHS: NO
HOW OFTEN DO YOU HAVE A DRINK CONTAINING ALCOHOL: NEVER
HOW OFTEN DO YOU HAVE 6 OR MORE DRINKS ON ONE OCCASION: NEVER

## 2024-06-18 ASSESSMENT — PATIENT HEALTH QUESTIONNAIRE - PHQ9
2. FEELING DOWN, DEPRESSED OR HOPELESS: NOT AT ALL
SUM OF ALL RESPONSES TO PHQ9 QUESTIONS 1 & 2: 0
1. LITTLE INTEREST OR PLEASURE IN DOING THINGS: NOT AT ALL

## 2024-06-18 ASSESSMENT — ACTIVITIES OF DAILY LIVING (ADL)
FEEDING YOURSELF: INDEPENDENT
HEARING - LEFT EAR: FUNCTIONAL
BATHING_ASSISTANCE: STAND BY
PATIENT'S MEMORY ADEQUATE TO SAFELY COMPLETE DAILY ACTIVITIES?: YES
BATHING: INDEPENDENT
WALKS IN HOME: INDEPENDENT
ADL_ASSISTANCE: INDEPENDENT
ADEQUATE_TO_COMPLETE_ADL: YES
ADL_ASSISTANCE: INDEPENDENT
GROOMING: INDEPENDENT
JUDGMENT_ADEQUATE_SAFELY_COMPLETE_DAILY_ACTIVITIES: NO
TOILETING: NEEDS ASSISTANCE
LACK_OF_TRANSPORTATION: NO
DRESSING YOURSELF: INDEPENDENT
HEARING - RIGHT EAR: FUNCTIONAL

## 2024-06-18 ASSESSMENT — PAIN SCALES - GENERAL
PAINLEVEL_OUTOF10: 0 - NO PAIN

## 2024-06-18 ASSESSMENT — PAIN - FUNCTIONAL ASSESSMENT: PAIN_FUNCTIONAL_ASSESSMENT: 0-10

## 2024-06-18 NOTE — PROGRESS NOTES
06/18/24 1459   Discharge Planning   Living Arrangements Spouse/significant other   Support Systems Spouse/significant other;Children   Assistance Needed None   Type of Residence Private residence   Number of Stairs to Enter Residence 3   Number of Stairs Within Residence 13   Do you have animals or pets at home? Yes   Type of Animals or Pets Birds   Patient expects to be discharged to: Home   Does the patient need discharge transport arranged? No   Financial Resource Strain   How hard is it for you to pay for the very basics like food, housing, medical care, and heating? Not very   Housing Stability   In the last 12 months, was there a time when you were not able to pay the mortgage or rent on time? N   In the last 12 months, how many places have you lived? 1   In the last 12 months, was there a time when you did not have a steady place to sleep or slept in a shelter (including now)? N   Transportation Needs   In the past 12 months, has lack of transportation kept you from medical appointments or from getting medications? no   In the past 12 months, has lack of transportation kept you from meetings, work, or from getting things needed for daily living? No

## 2024-06-18 NOTE — SIGNIFICANT EVENT
06/18/24 0800   Medical Gas Therapy/Pulse Ox   Medical Gas Therapy Supplemental oxygen   O2 Delivery Method (S)  Nasal cannula  (4L-WEANED TO 2L)   SpO2 99 %   Patient Activity During SpO2 Measurement At rest   Pulse Oximetry Type Intermittent     PATIENT WEANED TO 2L [place]. NO HOME O2 AT THIS TIME.

## 2024-06-18 NOTE — PROGRESS NOTES
Occupational Therapy    Evaluation    Patient Name: George Rowan  MRN: [id number]  Today's Date: 6/18/2024  Time Calculation  Start Time: 1008  Stop Time: 1021  Time Calculation (min): 13 min    Assessment  IP OT Assessment  OT Assessment: Patient is a 78 year old male admitted with acute hypoxic respiratory failure. Patient is presenting with a decline in activity tolerance, strength, and function. Skilled OT to address the above deficits in order to increase patient's safety and independence with daily tasks.  Prognosis: Good  Barriers to Discharge:  (decreased activity tolerance)  Evaluation/Treatment Tolerance: Patient limited by fatigue  End of Session Communication: Bedside nurse  End of Session Patient Position: Up in chair, Alarm off, not on at start of session  Plan:  Treatment Interventions: ADL retraining, Functional transfer training, UE strengthening/ROM, Endurance training, Patient/family training, Compensatory technique education  OT Frequency: 3 times per week  OT Discharge Recommendations: Low intensity level of continued care  OT Recommended Transfer Status: Stand by assist, Minimal assist, Assist of 1  OT - OK to Discharge: Yes    Subjective   Current Problem:  1. Acute hypoxic respiratory failure (Multi)        2. Acute on chronic congestive heart failure, unspecified heart failure type (Multi)        3. Hypoxia        4. End stage renal disease (Multi)        5. Nonrheumatic aortic valve stenosis  Transthoracic Echo (TTE) Complete    Transthoracic Echo (TTE) Complete      6. Coronary artery disease of native artery of native heart with stable angina pectoris (CMS-HCC)  Transthoracic Echo (TTE) Complete    Transthoracic Echo (TTE) Complete        General:  General  Reason for Referral: decline in ADLs  Referred By: Dr. Lindsay  Past Medical History Relevant to Rehab: sinus bradycardia, secondary hyperparathyroidism, renal failure, MI, carotid artery stenosis  Family/Caregiver Present: No  Prior to  "Session Communication: Bedside nurse  Patient Position Received: Up in chair, Alarm off, not on at start of session  Preferred Learning Style: verbal, visual  General Comment: Patient is a 78 year old male who presented to the ED with c/o SOB. Patient admitted with acute hypoxic respiratory failure. He is cleared by nursing for therapy and agreeable to participate upon arrival  Precautions:  Medical Precautions: Fall precautions, Oxygen therapy device and L/min (2L O2 via NC)  Vital Signs:  Heart Rate: 84  SpO2: 95 %  Pain:  Pain Assessment  Pain Assessment: 0-10  Pain Score: 0 - No pain    Objective   Cognition:  Overall Cognitive Status: Within Functional Limits (able to follow commands appropriately. appears anxious at times due to SOB)  Attention: Within Functional Limits     Home Living:  Type of Home: House  Lives With: Spouse  Home Adaptive Equipment: None  Home Layout: Multi-level, 1/2 bath on main level, Bed/bath upstairs  Home Access: Stairs to enter without rails  Entrance Stairs-Rails: None  Entrance Stairs-Number of Steps: 2  Bathroom Shower/Tub: Tub/shower unit  Bathroom Toilet: Standard  Bathroom Equipment: Grab bars in shower, Shower chair with back  Bathroom Accessibility: Full bath on second floor. Has half bath on first floor.   Prior Function:  Level of [place]: Independent with ADLs and functional transfers, Independent with homemaking with ambulation  Receives Help From: Family  ADL Assistance: Independent  Homemaking Assistance: Independent  Ambulatory Assistance: Independent  Vocational: Retired  Prior Function Comments: Works as an [employer] , \"to keep myself busy during the day.\"  Independent with mobility. Increased SOB \"yesterday I couldn't walk to the bathroom without huffing and puffing.\"  IADL History:  Homemaking Responsibilities: Yes  IADL Comments: patient is independent at baseline  ADL:  Eating Assistance: Independent  Eating Deficit: Setup  Grooming Assistance: Stand " by  Grooming Deficit: Supervision/safety (standing sinkside to wash hands)  Bathing Assistance: Stand by  Bathing Deficit: Supervision/safety, Increased time to complete  (per clinical judgement)  UE Dressing Assistance: Stand by  UE Dressing Deficit: Setup (per clinical judgement)  LE Dressing Assistance: Stand by  LE Dressing Deficit: Steadying, Supervision/safety, Increased time to complete (per clinical judgement)  Toileting Assistance with Device: Stand by  Toileting Deficit: Supervison/safety, Increased time to complete  Activity Tolerance:  Endurance: Decreased tolerance for upright activites  Activity Tolerance Comments: SOB with mobility. O2 reading 95% post functional mobility < household distances  Rate of Perceived Exertion (RPE): 5/10 per patient report  Bed Mobility/Transfers: Bed Mobility  Bed Mobility:  (patient OOB and remains OOB)    Transfers  Transfer: Yes  Transfer 1  Transfer From 1: Chair with arms to  Transfer to 1: Stand  Technique 1: Sit to stand  Transfer Level of Assistance 1: Close supervision  Transfers 2  Transfer From 2: Toilet to  Transfer to 2: Stand  Technique 2: Sit to stand  Transfer Level of Assistance 2: Close supervision  Trials/Comments 2: with use of grab bars  Transfers 3  Trials/Comments 3: patient txfes  in/out of shower with CGA and use of grab bars    Functional Mobility:  Functional Mobility  Functional Mobility Performed: Yes  Functional Mobility 1  Surface 1: Level tile  Device 1: No device  Assistance 1: Close supervision, Contact guard  Comments 1: < household distances    IADL's:   Homemaking Responsibilities: Yes  IADL Comments: patient is independent at baseline    Sensation:  Sensation Comment: patient denies numbness/tingling  Strength:  Strength Comments: B UE 4-/5 grossly  Extremities: RUE   RUE : Within Functional Limits and LUE   LUE: Within Functional Limits    Outcome Measures: [facility] Daily Activity  Putting on and taking off regular lower body clothing:  A little  Bathing (including washing, rinsing, drying): A little  Putting on and taking off regular upper body clothing: None  Toileting, which includes using toilet, bedpan or urinal: A little  Taking care of personal grooming such as brushing teeth: None  Eating Meals: None  Daily Activity - Total Score: 21    Education Documentation  Body Mechanics, taught by Shayna Mcknight OT at 6/18/2024 11:00 AM.  Learner: Patient  Readiness: Acceptance  Method: Explanation, Demonstration  Response: Needs Reinforcement    Precautions, taught by Shayna Mcknight OT at 6/18/2024 11:00 AM.  Learner: Patient  Readiness: Acceptance  Method: Explanation, Demonstration  Response: Needs Reinforcement    ADL Training, taught by Shayna Mcknight OT at 6/18/2024 11:00 AM.  Learner: Patient  Readiness: Acceptance  Method: Explanation, Demonstration  Response: Needs Reinforcement    Education Comments  No comments found.      Goals:   Encounter Problems       Encounter Problems (Active)       OT Goals       ADLs (Progressing)       Start:  06/18/24    Expected End:  07/16/24       Patient will complete ADL tasks with Mod I, using AE as needed, to increase safety and independence with self-care tasks.         Functional Transfers (Progressing)       Start:  06/18/24    Expected End:  07/16/24       Patient will complete functional transfers with Mod I, using LRD, in order to increase safety and independence with daily tasks.         B UE Strengthening (Progressing)       Start:  06/18/24    Expected End:  07/16/24       Patient will increase B UE strength to 4+/5 for functional transfers.         Functional Mobility (Progressing)       Start:  06/18/24    Expected End:  07/16/24       Patient will demonstrate the ability to complete item retrieval and functional mobility with Mod I, using LRD, in order to increase safety and independence with daily tasks.

## 2024-06-18 NOTE — CARE PLAN
Problem: Skin  Goal: Participates in plan/prevention/treatment measures  Outcome: Progressing     Problem: Pain  Goal: Turns in bed with improved pain control throughout the shift  Outcome: Progressing     Problem: Pain  Goal: Free from acute confusion related to pain meds throughout the shift  Outcome: Progressing     Problem: ACS/CP/NSTEMI/STEMI  Goal: Wean vasopressors/achieve hemodynamic stability  Outcome: Progressing   The patient's goals for the shift include get some rest    The clinical goals for the shift include monitor vitals, labs

## 2024-06-18 NOTE — CARE PLAN
The patient's goals for the shift include to rest and to ensure medication list for home medications are updated properly.    The clinical goals for the shift include monitor I&O's and vitals

## 2024-06-18 NOTE — CONSULTS
Nutrition Assessement Note    Nutrition Assessment    Reason for Assessment: Admission nursing screening (MST 3)    Spoke with pt and daughter at bedside. Pt has a peritoneal dialysis, catheter placed, but has not started on peritoneal dialysis yet. Daughter reported that pt will start dialysis when he is discharged. Pt reported decreased appetite and only eats because he has to. Daughter stated that pt eats with encouragement. Daughter reported 14# weight loss over the past 5 months due to decreased PO intake. Daughter stated that pt follows with outpatient renal dietitian and denied the need for education at this time. Will provide Mighty shake TID.      Reason for Hospital Admission:  George Rowan is a 78 y.o. male who is admitted for acute hypoxic respiratory failure.     Past Medical History:   Diagnosis Date    Cataract     Hyperlipidemia     Hypertension     Macular hole of left eye     Nephrosclerosis     Other seasonal allergic rhinitis     Seasonal allergies    Other specified diabetes mellitus without complications (Multi)     Diabetes mellitus of other type without complication    Personal history of diseases of the blood and blood-forming organs and certain disorders involving the immune mechanism     History of autoimmune disorder    Personal history of other diseases of urinary system     History of kidney disease    Polyarticular gout     Pre-diabetes     Renal disorder     Spinal stenosis     STEMI (ST elevation myocardial infarction) (Multi) 01/2019      Past Surgical History:   Procedure Laterality Date    CARDIAC CATHETERIZATION  01/20/2019    CATARACT EXTRACTION      CT GUIDED IMAGING FOR NEEDLE PLACEMENT  06/12/2018    CT GUIDED IMAGING FOR NEEDLE PLACEMENT [facility]    CT GUIDED IMAGING FOR NEEDLE PLACEMENT  08/22/2022    CT GUIDED IMAGING FOR NEEDLE PLACEMENT [facility] CL[facility]ICAL [facility]GAC[facility]    RENAL BIOPSY  08/2022    RETINAL DETACHMENT REPAIR W/ SCLERAL BUCKLE LE Left 2019    VASECTOMY    "      Nutrition History:  Food and Nutrient History: Pt reported a decreased appetite and eats because he has to. Daughter reported that pt eats well with encouragement.     Food Allergies/Intolerances:  None  GI Symptoms: None  Oral Problems: None    Anthropometrics:  Ht: 177.8 cm (5' 10\"), Wt: 73 kg (160 lb 15 oz), BMI: 23.09  IBW/kg (Dietitian Calculated): 75.45 kg          Weight Change:  Daily Weight  06/17/24 : 73 kg (160 lb 15 oz)  06/05/24 : 73.1 kg (161 lb 2.2 oz)  05/22/24 : 71.8 kg (158 lb 4.8 oz)  05/08/24 : 71.7 kg (158 lb)  04/24/24 : 70.3 kg (155 lb)  04/23/24 : 71.1 kg (156 lb 12.8 oz)  04/10/24 : 72.6 kg (160 lb)  04/10/24 : 72.7 kg (160 lb 3.2 oz)  04/09/24 : 72.4 kg (159 lb 9.6 oz)  03/27/24 : 72.1 kg (159 lb)   Per chart review:  2/29/24: 173 lbs  Weight History / % Weight Change: Daughter reported 14# weight loss over 5 months due to decreased PO intake. Daughter reported UBW of 170# 5 months ago and 156# now. Per chart review, pt has lost 13# (8.1%) over 4 months  Significant Weight Loss: Yes  Interpretation of Weight Loss: >7.5% in 3 months       Nutrition Focused Physical Exam Findings:                       Nutrition Significant Labs:  Lab Results   Component Value Date    WBC 8.2 06/18/2024    HGB 9.6 (L) 06/18/2024    HCT 30.1 (L) 06/18/2024     06/18/2024    CHOL 139 04/24/2023    TRIG 109 04/24/2023    HDL 41 04/24/2023    ALT 8 06/18/2024    AST 35 06/18/2024     06/18/2024    K 5.0 06/18/2024     06/18/2024    CREATININE 4.70 (H) 06/18/2024    BUN 63 (H) 06/18/2024    CO2 20 (L) 06/18/2024    TSH 4.62 (H) 04/05/2022    PSA 0.40 02/29/2024    INR 1.0 08/22/2023    HGBA1C 6.2 (H) 05/28/2024    ALBUR 2,732 (H) 09/14/2023     Nutrition Specific Medications:  aspirin, 81 mg, oral, Daily  atorvastatin, 20 mg, oral, Nightly  calcitriol, 0.5 mcg, oral, Daily  carvedilol, 6.25 mg, oral, Daily with breakfast  gabapentin, 300 mg, oral, Nightly  heparin (porcine), 5,000 Units, " subcutaneous, q8h  hydrALAZINE, 50 mg, oral, TID  hydroxychloroquine, 200 mg, oral, Daily  levothyroxine, 50 mcg, oral, Daily  losartan, 100 mg, oral, Daily  oxygen, , inhalation, q8h  pantoprazole, 20 mg, oral, Daily before breakfast      furosemide, 10 mg/hr, Last Rate: 10 mg/hr (06/18/24 1320)      Dietary Orders (From admission, onward)       Start     Ordered    06/17/24 2017  Adult diet Renal; Potassium Restricted 2 gm (50mEq); 2 - 3 grams Sodium; 1500 mL fluid  Diet effective now        Question Answer Comment   Diet type Renal    Potassium restriction: Potassium Restricted 2 gm (50mEq)    Sodium restriction: 2 - 3 grams Sodium    Dietary fluid restriction / 24h: 1500 mL fluid        06/17/24 2016                  Estimated Needs:   Estimated Energy Needs  Total Energy Estimated Needs (kCal): 2190 kCal  Total Estimated Energy Need per Day (kCal/kg): 30 kCal/kg  Method for Estimating Needs: actual wt    Estimated Protein Needs  Total Protein Estimated Needs (g):  (44-73)  Total Protein Estimated Needs (g/kg):  (0.6-1)  Method for Estimating Needs: actual wt    Estimated Fluid Needs  Total Fluid Estimated Needs (mL): 1500 mL  Method for Estimating Needs: fluid restriction        Nutrition Diagnosis   Nutrition Diagnosis:  Malnutrition Diagnosis  Patient has Malnutrition Diagnosis: Yes  Diagnosis Status: New  Malnutrition Diagnosis: Severe malnutrition related to chronic disease or condition  As Evidenced by: PO intake < 75% for > 1 month, significant weight loss of 13# (8.1%) over 4 months            Nutrition Interventions/Recommendations   Nutrition Interventions and Recommendations:    Nutrition Prescription:  Individualized Nutrition Prescription Provided for : 2190 kcals, 44-73 gm protein via renal diet    Nutrition Interventions:   Food and/or Nutrient Delivery Interventions  Interventions: Meals and snacks, Medical food supplement  Meals and Snacks: Mineral-modified diet  Goal: provide diet as  ordered  Medical Food Supplement: Commercial beverage  Goal: mighty shake TID to provide 200 kcals and 7g protein each    Education Documentation  No documentation found.           Nutrition Monitoring and Evaluation   Monitoring/Evaluation:   Food/Nutrient Related History Monitoring  Monitoring and Evaluation Plan: Energy intake  Energy Intake: Estimated energy intake  Criteria: pt to consume >/= 75% estimated needs         Biochemical Data, Medical Tests and Procedures  Monitoring and Evaluation Plan: Electrolyte/renal panel  Electrolyte and Renal Panel: Phosphorus  Criteria: labs will trend towards desirable range         Time Spent/Follow-up:   Follow Up  Time Spent (min): 30 minutes  Last Date of Nutrition Visit: 06/18/24  Nutrition Follow-Up Needed?: 3-5 days  Follow up Comment: 6/21/24

## 2024-06-18 NOTE — PROGRESS NOTES
Physical Therapy    Physical Therapy Evaluation    Patient Name: George Rowan  MRN: [id number]  Today's Date: 6/19/2024   Time Calculation  Start Time: 1002  Stop Time: 1013  Time Calculation (min): 11 min    Assessment/Plan   PT Assessment  PT Assessment Results: Decreased endurance, Impaired balance, Decreased mobility  Rehab Prognosis: Good  Evaluation/Treatment Tolerance: Patient limited by fatigue  Medical Staff Made Aware: Yes  End of Session Communication: Bedside nurse  End of Session Patient Position: Up in chair, Alarm off, not on at start of session    Assessment Comment: 79 y/o male who presented to ED wtih SOB. Small B pleural effusions and decomposed heart failure. Pt with elevated troponins; per cardiology consult medical management of Type 2 MI. Pt reportedly indep at baseline. Is currently limited in mobility due to decreased endurance and activity tolerance. Would benefit from follow-up at low frequency for endurance training.        IP OR SWING BED PT PLAN  Inpatient or Swing Bed: Inpatient  PT Plan  Treatment/Interventions: Gait training, Stair training, Balance training, Endurance training  PT Plan: Ongoing PT  PT Frequency: 2 times per week  PT Discharge Recommendations: Low intensity level of continued care  PT Recommended Transfer Status: Contact guard  PT - OK to Discharge: Yes      Subjective   General Visit Information:  General  Reason for Referral: impaired mobility  Past Medical History Relevant to Rehab: including but not limited to HTN, HLD, DM, spinal stenosis, CKD has PD port  Missed Visit: No  Family/Caregiver Present: No  Prior to Session Communication: Bedside nurse  Patient Position Received: Up in chair, Alarm off, not on at start of session  Preferred Learning Style: verbal, visual  General Comment: 79 y/o male who presented to ED with increased SOB. Placed on 4 L O2 in ED and started on diuretics. B pleural effusion and decomposed heart failure. Pt sitting in chair upon  "arrival; 2 L O2. Agreeable to participate.  Home Living:  Home Living  Type of Home: House  Lives With: Spouse  Home Layout: Multi-level, 1/2 bath on main level, Bed/bath upstairs  Home Access: Stairs to enter without rails  Entrance Stairs-Number of Steps: 2  Bathroom Shower/Tub: Tub/shower unit  Bathroom Equipment: Grab bars in shower, Shower chair with back  Bathroom Accessibility: Full bath on second floor. Has half bath on first floor.  Prior Level of Function:  Prior Function Per Pt/Caregiver Report  Level of [place]: Independent with ADLs and functional transfers, Independent with homemaking with ambulation  Receives Help From: Family  ADL Assistance: Independent  Homemaking Assistance: Independent  Ambulatory Assistance: Independent  Vocational: Retired  Prior Function Comments: Works as an [employer] , \"to keep myself busy during the day.\"  Independent with mobility. Increased SOB \"yesterday I couldn't walk to the bathroom without huffing and puffing.\"  Precautions:  Precautions  Medical Precautions: Fall precautions, Oxygen therapy device and L/min (2 L O2 via NC)  Vital Signs:  Vital Signs  Heart Rate: 72  SpO2: 97 % (91-92% with ambulation)  Patient Position: Sitting    Objective   Pain:  Pain Assessment  Pain Score: 0 - No pain  Cognition:  Cognition  Overall Cognitive Status: Within Functional Limits  Attention: Within Functional Limits    General Assessments:  General Observation  General Observation: pleasant/cooperative     Activity Tolerance  Endurance: Decreased tolerance for upright activites  Activity Tolerance Comments: SOB with mobility  Rate of Perceived Exertion (RPE): 4-5/10 after ambulation, pt report    Sensation  Light Touch: No apparent deficits  Sensation Comment: pt denies numbness/tingling    Strength  Strength Comments: B LEs > 3/5 observed  Strength  Strength Comments: B LEs > 3/5 observed          Static Sitting Balance  Static Sitting-Level of Assistance: " Independent    Static Standing Balance  Static Standing-Level of Assistance: Close supervision  Dynamic Standing Balance  Dynamic Standing-Comments: No major LOB observed with mobility  Functional Assessments:  Bed Mobility  Bed Mobility: No (up in chair pre/post PT. Anticipate indep)    Transfer 1  Technique 1: Sit to stand  Transfer Level of Assistance 1: Close supervision  Trials/Comments 1: x2 from chair with arms  Transfers 2  Technique 2: Stand to sit  Transfer Level of Assistance 2: Distant supervision  Trials/Comments 2: arm rests of chair    Ambulation/Gait Training  Ambulation/Gait Training Performed: Yes  Ambulation/Gait Training 1  Surface 1: Level tile  Device 1: No device  Assistance 1: Close supervision, Contact guard  Quality of Gait 1:  (decreased anais with increased fatigue. Would occasionally reach for external support. Observed SOB wtih increased distance.)  Comments/Distance (ft) 1: about 40 ft  Extremity/Trunk Assessments:  RLE   RLE : Within Functional Limits  LLE   LLE : Within Functional Limits  Outcome Measures:  [facility] Basic Mobility  Turning from your back to your side while in a flat bed without using bedrails: None  Moving from lying on your back to sitting on the side of a flat bed without using bedrails: None  Moving to and from bed to chair (including a wheelchair): None  Standing up from a chair using your arms (e.g. wheelchair or bedside chair): A little  To walk in hospital room: A little  Climbing 3-5 steps with railing: A little  Basic Mobility - Total Score: 21    Encounter Problems       Encounter Problems (Active)       Balance       dynamic (Progressing)       Start:  06/18/24    Expected End:  07/02/24       Pt will perform DGI and score >/= 22/24 to identify low falls risk             Mobility       LTG - Patient will navigate 4-6 steps with rails/device (Progressing)       Start:  06/18/24    Expected End:  07/02/24            endurance (Progressing)       Start:   06/18/24    Expected End:  07/02/24       Pt will tolerated 6 MWT with RPE 2-3/10 and stable vital signs            Safety       LTG - Patient will utilize safety techniques (Progressing)       Start:  06/18/24    Expected End:  07/02/24                   Education Documentation  No documentation found.  Education Comments  No comments found.

## 2024-06-18 NOTE — CARE PLAN
Problem: Balance  Goal: dynamic  Description: Pt will perform DGI and score >/= 22/24 to identify low falls risk   Outcome: Progressing     Problem: Mobility  Goal: LTG - Patient will navigate 4-6 steps with rails/device  Outcome: Progressing  Goal: endurance  Description: Pt will tolerated 6 MWT with RPE 2-3/10 and stable vital signs  Outcome: Progressing     Problem: Safety  Goal: LTG - Patient will utilize safety techniques  Outcome: Progressing

## 2024-06-18 NOTE — H&P
History Of Present Illness        George Rowan is a 78 y.o. male presenting with Shortness of Breath.      Patient presenting from home.  Had sudden onset of shortness of breath.  He denies having any oxygen at home.  In the emergency room the patient was placed on 4 L supplemental oxygen via nasal cannula.  The physician noted patient was having rales upon examination.  There was a peritoneal dialysis port noted to the lower abdomen which was recently placed per the patient.  Apparently the patient follows with Dr. Mckoy.      Upon arrival to the emergency room the patient's temperature was Tmax 36.1, pulse rate 95, respiratory rate 24.  BP was 133/75.  Patient was saturating 92% on room air.    Additional ED diagnostic workup was noted for a CBC with differential with a normal WBC count of 8.9.  The H&H was low normal at 10/31.9.  The patient's platelet count was 231.  There was a neutrophil predominance.  The influenza a and B and coronavirus testing were pan negative.      Patient's chest x-ray was noted for the following.      IMPRESSION:    1. Moderate vascular congestion with small bilateral pleural  effusions.    2. Bibasilar alveolar opacity; pulmonary edema versus superimposed  pneumonia on congestive heart failure or fluid overload.      Off note a 2D echocardiogram from June 30, 2023 which was read by Dr. Waddell is noted for the following:      CONCLUSIONS:    1. Left ventricular systolic function is normal with a 60-65% estimated  ejection fraction.  2. Mild aortic valve stenosis.  3. Mild aortic valve regurgitation.  4. Aortic stenosis mean gradient 13 mm Hg, peak gradient 25 mm Hg and TEOFILO  1.48 cm2.  5. Aortic regurgitation P1/2t 564 ms.      Patient's daughter, Ms. Ochoa was present during my encounter.  She was able to corroborate her father's information with me.      Patient is EKG noted for sinus rhythm with first-degree AV block  Nonspecific intraventricular conduction delay  ST and T wave  abnormality, consider lateral ischemia        Past Medical History      Past Medical History:   Diagnosis Date    Cataract     Hyperlipidemia     Hypertension     Macular hole of left eye     Nephrosclerosis     Other seasonal allergic rhinitis     Seasonal allergies    Other specified diabetes mellitus without complications (Multi)     Diabetes mellitus of other type without complication    Personal history of diseases of the blood and blood-forming organs and certain disorders involving the immune mechanism     History of autoimmune disorder    Personal history of other diseases of urinary system     History of kidney disease    Polyarticular gout     Pre-diabetes     Renal disorder     Spinal stenosis     STEMI (ST elevation myocardial infarction) (Multi) 01/2019           Surgical History          Past Surgical History:   Procedure Laterality Date    CARDIAC CATHETERIZATION  01/20/2019    CATARACT EXTRACTION      CT GUIDED IMAGING FOR NEEDLE PLACEMENT  06/12/2018    CT GUIDED IMAGING FOR NEEDLE PLACEMENT [facility]    CT GUIDED IMAGING FOR NEEDLE PLACEMENT  08/22/2022    CT GUIDED IMAGING FOR NEEDLE PLACEMENT [facility]    RENAL BIOPSY  08/2022    RETINAL DETACHMENT REPAIR W/ SCLERAL BUCKLE LE Left 2019    VASECTOMY              Social History        He reports that he quit smoking about 11 years ago. His smoking use included cigarettes. He has never used smokeless tobacco. He reports that he does not currently use alcohol. He reports that he does not use drugs.        Family History      Family History   Problem Relation Name Age of Onset    Kidney failure Mother      Heart failure Mother      Heart disease Mother      Prostate cancer Father      Other (stomach mass) Sister Sister 1     Cancer Sister Sister 1           Allergies          Tetracaine, Azathioprine, Fluorescein-benoxinate, and Other          Review of Systems      14-point ROS otherwise negative, as per HPI/Interval  "History.    General: No change in weight. No weakenss. No Fevers/Chills/Night Sweats   Skin: No skin/hair/nail changes. No rashes or sores.  Head:  No trauma. No Headache/nasuea/vomitting.   Eyes: No visual changes. No tearing. No itching.   Ears: No hearing loss. No tinnitus. No vertigo. No discharge.  Nose, Sinuses: No rhinorrhea, No nasal congestion. No epistaxis.  Mouth, Throat, Neck: No bleeding gums, hoarseness, sore throat or swollen neck  Cardiac: No palpitations. No RAMON. No PND. No Orthopnea.   Respiratory: Yes Shortness of Breath. No wheezing. No cough. No hemoptysis.   GI: No nausea/vomiting. No indigestion. No diarrhea. No constipation.   Extremities: No numbness or tingling. No paresthesias.   Urinary: No change in urinary frequency. No change in hesitancy. No hematuria. No incontinence.         Physical Exam        Constitutional:  Pleasant  Eyes: PERRL, EOMI,   ENMT: mucous membranes moist  Head/Neck: Neck supple, Minimal JVD,   Respiratory/Thorax: Bilateral crackles  Cardiovascular: Regular, rate and rhythm, no murmurs  Gastrointestinal: Soft, non-distended, +BS.  Peritoneal dialysis catheter  Musculoskeletal: ROM intact, no joint swelling, normal strength  Extremities: peripheral pulses intact; no edema  Neurological: Alert and Oriented x 3; no focal deficits; gross motor and sensation intact; CN II-XII intact. No asterixis.  Psychological: Appropriate mood and behavior  Skin: No lesions, No rashes.         Last Recorded Vitals  Blood pressure 124/75, pulse 80, temperature 36.1 °C (97 °F), temperature source Temporal, resp. rate 19, height 1.778 m (5' 10\"), weight 73 kg (160 lb 15 oz), SpO2 97%.    Relevant Results    Lab Results   Component Value Date    WBC 8.9 06/17/2024    HGB 10.0 (L) 06/17/2024    HCT 31.9 (L) 06/17/2024     (H) 06/17/2024     06/17/2024       Lab Results   Component Value Date    GLUCOSE 273 (H) 06/17/2024    CALCIUM 8.0 (L) 06/17/2024     06/17/2024    K " 4.9 06/17/2024    CO2 14 (L) 06/17/2024     06/17/2024    BUN 58 (H) 06/17/2024    CREATININE 4.40 (H) 06/17/2024       Lab Results   Component Value Date    HGBA1C 6.2 (H) 05/28/2024         No CT head results found for the past 12 months      Scheduled medications  aspirin, 81 mg, oral, Daily  atorvastatin, 20 mg, oral, Daily  calcitriol, 0.5 mcg, oral, q24h  carvedilol, 6.25 mg, oral, Daily  gabapentin, 300 mg, oral, Daily  heparin (porcine), 5,000 Units, subcutaneous, q8h  hydrALAZINE, 50 mg, oral, TID  hydroxychloroquine, 200 mg, oral, Daily  [START ON 6/18/2024] levothyroxine, 50 mcg, oral, Daily before breakfast  losartan, 100 mg, oral, Daily  oxygen, , inhalation, Continuous - Inhalation  tamsulosin, 0.4 mg, oral, Daily      Continuous medications     PRN medications  PRN medications: acetaminophen **OR** acetaminophen **OR** acetaminophen, ondansetron **OR** ondansetron, polyethylene glycol          Assessment/Plan   Principal Problem:    Acute hypoxic respiratory failure (Multi)  Active Problems:    Systemic involvement of connective tissue (Multi)    Mild aortic valve stenosis    History of coronary artery stent placement    Essential hypertension    Dyslipidemia    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 male presenting with Shortness of Breath.  Patient admitted for further evaluation and management.          Acute Hypoxic Respiratory Failure    Admit to stepdown unit  Continuous telemetry monitoring and pulse oximetry  Most probably secondary to decompensated heart failure with concomitant pulmonary edema  IV diuresis  Strict intake and output  Daily weights      Decompensated Heart Failure (HFpEF)    Management as above  For repeat 2D echocardiogram to cardiology      Mild Aortic Stenosis/Pulmonary Edema     Continue with IV diuretics      Carotid Artery Stenosis    Patient follows  with Dr. Reid  Daughter requested follow up in the hospital possibly  Patient scheduled for Carotid Artery US as outpatient  Will consider follow up while inpatient if necessary      Chronic Kidney Disease    Avoid nephrotoxic agents  Nephrology consultation placed  Patient is in the process of undergoing PD RRT      Type II  MI Demand Ischemia versus NSTEMI    Patient denies any chest pain  EKG reviewed as above  Cardiology consultation has been placed  ED doctors correspondence with cardiology previously did not dictate any need for heparin drip  Will continue to monitor closely        CAD s/p PCI with Stents    He has roughly 3 stents that were placed by Dr. Valdez  Continue aspirin and statin therapy      Systemic Lupus Erythematosus    Continue hydroxychloroquine home dose      Hypothyroidism    Continue home levothyroxine dose      BPH    Continue home Flomax dose      Hypertension     Continue to monitor BP and adjust hypertensive medications accordingly      GI + DVT Prophylaxis      Low-dose oral PPI  Heparin subcu      Insomnia    Patient requested ramelteon  This is nonformulary here so I ordered low-dose Ambien after speaking with the daughter            The patient has been Instructed by me upon admission to follow-up with their PCP upon discharge to obtain repeat blood work/CXR and to maintain close medical follow-up for their current disease process.          This Dictation was Transcribed using a Nuance Dragon Voice Recognition System Device (with Compatible Computer + Software) and as such may contain Grammatical Errors and Unintentional Typing Misprints.      I spent 34 minutes in the professional and overall care of this patient.      Darion Lindsay MD

## 2024-06-18 NOTE — CONSULTS
Inpatient consult to Cardiology  Consult performed by: Justin Beckett MD  Consult ordered by: Darion Lindsay MD  Reason for consult: Congestive heart failure        History Of Present Illness:    George Rowan is a 78 y.o. male presenting with shortness of breath.  He has a history of having had a microinfarction in 2019 underwent a heart catheterization at that time showing the circumflex was occluded he had LAD and RCA lesions.  He underwent angioplasty of the right coronary artery.  He did have a nuclear stress test this year showing ejection fraction of 36% with some mild ischemia felt to be treated medically.  He does have renal failure close to dialysis he is going to have peritoneal dialysis and a port was placed approximately 2 weeks ago for this.  He comes in now with shortness of breath.  He has been short of breath for the past 4 to 5 days but got worse yesterday.  He was having trouble sleeping and laying down.  His legs are now swelling up.  He said he was losing weight.  Chest x-rays was done showing pulmonary edema.  He was admitted started on diuretics.  He was not on diuretics before.  He was not complaining of any chest pains or discomforts.  A troponin was drawn it was 312 repeat 352 and repeat was 577.  He does have a BUN/creatinine of 6.3 and 4.7.  EKG showed normal sinus rhythm with an intraventricular conduction delay.  This is somewhat different from previous EKG which showed inferior wall myocardial infarction.  He now has ST changes.  He was started on supplemental oxygen and was darted on Lasix.  He says he is improved.  He is known to have peripheral vascular disease and does have carotid lesions which are being followed by Dr. Diehl.  His last echocardiogram done in 2023 did show that his ejection fraction was normal and he had mild aortic stenosis.  He does have a history of hypertension he is not a diabetic.  Is felt he has renal failure secondary to his hypertension.     Last  Recorded Vitals:  Vitals:    06/17/24 2030 06/17/24 2134 06/18/24 0410 06/18/24 0759   BP: (!) 137/91 141/76 101/81 119/62   BP Location:  Right arm Right arm Right arm   Patient Position:  Sitting Lying Lying   Pulse: 84 93 87 87   Resp: 19 21 20 20   Temp:  36.3 °C (97.3 °F) 36.7 °C (98.1 °F) 36.7 °C (98.1 °F)   TempSrc:  Temporal Temporal Temporal   SpO2: 98% 99% 97% 100%   Weight:       Height:           Last Labs:  CBC - 6/18/2024:  6:15 AM  8.2 9.6 205    30.1      CMP - 6/18/2024:  6:15 AM  8.3 6.4 35 --- 0.4   4.9 3.5 8 88      PTT - 8/22/2023:  1:24 PM  1.0   10.9 27.3     Hemoglobin A1C   Date/Time Value Ref Range Status   05/28/2024 11:02 AM 6.2 (H) 4.3 - 5.6 % Final     Comment:     American Diabetes Association guidelines indicate that patients with HgbA1c in the range 5.7-6.4% are at increased risk for development of diabetes, and intervention by lifestyle modification may be beneficial. HgbA1c greater or equal to 6.5% is considered diagnostic of diabetes.   08/22/2023 01:24 PM 6.0 4.0 - 6.0 % Final     Comment:     Hemoglobin A1C levels are related to mean blood glucose during the   preceding 2-3 months. The relationship table below may be used as a   general guide. Each 1% increase in HGB A1C is a reflection of an   increase in mean glucose of approximately 30 mg/dl.   Reference: Diabetes Care, volume 29, supplement 1 Jan. 2006                        HGB A1C ................. Approx. Mean Glucose   _______________________________________________   6%   ...............................  120 mg/dl   7%   ...............................  150 mg/dl   8%   ...............................  180 mg/dl   9%   ...............................  210 mg/dl   10%  ...............................  240 mg/dl  Performed at [address] [address]     03/29/2023 09:09 AM 5.8 4.0 - 6.0 % Final     Comment:     Hemoglobin A1C levels are related to mean blood glucose during the   preceding 2-3  "months. The relationship table below may be used as a   general guide. Each 1% increase in HGB A1C is a reflection of an   increase in mean glucose of approximately 30 mg/dl.   Reference: Diabetes Care, volume 29, supplement 1 Jan. 2006                        HGB A1C ................. Approx. Mean Glucose   _______________________________________________   6%   ...............................  120 mg/dl   7%   ...............................  150 mg/dl   8%   ...............................  180 mg/dl   9%   ...............................  210 mg/dl   10%  ...............................  240 mg/dl  Performed at [address] [address]       LDL Calculated   Date/Time Value Ref Range Status   04/24/2023 09:15 AM 76 65 - 130 MG/DL Final   10/13/2022 09:19 AM 63 (L) 65 - 130 MG/DL Final   04/05/2022 10:40 AM 58 (L) 65 - 130 MG/DL Final      Last I/O:  I/O last 3 completed shifts:  In: 100 (1.4 mL/kg) [P.O.:100]  Out: 625 (8.6 mL/kg) [Urine:625 (0.2 mL/kg/hr)]  Weight: 73 kg     Past Cardiology Tests (Last 3 Years):  EKG:  ECG 12 lead 06/17/2024 (Preliminary)      ECG 12 lead 10/16/2023    Echo:  No results found for this or any previous visit from the past 1095 days.    Ejection Fractions:  No results found for: \"EF\"  Cath:  No results found for this or any previous visit from the past 1095 days.    Stress Test:  Nuclear Stress Test 05/07/2024    Cardiac Imaging:  No results found for this or any previous visit from the past 1095 days.      Past Medical History:  He has a past medical history of Cataract, Hyperlipidemia, Hypertension, Macular hole of left eye, Nephrosclerosis, Other seasonal allergic rhinitis, Other specified diabetes mellitus without complications (Multi), Personal history of diseases of the blood and blood-forming organs and certain disorders involving the immune mechanism, Personal history of other diseases of urinary system, Polyarticular gout, Pre-diabetes, Renal disorder, " Spinal stenosis, and STEMI (ST elevation myocardial infarction) (Multi) (01/2019).    Past Surgical History:  He has a past surgical history that includes CT guided imaging for needle placement (06/12/2018); CT guided imaging for needle placement (08/22/2022); Vasectomy; Cataract extraction; Retinal detachment repair w/ scleral buckle (Left, 2019); Cardiac catheterization (01/20/2019); and Renal biopsy (08/2022).      Social History:  He reports that he quit smoking about 11 years ago. His smoking use included cigarettes. He has never used smokeless tobacco. He reports that he does not currently use alcohol. He reports that he does not use drugs.    Family History:  Family History   Problem Relation Name Age of Onset    Kidney failure Mother      Heart failure Mother      Heart disease Mother      Prostate cancer Father      Other (stomach mass) Sister Sister 1     Cancer Sister Sister 1         Allergies:  Tetracaine, Azathioprine, Fluorescein-benoxinate, and Other    Inpatient Medications:  Scheduled medications   Medication Dose Route Frequency    aspirin  81 mg oral Daily    atorvastatin  20 mg oral Nightly    calcitriol  0.5 mcg oral Daily    carvedilol  6.25 mg oral Daily with breakfast    furosemide  40 mg intravenous q12h    gabapentin  300 mg oral Daily    heparin (porcine)  5,000 Units subcutaneous q8h    hydrALAZINE  50 mg oral TID    hydroxychloroquine  200 mg oral Daily    levothyroxine  50 mcg oral Daily    losartan  100 mg oral Daily    oxygen   inhalation Continuous - Inhalation    pantoprazole  20 mg oral Daily before breakfast     PRN medications   Medication    acetaminophen    Or    acetaminophen    Or    acetaminophen    ondansetron ODT    Or    ondansetron    polyethylene glycol    zolpidem     Continuous Medications   Medication Dose Last Rate     Outpatient Medications:  Current Outpatient Medications   Medication Instructions    allopurinol (ZYLOPRIM) 150 mg, oral, Daily    aspirin 81 mg  capsule 1 tablet, oral, Daily    atorvastatin (LIPITOR) 20 mg, oral, Daily    calcitriol (Rocaltrol) 0.25 mcg capsule 2 capsules, oral, Every 24 hours    carvedilol (Coreg) 6.25 mg tablet 1 tablet, oral, Daily    epoetin clyde (EPOGEN,PROCRIT) 10,000 Units, subcutaneous, States 1.5 doses Every 2 weeks     fluticasone (Flonase) 50 mcg/actuation nasal spray 2 sprays, Each Nostril, Daily    gabapentin (NEURONTIN) 300 mg, oral, Daily    HYDRALAZINE-HYDROCHLOROTHIAZID ORAL 50 mg, oral, 3 times daily    HYDROcodone-acetaminophen (Norco) 5-325 mg tablet     hydroxychloroquine (Plaquenil) 200 mg tablet oral, Daily    levothyroxine (SYNTHROID, LEVOXYL) 50 mcg, oral, Daily before breakfast, Take on an empty stomach.    losartan (COZAAR) 100 mg, oral, Daily    NON FORMULARY CBD Oil<BR>    ramelteon (ROZEREM) 8 mg, oral, Nightly    tamsulosin (FLOMAX) 0.4 mg, oral, Daily    traZODone (DESYREL) 50 mg, oral, Nightly PRN    vit A/vit C/vit E/zinc/copper (PRESERVISION AREDS ORAL) oral    vitamin B complex (SUPER B-50 COMPLEX ORAL) oral       Physical Exam:  General is well-developed well-nourished male on oxygen resting comfortably  Neck shows no JVD  Lungs have rales in the bases  Cardiovascular PMI is nonpalpable S1 is normal to a single there is a 1/6 midsystolic murmur does not radiate  Abdominal exam is soft he does have a peritoneal dialysis catheter in   rectal deferred  Extremities show no pitting edema     Assessment/Plan     1 pulmonary edema  2 chronic renal failure  3 vascular heart disease  4 elevated troponin consistent with myocardial infarction type II  5 hypertension      This is a 78-year-old male who does have end-stage renal failure coronary artery disease has angioplasty in the past has high blood pressure who now comes in with congestive heart failure.  This may be secondary to his end-stage renal failure.  Feel he needs to be diuresed we will try him on diuretics at this stage.  He also has elevated  troponins which are consistent with a myocardial infarction type II.  His nuclear stress test recently done show she does have some herman-infarction ischemia and reportedly ejection fraction diminished at 36%.  Would like to repeat the echocardiogram to look at the left ventricular function not sure if this EF is correct or not.  In the meantime we will treat him medically for his myocardial infarction.      Peripheral IV 06/17/24 20 G Left;Anterior Forearm (Active)   Site Assessment Intact;Clean;Dry 06/17/24 2100   Dressing Status Clean;Dry 06/17/24 2100   Number of days: 1       Code Status:  Full Code    I spent 60 minutes in the professional and overall care of this patient.        Justin Beckett MD

## 2024-06-18 NOTE — PROGRESS NOTES
George Rowan is a 78 y.o. male on day 1 of admission presenting with Congestive heart failure due to cardiomyopathy (Multi).    Subjective   Seen and examined daughter at bedside updated as the patient's condition he is doing much better relative to his respiratory status significant improvement in his hypoxia breathing is significantly improved he is just completed his echocardiogram results which are pending.  He has been initiated on IV Lasix drip via continuous infusion attempt further induced diuresis with plan for short-term turnaround to peritoneal dialysis she had been previously planned for approximately 3 weeks from today this will be moved up a significantly in conjunction with nephrology.  Appreciate cardiology input.  Increase activity heart failure navigator program PT OT and cardiac rehab anticipated discharge in approximately 48 to 72 hours       Objective     Physical Exam  Vitals and nursing note reviewed.   Constitutional:       Appearance: Normal appearance. He is ill-appearing.   HENT:      Head: Atraumatic.      Mouth/Throat:      Mouth: Mucous membranes are dry.   Cardiovascular:      Rate and Rhythm: Normal rate and regular rhythm.      Pulses: Normal pulses.      Heart sounds: Normal heart sounds.   Pulmonary:      Effort: Pulmonary effort is normal.      Breath sounds: Rales present.   Abdominal:      Palpations: Abdomen is soft.   Musculoskeletal:         General: Normal range of motion.      Cervical back: Normal range of motion and neck supple.      Right lower leg: Edema present.      Left lower leg: Edema present.   Skin:     General: Skin is warm.      Capillary Refill: Capillary refill takes less than 2 seconds.   Neurological:      General: No focal deficit present.      Mental Status: Mental status is at baseline.   Psychiatric:         Mood and Affect: Mood normal.         Last Recorded Vitals  Blood pressure 119/62, pulse 84, temperature 36.7 °C (98.1 °F), temperature source  "Temporal, resp. rate 20, height 1.778 m (5' 10\"), weight 73 kg (160 lb 15 oz), SpO2 95%.  Intake/Output last 3 Shifts:  I/O last 3 completed shifts:  In: 100 (1.4 mL/kg) [P.O.:100]  Out: 625 (8.6 mL/kg) [Urine:625 (0.2 mL/kg/hr)]  Weight: 73 kg     Relevant Results            Associated Stress Result    Procedure Accession # Study Status   Cardiology Interpretation Of Nuclear Stress - See Other Report For Nuclear Portion [id number] Final     Interpretation Summary    Interpreted By:  Mihir Casas and Bartolomei Aguilar Christopher   STUDY:  NUCLEAR STRESS TEST;  5/7/2024 10:29 am      INDICATION:  Signs/Symptoms:CAD.      COMPARISON:  None.      ACCESSION NUMBER(S):  [id number]      ORDERING CLINICIAN:  TAMICA REID      TECHNIQUE:  DIVISION OF NUCLEAR MEDICINE  PHARMACOLOGIC STRESS MYOCARDIAL PERFUSION SCAN, ONE DAY PROTOCOL      The patient received an intravenous injection of 11.4 mCi of Tc-99m  Myoview and resting emission tomographic (SPECT) images of the  myocardium were acquired. The patient then received an intravenous  infusion of 0.4 mg regadenoson (Lexiscan) followed by an additional  injection of 34.3 mCi of Tc-99m Myoview. Stress phase SPECT images of  the myocardium were then acquired. These included ECG-gated  post-stress and rest images to assess and quantify ventricular  function.      FINDINGS:  There is a fixed defect along the inferior wall.      There is a fixed defects of the lateral wall with partial  reversibility along its distal aspect.      There is a fixed defect along the basal anterior wall.      The left ventricle is enlarged.      EKG-gated images demonstrate global hypokinesis of the left ventricle  with an LV EF estimated at 36%.      IMPRESSION:  Lateral wall infarct with mild herman-infarct ischemia along its distal  aspect.      Infarct along the inferior wall.      Suspicion of infarct along the basal anterior wall.      The left ventricle is enlarged.      Global " hypokinesis with LVEF estimated at 36%.      I personally reviewed the images/study and I agree with the findings  as stated.  This study was interpreted at [facility], [place], [place].      MACRO:  Critical Finding:  See findings. Notification was initiated on  5/7/2024 at 11:53 am by  Koffi Nicholson.  (**-YCF-**)  Instructions:          Signed by: Mihir Casas 5/7/2024 12:03 PM  Dictation workstation:   [id number]    Results for orders placed or performed during the hospital encounter of 06/17/24 (from the past 24 hour(s))   ECG 12 lead   Result Value Ref Range    Ventricular Rate 88 BPM    Atrial Rate 88 BPM    TX Interval 222 ms    QRS Duration 124 ms    QT Interval 396 ms    QTC Calculation(Bazett) 479 ms    P Axis 43 degrees    R Axis 7 degrees    T Axis 165 degrees    QRS Count 15 beats    Q Onset 214 ms    P Onset 103 ms    P Offset 165 ms    T Offset 412 ms    QTC Fredericia 450 ms   CBC and Auto Differential   Result Value Ref Range    WBC 8.9 4.4 - 11.3 x10*3/uL    nRBC 0.2 (H) 0.0 - 0.0 /100 WBCs    RBC 3.11 (L) 4.50 - 5.90 x10*6/uL    Hemoglobin 10.0 (L) 13.5 - 17.5 g/dL    Hematocrit 31.9 (L) 41.0 - 52.0 %     (H) 80 - 100 fL    MCH 32.2 26.0 - 34.0 pg    MCHC 31.3 (L) 32.0 - 36.0 g/dL    RDW 18.4 (H) 11.5 - 14.5 %    Platelets 231 150 - 450 x10*3/uL    Neutrophils % 71.9 40.0 - 80.0 %    Immature Granulocytes %, Automated 0.2 0.0 - 0.9 %    Lymphocytes % 22.2 13.0 - 44.0 %    Monocytes % 2.9 2.0 - 10.0 %    Eosinophils % 2.1 0.0 - 6.0 %    Basophils % 0.7 0.0 - 2.0 %    Neutrophils Absolute 6.36 (H) 1.60 - 5.50 x10*3/uL    Immature Granulocytes Absolute, Automated 0.02 0.00 - 0.50 x10*3/uL    Lymphocytes Absolute 1.97 0.80 - 3.00 x10*3/uL    Monocytes Absolute 0.26 0.05 - 0.80 x10*3/uL    Eosinophils Absolute 0.19 0.00 - 0.40 x10*3/uL    Basophils Absolute 0.06 0.00 - 0.10 x10*3/uL   Comprehensive Metabolic Panel   Result Value Ref Range     Glucose 273 (H) 65 - 99 mg/dL    Sodium 133 133 - 145 mmol/L    Potassium 4.9 3.4 - 5.1 mmol/L    Chloride 107 97 - 107 mmol/L    Bicarbonate 14 (L) 24 - 31 mmol/L    Urea Nitrogen 58 (H) 8 - 25 mg/dL    Creatinine 4.40 (H) 0.40 - 1.60 mg/dL    eGFR 13 (L) >60 mL/min/1.73m*2    Calcium 8.0 (L) 8.5 - 10.4 mg/dL    Albumin 3.5 3.5 - 5.0 g/dL    Alkaline Phosphatase 93 35 - 125 U/L    Total Protein 6.3 5.9 - 7.9 g/dL    AST 15 5 - 40 U/L    Bilirubin, Total 0.4 0.1 - 1.2 mg/dL    ALT 7 5 - 40 U/L    Anion Gap 12 <=19 mmol/L   NT Pro-BNP   Result Value Ref Range    PROBNP 41,900 (H) 0 - 852 pg/mL   Sars-CoV-2 PCR   Result Value Ref Range    Coronavirus 2019, PCR Not Detected Not Detected   Influenza A, and B PCR   Result Value Ref Range    Flu A Result Not Detected Not Detected    Flu B Result Not Detected Not Detected   Serial Troponin, Initial ([facility])   Result Value Ref Range    Troponin T, High Sensitivity 312 () <=14 ng/L   Serial Troponin, 2 Hour ([facility])   Result Value Ref Range    Troponin T, High Sensitivity 352 () <=14 ng/L   Serial Troponin, 6 Hour ([facility])   Result Value Ref Range    Troponin T, High Sensitivity 577 () <=14 ng/L   Comprehensive metabolic panel   Result Value Ref Range    Glucose 101 (H) 65 - 99 mg/dL    Sodium 137 133 - 145 mmol/L    Potassium 5.0 3.4 - 5.1 mmol/L    Chloride 107 97 - 107 mmol/L    Bicarbonate 20 (L) 24 - 31 mmol/L    Urea Nitrogen 63 (H) 8 - 25 mg/dL    Creatinine 4.70 (H) 0.40 - 1.60 mg/dL    eGFR 12 (L) >60 mL/min/1.73m*2    Calcium 8.3 (L) 8.5 - 10.4 mg/dL    Albumin 3.5 3.5 - 5.0 g/dL    Alkaline Phosphatase 88 35 - 125 U/L    Total Protein 6.4 5.9 - 7.9 g/dL    AST 35 5 - 40 U/L    Bilirubin, Total 0.4 0.1 - 1.2 mg/dL    ALT 8 5 - 40 U/L    Anion Gap 10 <=19 mmol/L   CBC and Auto Differential   Result Value Ref Range    WBC 8.2 4.4 - 11.3 x10*3/uL    nRBC 0.0 0.0 - 0.0 /100 WBCs    RBC 3.00 (L) 4.50 - 5.90 x10*6/uL    Hemoglobin 9.6 (L) 13.5 - 17.5 g/dL    Hematocrit  30.1 (L) 41.0 - 52.0 %     80 - 100 fL    MCH 32.0 26.0 - 34.0 pg    MCHC 31.9 (L) 32.0 - 36.0 g/dL    RDW 17.9 (H) 11.5 - 14.5 %    Platelets 205 150 - 450 x10*3/uL    Neutrophils % 69.0 40.0 - 80.0 %    Immature Granulocytes %, Automated 0.5 0.0 - 0.9 %    Lymphocytes % 18.7 13.0 - 44.0 %    Monocytes % 8.7 2.0 - 10.0 %    Eosinophils % 2.4 0.0 - 6.0 %    Basophils % 0.7 0.0 - 2.0 %    Neutrophils Absolute 5.64 (H) 1.60 - 5.50 x10*3/uL    Immature Granulocytes Absolute, Automated 0.04 0.00 - 0.50 x10*3/uL    Lymphocytes Absolute 1.53 0.80 - 3.00 x10*3/uL    Monocytes Absolute 0.71 0.05 - 0.80 x10*3/uL    Eosinophils Absolute 0.20 0.00 - 0.40 x10*3/uL    Basophils Absolute 0.06 0.00 - 0.10 x10*3/uL   Transthoracic Echo (TTE) Complete   Result Value Ref Range    BSA 1.9 m2     Scheduled medications  aspirin, 81 mg, oral, Daily  atorvastatin, 20 mg, oral, Nightly  calcitriol, 0.5 mcg, oral, Daily  carvedilol, 6.25 mg, oral, Daily with breakfast  gabapentin, 300 mg, oral, Nightly  heparin (porcine), 5,000 Units, subcutaneous, q8h  hydrALAZINE, 50 mg, oral, TID  hydroxychloroquine, 200 mg, oral, Daily  levothyroxine, 50 mcg, oral, Daily  losartan, 100 mg, oral, Daily  oxygen, , inhalation, q8h  pantoprazole, 20 mg, oral, Daily before breakfast  perflutren lipid microspheres, 0.5-10 mL of dilution, intravenous, Once in imaging  perflutren protein A microsphere, 0.5 mL, intravenous, Once in imaging  sulfur hexafluoride microsphr, 2 mL, intravenous, Once in imaging      Continuous medications  furosemide, 10 mg/hr, Last Rate: 10 mg/hr (06/18/24 1320)      PRN medications  PRN medications: acetaminophen **OR** [DISCONTINUED] acetaminophen **OR** [DISCONTINUED] acetaminophen, ondansetron ODT **OR** [DISCONTINUED] ondansetron, polyethylene glycol, zolpidem          Malnutrition Diagnosis Status: New  Malnutrition Diagnosis: Severe malnutrition related to chronic disease or condition  As Evidenced by: PO intake < 75%  for > 1 month, significant weight loss of 13# (8.1%) over 4 months  I agree with the dietitian's malnutrition diagnosis.      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      Dmitry Morillo, DO

## 2024-06-18 NOTE — CONSULTS
Reason For Consult  Renal failure    History Of Present Illness  78-year-old male.  He has known history of stage V chronic kidney disease.  He sees  as an outpatient.  He is in the process of initiating peritoneal dialysis, he got a peritoneal dialysis catheter placed 11 days ago.  It was flushed 4 days ago and is working.  He was supposed to have his second flush on Friday and start peritoneal dialysis in the next couple of weeks.  Who presented to our emergency room secondary to 3 days of progressive shortness of breath, on presentation he is found to be in decompensated heart failure.  Emergency room called me yesterday, we decided to try some intravenous Lasix.  He says he improved somewhat but does not feel he is urinating much.  Currently he is down to 2 L nasal cannula but still dyspneic.     Past Medical History  He has a past medical history of Cataract, Hyperlipidemia, Hypertension, Macular hole of left eye, Nephrosclerosis, Other seasonal allergic rhinitis, Other specified diabetes mellitus without complications (Multi), Personal history of diseases of the blood and blood-forming organs and certain disorders involving the immune mechanism, Personal history of other diseases of urinary system, Polyarticular gout, Pre-diabetes, Renal disorder, Spinal stenosis, and STEMI (ST elevation myocardial infarction) (Multi) (01/2019).    Surgical History  He has a past surgical history that includes CT guided imaging for needle placement (06/12/2018); CT guided imaging for needle placement (08/22/2022); Vasectomy; Cataract extraction; Retinal detachment repair w/ scleral buckle (Left, 2019); Cardiac catheterization (01/20/2019); and Renal biopsy (08/2022).     Social History  He reports that he quit smoking about 11 years ago. His smoking use included cigarettes. He has never used smokeless tobacco. He reports that he does not currently use alcohol. He reports that he does not use drugs.    Family  "History  Family History   Problem Relation Name Age of Onset    Kidney failure Mother      Heart failure Mother      Heart disease Mother      Prostate cancer Father      Other (stomach mass) Sister Sister 1     Cancer Sister Sister 1         Allergies  Tetracaine, Azathioprine, Fluorescein-benoxinate, and Other    Review of Systems  Gen: no fever, no weight loss  Cardiac: No chest pain  Pulm: no dyspnea or cough  GI: no abdominal pain, nausea or vomiting   no dysuria, urgemcy of LUTS  Neuro: no focal weakness  MSK: no joint pains        Physical Exam  Gen: NAD  HENT: atraumatic  Heart: RRR  Lungs: Rales bilaterally  Abdomen: Soft  Ext; trace edema  Neuro: non focal  Psych : AAO x 3            I&O 24HR    Intake/Output Summary (Last 24 hours) at 6/18/2024 1010  Last data filed at 6/18/2024 0900  Gross per 24 hour   Intake 100 ml   Output 825 ml   Net -725 ml       Vitals 24HR  Heart Rate:  [80-98]   Temp:  [36.1 °C (97 °F)-36.7 °C (98.1 °F)]   Resp:  [19-24]   BP: (101-142)/()   Height:  [177.8 cm (5' 10\")]   Weight:  [73 kg (160 lb 15 oz)]   SpO2:  [92 %-100 %]         Relevant Results  Results for orders placed or performed during the hospital encounter of 06/17/24 (from the past 24 hour(s))   ECG 12 lead   Result Value Ref Range    Ventricular Rate 88 BPM    Atrial Rate 88 BPM    NE Interval 222 ms    QRS Duration 124 ms    QT Interval 396 ms    QTC Calculation(Bazett) 479 ms    P Axis 43 degrees    R Axis 7 degrees    T Axis 165 degrees    QRS Count 15 beats    Q Onset 214 ms    P Onset 103 ms    P Offset 165 ms    T Offset 412 ms    QTC Fredericia 450 ms   CBC and Auto Differential   Result Value Ref Range    WBC 8.9 4.4 - 11.3 x10*3/uL    nRBC 0.2 (H) 0.0 - 0.0 /100 WBCs    RBC 3.11 (L) 4.50 - 5.90 x10*6/uL    Hemoglobin 10.0 (L) 13.5 - 17.5 g/dL    Hematocrit 31.9 (L) 41.0 - 52.0 %     (H) 80 - 100 fL    MCH 32.2 26.0 - 34.0 pg    MCHC 31.3 (L) 32.0 - 36.0 g/dL    RDW 18.4 (H) 11.5 - 14.5 %    " Platelets 231 150 - 450 x10*3/uL    Neutrophils % 71.9 40.0 - 80.0 %    Immature Granulocytes %, Automated 0.2 0.0 - 0.9 %    Lymphocytes % 22.2 13.0 - 44.0 %    Monocytes % 2.9 2.0 - 10.0 %    Eosinophils % 2.1 0.0 - 6.0 %    Basophils % 0.7 0.0 - 2.0 %    Neutrophils Absolute 6.36 (H) 1.60 - 5.50 x10*3/uL    Immature Granulocytes Absolute, Automated 0.02 0.00 - 0.50 x10*3/uL    Lymphocytes Absolute 1.97 0.80 - 3.00 x10*3/uL    Monocytes Absolute 0.26 0.05 - 0.80 x10*3/uL    Eosinophils Absolute 0.19 0.00 - 0.40 x10*3/uL    Basophils Absolute 0.06 0.00 - 0.10 x10*3/uL   Comprehensive Metabolic Panel   Result Value Ref Range    Glucose 273 (H) 65 - 99 mg/dL    Sodium 133 133 - 145 mmol/L    Potassium 4.9 3.4 - 5.1 mmol/L    Chloride 107 97 - 107 mmol/L    Bicarbonate 14 (L) 24 - 31 mmol/L    Urea Nitrogen 58 (H) 8 - 25 mg/dL    Creatinine 4.40 (H) 0.40 - 1.60 mg/dL    eGFR 13 (L) >60 mL/min/1.73m*2    Calcium 8.0 (L) 8.5 - 10.4 mg/dL    Albumin 3.5 3.5 - 5.0 g/dL    Alkaline Phosphatase 93 35 - 125 U/L    Total Protein 6.3 5.9 - 7.9 g/dL    AST 15 5 - 40 U/L    Bilirubin, Total 0.4 0.1 - 1.2 mg/dL    ALT 7 5 - 40 U/L    Anion Gap 12 <=19 mmol/L   NT Pro-BNP   Result Value Ref Range    PROBNP 41,900 (H) 0 - 852 pg/mL   Sars-CoV-2 PCR   Result Value Ref Range    Coronavirus 2019, PCR Not Detected Not Detected   Influenza A, and B PCR   Result Value Ref Range    Flu A Result Not Detected Not Detected    Flu B Result Not Detected Not Detected   Serial Troponin, Initial ([facility])   Result Value Ref Range    Troponin T, High Sensitivity 312 (HH) <=14 ng/L   Serial Troponin, 2 Hour ([facility])   Result Value Ref Range    Troponin T, High Sensitivity 352 () <=14 ng/L   Serial Troponin, 6 Hour ([facility])   Result Value Ref Range    Troponin T, High Sensitivity 577 () <=14 ng/L   Comprehensive metabolic panel   Result Value Ref Range    Glucose 101 (H) 65 - 99 mg/dL    Sodium 137 133 - 145 mmol/L    Potassium 5.0 3.4 - 5.1 mmol/L     Chloride 107 97 - 107 mmol/L    Bicarbonate 20 (L) 24 - 31 mmol/L    Urea Nitrogen 63 (H) 8 - 25 mg/dL    Creatinine 4.70 (H) 0.40 - 1.60 mg/dL    eGFR 12 (L) >60 mL/min/1.73m*2    Calcium 8.3 (L) 8.5 - 10.4 mg/dL    Albumin 3.5 3.5 - 5.0 g/dL    Alkaline Phosphatase 88 35 - 125 U/L    Total Protein 6.4 5.9 - 7.9 g/dL    AST 35 5 - 40 U/L    Bilirubin, Total 0.4 0.1 - 1.2 mg/dL    ALT 8 5 - 40 U/L    Anion Gap 10 <=19 mmol/L   CBC and Auto Differential   Result Value Ref Range    WBC 8.2 4.4 - 11.3 x10*3/uL    nRBC 0.0 0.0 - 0.0 /100 WBCs    RBC 3.00 (L) 4.50 - 5.90 x10*6/uL    Hemoglobin 9.6 (L) 13.5 - 17.5 g/dL    Hematocrit 30.1 (L) 41.0 - 52.0 %     80 - 100 fL    MCH 32.0 26.0 - 34.0 pg    MCHC 31.9 (L) 32.0 - 36.0 g/dL    RDW 17.9 (H) 11.5 - 14.5 %    Platelets 205 150 - 450 x10*3/uL    Neutrophils % 69.0 40.0 - 80.0 %    Immature Granulocytes %, Automated 0.5 0.0 - 0.9 %    Lymphocytes % 18.7 13.0 - 44.0 %    Monocytes % 8.7 2.0 - 10.0 %    Eosinophils % 2.4 0.0 - 6.0 %    Basophils % 0.7 0.0 - 2.0 %    Neutrophils Absolute 5.64 (H) 1.60 - 5.50 x10*3/uL    Immature Granulocytes Absolute, Automated 0.04 0.00 - 0.50 x10*3/uL    Lymphocytes Absolute 1.53 0.80 - 3.00 x10*3/uL    Monocytes Absolute 0.71 0.05 - 0.80 x10*3/uL    Eosinophils Absolute 0.20 0.00 - 0.40 x10*3/uL    Basophils Absolute 0.06 0.00 - 0.10 x10*3/uL   ;ab       Assessment/Plan     78-year-old male with stage V chronic kidney disease  Fluid overload  Cardio myopathy with ejection fraction 36%    -He is in the process of the need of dialysis initiation as an outpatient.  This could only be done as an outpatient as logistics will need to be in place for him to continue this at home and cannot be started here.  -I discussed with the peritoneal dialysis nurse, they have agreed to push up his peritoneal dialysis started to this week.  This would allow us to maximize diuresis here as we would not be limited by his solutes.  -Will place him on  a Lasix drip on top of the intermittent Lasix bolus he already got this morning.  Will diurese as much as able and hopefully if improved by tomorrow can discharge on oral diuretics with plan for continued dialysis in [facility] on Thursday.  -Discussed with Dr. Morillo and Dr. Beckett  -Discussed with his peritoneal dialysis nurse  -Trevin with patient and his daughter as well.  -Thank you  for allowing me to help with Mr Rowan's care, will follow        Alfred Jackson MD

## 2024-06-19 ENCOUNTER — APPOINTMENT (OUTPATIENT)
Dept: PRIMARY CARE | Facility: CLINIC | Age: 79
End: 2024-06-19
Payer: MEDICARE

## 2024-06-19 ENCOUNTER — PHARMACY VISIT (OUTPATIENT)
Dept: PHARMACY | Facility: CLINIC | Age: 79
End: 2024-06-19
Payer: COMMERCIAL

## 2024-06-19 ENCOUNTER — APPOINTMENT (OUTPATIENT)
Dept: INFUSION THERAPY | Facility: CLINIC | Age: 79
End: 2024-06-19
Payer: MEDICARE

## 2024-06-19 ENCOUNTER — HOME HEALTH ADMISSION (OUTPATIENT)
Dept: HOME HEALTH SERVICES | Facility: HOME HEALTH | Age: 79
End: 2024-06-19
Payer: MEDICARE

## 2024-06-19 ENCOUNTER — DOCUMENTATION (OUTPATIENT)
Dept: HOME HEALTH SERVICES | Facility: HOME HEALTH | Age: 79
End: 2024-06-19

## 2024-06-19 VITALS
DIASTOLIC BLOOD PRESSURE: 52 MMHG | SYSTOLIC BLOOD PRESSURE: 113 MMHG | HEIGHT: 70 IN | TEMPERATURE: 97.7 F | BODY MASS INDEX: 23.04 KG/M2 | OXYGEN SATURATION: 95 % | HEART RATE: 66 BPM | WEIGHT: 160.94 LBS | RESPIRATION RATE: 20 BRPM

## 2024-06-19 LAB
ANION GAP SERPL CALC-SCNC: 13 MMOL/L
AORTIC VALVE MEAN GRADIENT: 14 MMHG
AORTIC VALVE PEAK VELOCITY: 2.46 M/S
ATRIAL RATE: 88 BPM
AV PEAK GRADIENT: 24.2 MMHG
AVA (PEAK VEL): 1.25 CM2
AVA (VTI): 1.15 CM2
BUN SERPL-MCNC: 66 MG/DL (ref 8–25)
CALCIUM SERPL-MCNC: 8.5 MG/DL (ref 8.5–10.4)
CHLORIDE SERPL-SCNC: 102 MMOL/L (ref 97–107)
CO2 SERPL-SCNC: 21 MMOL/L (ref 24–31)
CREAT SERPL-MCNC: 4.9 MG/DL (ref 0.4–1.6)
EGFRCR SERPLBLD CKD-EPI 2021: 11 ML/MIN/1.73M*2
EJECTION FRACTION APICAL 4 CHAMBER: 34.2
GLUCOSE SERPL-MCNC: 112 MG/DL (ref 65–99)
HOLD SPECIMEN: NORMAL
LEFT ATRIUM VOLUME AREA LENGTH INDEX BSA: 31.2 ML/M2
LEFT VENTRICLE INTERNAL DIMENSION DIASTOLE: 5.81 CM (ref 3.5–6)
LEFT VENTRICULAR OUTFLOW TRACT DIAMETER: 2 CM
MITRAL VALVE E/A RATIO: 1.58
MITRAL VALVE E/E' RATIO: 25.33
P AXIS: 43 DEGREES
P OFFSET: 165 MS
P ONSET: 103 MS
POTASSIUM SERPL-SCNC: 3.9 MMOL/L (ref 3.4–5.1)
PR INTERVAL: 222 MS
Q ONSET: 214 MS
QRS COUNT: 15 BEATS
QRS DURATION: 124 MS
QT INTERVAL: 396 MS
QTC CALCULATION(BAZETT): 479 MS
QTC FREDERICIA: 450 MS
R AXIS: 7 DEGREES
RIGHT VENTRICLE FREE WALL PEAK S': 12.8 CM/S
RIGHT VENTRICLE PEAK SYSTOLIC PRESSURE: 22.5 MMHG
SODIUM SERPL-SCNC: 136 MMOL/L (ref 133–145)
T AXIS: 165 DEGREES
T OFFSET: 412 MS
TRICUSPID ANNULAR PLANE SYSTOLIC EXCURSION: 1.8 CM
VENTRICULAR RATE: 88 BPM

## 2024-06-19 PROCEDURE — 2500000002 HC RX 250 W HCPCS SELF ADMINISTERED DRUGS (ALT 637 FOR MEDICARE OP, ALT 636 FOR OP/ED): Performed by: INTERNAL MEDICINE

## 2024-06-19 PROCEDURE — 6350000001 HC RX 635 EPOETIN >10,000 UNITS: Performed by: INTERNAL MEDICINE

## 2024-06-19 PROCEDURE — 36415 COLL VENOUS BLD VENIPUNCTURE: CPT | Performed by: INTERNAL MEDICINE

## 2024-06-19 PROCEDURE — RXMED WILLOW AMBULATORY MEDICATION CHARGE

## 2024-06-19 PROCEDURE — 2500000001 HC RX 250 WO HCPCS SELF ADMINISTERED DRUGS (ALT 637 FOR MEDICARE OP): Performed by: INTERNAL MEDICINE

## 2024-06-19 PROCEDURE — 99232 SBSQ HOSP IP/OBS MODERATE 35: CPT | Performed by: INTERNAL MEDICINE

## 2024-06-19 PROCEDURE — 82310 ASSAY OF CALCIUM: CPT | Performed by: INTERNAL MEDICINE

## 2024-06-19 PROCEDURE — 2500000005 HC RX 250 GENERAL PHARMACY W/O HCPCS: Performed by: INTERNAL MEDICINE

## 2024-06-19 PROCEDURE — 2500000004 HC RX 250 GENERAL PHARMACY W/ HCPCS (ALT 636 FOR OP/ED): Performed by: INTERNAL MEDICINE

## 2024-06-19 PROCEDURE — 82565 ASSAY OF CREATININE: CPT | Performed by: INTERNAL MEDICINE

## 2024-06-19 RX ORDER — TORSEMIDE 100 MG/1
50 TABLET ORAL DAILY
Status: DISCONTINUED | OUTPATIENT
Start: 2024-06-20 | End: 2024-06-19 | Stop reason: HOSPADM

## 2024-06-19 RX ORDER — CARVEDILOL 6.25 MG/1
6.25 TABLET ORAL
Qty: 30 TABLET | Refills: 1 | Status: SHIPPED | OUTPATIENT
Start: 2024-06-19 | End: 2024-06-25 | Stop reason: DRUGHIGH

## 2024-06-19 RX ORDER — CALCITRIOL 0.5 UG/1
0.5 CAPSULE ORAL DAILY
Qty: 30 CAPSULE | Refills: 1 | Status: ON HOLD | OUTPATIENT
Start: 2024-06-19

## 2024-06-19 RX ORDER — ACETAMINOPHEN 325 MG/1
650 TABLET ORAL 3 TIMES DAILY
Qty: 30 TABLET | Refills: 0 | Status: ON HOLD | OUTPATIENT
Start: 2024-06-19

## 2024-06-19 RX ORDER — HYDRALAZINE HYDROCHLORIDE 50 MG/1
50 TABLET, FILM COATED ORAL 3 TIMES DAILY
Qty: 90 TABLET | Refills: 1 | Status: SHIPPED | OUTPATIENT
Start: 2024-06-19 | End: 2024-06-28 | Stop reason: WASHOUT

## 2024-06-19 RX ORDER — LOSARTAN POTASSIUM 100 MG/1
100 TABLET ORAL DAILY
Qty: 30 TABLET | Refills: 1 | Status: SHIPPED | OUTPATIENT
Start: 2024-06-20 | End: 2024-06-25 | Stop reason: SINTOL

## 2024-06-19 RX ORDER — TORSEMIDE 100 MG/1
50 TABLET ORAL DAILY
Qty: 30 TABLET | Refills: 1 | Status: ON HOLD | OUTPATIENT
Start: 2024-06-19

## 2024-06-19 RX ORDER — ALLOPURINOL 100 MG/1
100 TABLET ORAL DAILY
Status: DISCONTINUED | OUTPATIENT
Start: 2024-06-19 | End: 2024-06-19 | Stop reason: HOSPADM

## 2024-06-19 RX ORDER — PANTOPRAZOLE SODIUM 20 MG/1
20 TABLET, DELAYED RELEASE ORAL
Qty: 30 TABLET | Refills: 1 | Status: SHIPPED | OUTPATIENT
Start: 2024-06-20 | End: 2024-06-28 | Stop reason: WASHOUT

## 2024-06-19 ASSESSMENT — COGNITIVE AND FUNCTIONAL STATUS - GENERAL
TOILETING: A LITTLE
DRESSING REGULAR LOWER BODY CLOTHING: A LITTLE
WALKING IN HOSPITAL ROOM: A LITTLE
HELP NEEDED FOR BATHING: A LITTLE
MOBILITY SCORE: 21
CLIMB 3 TO 5 STEPS WITH RAILING: A LITTLE
STANDING UP FROM CHAIR USING ARMS: A LITTLE
DAILY ACTIVITIY SCORE: 21

## 2024-06-19 ASSESSMENT — PAIN SCALES - GENERAL: PAINLEVEL_OUTOF10: 0 - NO PAIN

## 2024-06-19 NOTE — HH CARE COORDINATION
Home Care received a Referral for Physical Therapy, Home Health Aide, and Medical Social Work. We have processed the referral for a Start of Care on 6/20-6/21.     If you have any questions or concerns, please feel free to contact us at [phone]. Follow the prompts, enter your five digit zip code, and you will be directed to your care team on [facility] 1.

## 2024-06-19 NOTE — PROGRESS NOTES
George Rowan is a 78 y.o. male on day 2 of admission presenting with Congestive heart failure due to cardiomyopathy (Multi).      Subjective   No acute events, he feels much improved and feels he is close to baseline       Objective          Vitals 24HR  Heart Rate:  [60-77]   Temp:  [36.4 °C (97.5 °F)-36.7 °C (98.1 °F)]   Resp:  [20]   BP: ()/(44-62)   SpO2:  [95 %-100 %]          Intake/Output last 3 Shifts:    Intake/Output Summary (Last 24 hours) at 6/19/2024 1011  Last data filed at 6/19/2024 0820  Gross per 24 hour   Intake 700.57 ml   Output 1625 ml   Net -924.43 ml       Physical Exam  Gen: NAD  HENT: atraumatic  Heart: RRR  Lungs: CTA  Abdomen: Soft  Ext; no edema  Neuro: non focal  Psych : AAO x 3    Relevant Results               Assessment/Plan              78-year-old male with stage V chronic kidney disease  Fluid overload  Cardio myopathy with ejection fraction 36%     -He feels much improved, he still has some volume but symptomatically is close to baseline  -Other consideration is logistics of starting his peritoneal dialysis which should be done as an outpatient.  I think that would be the best option to manage his volume  -He is set up to start peritoneal dialysis tomorrow as an outpatient  -I would be okay discharging him today on oral torsemide to facilitate peritoneal dialysis initiation tomorrow in [place]  -Discussed with Dr. Ilia Jackson MD

## 2024-06-19 NOTE — PROGRESS NOTES
"George Rowan is a 78 y.o. male on day 2 of admission presenting with Congestive heart failure due to cardiomyopathy (Multi).    Subjective   He is feeling well today is not complain of shortness of breath.  Did put out 654 cc yesterday on a Lasix drip.       Objective     Physical Exam  HENT:      Head: Normocephalic.   Cardiovascular:      Rate and Rhythm: Normal rate.   Pulmonary:      Breath sounds: Normal breath sounds.   Abdominal:      Palpations: Abdomen is soft.   Musculoskeletal:         General: No swelling.   Neurological:      Mental Status: He is alert.         Last Recorded Vitals  Blood pressure 117/52, pulse 66, temperature 36.4 °C (97.5 °F), temperature source Temporal, resp. rate 20, height 1.778 m (5' 10\"), weight 73 kg (160 lb 15 oz), SpO2 100%.  Intake/Output last 3 Shifts:  I/O last 3 completed shifts:  In: 920.6 (12.6 mL/kg) [P.O.:860; I.V.:60.6 (0.8 mL/kg)]  Out: 2100 (28.8 mL/kg) [Urine:2100 (0.8 mL/kg/hr)]  Weight: 73 kg     Relevant Results           Results for orders placed or performed during the hospital encounter of 06/17/24 (from the past 24 hour(s))   Transthoracic Echo (TTE) Complete   Result Value Ref Range    BSA 1.9 m2                          Assessment/Plan   Principal Problem:    Congestive heart failure due to cardiomyopathy (Multi)  Active Problems:    Systemic involvement of connective tissue (Multi)    Mild aortic valve stenosis    History of coronary artery stent placement    Essential hypertension    Dyslipidemia    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  This may be secondary to his end-stage renal failure.  Feel he needs to be diuresed we will try him on diuretics at this stage.  He also has elevated troponins which are consistent with a myocardial infarction type II.  His nuclear stress test recently done show she does have some herman-infarction ischemia and reportedly ejection fraction diminished at 36%.  Would like to repeat the echocardiogram to look at the left ventricular function not sure if this EF is correct or not.  In the meantime we will treat him medically for his myocardial infarction.    6/19 he is feeling much better today did diurese some.  His echocardiogram shows ejection fraction is diminished in the 30% range.  Inferior lateral wall is hypokinetic.  He does have a very large pleural effusion.  He needs to continue diuresing.  Remains on the Lasix drip.  He may have had a type II myocardial infarction and will continue medical management.       I spent 15   minutes in the professional and overall care of this patient.      Justin Beckett MD

## 2024-06-19 NOTE — CARE PLAN
The patient's goals for the shift include get some rest    The clinical goals for the shift include Pt will have improved lab values by moring.      Problem: Skin  Goal: Decreased wound size/increased tissue granulation at next dressing change  Outcome: Progressing  Flowsheets (Taken 6/18/2024 2153)  Decreased wound size/increased tissue granulation at next dressing change: Protective dressings over bony prominences  Goal: Participates in plan/prevention/treatment measures  Outcome: Progressing  Flowsheets (Taken 6/18/2024 2153)  Participates in plan/prevention/treatment measures:   Discuss with provider PT/OT consult   Elevate heels   Increase activity/out of bed for meals  Goal: Prevent/manage excess moisture  Outcome: Progressing  Flowsheets (Taken 6/18/2024 2153)  Prevent/manage excess moisture:   Cleanse incontinence/protect with barrier cream   Moisturize dry skin   Use wicking fabric (obtain order)   Monitor for/manage infection if present   Follow provider orders for dressing changes  Goal: Prevent/minimize sheer/friction injuries  Outcome: Progressing  Flowsheets (Taken 6/18/2024 2153)  Prevent/minimize sheer/friction injuries:   Complete micro-shifts as needed if patient unable. Adjust patient position to relieve pressure points, not a full turn   Increase activity/out of bed for meals   Use pull sheet   HOB 30 degrees or less   Turn/reposition every 2 hours/use positioning/transfer devices   Utilize specialty bed per algorithm  Goal: Promote/optimize nutrition  Outcome: Progressing  Flowsheets (Taken 6/18/2024 2153)  Promote/optimize nutrition:   Assist with feeding   Monitor/record intake including meals   Consume > 50% meals/supplements   Offer water/supplements/favorite foods   Discuss with provider if NPO > 2 days   Reassess MST if dietician not consulted  Goal: Promote skin healing  Outcome: Progressing  Flowsheets (Taken 6/18/2024 2153)  Promote skin healing:   Assess skin/pad under  line(s)/device(s)   Protective dressings over bony prominences   Ensure correct size (line/device) and apply per  instructions   Rotate device position/do not position patient on device     Problem: Pain  Goal: Takes deep breaths with improved pain control throughout the shift  Outcome: Progressing  Goal: Turns in bed with improved pain control throughout the shift  Outcome: Progressing  Goal: Walks with improved pain control throughout the shift  Outcome: Progressing  Goal: Performs ADL's with improved pain control throughout shift  Outcome: Progressing  Goal: Participates in PT with improved pain control throughout the shift  Outcome: Progressing  Goal: Free from opioid side effects throughout the shift  Outcome: Progressing  Goal: Free from acute confusion related to pain meds throughout the shift  Outcome: Progressing     Problem: ACS/CP/NSTEMI/STEMI  Goal: Chest pain managed (free from pain or at acceptable level)  Outcome: Progressing  Flowsheets (Taken 6/18/2024 2153)  Chest pain managed (free from pain or at acceptable level): Eliminate/minimize actual/potential pain  Goal: Lab values return to normal range  Outcome: Progressing  Flowsheets (Taken 6/18/2024 2153)  Lab values return to normal range:   Monitor for signs bleeding/correct coagulopathy   Monitor I&O, weight, labs  Goal: Promote self management  Outcome: Progressing  Flowsheets (Taken 6/18/2024 2153)  Promote self management:   Encourage activity/cluster activity to conserve energy   Monitor for depression/anxiety/coping ability   Promote nutrition/identify dietary restrictions  Goal: Serial ECG will return to baseline  Outcome: Progressing  Flowsheets (Taken 6/18/2024 2153)  Serial ECG will return to baseline: Monitor vitals/rhythm/ECG  Goal: Verbalize understanding of procedures/devices  Outcome: Progressing  Flowsheets (Taken 6/18/2024 2153)  Verbalize understanding of procedures/devices:   Monitor effectiveness of supportive  devices   Provide education  Goal: Wean vasopressors/achieve hemodynamic stability  Outcome: Progressing  Flowsheets (Taken 6/18/2024 2153)  Wean vasopressors/achieve hemodynamic stability:   Observe for signs/symptoms of inadequate perfusion   Monitor I&O, weight, labs     Problem: Hypertensive Emergency/Crisis  Goal: Blood pressure gradually reduced to goal range  Outcome: Progressing  Flowsheets (Taken 6/18/2024 2153)  Blood pressure gradually reduced to goal range:   Monitor vitals/rhythm/ECG   Monitor/manage medications and effect  Goal: Free from signs of organ damage  Outcome: Progressing  Flowsheets (Taken 6/18/2024 2153)  Free from signs of organ damage:   Monitor I&O, weight, labs   Monitor vitals/rhythm/ECG  Goal: Lab values return to normal range  Outcome: Progressing  Flowsheets (Taken 6/18/2024 2153)  Lab values return to normal range: Monitor I&O, weight, labs  Goal: Promote self management  Outcome: Progressing  Flowsheets (Taken 6/18/2024 2153)  Promote self management:   Encourage activity/cluster activity to conserve energy   Promote nutrition/identify dietary restrictions   Monitor for depression/anxiety/coping ability   Provide education

## 2024-06-19 NOTE — CARE PLAN
The patient's goals for the shift include     The clinical goals for the shift include verónica

## 2024-06-19 NOTE — DISCHARGE SUMMARY
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       hydrALAZINE 50 mg tablet  Commonly known as: Apresoline      Take 1 tablet (50 mg) by mouth 3 times a day.       pantoprazole 20 mg EC tablet  Commonly known as: ProtoNix  Start taking on: June 20, 2024      Take 1 tablet (20 mg) by mouth once daily in the morning. Take before meals. Do not crush, chew, or split.       torsemide 100 mg tablet  Commonly known as: Demadex  Start taking on: June 20, 2024      Take 0.5 tablets (50 mg) by mouth once daily. Do not fill before June 20, 2024.              CHANGE how you take these medications        Instructions Last Dose Given Next Dose Due   calcitriol 0.5 mcg capsule  Commonly known as: Rocaltrol  Start taking on: June 20, 2024  What changed:   medication strength  when to take this      Take 1 capsule (0.5 mcg) by mouth once daily. Do not fill before June 20, 2024.       carvedilol 6.25 mg tablet  Commonly known as: Coreg  Start taking on: June 20, 2024  What changed: when to take this      Take 1 tablet (6.25 mg) by mouth once daily with breakfast. Do not fill before June 20, 2024.       fluticasone 50 mcg/actuation nasal spray  Commonly known as: Flonase  What changed:   when to take this  reasons to take this  additional instructions      USE 2 SPRAYS IN BOTH  NOSTRILS ONCE DAILY       losartan 100 mg tablet  Commonly known as: Cozaar  Start taking on: June 20, 2024  What changed: medication strength      Take 1 tablet (100 mg) by mouth once daily.       ramelteon 8 mg tablet  Commonly known as: Rozerem  What changed:   when to take this  reasons to take this      Take 1 tablet (8 mg)  by mouth once daily at bedtime.              CONTINUE taking these medications        Instructions Last Dose Given Next Dose Due   allopurinol 300 mg tablet  Commonly known as: Zyloprim      TAKE ONE-HALF TABLET BY MOUTH  ONCE DAILY       aspirin 81 mg capsule           atorvastatin 20 mg tablet  Commonly known as: Lipitor      TAKE 1 TABLET BY MOUTH ONCE  DAILY       epoetin clyde 10,000 unit/mL injection  Commonly known as: Epogen,Procrit           gabapentin 300 mg capsule  Commonly known as: Neurontin      TAKE 1 CAPSULE BY MOUTH ONCE  DAILY       hydroxychloroquine 200 mg tablet  Commonly known as: Plaquenil      TAKE 1 TABLET BY MOUTH ONCE  DAILY       levothyroxine 50 mcg tablet  Commonly known as: Synthroid, Levoxyl      TAKE 1 TABLET BY MOUTH ONCE  DAILY IN THE MORNING ON AN EMPTY STOMACH       PRESERVISION AREDS ORAL           SUPER B-50 COMPLEX ORAL                  STOP taking these medications      tamsulosin 0.4 mg 24 hr capsule  Commonly known as: Flomax        traZODone 50 mg tablet  Commonly known as: Desyrel                  Where to Get Your Medications        These medications were sent to [facility]  [address], [address], [facility] [address]      Hours: 9 AM to 6 PM Mon-Fri, 9 AM to 1 PM Sat Phone: [phone]   acetaminophen 325 mg tablet  calcitriol 0.5 mcg capsule  carvedilol 6.25 mg tablet  hydrALAZINE 50 mg tablet  losartan 100 mg tablet  pantoprazole 20 mg EC tablet  torsemide 100 mg tablet         Test Results Pending At Discharge  Pending Labs       No current pending labs.            Hospital Course   78-year-old male presents with flash pulmonary edema acute decompensated congestive heart failure echocardiogram showing a 30% ejection fraction consistent with acute on chronic systolic heart failure he has stage V chronic kidney disease and was being evaluated to initiate peritoneal dialysis in approximately 3 weeks this has been moved up to approximately 2 days from  discharge.  He was initiated on IV Lasix aggressive blood pressure control.  Cardiology and nephrology consultations were obtained he was transition from IV Lasix to oral torsemide medically stable for discharge evaluated for needs for home oxygen Home health care and healthy at home programs for heart failure management and outpatient heart failure navigator program has been initiated.  Patient is medically stable for discharge outpatient follow-up with cardiology given his low ejection fraction repeat echocardiogram being necessary in the next 3 to 6 months and consideration for potential referral for AICD defibrillator if his ejection fraction does not significantly improve.  Ongoing follow-up with nephrology for end-stage renal disease on peritoneal dialysis    Pertinent Physical Exam At Time of Discharge  Physical Exam  Vitals and nursing note reviewed.   Constitutional:       Appearance: Normal appearance.   HENT:      Head: Atraumatic.      Mouth/Throat:      Mouth: Mucous membranes are moist.   Cardiovascular:      Rate and Rhythm: Normal rate and regular rhythm.      Pulses: Normal pulses.      Heart sounds: Normal heart sounds.   Pulmonary:      Effort: Pulmonary effort is normal.      Breath sounds: Normal breath sounds.   Abdominal:      Palpations: Abdomen is soft.   Musculoskeletal:         General: Normal range of motion.      Cervical back: Normal range of motion and neck supple.   Skin:     General: Skin is warm and dry.      Capillary Refill: Capillary refill takes less than 2 seconds.   Neurological:      General: No focal deficit present.      Mental Status: He is alert and oriented to person, place, and time. Mental status is at baseline.   Psychiatric:         Mood and Affect: Mood normal.     Transthoracic Echo (TTE) Complete    Result Date: 6/19/2024            [facility] [address], [place] [address]             Phone [phone] TRANSTHORACIC ECHOCARDIOGRAM REPORT   Patient Name:     DALILA PHIPPS      Reading Physician:  [id number] Justin Beckett MD Study Date:       6/18/2024            Ordering Provider:  [id number] JUSTIN BECKETT MRN/PID:          [id number]             Fellow: Accession#:       [id number]         Nurse: Date of           1945 / 78 years Sonographer:        Juju Thompson [facility] Birth/Age: Gender:           M                    Additional Staff: Height:           177.80 cm            Admit Date: Weight:           72.58 kg             Admission Status:   Inpatient - Routine BSA / BMI:        1.90 m2 / 22.96      Department          [facility]                   kg/m2                Location: Blood Pressure: 119 /62 mmHg Study Type:    TRANSTHORACIC ECHO (TTE) COMPLETE Diagnosis/ICD: Nonrheumatic aortic (valve) stenosis-I35.0; Atherosclerotic heart                disease of native coronary artery with other forms of angina                pectoris-I25.118 Indication:    NSTEMI, AS, CAD CPT Codes:     Echo Complete w Full Doppler-[id number] Patient History: Valve Disorders:   Aortic Stenosis. Pertinent History: Hyperlipidemia, HTN, CAD, Chest Pain and V TACH, BRADYCARDIA. Study Detail: The following Echo studies were performed: 2D, M-Mode, Doppler and               color flow.  PHYSICIAN INTERPRETATION: Left Ventricle: Left ventricular systolic function is mildly to moderately decreased, with an estimated ejection fraction of 30-35%. There are multiple wall motion abnormalities. The left ventricular cavity size is moderately dilated. Spectral Doppler shows a normal pattern of left ventricular diastolic filling. LV Wall Scoring: The basal and mid anterolateral wall, basal and mid inferior wall, basal and mid inferolateral wall, and basal inferoseptal segment are akinetic. All remaining scored segments are normal. Left Atrium: The left atrium is  mildly dilated. Right Ventricle: The right ventricle is normal in size. There is normal right ventricular global systolic function. Right Atrium: The right atrium is normal in size. Aortic Valve: The aortic valve is trileaflet. There is mild aortic valve thickening. There is evidence of mildly elevated transaortic gradients consistent with sclerosis of the aortic valve. There is mild aortic valve regurgitation. The peak instantaneous gradient of the aortic valve is 24.2 mmHg. The mean gradient of the aortic valve is 14.0 mmHg. Mitral Valve: The mitral valve is normal in structure. There is trace to mild mitral valve regurgitation. Tricuspid Valve: The tricuspid valve is structurally normal. No evidence of tricuspid regurgitation. Pulmonic Valve: The pulmonic valve is not well visualized. There is no indication of pulmonic valve regurgitation. Pericardium: There is no pericardial effusion noted. Pleural: There is a moderate pleural effusion. Aorta: The aortic root is normal.  CONCLUSIONS:  1. Left ventricular systolic function is mildly to moderately decreased with a 30-35% estimated ejection fraction.  2. Basal and mid anterolateral wall, basal and mid inferior wall, basal and mid inferolateral wall, and basal inferoseptal segment are abnormal.  3. There is a moderate pleural effusion.  4. Aortic valve sclerosis.  5. Mild aortic valve regurgitation.  6. Left ventricular cavity size is moderately dilated.  7. There are multiple wall motion abnormalities. QUANTITATIVE DATA SUMMARY: 2D MEASUREMENTS:                          Normal Ranges: LAs:           3.95 cm   (2.7-4.0cm) IVSd:          0.75 cm   (0.6-1.1cm) LVPWd:         0.70 cm   (0.6-1.1cm) LVIDd:         5.81 cm   (3.9-5.9cm) LVIDs:         4.92 cm LV Mass Index: 82.0 g/m2 LV % FS        15.3 % LA VOLUME:                               Normal Ranges: LA Vol A4C:        46.5 ml    (22+/-6mL/m2) LA Vol A2C:        65.7 ml LA Vol BP:         59.3 ml LA Vol Index  A4C:  24.5ml/m2 LA Vol Index A2C:  34.6 ml/m2 LA Vol Index BP:   31.2 ml/m2 LA Area A4C:       17.4 cm2 LA Area A2C:       22.2 cm2 LA Major Axis A4C: 5.5 cm LA Major Axis A2C: 6.4 cm LA Volume Index:   29.9 ml/m2 LA Vol A4C:        45.6 ml LA Vol A2C:        62.5 ml RA VOLUME BY A/L METHOD:                      Normal Ranges: RA Area A4C: 9.3 cm2 M-MODE MEASUREMENTS:                  Normal Ranges: Ao Root: 2.45 cm (2.0-3.7cm) AORTA MEASUREMENTS:                    Normal Ranges: Asc Ao, d: 2.50 cm (2.1-3.4cm) LV SYSTOLIC FUNCTION BY 2D PLANIMETRY (MOD):                     Normal Ranges: EF-A4C View: 34.2 % (>=55%) LV DIASTOLIC FUNCTION:                        Normal Ranges: MV Peak E:    1.14 m/s (0.7-1.2 m/s) MV Peak A:    0.72 m/s (0.42-0.7 m/s) E/A Ratio:    1.58     (1.0-2.2) MV e'         0.04 m/s (>8.0) MV lateral e' 0.05 m/s MV medial e'  0.04 m/s E/e' Ratio:   25.33    (<8.0) MITRAL VALVE:                 Normal Ranges: MV DT: 214 msec (150-240msec) AORTIC VALVE:                                    Normal Ranges: AoV Vmax:                2.46 m/s  (<=1.7m/s) AoV Peak P.2 mmHg (<20mmHg) AoV Mean P.0 mmHg (1.7-11.5mmHg) LVOT Max Jose Luis:            0.98 m/s  (<=1.1m/s) AoV VTI:                 58.80 cm  (18-25cm) LVOT VTI:                21.60 cm LVOT Diameter:           2.00 cm   (1.8-2.4cm) AoV Area, VTI:           1.15 cm2  (2.5-5.5cm2) AoV Area,Vmax:           1.25 cm2  (2.5-4.5cm2) AoV Dimensionless Index: 0.37 AORTIC INSUFFICIENCY: AI Vmax:       2.63 m/s AI Half-time:  488 msec AI Decel Rate: 184.25 cm/s2  RIGHT VENTRICLE: RV Basal 3.83 cm RV Mid   2.25 cm RV Major 8.9 cm TAPSE:   17.8 mm RV s'    0.13 m/s TRICUSPID VALVE/RVSP:                             Normal Ranges: Peak TR Velocity: 2.21 m/s RV Syst Pressure: 22.5 mmHg (< 30mmHg) IVC Diam:         1.34 cm  [id number] Justin Beckett MD Electronically signed on 2024 at 8:24:08 AM  Wall Scoring  ** Final **    Scheduled  medications  allopurinol, 100 mg, oral, Daily  aspirin, 81 mg, oral, Daily  atorvastatin, 20 mg, oral, Nightly  calcitriol, 0.5 mcg, oral, Daily  carvedilol, 6.25 mg, oral, Daily with breakfast  gabapentin, 300 mg, oral, Nightly  heparin (porcine), 5,000 Units, subcutaneous, q8h  hydrALAZINE, 50 mg, oral, TID  hydroxychloroquine, 200 mg, oral, Daily  levothyroxine, 50 mcg, oral, Daily  losartan, 100 mg, oral, Daily  oxygen, , inhalation, q8h  pantoprazole, 20 mg, oral, Daily before breakfast  perflutren lipid microspheres, 0.5-10 mL of dilution, intravenous, Once in imaging  perflutren protein A microsphere, 0.5 mL, intravenous, Once in imaging  sulfur hexafluoride microsphr, 2 mL, intravenous, Once in imaging  [START ON 6/20/2024] torsemide, 50 mg, oral, Daily      Continuous medications  furosemide, 10 mg/hr, Last Rate: 10 mg/hr (06/19/24 0517)      PRN medications  PRN medications: acetaminophen **OR** [DISCONTINUED] acetaminophen **OR** [DISCONTINUED] acetaminophen, ondansetron ODT **OR** [DISCONTINUED] ondansetron, polyethylene glycol      Outpatient Follow-Up  Future Appointments   Date Time Provider Department Center   6/27/2024 10:00 AM GENE WLTABATHA 107 VASCULAR 2 [id number] [facility]   7/3/2024  9:00 AM TRI INFUSION 03 [id number] [facility]   7/23/2024 10:00 AM Maurice Felix MD [id number] [facility]   8/21/2024 10:30 AM Loy Beltran MD [id number] [facility]         Dmitry Morillo DO

## 2024-06-19 NOTE — PROGRESS NOTES
"George Rowan is a 78 y.o. male on day 2 of admission presenting with Congestive heart failure due to cardiomyopathy (Multi).    Subjective   Seen and examined continues on IV Lasix drip.  Chest x-ray showing small bilateral pleural effusions and pulmonary edema continue Lasix via continuous infusion anticipating potential transition from IV to oral diuretic therapy tomorrow ending input from nephrology and then subsequent discharge to initiate peritoneal dialysis shortly.  Will check the patient for oxygen levels and ambulation today to see if he qualifies for home O2.  Echocardiogram reviewed and appreciate cardiology input EF 30%       Objective     Physical Exam  Vitals and nursing note reviewed.   Constitutional:       Appearance: Normal appearance.   HENT:      Head: Atraumatic.      Mouth/Throat:      Mouth: Mucous membranes are moist.   Cardiovascular:      Rate and Rhythm: Normal rate and regular rhythm.      Pulses: Normal pulses.      Heart sounds: Normal heart sounds.   Pulmonary:      Effort: Pulmonary effort is normal.      Breath sounds: Normal breath sounds.   Abdominal:      Palpations: Abdomen is soft.   Musculoskeletal:      Cervical back: Normal range of motion and neck supple.      Right lower leg: Edema present.      Left lower leg: Edema present.   Skin:     General: Skin is warm and dry.      Capillary Refill: Capillary refill takes less than 2 seconds.   Neurological:      General: No focal deficit present.      Mental Status: He is alert and oriented to person, place, and time. Mental status is at baseline.   Psychiatric:         Mood and Affect: Mood normal.         Last Recorded Vitals  Blood pressure 117/52, pulse 66, temperature 36.4 °C (97.5 °F), temperature source Temporal, resp. rate 20, height 1.778 m (5' 10\"), weight 73 kg (160 lb 15 oz), SpO2 100%.  Intake/Output last 3 Shifts:  I/O last 3 completed shifts:  In: 920.6 (12.6 mL/kg) [P.O.:860; I.V.:60.6 (0.8 mL/kg)]  Out: 2100 " (28.8 mL/kg) [Urine:2100 (0.8 mL/kg/hr)]  Weight: 73 kg     Relevant Results            Transthoracic Echo (TTE) Complete    Result Date: 6/19/2024            [address], [place] [id number]             Phone [phone] TRANSTHORACIC ECHOCARDIOGRAM REPORT  Patient Name:     DALILA PHIPPS      Reading Physician:  Anabel Beckett MD Study Date:       6/18/2024            Ordering Provider:  Anabel BECKETT MRN/PID:          [id number]             Fellow: Accession#:       [id number]         Nurse: Date of           1945 / 78 years Sonographer:        Juju Thompson RDCS Birth/Age: Gender:           M                    Additional Staff: Height:           177.80 cm            Admit Date: Weight:           72.58 kg             Admission Status:   Inpatient - Routine BSA / BMI:        1.90 m2 / 22.96      Department          [facility] Stepdown                   kg/m2                Location: Blood Pressure: 119 /62 mmHg Study Type:    TRANSTHORACIC ECHO (TTE) COMPLETE Diagnosis/ICD: Nonrheumatic aortic (valve) stenosis-I35.0; Atherosclerotic heart                disease of native coronary artery with other forms of angina                pectoris-I25.118 Indication:    NSTEMI, AS, CAD CPT Codes:     Echo Complete w Full [id number] Patient History: Valve Disorders:   Aortic Stenosis. Pertinent History: Hyperlipidemia, HTN, CAD, Chest Pain and V TACH, BRADYCARDIA. Study Detail: The following Echo studies were performed: 2D, M-Mode, Doppler and               color flow.  PHYSICIAN INTERPRETATION: Left Ventricle: Left ventricular systolic function is mildly to moderately decreased, with an estimated ejection fraction of 30-35%. There are multiple wall motion abnormalities. The left ventricular cavity size is moderately dilated. Spectral Doppler shows a  normal pattern of left ventricular diastolic filling. LV Wall Scoring: The basal and mid anterolateral wall, basal and mid inferior wall, basal and mid inferolateral wall, and basal inferoseptal segment are akinetic. All remaining scored segments are normal. Left Atrium: The left atrium is mildly dilated. Right Ventricle: The right ventricle is normal in size. There is normal right ventricular global systolic function. Right Atrium: The right atrium is normal in size. Aortic Valve: The aortic valve is trileaflet. There is mild aortic valve thickening. There is evidence of mildly elevated transaortic gradients consistent with sclerosis of the aortic valve. There is mild aortic valve regurgitation. The peak instantaneous gradient of the aortic valve is 24.2 mmHg. The mean gradient of the aortic valve is 14.0 mmHg. Mitral Valve: The mitral valve is normal in structure. There is trace to mild mitral valve regurgitation. Tricuspid Valve: The tricuspid valve is structurally normal. No evidence of tricuspid regurgitation. Pulmonic Valve: The pulmonic valve is not well visualized. There is no indication of pulmonic valve regurgitation. Pericardium: There is no pericardial effusion noted. Pleural: There is a moderate pleural effusion. Aorta: The aortic root is normal.  CONCLUSIONS:  1. Left ventricular systolic function is mildly to moderately decreased with a 30-35% estimated ejection fraction.  2. Basal and mid anterolateral wall, basal and mid inferior wall, basal and mid inferolateral wall, and basal inferoseptal segment are abnormal.  3. There is a moderate pleural effusion.  4. Aortic valve sclerosis.  5. Mild aortic valve regurgitation.  6. Left ventricular cavity size is moderately dilated.  7. There are multiple wall motion abnormalities. QUANTITATIVE DATA SUMMARY: 2D MEASUREMENTS:                          Normal Ranges: LAs:           3.95 cm   (2.7-4.0cm) IVSd:          0.75 cm   (0.6-1.1cm) LVPWd:         0.70 cm    (0.6-1.1cm) LVIDd:         5.81 cm   (3.9-5.9cm) LVIDs:         4.92 cm LV Mass Index: 82.0 g/m2 LV % FS        15.3 % LA VOLUME:                               Normal Ranges: LA Vol A4C:        46.5 ml    (22+/-6mL/m2) LA Vol A2C:        65.7 ml LA Vol BP:         59.3 ml LA Vol Index A4C:  24.5ml/m2 LA Vol Index A2C:  34.6 ml/m2 LA Vol Index BP:   31.2 ml/m2 LA Area A4C:       17.4 cm2 LA Area A2C:       22.2 cm2 LA Major Axis A4C: 5.5 cm LA Major Axis A2C: 6.4 cm LA Volume Index:   29.9 ml/m2 LA Vol A4C:        45.6 ml LA Vol A2C:        62.5 ml RA VOLUME BY A/L METHOD:                      Normal Ranges: RA Area A4C: 9.3 cm2 M-MODE MEASUREMENTS:                  Normal Ranges: Ao Root: 2.45 cm (2.0-3.7cm) AORTA MEASUREMENTS:                    Normal Ranges: Asc Ao, d: 2.50 cm (2.1-3.4cm) LV SYSTOLIC FUNCTION BY 2D PLANIMETRY (MOD):                     Normal Ranges: EF-A4C View: 34.2 % (>=55%) LV DIASTOLIC FUNCTION:                        Normal Ranges: MV Peak E:    1.14 m/s (0.7-1.2 m/s) MV Peak A:    0.72 m/s (0.42-0.7 m/s) E/A Ratio:    1.58     (1.0-2.2) MV e'         0.04 m/s (>8.0) MV lateral e' 0.05 m/s MV medial e'  0.04 m/s E/e' Ratio:   25.33    (<8.0) MITRAL VALVE:                 Normal Ranges: MV DT: 214 msec (150-240msec) AORTIC VALVE:                                    Normal Ranges: AoV Vmax:                2.46 m/s  (<=1.7m/s) AoV Peak P.2 mmHg (<20mmHg) AoV Mean P.0 mmHg (1.7-11.5mmHg) LVOT Max Jose Luis:            0.98 m/s  (<=1.1m/s) AoV VTI:                 58.80 cm  (18-25cm) LVOT VTI:                21.60 cm LVOT Diameter:           2.00 cm   (1.8-2.4cm) AoV Area, VTI:           1.15 cm2  (2.5-5.5cm2) AoV Area,Vmax:           1.25 cm2  (2.5-4.5cm2) AoV Dimensionless Index: 0.37 AORTIC INSUFFICIENCY: AI Vmax:       2.63 m/s AI Half-time:  488 msec AI Decel Rate: 184.25 cm/s2  RIGHT VENTRICLE: RV Basal 3.83 cm RV Mid   2.25 cm RV Major 8.9 cm TAPSE:   17.8 mm  RV s'    0.13 m/s TRICUSPID VALVE/RVSP:                             Normal Ranges: Peak TR Velocity: 2.21 m/s RV Syst Pressure: 22.5 mmHg (< 30mmHg) IVC Diam:         1.34 cm  [id number] Justin Beckett MD Electronically signed on 6/19/2024 at 8:24:08 AM  Wall Scoring  ** Final **    Results for orders placed or performed during the hospital encounter of 06/17/24 (from the past 24 hour(s))   Transthoracic Echo (TTE) Complete   Result Value Ref Range    AV pk alber 2.46 m/s    LVOT diam 2.00 cm    AV mn grad 14.0 mmHg    MV E/A ratio 1.58     Tricuspid annular plane systolic excursion 1.8 cm    LA vol index A/L 31.2 ml/m2    MV avg E/e' ratio 25.33     RV free wall pk S' 12.80 cm/s    RVSP 22.5 mmHg    LVIDd 5.81 cm    AV pk grad 24.2 mmHg    Aortic Valve Area by Continuity of VTI 1.15 cm2    Aortic Valve Area by Continuity of Peak Velocity 1.25 cm2    LV A4C EF 34.2    Basic metabolic panel   Result Value Ref Range    Glucose 112 (H) 65 - 99 mg/dL    Sodium 136 133 - 145 mmol/L    Potassium 3.9 3.4 - 5.1 mmol/L    Chloride 102 97 - 107 mmol/L    Bicarbonate 21 (L) 24 - 31 mmol/L    Urea Nitrogen 66 (H) 8 - 25 mg/dL    Creatinine 4.90 (H) 0.40 - 1.60 mg/dL    eGFR 11 (L) >60 mL/min/1.73m*2    Calcium 8.5 8.5 - 10.4 mg/dL    Anion Gap 13 <=19 mmol/L     Scheduled medications  allopurinol, 100 mg, oral, Daily  aspirin, 81 mg, oral, Daily  atorvastatin, 20 mg, oral, Nightly  calcitriol, 0.5 mcg, oral, Daily  carvedilol, 6.25 mg, oral, Daily with breakfast  gabapentin, 300 mg, oral, Nightly  heparin (porcine), 5,000 Units, subcutaneous, q8h  hydrALAZINE, 50 mg, oral, TID  hydroxychloroquine, 200 mg, oral, Daily  levothyroxine, 50 mcg, oral, Daily  losartan, 100 mg, oral, Daily  oxygen, , inhalation, q8h  pantoprazole, 20 mg, oral, Daily before breakfast  perflutren lipid microspheres, 0.5-10 mL of dilution, intravenous, Once in imaging  perflutren protein A microsphere, 0.5 mL, intravenous, Once in imaging  sulfur hexafluoride  microsphr, 2 mL, intravenous, Once in imaging      Continuous medications  furosemide, 10 mg/hr, Last Rate: 10 mg/hr (06/19/24 0533)      PRN medications  PRN medications: acetaminophen **OR** [DISCONTINUED] acetaminophen **OR** [DISCONTINUED] acetaminophen, ondansetron ODT **OR** [DISCONTINUED] ondansetron, polyethylene glycol              Malnutrition Diagnosis Status: New  Malnutrition Diagnosis: Severe malnutrition related to chronic disease or condition  As Evidenced by: PO intake < 75% for > 1 month, significant weight loss of 13# (8.1%) over 4 months  I agree with the dietitian's malnutrition diagnosis.      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      Dmitry Morillo DO

## 2024-06-20 ENCOUNTER — PATIENT OUTREACH (OUTPATIENT)
Dept: PRIMARY CARE | Facility: CLINIC | Age: 79
End: 2024-06-20
Payer: MEDICARE

## 2024-06-20 ENCOUNTER — DOCUMENTATION (OUTPATIENT)
Dept: PRIMARY CARE | Facility: CLINIC | Age: 79
End: 2024-06-20
Payer: MEDICARE

## 2024-06-20 NOTE — PROGRESS NOTES
Discharge Facility: [facility]  Discharge Diagnosis: Congestive heart failure due to cardiomyopathy  Admission Date: 06/17/2024  Discharge Date: 06/19/2024    PCP Appointment Date: 06/26/2024, scheduled by this   Specialist Appointment Date: Cardio 06/25/2024, Vasc 06/27/2024, Rheum 07/23/2024, Neuro 08/21/2024  Hospital Encounter and Summary: Linked     See discharge assessment below for further details      Engagement  Call Start Time: 1050 (6/20/2024 10:55 AM)    Medications  Medications reviewed with patient/caregiver?: Yes (6/20/2024 10:55 AM)  Is the patient having any side effects they believe may be caused by any medication additions or changes?: No (6/20/2024 10:55 AM)  Does the patient have all medications ordered at discharge?: Yes (6/20/2024 10:55 AM)  Prescription Comments: Scripts given at discharge for Tylenol, Hydralazine, Protonix, Torsemide, Calcitriol and Losartan (6/20/2024 10:55 AM)  Is the patient taking all medications as directed (includes completed medication regime)?: Yes (6/20/2024 10:55 AM)  Medication Comments: Patient denies any issues obtaining or affording medication (6/20/2024 10:55 AM)    Appointments  Does the patient have a primary care provider?: Yes (6/20/2024 10:55 AM)  Care Management Interventions: Verified appointment date/time/provider (Scheduled by this CM) (6/20/2024 10:55 AM)  Has the patient kept scheduled appointments due by today?: Yes (6/20/2024 10:55 AM)    Self Management  What is the home health agency?: N/A (6/20/2024 10:55 AM)  What Durable Medical Equipment (DME) was ordered?: N/A (6/20/2024 10:55 AM)    Patient Teaching  Does the patient have access to their discharge instructions?: Yes (6/20/2024 10:55 AM)  Care Management Interventions: Reviewed instructions with patient (6/20/2024 10:55 AM)  What is the patient's perception of their health status since discharge?: Improving (6/20/2024 10:55 AM)  Is the patient/caregiver able to teach back the hierarchy of  who to call/visit for symptoms/problems? PCP, Specialist, Home Health nurse, Urgent Care, ED, 911: Yes (6/20/2024 10:55 AM)  Patient/Caregiver Education Comments: CM spoke to patient via phone. He states that he is doing very well at home. He has all discharge medication at this time. PCP follow up appointment has been scheduled by this CM. he has no questions at this time and was very thankful for this call and assistance. (6/20/2024 10:55 AM)

## 2024-06-22 ENCOUNTER — PATIENT OUTREACH (OUTPATIENT)
Dept: HOME HEALTH SERVICES | Age: 79
End: 2024-06-22
Payer: MEDICARE

## 2024-06-22 NOTE — PROGRESS NOTES
No answer for enrollment reach out- Left         Patient's Address:   [address] [place] [id number]  **  If this is not the address patient will receive services - alert team and address in EMR**       Patient Contacts:  Extended Emergency Contact Information  Primary Emergency Contact: Berta Rowan  Address: [address] [place] [address] [facility]  Home Phone: [phone]  Relation: None  Secondary Emergency Contact: Abby Rowan  Address: [address] [place] [address] [facility]  Home Phone: [phone]  Relation: Spouse                                Patient's Preferred Phone: [phone]  Patient's E-mail: [email or web address]

## 2024-06-25 ENCOUNTER — OFFICE VISIT (OUTPATIENT)
Dept: CARDIOLOGY | Facility: CLINIC | Age: 79
End: 2024-06-25
Payer: MEDICARE

## 2024-06-25 VITALS
WEIGHT: 154 LBS | HEART RATE: 73 BPM | SYSTOLIC BLOOD PRESSURE: 112 MMHG | OXYGEN SATURATION: 98 % | BODY MASS INDEX: 22.1 KG/M2 | DIASTOLIC BLOOD PRESSURE: 60 MMHG

## 2024-06-25 DIAGNOSIS — I12.0 CKD STAGE 5 SECONDARY TO HYPERTENSION (MULTI): ICD-10-CM

## 2024-06-25 DIAGNOSIS — N18.5 CKD STAGE 5 SECONDARY TO HYPERTENSION (MULTI): ICD-10-CM

## 2024-06-25 DIAGNOSIS — I25.5 ISCHEMIC CARDIOMYOPATHY: Primary | ICD-10-CM

## 2024-06-25 PROCEDURE — 3078F DIAST BP <80 MM HG: CPT | Performed by: INTERNAL MEDICINE

## 2024-06-25 PROCEDURE — 1159F MED LIST DOCD IN RCRD: CPT | Performed by: INTERNAL MEDICINE

## 2024-06-25 PROCEDURE — 99214 OFFICE O/P EST MOD 30 MIN: CPT | Performed by: INTERNAL MEDICINE

## 2024-06-25 PROCEDURE — 3074F SYST BP LT 130 MM HG: CPT | Performed by: INTERNAL MEDICINE

## 2024-06-25 PROCEDURE — 1036F TOBACCO NON-USER: CPT | Performed by: INTERNAL MEDICINE

## 2024-06-25 PROCEDURE — 1111F DSCHRG MED/CURRENT MED MERGE: CPT | Performed by: INTERNAL MEDICINE

## 2024-06-25 RX ORDER — CARVEDILOL 3.12 MG/1
3.12 TABLET ORAL 2 TIMES DAILY
Qty: 180 TABLET | Refills: 3 | Status: SHIPPED | OUTPATIENT
Start: 2024-06-25 | End: 2025-06-25

## 2024-06-25 ASSESSMENT — ENCOUNTER SYMPTOMS
OCCASIONAL FEELINGS OF UNSTEADINESS: 0
LOSS OF SENSATION IN FEET: 0
DEPRESSION: 0

## 2024-06-25 NOTE — ASSESSMENT & PLAN NOTE
Echocardiography June 2024 shows inferolateral lateral hypokinesis with left ventricular ejection fraction 30 to 35%    He should resume his carvedilol dose of 3.125 mg twice daily for guideline directed medical therapy, and he is now agreeable to that.  We will hold his losartan to avoid hypotension and reassess in 3 months

## 2024-06-25 NOTE — ASSESSMENT & PLAN NOTE
This is a low output and low gradient mild aortic valve stenosis with aortic valve area 1.2 cm², peak and mean gradients of 24/14

## 2024-06-25 NOTE — PROGRESS NOTES
Subjective      Chief Complaint   Patient presents with    Hospital Follow-up        Here for follow-up of his coronary disease and ischemic cardiomyopathy.  He was hospitalized June 24 for a peritoneal dialysis catheter was placed in the setting of end-stage renal disease that will require dialysis and he elected peritoneal dialysis.  He was hospitalized June 17, 2024 with decompensated heart failure and large left pleural effusion and was diuresed.  His echocardiogram confirmed inferoposterior wall hypokinesis with moderately severe LV systolic dysfunction and left ventricular ejection fraction 30 to 35%.  He is not on ACE inhibitor/ARB/Arni or spironolactone due to his end-stage renal disease.      He has started his peritoneal dialysis sessions, was sent home on carvedilol 6.25 mg twice daily and losartan 100 mg daily but he has been concerned about the blood pressures being too low which I think is a legitimate concern especially in the setting of aortic valve stenosis.    Previous: Posterior wall STEMI February 2019 with catheterization showing mid circumflex occlusion left main had mild disease LAD had a 60 to 70% calcified proximal to mid stenosis with an intramyocardial bridge distally and right coronary artery small nondominant with 60 to 70% proximal stenosis.  Left ventricular ejection fraction 55 to 59% with posterior wall hypokinesis.  Drug-eluting stent deployment successful in his circumflex with a 3/12 mm Synergy stent dilated to 3.43 mm in overlapping 3/16 and 3/12 millimeters Synergy stents to mid vessel dilated to 3.32 mm     He has unilateral severe  carotid stenosis with carotid occlusion managed medically in the setting of his chronic kidney disease.    He has chronic kidney disease and that is now followed by his nephrologist,           Review of Systems   All other systems reviewed and are negative.       Objective   Physical Exam  Constitutional:       Appearance: Normal appearance.   HENT:       Head: Normocephalic and atraumatic.   Eyes:      Pupils: Pupils are equal, round, and reactive to light.   Cardiovascular:      Rate and Rhythm: Normal rate and regular rhythm.      Pulses: Normal pulses.      Heart sounds: Murmur heard.      Comments: 1/6 systolic ejection murmur aortic region  Pulmonary:      Effort: Pulmonary effort is normal.      Breath sounds: Normal breath sounds.   Abdominal:      General: Abdomen is flat. Bowel sounds are normal.      Palpations: Abdomen is soft.   Musculoskeletal:         General: Normal range of motion.      Cervical back: Normal range of motion.   Skin:     General: Skin is warm and dry.   Neurological:      General: No focal deficit present.   Psychiatric:         Mood and Affect: Mood normal.         Judgment: Judgment normal.          Lab Review:   Not applicable    Ischemic cardiomyopathy  Echocardiography June 2024 shows inferolateral lateral hypokinesis with left ventricular ejection fraction 30 to 35%    He should resume his carvedilol dose of 3.125 mg twice daily for guideline directed medical therapy, and he is now agreeable to that.  We will hold his losartan to avoid hypotension and reassess in 3 months    Mild aortic valve stenosis  This is a low output and low gradient mild aortic valve stenosis with aortic valve area 1.2 cm², peak and mean gradients of 24/14

## 2024-06-26 ENCOUNTER — OFFICE VISIT (OUTPATIENT)
Dept: PRIMARY CARE | Facility: CLINIC | Age: 79
End: 2024-06-26
Payer: MEDICARE

## 2024-06-26 ENCOUNTER — APPOINTMENT (OUTPATIENT)
Dept: CARDIOLOGY | Facility: CLINIC | Age: 79
End: 2024-06-26
Payer: MEDICARE

## 2024-06-26 VITALS
BODY MASS INDEX: 22.24 KG/M2 | DIASTOLIC BLOOD PRESSURE: 80 MMHG | OXYGEN SATURATION: 99 % | TEMPERATURE: 97.8 F | SYSTOLIC BLOOD PRESSURE: 126 MMHG | WEIGHT: 155 LBS | HEART RATE: 52 BPM

## 2024-06-26 DIAGNOSIS — G47.01 INSOMNIA DUE TO MEDICAL CONDITION: ICD-10-CM

## 2024-06-26 DIAGNOSIS — N18.5 CKD STAGE 5 SECONDARY TO HYPERTENSION (MULTI): ICD-10-CM

## 2024-06-26 DIAGNOSIS — I42.9 CONGESTIVE HEART FAILURE DUE TO CARDIOMYOPATHY (MULTI): Primary | ICD-10-CM

## 2024-06-26 DIAGNOSIS — G63 POLYNEUROPATHY ASSOCIATED WITH UNDERLYING DISEASE (MULTI): ICD-10-CM

## 2024-06-26 DIAGNOSIS — I47.29 NONSUSTAINED VENTRICULAR TACHYCARDIA (MULTI): ICD-10-CM

## 2024-06-26 DIAGNOSIS — D59.10 AUTOIMMUNE HEMOLYTIC ANEMIA (MULTI): ICD-10-CM

## 2024-06-26 DIAGNOSIS — I12.0 CKD STAGE 5 SECONDARY TO HYPERTENSION (MULTI): ICD-10-CM

## 2024-06-26 DIAGNOSIS — I50.9 CONGESTIVE HEART FAILURE DUE TO CARDIOMYOPATHY (MULTI): Primary | ICD-10-CM

## 2024-06-26 DIAGNOSIS — I77.9 DISORDER OF CAROTID ARTERY (CMS-HCC): ICD-10-CM

## 2024-06-26 PROCEDURE — 1111F DSCHRG MED/CURRENT MED MERGE: CPT | Performed by: REGISTERED NURSE

## 2024-06-26 PROCEDURE — 1036F TOBACCO NON-USER: CPT | Performed by: REGISTERED NURSE

## 2024-06-26 PROCEDURE — 1160F RVW MEDS BY RX/DR IN RCRD: CPT | Performed by: REGISTERED NURSE

## 2024-06-26 PROCEDURE — 1159F MED LIST DOCD IN RCRD: CPT | Performed by: REGISTERED NURSE

## 2024-06-26 PROCEDURE — 3074F SYST BP LT 130 MM HG: CPT | Performed by: REGISTERED NURSE

## 2024-06-26 PROCEDURE — 3079F DIAST BP 80-89 MM HG: CPT | Performed by: REGISTERED NURSE

## 2024-06-26 PROCEDURE — 99495 TRANSJ CARE MGMT MOD F2F 14D: CPT | Performed by: REGISTERED NURSE

## 2024-06-26 RX ORDER — ZOLPIDEM TARTRATE 5 MG/1
2.5 TABLET ORAL NIGHTLY PRN
Qty: 30 TABLET | Refills: 1 | Status: SHIPPED | OUTPATIENT
Start: 2024-06-26 | End: 2024-06-28 | Stop reason: WASHOUT

## 2024-06-26 ASSESSMENT — ENCOUNTER SYMPTOMS
LOSS OF SENSATION IN FEET: 0
OCCASIONAL FEELINGS OF UNSTEADINESS: 0

## 2024-06-26 ASSESSMENT — PATIENT HEALTH QUESTIONNAIRE - PHQ9
1. LITTLE INTEREST OR PLEASURE IN DOING THINGS: NOT AT ALL
2. FEELING DOWN, DEPRESSED OR HOPELESS: NOT AT ALL
SUM OF ALL RESPONSES TO PHQ9 QUESTIONS 1 AND 2: 0

## 2024-06-26 NOTE — PROGRESS NOTES
"Patient: George Rowan  : 1945  PCP: Carl York DO  MRN: [id number]  Program: Transitional Care Management  Status: Enrolled  Effective Dates: 2024 - present  Responsible Staff: Homa Shane  Social Determinants to be Addressed: No information to display         George Rowan is a 78 y.o. male presenting today for follow-up after being discharged from the hospital 9 days ago. The main problem requiring admission was Congestive heart failure due to cardiomyopathy (Multi)   . The discharge summary and/or Transitional Care Management documentation was reviewed. Medication reconciliation was performed as indicated via the \"Loy as Reviewed\" timestamp.     George Rowan was contacted by Transitional Care Management services two days after his discharge. This encounter and supporting documentation was reviewed.    Review of Systems   All other systems reviewed and are negative.      /80 (BP Location: Left arm, Patient Position: Sitting, BP Cuff Size: Adult)   Pulse 52   Temp 36.6 °C (97.8 °F) (Temporal)   Wt 70.3 kg (155 lb)   SpO2 99%   BMI 22.24 kg/m²     Physical Exam  Vitals reviewed.   Constitutional:       Appearance: Normal appearance.   HENT:      Mouth/Throat:      Mouth: Mucous membranes are moist.      Pharynx: Posterior oropharyngeal erythema present.   Pulmonary:      Effort: Pulmonary effort is normal.      Breath sounds: Normal breath sounds.   Neurological:      Mental Status: He is alert.   Psychiatric:         Mood and Affect: Mood normal.         Behavior: Behavior normal.       Concerns for today   The complexity of medical decision making for this patient's transitional care is moderate.    Assessment/Plan   Problem List Items Addressed This Visit    None      "

## 2024-06-27 ENCOUNTER — ANCILLARY PROCEDURE (OUTPATIENT)
Dept: VASCULAR MEDICINE | Facility: CLINIC | Age: 79
End: 2024-06-27
Payer: MEDICARE

## 2024-06-27 ENCOUNTER — TELEPHONE (OUTPATIENT)
Dept: HOME HEALTH SERVICES | Facility: HOME HEALTH | Age: 79
End: 2024-06-27
Payer: MEDICARE

## 2024-06-27 ENCOUNTER — TELEPHONE (OUTPATIENT)
Dept: CARDIOLOGY | Facility: CLINIC | Age: 79
End: 2024-06-27

## 2024-06-27 DIAGNOSIS — I65.23 BILATERAL CAROTID ARTERY STENOSIS: ICD-10-CM

## 2024-06-27 LAB
ATRIAL RATE: 96 BPM
P AXIS: 58 DEGREES
P OFFSET: 172 MS
P ONSET: 106 MS
PR INTERVAL: 216 MS
Q ONSET: 214 MS
QRS COUNT: 15 BEATS
QRS DURATION: 126 MS
QT INTERVAL: 380 MS
QTC CALCULATION(BAZETT): 480 MS
QTC FREDERICIA: 444 MS
R AXIS: 8 DEGREES
T AXIS: 176 DEGREES
T OFFSET: 404 MS
VENTRICULAR RATE: 96 BPM

## 2024-06-27 PROCEDURE — 93880 EXTRACRANIAL BILAT STUDY: CPT

## 2024-06-27 PROCEDURE — 93880 EXTRACRANIAL BILAT STUDY: CPT | Performed by: INTERNAL MEDICINE

## 2024-06-27 NOTE — TELEPHONE ENCOUNTER
Patients daughter called the office today stating that George has been having RAMON today.  She is asking for advice, thanks  Call back [phone]

## 2024-06-27 NOTE — TELEPHONE ENCOUNTER
Hello,    Your recent home care referral for George Rowan has been made a Non Admit with  Home Care due to Inability to Contact Patient. If you have further questions, feel free to reach out to our office at [phone].     Thank you,   [facility]

## 2024-06-28 ENCOUNTER — OFFICE VISIT (OUTPATIENT)
Dept: PRIMARY CARE | Facility: CLINIC | Age: 79
End: 2024-06-28
Payer: MEDICARE

## 2024-06-28 VITALS
DIASTOLIC BLOOD PRESSURE: 56 MMHG | SYSTOLIC BLOOD PRESSURE: 102 MMHG | TEMPERATURE: 97.5 F | RESPIRATION RATE: 16 BRPM | BODY MASS INDEX: 22.48 KG/M2 | HEART RATE: 66 BPM | HEIGHT: 70 IN | WEIGHT: 157 LBS | OXYGEN SATURATION: 96 %

## 2024-06-28 DIAGNOSIS — I50.9 CONGESTIVE HEART FAILURE DUE TO CARDIOMYOPATHY (MULTI): ICD-10-CM

## 2024-06-28 DIAGNOSIS — E11.22 TYPE 2 DIABETES MELLITUS WITH DIABETIC CHRONIC KIDNEY DISEASE, UNSPECIFIED CKD STAGE, UNSPECIFIED WHETHER LONG TERM INSULIN USE (MULTI): Primary | ICD-10-CM

## 2024-06-28 DIAGNOSIS — I42.9 CONGESTIVE HEART FAILURE DUE TO CARDIOMYOPATHY (MULTI): ICD-10-CM

## 2024-06-28 DIAGNOSIS — I65.22 OCCLUSION OF LEFT INTERNAL CAROTID ARTERY: ICD-10-CM

## 2024-06-28 DIAGNOSIS — I35.9 AORTIC VALVE DISEASE: ICD-10-CM

## 2024-06-28 DIAGNOSIS — I25.5 ISCHEMIC CARDIOMYOPATHY: Primary | ICD-10-CM

## 2024-06-28 DIAGNOSIS — N18.4 TYPE 2 DIABETES MELLITUS WITH STAGE 4 CHRONIC KIDNEY DISEASE, UNSPECIFIED WHETHER LONG TERM INSULIN USE (MULTI): ICD-10-CM

## 2024-06-28 DIAGNOSIS — Z95.5 HISTORY OF CORONARY ARTERY STENT PLACEMENT: ICD-10-CM

## 2024-06-28 DIAGNOSIS — E11.22 TYPE 2 DIABETES MELLITUS WITH STAGE 4 CHRONIC KIDNEY DISEASE, UNSPECIFIED WHETHER LONG TERM INSULIN USE (MULTI): ICD-10-CM

## 2024-06-28 DIAGNOSIS — I10 ESSENTIAL HYPERTENSION: ICD-10-CM

## 2024-06-28 DIAGNOSIS — G47.00 INSOMNIA, UNSPECIFIED TYPE: ICD-10-CM

## 2024-06-28 PROCEDURE — 1126F AMNT PAIN NOTED NONE PRSNT: CPT | Performed by: FAMILY MEDICINE

## 2024-06-28 PROCEDURE — 1111F DSCHRG MED/CURRENT MED MERGE: CPT | Performed by: FAMILY MEDICINE

## 2024-06-28 PROCEDURE — 1158F ADVNC CARE PLAN TLK DOCD: CPT | Performed by: FAMILY MEDICINE

## 2024-06-28 PROCEDURE — 99214 OFFICE O/P EST MOD 30 MIN: CPT | Performed by: FAMILY MEDICINE

## 2024-06-28 PROCEDURE — 1159F MED LIST DOCD IN RCRD: CPT | Performed by: FAMILY MEDICINE

## 2024-06-28 PROCEDURE — 3074F SYST BP LT 130 MM HG: CPT | Performed by: FAMILY MEDICINE

## 2024-06-28 PROCEDURE — 1123F ACP DISCUSS/DSCN MKR DOCD: CPT | Performed by: FAMILY MEDICINE

## 2024-06-28 PROCEDURE — 3078F DIAST BP <80 MM HG: CPT | Performed by: FAMILY MEDICINE

## 2024-06-28 RX ORDER — GENTAMICIN SULFATE 1 MG/G
CREAM TOPICAL
Status: ON HOLD | COMMUNITY
Start: 2024-06-21

## 2024-06-28 ASSESSMENT — ENCOUNTER SYMPTOMS
LOSS OF SENSATION IN FEET: 0
OCCASIONAL FEELINGS OF UNSTEADINESS: 0
DEPRESSION: 0

## 2024-06-28 ASSESSMENT — PAIN SCALES - GENERAL: PAINLEVEL: 0-NO PAIN

## 2024-06-28 ASSESSMENT — COLUMBIA-SUICIDE SEVERITY RATING SCALE - C-SSRS
6. HAVE YOU EVER DONE ANYTHING, STARTED TO DO ANYTHING, OR PREPARED TO DO ANYTHING TO END YOUR LIFE?: NO
1. IN THE PAST MONTH, HAVE YOU WISHED YOU WERE DEAD OR WISHED YOU COULD GO TO SLEEP AND NOT WAKE UP?: NO
2. HAVE YOU ACTUALLY HAD ANY THOUGHTS OF KILLING YOURSELF?: NO

## 2024-06-28 NOTE — PROGRESS NOTES
"Subjective   Patient ID: George Rowan is a 78 y.o. male who presents for Hospital Follow-up (Pt is here for a hospital follow up of fluid on his lungs and kidney failure. ).    HPI admitted 6/17-6/19 due to flash pulmonary edema/acute CHF on chronic with ef of 30 pct.  Hx of stage 5 chronic renal failure.  He has started peritoneal dialysis  with Dr Mckoy.  Recheck echo in 3-6 months and considerateon for AICD if EF not improved  He had carotid US yesterday with Dr Reid.   Tcm already completed    Review of Systems see HPI    Objective   /56 (BP Location: Left arm, Patient Position: Sitting, BP Cuff Size: Adult)   Pulse 66   Temp 36.4 °C (97.5 °F) (Temporal)   Resp 16   Ht 1.778 m (5' 10\")   Wt 71.2 kg (157 lb)   SpO2 96%   BMI 22.53 kg/m²     Physical Exam  Constitutional:       General: He is not in acute distress.     Appearance: Normal appearance.   Cardiovascular:      Rate and Rhythm: Normal rate and regular rhythm.      Heart sounds: No murmur heard.  Pulmonary:      Breath sounds: Normal breath sounds. No wheezing.   Neurological:      Mental Status: He is alert.         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   Reviewed hospital records.   He will clear zolpidem with Dr Mckoy also and use only occasionally     Is is getting set up with peritoneal dialysis and is in training for this.  "

## 2024-07-01 ENCOUNTER — TRANSCRIBE ORDERS (OUTPATIENT)
Dept: INFUSION THERAPY | Facility: CLINIC | Age: 79
End: 2024-07-01
Payer: MEDICARE

## 2024-07-01 ENCOUNTER — HOSPITAL ENCOUNTER (OUTPATIENT)
Dept: RADIOLOGY | Facility: CLINIC | Age: 79
Discharge: HOME | End: 2024-07-01
Payer: MEDICARE

## 2024-07-01 DIAGNOSIS — R06.00 DYSPNEA: ICD-10-CM

## 2024-07-01 PROCEDURE — 71046 X-RAY EXAM CHEST 2 VIEWS: CPT | Performed by: RADIOLOGY

## 2024-07-01 PROCEDURE — 71046 X-RAY EXAM CHEST 2 VIEWS: CPT

## 2024-07-02 ENCOUNTER — APPOINTMENT (OUTPATIENT)
Dept: PRIMARY CARE | Facility: CLINIC | Age: 79
End: 2024-07-02
Payer: MEDICARE

## 2024-07-02 ENCOUNTER — PATIENT OUTREACH (OUTPATIENT)
Dept: PRIMARY CARE | Facility: CLINIC | Age: 79
End: 2024-07-02

## 2024-07-02 RX ORDER — ZOLPIDEM TARTRATE 5 MG/1
5 TABLET ORAL NIGHTLY PRN
Qty: 30 TABLET | Refills: 0 | Status: ON HOLD | OUTPATIENT
Start: 2024-07-02 | End: 2024-08-31

## 2024-07-03 ENCOUNTER — APPOINTMENT (OUTPATIENT)
Dept: INFUSION THERAPY | Facility: CLINIC | Age: 79
End: 2024-07-03
Payer: MEDICARE

## 2024-07-06 ENCOUNTER — APPOINTMENT (OUTPATIENT)
Dept: CARDIOLOGY | Facility: HOSPITAL | Age: 79
DRG: 280 | End: 2024-07-06
Payer: MEDICARE

## 2024-07-06 ENCOUNTER — APPOINTMENT (OUTPATIENT)
Dept: RADIOLOGY | Facility: HOSPITAL | Age: 79
DRG: 280 | End: 2024-07-06
Payer: MEDICARE

## 2024-07-06 ENCOUNTER — HOSPITAL ENCOUNTER (INPATIENT)
Facility: HOSPITAL | Age: 79
LOS: 3 days | Discharge: HOME | DRG: 280 | End: 2024-07-09
Attending: STUDENT IN AN ORGANIZED HEALTH CARE EDUCATION/TRAINING PROGRAM | Admitting: INTERNAL MEDICINE
Payer: MEDICARE

## 2024-07-06 DIAGNOSIS — N18.5 CKD STAGE 5 SECONDARY TO HYPERTENSION (MULTI): ICD-10-CM

## 2024-07-06 DIAGNOSIS — I12.0 CKD STAGE 5 SECONDARY TO HYPERTENSION (MULTI): ICD-10-CM

## 2024-07-06 DIAGNOSIS — I50.9 CONGESTIVE HEART FAILURE DUE TO CARDIOMYOPATHY (MULTI): ICD-10-CM

## 2024-07-06 DIAGNOSIS — J96.01 ACUTE HYPOXIC RESPIRATORY FAILURE (MULTI): Primary | ICD-10-CM

## 2024-07-06 DIAGNOSIS — I42.9 CONGESTIVE HEART FAILURE DUE TO CARDIOMYOPATHY (MULTI): ICD-10-CM

## 2024-07-06 DIAGNOSIS — E87.70 HYPERVOLEMIA, UNSPECIFIED HYPERVOLEMIA TYPE: ICD-10-CM

## 2024-07-06 PROBLEM — E87.1 HYPONATREMIA: Status: ACTIVE | Noted: 2024-07-06

## 2024-07-06 PROBLEM — J81.1 PULMONARY EDEMA (HHS-HCC): Status: ACTIVE | Noted: 2024-07-06

## 2024-07-06 PROBLEM — E87.6 HYPOKALEMIA: Status: ACTIVE | Noted: 2024-07-06

## 2024-07-06 PROBLEM — J90 BILATERAL PLEURAL EFFUSION: Status: ACTIVE | Noted: 2024-07-06

## 2024-07-06 LAB
ALBUMIN SERPL-MCNC: 3.7 G/DL (ref 3.5–5)
ALP BLD-CCNC: 89 U/L (ref 35–125)
ALT SERPL-CCNC: 8 U/L (ref 5–40)
ANION GAP SERPL CALC-SCNC: 19 MMOL/L
APPEARANCE UR: CLEAR
AST SERPL-CCNC: 13 U/L (ref 5–40)
BASOPHILS # BLD AUTO: 0.03 X10*3/UL (ref 0–0.1)
BASOPHILS NFR BLD AUTO: 0.4 %
BILIRUB SERPL-MCNC: 0.5 MG/DL (ref 0.1–1.2)
BILIRUB UR STRIP.AUTO-MCNC: NEGATIVE MG/DL
BUN SERPL-MCNC: 95 MG/DL (ref 8–25)
CALCIUM SERPL-MCNC: 9.6 MG/DL (ref 8.5–10.4)
CHLORIDE SERPL-SCNC: 81 MMOL/L (ref 97–107)
CO2 SERPL-SCNC: 24 MMOL/L (ref 24–31)
COLOR UR: COLORLESS
CREAT SERPL-MCNC: 5.7 MG/DL (ref 0.4–1.6)
D DIMER PPP FEU-MCNC: 0.79 MG/L FEU (ref 0.19–0.5)
EGFRCR SERPLBLD CKD-EPI 2021: 10 ML/MIN/1.73M*2
EOSINOPHIL # BLD AUTO: 0.01 X10*3/UL (ref 0–0.4)
EOSINOPHIL NFR BLD AUTO: 0.1 %
ERYTHROCYTE [DISTWIDTH] IN BLOOD BY AUTOMATED COUNT: 14.7 % (ref 11.5–14.5)
FLUAV RNA RESP QL NAA+PROBE: NOT DETECTED
FLUBV RNA RESP QL NAA+PROBE: NOT DETECTED
GLUCOSE SERPL-MCNC: 117 MG/DL (ref 65–99)
GLUCOSE UR STRIP.AUTO-MCNC: NORMAL MG/DL
HCT VFR BLD AUTO: 29.8 % (ref 41–52)
HGB BLD-MCNC: 10.2 G/DL (ref 13.5–17.5)
HYALINE CASTS #/AREA URNS AUTO: ABNORMAL /LPF
IMM GRANULOCYTES # BLD AUTO: 0.04 X10*3/UL (ref 0–0.5)
IMM GRANULOCYTES NFR BLD AUTO: 0.5 % (ref 0–0.9)
KETONES UR STRIP.AUTO-MCNC: NEGATIVE MG/DL
LEUKOCYTE ESTERASE UR QL STRIP.AUTO: NEGATIVE
LYMPHOCYTES # BLD AUTO: 0.92 X10*3/UL (ref 0.8–3)
LYMPHOCYTES NFR BLD AUTO: 11.3 %
MAGNESIUM SERPL-MCNC: 1.9 MG/DL (ref 1.6–3.1)
MCH RBC QN AUTO: 32.6 PG (ref 26–34)
MCHC RBC AUTO-ENTMCNC: 34.2 G/DL (ref 32–36)
MCV RBC AUTO: 95 FL (ref 80–100)
MONOCYTES # BLD AUTO: 0.3 X10*3/UL (ref 0.05–0.8)
MONOCYTES NFR BLD AUTO: 3.7 %
MUCOUS THREADS #/AREA URNS AUTO: ABNORMAL /LPF
NEUTROPHILS # BLD AUTO: 6.84 X10*3/UL (ref 1.6–5.5)
NEUTROPHILS NFR BLD AUTO: 84 %
NITRITE UR QL STRIP.AUTO: NEGATIVE
NRBC BLD-RTO: 0 /100 WBCS (ref 0–0)
NT-PROBNP SERPL-MCNC: ABNORMAL PG/ML (ref 0–852)
PH UR STRIP.AUTO: 5.5 [PH]
PLATELET # BLD AUTO: 247 X10*3/UL (ref 150–450)
POTASSIUM SERPL-SCNC: 3.2 MMOL/L (ref 3.4–5.1)
PROT SERPL-MCNC: 6.8 G/DL (ref 5.9–7.9)
PROT UR STRIP.AUTO-MCNC: ABNORMAL MG/DL
RBC # BLD AUTO: 3.13 X10*6/UL (ref 4.5–5.9)
RBC # UR STRIP.AUTO: ABNORMAL /UL
RBC #/AREA URNS AUTO: ABNORMAL /HPF
SARS-COV-2 RNA RESP QL NAA+PROBE: NOT DETECTED
SODIUM SERPL-SCNC: 124 MMOL/L (ref 133–145)
SP GR UR STRIP.AUTO: 1.01
TROPONIN T SERPL-MCNC: 191 NG/L
TROPONIN T SERPL-MCNC: 200 NG/L
UROBILINOGEN UR STRIP.AUTO-MCNC: NORMAL MG/DL
WBC # BLD AUTO: 8.1 X10*3/UL (ref 4.4–11.3)
WBC #/AREA URNS AUTO: ABNORMAL /HPF

## 2024-07-06 PROCEDURE — 84484 ASSAY OF TROPONIN QUANT: CPT

## 2024-07-06 PROCEDURE — 83880 ASSAY OF NATRIURETIC PEPTIDE: CPT

## 2024-07-06 PROCEDURE — 83935 ASSAY OF URINE OSMOLALITY: CPT | Mod: TRILAB,WESLAB | Performed by: INTERNAL MEDICINE

## 2024-07-06 PROCEDURE — 71045 X-RAY EXAM CHEST 1 VIEW: CPT

## 2024-07-06 PROCEDURE — 2500000005 HC RX 250 GENERAL PHARMACY W/O HCPCS: Performed by: INTERNAL MEDICINE

## 2024-07-06 PROCEDURE — 84075 ASSAY ALKALINE PHOSPHATASE: CPT

## 2024-07-06 PROCEDURE — 87636 SARSCOV2 & INF A&B AMP PRB: CPT

## 2024-07-06 PROCEDURE — 93005 ELECTROCARDIOGRAM TRACING: CPT

## 2024-07-06 PROCEDURE — 81001 URINALYSIS AUTO W/SCOPE: CPT

## 2024-07-06 PROCEDURE — 84300 ASSAY OF URINE SODIUM: CPT | Performed by: INTERNAL MEDICINE

## 2024-07-06 PROCEDURE — 83735 ASSAY OF MAGNESIUM: CPT

## 2024-07-06 PROCEDURE — 83930 ASSAY OF BLOOD OSMOLALITY: CPT | Mod: TRILAB,WESLAB | Performed by: INTERNAL MEDICINE

## 2024-07-06 PROCEDURE — 36415 COLL VENOUS BLD VENIPUNCTURE: CPT

## 2024-07-06 PROCEDURE — 85025 COMPLETE CBC W/AUTO DIFF WBC: CPT

## 2024-07-06 PROCEDURE — 2060000001 HC INTERMEDIATE ICU ROOM DAILY

## 2024-07-06 PROCEDURE — 2500000001 HC RX 250 WO HCPCS SELF ADMINISTERED DRUGS (ALT 637 FOR MEDICARE OP)

## 2024-07-06 PROCEDURE — 85300 ANTITHROMBIN III ACTIVITY: CPT

## 2024-07-06 PROCEDURE — 71045 X-RAY EXAM CHEST 1 VIEW: CPT | Performed by: RADIOLOGY

## 2024-07-06 PROCEDURE — 99291 CRITICAL CARE FIRST HOUR: CPT

## 2024-07-06 PROCEDURE — 93010 ELECTROCARDIOGRAM REPORT: CPT | Performed by: INTERNAL MEDICINE

## 2024-07-06 RX ORDER — LEVOTHYROXINE SODIUM 50 UG/1
50 TABLET ORAL
Status: DISCONTINUED | OUTPATIENT
Start: 2024-07-07 | End: 2024-07-09 | Stop reason: HOSPADM

## 2024-07-06 RX ORDER — ACETAMINOPHEN 325 MG/1
650 TABLET ORAL EVERY 4 HOURS PRN
Status: DISCONTINUED | OUTPATIENT
Start: 2024-07-06 | End: 2024-07-09 | Stop reason: HOSPADM

## 2024-07-06 RX ORDER — GABAPENTIN 300 MG/1
300 CAPSULE ORAL DAILY
Status: DISCONTINUED | OUTPATIENT
Start: 2024-07-07 | End: 2024-07-07

## 2024-07-06 RX ORDER — ALLOPURINOL 300 MG/1
150 TABLET ORAL DAILY
Status: DISCONTINUED | OUTPATIENT
Start: 2024-07-07 | End: 2024-07-07 | Stop reason: DRUGHIGH

## 2024-07-06 RX ORDER — ATORVASTATIN CALCIUM 20 MG/1
20 TABLET, FILM COATED ORAL DAILY
Status: DISCONTINUED | OUTPATIENT
Start: 2024-07-07 | End: 2024-07-09 | Stop reason: HOSPADM

## 2024-07-06 RX ORDER — CALCITRIOL 0.25 UG/1
0.5 CAPSULE ORAL DAILY
Status: DISCONTINUED | OUTPATIENT
Start: 2024-07-07 | End: 2024-07-09 | Stop reason: HOSPADM

## 2024-07-06 RX ORDER — POLYETHYLENE GLYCOL 3350 17 G/17G
17 POWDER, FOR SOLUTION ORAL DAILY PRN
Status: DISCONTINUED | OUTPATIENT
Start: 2024-07-06 | End: 2024-07-09 | Stop reason: HOSPADM

## 2024-07-06 RX ORDER — HEPARIN SODIUM 5000 [USP'U]/ML
5000 INJECTION, SOLUTION INTRAVENOUS; SUBCUTANEOUS EVERY 8 HOURS SCHEDULED
Status: DISCONTINUED | OUTPATIENT
Start: 2024-07-06 | End: 2024-07-09 | Stop reason: HOSPADM

## 2024-07-06 RX ORDER — GUAIFENESIN/DEXTROMETHORPHAN 100-10MG/5
5 SYRUP ORAL EVERY 4 HOURS PRN
Status: DISCONTINUED | OUTPATIENT
Start: 2024-07-06 | End: 2024-07-09 | Stop reason: HOSPADM

## 2024-07-06 RX ORDER — TORSEMIDE 100 MG/1
100 TABLET ORAL ONCE
Status: COMPLETED | OUTPATIENT
Start: 2024-07-06 | End: 2024-07-06

## 2024-07-06 RX ORDER — ACETAMINOPHEN 650 MG/1
650 SUPPOSITORY RECTAL EVERY 4 HOURS PRN
Status: DISCONTINUED | OUTPATIENT
Start: 2024-07-06 | End: 2024-07-09 | Stop reason: HOSPADM

## 2024-07-06 RX ORDER — NAPROXEN SODIUM 220 MG/1
81 TABLET, FILM COATED ORAL DAILY
Status: DISCONTINUED | OUTPATIENT
Start: 2024-07-07 | End: 2024-07-09 | Stop reason: HOSPADM

## 2024-07-06 RX ORDER — CARVEDILOL 3.12 MG/1
3.12 TABLET ORAL
Status: DISCONTINUED | OUTPATIENT
Start: 2024-07-07 | End: 2024-07-09 | Stop reason: HOSPADM

## 2024-07-06 RX ORDER — HYDROXYCHLOROQUINE SULFATE 200 MG/1
200 TABLET, FILM COATED ORAL DAILY
Status: DISCONTINUED | OUTPATIENT
Start: 2024-07-07 | End: 2024-07-09 | Stop reason: HOSPADM

## 2024-07-06 RX ORDER — IPRATROPIUM BROMIDE AND ALBUTEROL SULFATE 2.5; .5 MG/3ML; MG/3ML
3 SOLUTION RESPIRATORY (INHALATION) EVERY 6 HOURS PRN
Status: DISCONTINUED | OUTPATIENT
Start: 2024-07-06 | End: 2024-07-07

## 2024-07-06 RX ORDER — PANTOPRAZOLE SODIUM 20 MG/1
20 TABLET, DELAYED RELEASE ORAL
Status: DISCONTINUED | OUTPATIENT
Start: 2024-07-07 | End: 2024-07-09 | Stop reason: HOSPADM

## 2024-07-06 RX ORDER — FUROSEMIDE 10 MG/ML
40 INJECTION INTRAMUSCULAR; INTRAVENOUS EVERY 12 HOURS
Status: DISCONTINUED | OUTPATIENT
Start: 2024-07-07 | End: 2024-07-07

## 2024-07-06 RX ORDER — ONDANSETRON HYDROCHLORIDE 2 MG/ML
4 INJECTION, SOLUTION INTRAVENOUS EVERY 8 HOURS PRN
Status: DISCONTINUED | OUTPATIENT
Start: 2024-07-06 | End: 2024-07-09 | Stop reason: HOSPADM

## 2024-07-06 RX ORDER — FLUTICASONE PROPIONATE 50 MCG
2 SPRAY, SUSPENSION (ML) NASAL DAILY
Status: DISCONTINUED | OUTPATIENT
Start: 2024-07-07 | End: 2024-07-09 | Stop reason: HOSPADM

## 2024-07-06 RX ORDER — ACETAMINOPHEN 160 MG/5ML
650 SOLUTION ORAL EVERY 4 HOURS PRN
Status: DISCONTINUED | OUTPATIENT
Start: 2024-07-06 | End: 2024-07-09 | Stop reason: HOSPADM

## 2024-07-06 RX ORDER — GUAIFENESIN 600 MG/1
600 TABLET, EXTENDED RELEASE ORAL EVERY 12 HOURS PRN
Status: DISCONTINUED | OUTPATIENT
Start: 2024-07-06 | End: 2024-07-09 | Stop reason: HOSPADM

## 2024-07-06 RX ORDER — ONDANSETRON 4 MG/1
4 TABLET, ORALLY DISINTEGRATING ORAL EVERY 8 HOURS PRN
Status: DISCONTINUED | OUTPATIENT
Start: 2024-07-06 | End: 2024-07-09 | Stop reason: HOSPADM

## 2024-07-06 ASSESSMENT — PAIN SCALES - GENERAL
PAINLEVEL_OUTOF10: 0 - NO PAIN
PAINLEVEL_OUTOF10: 0 - NO PAIN

## 2024-07-06 ASSESSMENT — PAIN - FUNCTIONAL ASSESSMENT: PAIN_FUNCTIONAL_ASSESSMENT: 0-10

## 2024-07-06 NOTE — ED PROVIDER NOTES
HPI   Chief Complaint   Patient presents with    Shortness of Breath     Pt to ER for eval of worsening SOB x1 week, had increase in diuretics earlier this week and still has no relief. Daughter states pt o2 was down to the low 80s at home, pt unable to sleep. Pt states he feels like he is taking half breaths. Pt is a diaylsis pt MWF. Denies any chest pain       HPI  Patient is a 78 male with history of end-stage renal disease, on peritoneal dialysis Monday Wednesday Friday, STEMI, diabetes, who presents to ED for shortness of breath and hypoxia.  Patient is companied by family member who took a pulse ox at home showing patient's pulse ox at 75%.  Patient reports worsening exertional shortness of breath over the last week.  Patient does report an increase in diuretics over the last week, no improvement of symptoms.  Patient complains of severe orthopnea making it impossible for him to sleep.  Patient states it feels like he is taking half breaths.  Denies any history of DVT or PE.  Denies any recent travel or sick contacts.  Was recently discharged from the hospital on 6/19 for CHF.                  No data recorded                   Patient History   Past Medical History:   Diagnosis Date    Cataract     Hyperlipidemia     Hypertension     Macular hole of left eye     Nephrosclerosis     Other seasonal allergic rhinitis     Seasonal allergies    Other specified diabetes mellitus without complications (Multi)     Diabetes mellitus of other type without complication    Personal history of diseases of the blood and blood-forming organs and certain disorders involving the immune mechanism     History of autoimmune disorder    Personal history of other diseases of urinary system     History of kidney disease    Polyarticular gout     Pre-diabetes     Renal disorder     Spinal stenosis     STEMI (ST elevation myocardial infarction) (Multi) 01/2019     Past Surgical History:   Procedure Laterality Date    CARDIAC  CATHETERIZATION  2019    CATARACT EXTRACTION      CT GUIDED IMAGING FOR NEEDLE PLACEMENT  2018    CT GUIDED IMAGING FOR NEEDLE PLACEMENT [facility]    CT GUIDED IMAGING FOR NEEDLE PLACEMENT  2022    CT GUIDED IMAGING FOR NEEDLE PLACEMENT [facility]    RENAL BIOPSY  2022    RETINAL DETACHMENT REPAIR W/ SCLERAL BUCKLE LE Left 2019    VASECTOMY       Family History   Problem Relation Name Age of Onset    Kidney failure Mother      Heart failure Mother      Heart disease Mother      Prostate cancer Father      Other (stomach mass) Sister Sister 1     Cancer Sister Sister 1      Social History     Tobacco Use    Smoking status: Former     Current packs/day: 0.00     Types: Cigarettes     Quit date: 2013     Years since quittin.5    Smokeless tobacco: Never   Vaping Use    Vaping status: Never Used   Substance Use Topics    Alcohol use: Not Currently    Drug use: Never       Physical Exam   ED Triage Vitals   Temperature Heart Rate Respirations BP   24 1531 24 1531 24 1531 24 1531   36.4 °C (97.5 °F) 70 17 126/66      Pulse Ox Temp src Heart Rate Source Patient Position   24 1531 -- 24 1700 24 1700   95 %  Monitor Sitting      BP Location FiO2 (%)     24 1700 --     Right arm        Physical Exam  Vitals reviewed.   Constitutional:       General: He is not in acute distress.     Appearance: Normal appearance. He is not ill-appearing.   HENT:      Head: Normocephalic and atraumatic.   Eyes:      Extraocular Movements: Extraocular movements intact.   Cardiovascular:      Rate and Rhythm: Normal rate and regular rhythm.   Pulmonary:      Effort: Tachypnea and respiratory distress present.      Breath sounds: Decreased air movement present.   Abdominal:      General: Abdomen is flat.      Palpations: Abdomen is soft.   Musculoskeletal:         General: Normal range of motion.      Cervical back: Normal range of motion and neck  supple.   Skin:     General: Skin is warm and dry.   Neurological:      General: No focal deficit present.      Mental Status: He is alert and oriented to person, place, and time.   Psychiatric:         Mood and Affect: Mood normal.         Behavior: Behavior normal.         ED Course & MDM   ED Course as of 07/07/24 1056   Sat Jul 06, 2024   2324 ECG 12 Lead  Performed at  2321, HR of 75, NSR, NAD, QTc 509, no sign of STEMI, no Q wave or T wave abnormality noted.    Reviewed and interpreted by me at time performed   [JM]      ED Course User Index  [JM] Bhavana Yañez MD         Diagnoses as of 07/07/24 1056   Acute hypoxic respiratory failure (Multi)   Hypervolemia, unspecified hypervolemia type       Medical Decision Making  Parts of this chart have been completed using voice recognition software. Please excuse any errors of transcription.  My thought process and reason for plan has been formulated from the time that I saw the patient until the time of disposition and is not specific to one specific moment during their visit and furthermore my MDM encompasses this entire chart and not only this text box.    HPI:   Detailed above.    Exam:   A medically appropriate exam performed, outlined above, given the known history and presentation.    History obtained from:   Patient, family of patient    EKG/Cardiac monitor:   Interpreted by attending physician, see their note for ED course for more detail.    Social Determinants of Health considered during this visit:   Housing, Family or social support    Medications given during visit:  Medications   aspirin chewable tablet 81 mg (81 mg oral Given 7/7/24 0913)   atorvastatin (Lipitor) tablet 20 mg (20 mg oral Given 7/7/24 0912)   calcitriol (Rocaltrol) capsule 0.5 mcg (0.5 mcg oral Given 7/7/24 0913)   carvedilol (Coreg) tablet 3.125 mg (3.125 mg oral Given 7/7/24 0912)   fluticasone (Flonase) nasal spray 2 spray (2 sprays Each Nostril Given 7/7/24 0918)    hydroxychloroquine (Plaquenil) tablet 200 mg (200 mg oral Given 7/7/24 0912)   levothyroxine (Synthroid, Levoxyl) tablet 50 mcg ( oral MAR Unhold 7/7/24 0734)   heparin (porcine) injection 5,000 Units (5,000 Units subcutaneous Given 7/7/24 0919)   acetaminophen (Tylenol) tablet 650 mg ( oral MAR Unhold 7/7/24 0734)     Or   acetaminophen (Tylenol) oral liquid 650 mg ( nasogastric tube MAR Unhold 7/7/24 0734)     Or   acetaminophen (Tylenol) suppository 650 mg ( rectal MAR Unhold 7/7/24 0734)   ondansetron ODT (Zofran-ODT) disintegrating tablet 4 mg ( oral MAR Unhold 7/7/24 0734)     Or   ondansetron (Zofran) injection 4 mg ( intravenous MAR Unhold 7/7/24 0734)   polyethylene glycol (Glycolax, Miralax) packet 17 g ( oral MAR Unhold 7/7/24 0734)   dextromethorphan-guaifenesin (Robitussin DM)  mg/5 mL oral liquid 5 mL ( oral MAR Unhold 7/7/24 0734)   guaiFENesin (Mucinex) 12 hr tablet 600 mg ( oral MAR Unhold 7/7/24 0734)   pantoprazole (ProtoNix) EC tablet 20 mg ( oral MAR Unhold 7/7/24 0734)   oxygen (O2) therapy (2 L/min inhalation Start 7/7/24 0800)   allopurinol (Zyloprim) tablet 50 mg (50 mg oral Given 7/7/24 0912)   zolpidem (Ambien) tablet 2.5 mg ( oral MAR Unhold 7/7/24 0734)   oxygen (O2) therapy (2 L/min inhalation Start 7/7/24 0800)   potassium chloride CR (Klor-Con) ER tablet 10 mEq ( oral MAR Unhold 7/7/24 0734)   linezolid (Zyvox)  mg in 300 mL (600 mg intravenous New Bag 7/7/24 0912)   ipratropium-albuteroL (Duo-Neb) 0.5-2.5 mg/3 mL nebulizer solution 3 mL ( nebulization MAR Unhold 7/7/24 0734)   ipratropium-albuteroL (Duo-Neb) 0.5-2.5 mg/3 mL nebulizer solution 3 mL ( nebulization MAR Unhold 7/7/24 0734)   sodium bicarbonate 8.4 % (1 mEq/mL) 25 mEq ( intravenous MAR Unhold 7/7/24 0734)   furosemide (Lasix) injection 40 mg (has no administration in time range)   furosemide (Lasix) injection 80 mg (has no administration in time range)   dextrose 2.5 % - calcium 3.5 mEq/L 2,000 mL peritoneal  dialysate (has no administration in time range)   torsemide (Demadex) tablet 100 mg (100 mg oral Given 7/6/24 1827)   LORazepam (Ativan) injection 0.5 mg (0.5 mg intravenous Given 7/7/24 0301)   cefepime (Maxipime) 500 mg in dextrose 5% 50 mL IV (500 mg intravenous New Bag 7/7/24 0912)        Diagnostic/tests:  Labs Reviewed   CBC WITH AUTO DIFFERENTIAL - Abnormal       Result Value    WBC 8.1      nRBC 0.0      RBC 3.13 (*)     Hemoglobin 10.2 (*)     Hematocrit 29.8 (*)     MCV 95      MCH 32.6      MCHC 34.2      RDW 14.7 (*)     Platelets 247      Neutrophils % 84.0      Immature Granulocytes %, Automated 0.5      Lymphocytes % 11.3      Monocytes % 3.7      Eosinophils % 0.1      Basophils % 0.4      Neutrophils Absolute 6.84 (*)     Immature Granulocytes Absolute, Automated 0.04      Lymphocytes Absolute 0.92      Monocytes Absolute 0.30      Eosinophils Absolute 0.01      Basophils Absolute 0.03     COMPREHENSIVE METABOLIC PANEL - Abnormal    Glucose 117 (*)     Sodium 124 (*)     Potassium 3.2 (*)     Chloride 81 (*)     Bicarbonate 24      Urea Nitrogen 95 (*)     Creatinine 5.70 (*)     eGFR 10 (*)     Calcium 9.6      Albumin 3.7      Alkaline Phosphatase 89      Total Protein 6.8      AST 13      Bilirubin, Total 0.5      ALT 8      Anion Gap 19     N-TERMINAL PROBNP - Abnormal    PROBNP >70,000 (*)     Narrative:     Reference ranges are based on clinical submission data. These ranges represent the 95th percentile of normal cut-off points. As NT Pro- BNP values approach 1000 pg/ml, clinical symptoms are more likely associated with CHF.   URINALYSIS WITH REFLEX CULTURE AND MICROSCOPIC - Abnormal    Color, Urine Colorless (*)     Appearance, Urine Clear      Specific Gravity, Urine 1.007      pH, Urine 5.5      Protein, Urine 50 (1+) (*)     Glucose, Urine Normal      Blood, Urine 0.03 (TRACE) (*)     Ketones, Urine NEGATIVE      Bilirubin, Urine NEGATIVE      Urobilinogen, Urine Normal      Nitrite,  Urine NEGATIVE      Leukocyte Esterase, Urine NEGATIVE     SERIAL TROPONIN, INITIAL ([facility]) - Abnormal    Troponin T, High Sensitivity 191 (*)    SERIAL TROPONIN,  2 HOUR ([facility]) - Abnormal    Troponin T, High Sensitivity 200 (*)    D-DIMER, NON VTE - Abnormal    D-Dimer Non VTE, Quant (mg/L FEU) 0.79 (*)     Narrative:     THROMBOEMBOLIC EVENTS CANNOT BE EXCLUDED SOLELY ON THE BASIS OF THE D-DIMER LEVEL BEING WITHIN THE NORMAL REFERENCE RANGE. D-DIMER LEVELS LESS THAN 0.5 MG/L FEU IN CONJUNCTION WITH A LOW CLINICAL PROBABILITY HAVE AN EXCELLENT NEGATIVE PREDICTIVE VALUE IN EXCLUDING A DIAGNOSIS OF PULMONARY EMBOLUS (PE) OR DEEP VEIN THROMBOSIS (DVT). ELEVATED D-DIMER LEVELS ARE NOT SPECIFIC TO PE OR DVT, AND MAY BE SEEN IN PATIENTS WITH DIC, ADVANCED AGE, PREGNANCY, MALIGNANCY, LIVER DISEASE, INFECTION, AND INFLAMMATORY CONDITIONS AMONG OTHERS. D-DIMER LEVELS MAY BE DECREASED IN PATIENTS RECEIVING ANTI-COAGULATION THERAPY.   SERIAL TROPONIN, 6 HOUR ([facility]) - Abnormal    Troponin T, High Sensitivity 211 (*)    SERIAL TROPONIN, 6 HOUR ([facility]) - Abnormal    Troponin T, High Sensitivity 206 (*)    TSH - Abnormal    Thyroid Stimulating Hormone 18.79 (*)    CBC WITH AUTO DIFFERENTIAL - Abnormal    WBC 11.4 (*)     nRBC 0.0      RBC 2.86 (*)     Hemoglobin 9.2 (*)     Hematocrit 27.0 (*)     MCV 94      MCH 32.2      MCHC 34.1      RDW 14.4      Platelets 229      Neutrophils % 84.9      Immature Granulocytes %, Automated 0.3      Lymphocytes % 7.7      Monocytes % 6.8      Eosinophils % 0.0      Basophils % 0.3      Neutrophils Absolute 9.70 (*)     Immature Granulocytes Absolute, Automated 0.04      Lymphocytes Absolute 0.88      Monocytes Absolute 0.78      Eosinophils Absolute 0.00      Basophils Absolute 0.03     RENAL FUNCTION PANEL - Abnormal    Glucose 132 (*)     Sodium 127 (*)     Potassium 3.1 (*)     Chloride 82 (*)     Bicarbonate 22 (*)     Urea Nitrogen 99 (*)     Creatinine 5.80 (*)     eGFR 9 (*)     Calcium  9.4      Phosphorus 6.7 (*)     Albumin 3.8      Anion Gap >19 (*)    RENAL FUNCTION PANEL - Abnormal    Glucose 162 (*)     Sodium 127 (*)     Potassium 3.4      Chloride 81 (*)     Bicarbonate 22 (*)     Urea Nitrogen 103 (*)     Creatinine 6.30 (*)     eGFR 8 (*)     Calcium 9.6      Phosphorus 7.9 (*)     Albumin 3.9      Anion Gap >19 (*)    BLOOD GAS ARTERIAL FULL PANEL - Abnormal    POCT pH, Arterial 7.35 (*)     POCT pCO2, Arterial 36 (*)     POCT pO2, Arterial 77 (*)     POCT SO2, Arterial 97      POCT Oxy Hemoglobin, Arterial 94.9      POCT Hematocrit Calculated, Arterial 29.0 (*)     POCT Sodium, Arterial 122 (*)     POCT Potassium, Arterial 2.9 (*)     POCT Chloride, Arterial 85 (*)     POCT Ionized Calcium, Arterial 1.09 (*)     POCT Glucose, Arterial 230 (*)     POCT Lactate, Arterial 4.9 (*)     POCT Base Excess, Arterial -5.2 (*)     POCT HCO3 Calculated, Arterial 19.9 (*)     POCT Hemoglobin, Arterial 9.5 (*)     POCT Anion Gap, Arterial 20      Patient Temperature 37.0      FiO2 40      Apparatus CANNULA      Critical Called By STEPHON WARD, RRT      Critical Called To DR MELENDEZ      Critical Call Time 426      Critical Read Back Y      Critical Note HIGH LLAC AND LOW K      Site of Arterial Puncture Radial Right      Adalid's Test Positive     RENAL FUNCTION PANEL - Abnormal    Glucose 124 (*)     Sodium 126 (*)     Potassium 3.3 (*)     Chloride 82 (*)     Bicarbonate 26      Urea Nitrogen 104 (*)     Creatinine 6.00 (*)     eGFR 9 (*)     Calcium 9.6      Phosphorus 7.4 (*)     Albumin 3.7      Anion Gap 18     URINALYSIS MICROSCOPIC WITH REFLEX CULTURE - Abnormal    WBC, Urine NONE      RBC, Urine NONE      Mucus, Urine FEW      Hyaline Casts, Urine 1+ (*)    MAGNESIUM - Normal    Magnesium 1.90     SARS-COV-2 PCR - Normal    Coronavirus 2019, PCR Not Detected      Narrative:     This assay has received FDA Emergency Use Authorization (EUA) and is only authorized for the duration of time that  circumstances exist to justify the authorization of the emergency use of in vitro diagnostic tests for the detection of SARS-CoV-2 virus and/or diagnosis of COVID-19 infection under section 564(b)(1) of the Act, 21 U.S.C. 360bbb-3(b)(1). This assay is an in vitro diagnostic nucleic acid amplification test for the qualitative detection of SARS-CoV-2 from nasopharyngeal specimens and has been validated for use at [facility]. Negative results do not preclude COVID-19 infections and should not be used as the sole basis for diagnosis, treatment, or other management decisions.     INFLUENZA A AND B PCR - Normal    Flu A Result Not Detected      Flu B Result Not Detected      Narrative:     This assay is an in vitro diagnostic multiplex nucleic acid amplification test for the detection and discrimination of Influenza A & B from nasopharyngeal specimens, and has been validated for use at [facility]. Negative results do not preclude Influenza A/B infections, and should not be used as the sole basis for diagnosis, treatment, or other management decisions. If Influenza A/B and RSV PCR results are negative, testing for Parainfluenza virus, Adenovirus and Metapneumovirus is routinely performed for [facility] pediatric oncology and intensive care inpatients, and is available on other patients by placing an add-on request.   BLOOD GAS LACTIC ACID, VENOUS - Normal    POCT Lactate, Venous 1.9     THYROXINE, FREE - Normal    Thyroxine, Free 1.70     RESPIRATORY CULTURE/SMEAR   URINALYSIS WITH REFLEX CULTURE AND MICROSCOPIC    Narrative:     The following orders were created for panel order Urinalysis with Reflex Culture and Microscopic.  Procedure                               Abnormality         Status                     ---------                               -----------         ------                     Urinalysis with Reflex C...[[id number]]  Abnormal            Final result                Extra Urine Gray Tube[[id number]]                                                         Please view results for these tests on the individual orders.   SODIUM, URINE RANDOM    Sodium, Urine Random 72      Creatinine, Urine Random 41.7      Sodium/Creatinine Ratio 173     TROPONIN T SERIES, HIGH SENSITIVITY (0, 2 HR, 6 HR)    Narrative:     The following orders were created for panel order Troponin T Series, High Sensitivity (0, 2HR, 6HR).  Procedure                               Abnormality         Status                     ---------                               -----------         ------                     Serial Troponin, Initial...[[id number]]  Abnormal            Final result               Serial Troponin, 2 Hour ...[[id number]]                                                 Serial Troponin, 6 Hour ...[[id number]]  Abnormal            Final result                 Please view results for these tests on the individual orders.   SERIAL TROPONIN,  2 HOUR ([facility])   OSMOLALITY, URINE   OSMOLALITY   RENAL FUNCTION PANEL   TRIIODOTHYRONINE, FREE      XR chest 1 view   Final Result   Findings suggestive of worsening, now moderate, pulmonary edema with   bilateral effusions. Superimposed pneumonia difficult to exclude in   the proper clinical setting        MACRO:   None        Signed by: Joe Perez 7/6/2024 8:08 PM   Dictation workstation:   [id number]           Differential Diagnosis:       Consultations:      Procedures:      Critical Care:  Upon my evaluation, this patient had a high probability of imminent or life-threatening deterioration due to CHF exacerbation, acute hypoxic respiratory failure, which required my direct attention, intervention, and personal management.    I have personally provided 30 minutes of non-concurrent critical care time exclusive of time spent on separately billable procedures. Time includes review of laboratory data, radiology results, discussion with consultants, and  monitoring for potential decompensation. Interventions were performed as documented above.    Referrals:      Discharge Prescriptions:      MDM Summary:  BMP notable for level over 70,000.  Delta troponin is negative, elevated at 200.  D-dimer elevated 0.79.  Sodium is 124, potassium is 3.2.  Patient was dialyzed yesterday, kidney function at baseline.  Urinalysis shows no evidence of UTI.  CBC shows leukocytosis of 11.4, anemia of 9.2.  Chest x-ray shows worsening bilateral pleural effusions.  Patient was admitted to the inpatient medical service by the attending physician Dr. Yañez    Procedure  Procedures     Tito Brown PA-C  07/07/24 1059

## 2024-07-07 LAB
ALBUMIN SERPL-MCNC: 3.7 G/DL (ref 3.5–5)
ALBUMIN SERPL-MCNC: 3.8 G/DL (ref 3.5–5)
ALBUMIN SERPL-MCNC: 3.9 G/DL (ref 3.5–5)
ANION GAP BLDA CALCULATED.4IONS-SCNC: 20 MMO/L (ref 10–25)
ANION GAP SERPL CALC-SCNC: 18 MMOL/L
ANION GAP SERPL CALC-SCNC: >19 MMOL/L
ANION GAP SERPL CALC-SCNC: >19 MMOL/L
APPARATUS: ABNORMAL
ARTERIAL PATENCY WRIST A: POSITIVE
BASE EXCESS BLDA CALC-SCNC: -5.2 MMOL/L (ref -2–3)
BASOPHILS # BLD AUTO: 0.03 X10*3/UL (ref 0–0.1)
BASOPHILS NFR BLD AUTO: 0.3 %
BODY TEMPERATURE: 37 DEGREES CELSIUS
BUN SERPL-MCNC: 103 MG/DL (ref 8–25)
BUN SERPL-MCNC: 104 MG/DL (ref 8–25)
BUN SERPL-MCNC: 99 MG/DL (ref 8–25)
CA-I BLDA-SCNC: 1.09 MMOL/L (ref 1.1–1.33)
CALCIUM SERPL-MCNC: 9.4 MG/DL (ref 8.5–10.4)
CALCIUM SERPL-MCNC: 9.6 MG/DL (ref 8.5–10.4)
CALCIUM SERPL-MCNC: 9.6 MG/DL (ref 8.5–10.4)
CHLORIDE BLDA-SCNC: 85 MMOL/L (ref 98–107)
CHLORIDE SERPL-SCNC: 81 MMOL/L (ref 97–107)
CHLORIDE SERPL-SCNC: 82 MMOL/L (ref 97–107)
CHLORIDE SERPL-SCNC: 82 MMOL/L (ref 97–107)
CO2 SERPL-SCNC: 22 MMOL/L (ref 24–31)
CO2 SERPL-SCNC: 22 MMOL/L (ref 24–31)
CO2 SERPL-SCNC: 26 MMOL/L (ref 24–31)
CREAT SERPL-MCNC: 5.8 MG/DL (ref 0.4–1.6)
CREAT SERPL-MCNC: 6 MG/DL (ref 0.4–1.6)
CREAT SERPL-MCNC: 6.3 MG/DL (ref 0.4–1.6)
CREAT UR-MCNC: 41.7 MG/DL
CRITICAL CALL TIME: 426
CRITICAL CALLED BY: ABNORMAL
CRITICAL CALLED TO: ABNORMAL
CRITICAL NOTE: ABNORMAL
CRITICAL READ BACK: ABNORMAL
EGFRCR SERPLBLD CKD-EPI 2021: 8 ML/MIN/1.73M*2
EGFRCR SERPLBLD CKD-EPI 2021: 9 ML/MIN/1.73M*2
EGFRCR SERPLBLD CKD-EPI 2021: 9 ML/MIN/1.73M*2
EOSINOPHIL # BLD AUTO: 0 X10*3/UL (ref 0–0.4)
EOSINOPHIL NFR BLD AUTO: 0 %
ERYTHROCYTE [DISTWIDTH] IN BLOOD BY AUTOMATED COUNT: 14.4 % (ref 11.5–14.5)
GLUCOSE BLDA-MCNC: 230 MG/DL (ref 74–99)
GLUCOSE SERPL-MCNC: 124 MG/DL (ref 65–99)
GLUCOSE SERPL-MCNC: 132 MG/DL (ref 65–99)
GLUCOSE SERPL-MCNC: 162 MG/DL (ref 65–99)
HCO3 BLDA-SCNC: 19.9 MMOL/L (ref 22–26)
HCT VFR BLD AUTO: 27 % (ref 41–52)
HCT VFR BLD EST: 29 % (ref 41–52)
HGB BLD-MCNC: 9.2 G/DL (ref 13.5–17.5)
HGB BLDA-MCNC: 9.5 G/DL (ref 13.5–17.5)
HOLD SPECIMEN: NORMAL
IMM GRANULOCYTES # BLD AUTO: 0.04 X10*3/UL (ref 0–0.5)
IMM GRANULOCYTES NFR BLD AUTO: 0.3 % (ref 0–0.9)
INHALED O2 CONCENTRATION: 40 %
LACTATE BLDA-SCNC: 4.9 MMOL/L (ref 0.4–2)
LACTATE BLDV-SCNC: 1.9 MMOL/L (ref 0.4–2)
LYMPHOCYTES # BLD AUTO: 0.88 X10*3/UL (ref 0.8–3)
LYMPHOCYTES NFR BLD AUTO: 7.7 %
MCH RBC QN AUTO: 32.2 PG (ref 26–34)
MCHC RBC AUTO-ENTMCNC: 34.1 G/DL (ref 32–36)
MCV RBC AUTO: 94 FL (ref 80–100)
MONOCYTES # BLD AUTO: 0.78 X10*3/UL (ref 0.05–0.8)
MONOCYTES NFR BLD AUTO: 6.8 %
NEUTROPHILS # BLD AUTO: 9.7 X10*3/UL (ref 1.6–5.5)
NEUTROPHILS NFR BLD AUTO: 84.9 %
NRBC BLD-RTO: 0 /100 WBCS (ref 0–0)
OSMOLALITY SERPL: 297 MOSM/KG (ref 280–300)
OSMOLALITY UR: 284 MOSM/KG (ref 200–1200)
OXYHGB MFR BLDA: 94.9 % (ref 94–98)
PCO2 BLDA: 36 MM HG (ref 38–42)
PH BLDA: 7.35 PH (ref 7.38–7.42)
PHOSPHATE SERPL-MCNC: 6.7 MG/DL (ref 2.5–4.5)
PHOSPHATE SERPL-MCNC: 7.4 MG/DL (ref 2.5–4.5)
PHOSPHATE SERPL-MCNC: 7.9 MG/DL (ref 2.5–4.5)
PLATELET # BLD AUTO: 229 X10*3/UL (ref 150–450)
PO2 BLDA: 77 MM HG (ref 85–95)
POTASSIUM BLDA-SCNC: 2.9 MMOL/L (ref 3.5–5.3)
POTASSIUM SERPL-SCNC: 3.1 MMOL/L (ref 3.4–5.1)
POTASSIUM SERPL-SCNC: 3.3 MMOL/L (ref 3.4–5.1)
POTASSIUM SERPL-SCNC: 3.4 MMOL/L (ref 3.4–5.1)
RBC # BLD AUTO: 2.86 X10*6/UL (ref 4.5–5.9)
SAO2 % BLDA: 97 % (ref 94–100)
SODIUM BLDA-SCNC: 122 MMOL/L (ref 136–145)
SODIUM SERPL-SCNC: 126 MMOL/L (ref 133–145)
SODIUM SERPL-SCNC: 127 MMOL/L (ref 133–145)
SODIUM SERPL-SCNC: 127 MMOL/L (ref 133–145)
SODIUM UR-SCNC: 72 MMOL/L
SODIUM/CREAT UR-RTO: 173 MMOL/G CREAT
SPECIMEN DRAWN FROM PATIENT: ABNORMAL
T3FREE SERPL-MCNC: 2.7 PG/ML (ref 2.3–4.2)
T4 FREE SERPL-MCNC: 1.7 NG/DL (ref 0.9–1.7)
TROPONIN T SERPL-MCNC: 206 NG/L
TROPONIN T SERPL-MCNC: 211 NG/L
TSH SERPL DL<=0.05 MIU/L-ACNC: 18.79 MIU/L (ref 0.27–4.2)
WBC # BLD AUTO: 11.4 X10*3/UL (ref 4.4–11.3)

## 2024-07-07 PROCEDURE — 94640 AIRWAY INHALATION TREATMENT: CPT

## 2024-07-07 PROCEDURE — 2500000004 HC RX 250 GENERAL PHARMACY W/ HCPCS (ALT 636 FOR OP/ED): Performed by: INTERNAL MEDICINE

## 2024-07-07 PROCEDURE — 94660 CPAP INITIATION&MGMT: CPT

## 2024-07-07 PROCEDURE — 84481 FREE ASSAY (FT-3): CPT | Mod: TRILAB,WESLAB | Performed by: INTERNAL MEDICINE

## 2024-07-07 PROCEDURE — 36415 COLL VENOUS BLD VENIPUNCTURE: CPT | Performed by: INTERNAL MEDICINE

## 2024-07-07 PROCEDURE — 8010000001 HC DIALYSIS - HEMODIALYSIS PER DAY

## 2024-07-07 PROCEDURE — 2500000005 HC RX 250 GENERAL PHARMACY W/O HCPCS: Performed by: INTERNAL MEDICINE

## 2024-07-07 PROCEDURE — 84439 ASSAY OF FREE THYROXINE: CPT | Performed by: INTERNAL MEDICINE

## 2024-07-07 PROCEDURE — 2500000002 HC RX 250 W HCPCS SELF ADMINISTERED DRUGS (ALT 637 FOR MEDICARE OP, ALT 636 FOR OP/ED): Performed by: INTERNAL MEDICINE

## 2024-07-07 PROCEDURE — 80069 RENAL FUNCTION PANEL: CPT | Performed by: INTERNAL MEDICINE

## 2024-07-07 PROCEDURE — 84132 ASSAY OF SERUM POTASSIUM: CPT | Performed by: INTERNAL MEDICINE

## 2024-07-07 PROCEDURE — 85025 COMPLETE CBC W/AUTO DIFF WBC: CPT | Performed by: INTERNAL MEDICINE

## 2024-07-07 PROCEDURE — 97161 PT EVAL LOW COMPLEX 20 MIN: CPT | Mod: GP

## 2024-07-07 PROCEDURE — 2020000001 HC ICU ROOM DAILY

## 2024-07-07 PROCEDURE — 94664 DEMO&/EVAL PT USE INHALER: CPT

## 2024-07-07 PROCEDURE — 99222 1ST HOSP IP/OBS MODERATE 55: CPT | Performed by: INTERNAL MEDICINE

## 2024-07-07 PROCEDURE — 2500000001 HC RX 250 WO HCPCS SELF ADMINISTERED DRUGS (ALT 637 FOR MEDICARE OP): Performed by: INTERNAL MEDICINE

## 2024-07-07 PROCEDURE — 9420000001 HC RT PATIENT EDUCATION 5 MIN

## 2024-07-07 PROCEDURE — 84443 ASSAY THYROID STIM HORMONE: CPT | Performed by: INTERNAL MEDICINE

## 2024-07-07 PROCEDURE — 5A09357 ASSISTANCE WITH RESPIRATORY VENTILATION, LESS THAN 24 CONSECUTIVE HOURS, CONTINUOUS POSITIVE AIRWAY PRESSURE: ICD-10-PCS | Performed by: INTERNAL MEDICINE

## 2024-07-07 PROCEDURE — 83605 ASSAY OF LACTIC ACID: CPT | Performed by: INTERNAL MEDICINE

## 2024-07-07 PROCEDURE — 36600 WITHDRAWAL OF ARTERIAL BLOOD: CPT

## 2024-07-07 PROCEDURE — 84484 ASSAY OF TROPONIN QUANT: CPT

## 2024-07-07 PROCEDURE — 97530 THERAPEUTIC ACTIVITIES: CPT | Mod: GP

## 2024-07-07 RX ORDER — LINEZOLID 2 MG/ML
600 INJECTION, SOLUTION INTRAVENOUS EVERY 12 HOURS
Status: DISCONTINUED | OUTPATIENT
Start: 2024-07-07 | End: 2024-07-09

## 2024-07-07 RX ORDER — LORAZEPAM 2 MG/ML
0.5 INJECTION INTRAMUSCULAR ONCE
Status: COMPLETED | OUTPATIENT
Start: 2024-07-07 | End: 2024-07-07

## 2024-07-07 RX ORDER — ALLOPURINOL 100 MG/1
50 TABLET ORAL DAILY
Status: DISCONTINUED | OUTPATIENT
Start: 2024-07-07 | End: 2024-07-09 | Stop reason: HOSPADM

## 2024-07-07 RX ORDER — POTASSIUM CHLORIDE 750 MG/1
10 TABLET, FILM COATED, EXTENDED RELEASE ORAL ONCE
Status: DISCONTINUED | OUTPATIENT
Start: 2024-07-07 | End: 2024-07-07

## 2024-07-07 RX ORDER — IPRATROPIUM BROMIDE AND ALBUTEROL SULFATE 2.5; .5 MG/3ML; MG/3ML
3 SOLUTION RESPIRATORY (INHALATION) EVERY 2 HOUR PRN
Status: DISCONTINUED | OUTPATIENT
Start: 2024-07-07 | End: 2024-07-09 | Stop reason: HOSPADM

## 2024-07-07 RX ORDER — POTASSIUM CHLORIDE 20 MEQ/1
40 TABLET, EXTENDED RELEASE ORAL ONCE
Status: COMPLETED | OUTPATIENT
Start: 2024-07-07 | End: 2024-07-07

## 2024-07-07 RX ORDER — IPRATROPIUM BROMIDE AND ALBUTEROL SULFATE 2.5; .5 MG/3ML; MG/3ML
3 SOLUTION RESPIRATORY (INHALATION)
Status: DISCONTINUED | OUTPATIENT
Start: 2024-07-07 | End: 2024-07-09 | Stop reason: HOSPADM

## 2024-07-07 RX ORDER — FUROSEMIDE 10 MG/ML
40 INJECTION INTRAMUSCULAR; INTRAVENOUS ONCE
Status: COMPLETED | OUTPATIENT
Start: 2024-07-07 | End: 2024-07-07

## 2024-07-07 RX ORDER — SODIUM BICARBONATE 1 MEQ/ML
25 SYRINGE (ML) INTRAVENOUS ONCE
Status: DISCONTINUED | OUTPATIENT
Start: 2024-07-07 | End: 2024-07-07

## 2024-07-07 RX ORDER — FUROSEMIDE 10 MG/ML
40 INJECTION INTRAMUSCULAR; INTRAVENOUS EVERY 12 HOURS
Status: DISCONTINUED | OUTPATIENT
Start: 2024-07-07 | End: 2024-07-07

## 2024-07-07 RX ORDER — ZOLPIDEM TARTRATE 5 MG/1
2.5 TABLET ORAL NIGHTLY PRN
Status: DISCONTINUED | OUTPATIENT
Start: 2024-07-07 | End: 2024-07-09 | Stop reason: HOSPADM

## 2024-07-07 RX ORDER — FUROSEMIDE 10 MG/ML
80 INJECTION INTRAMUSCULAR; INTRAVENOUS EVERY 12 HOURS
Status: COMPLETED | OUTPATIENT
Start: 2024-07-07 | End: 2024-07-09

## 2024-07-07 SDOH — SOCIAL STABILITY: SOCIAL NETWORK: ARE YOU MARRIED, WIDOWED, DIVORCED, SEPARATED, NEVER MARRIED, OR LIVING WITH A PARTNER?: MARRIED

## 2024-07-07 SDOH — SOCIAL STABILITY: SOCIAL NETWORK
IN A TYPICAL WEEK, HOW MANY TIMES DO YOU TALK ON THE PHONE WITH FAMILY, FRIENDS, OR NEIGHBORS?: MORE THAN THREE TIMES A WEEK

## 2024-07-07 SDOH — SOCIAL STABILITY: SOCIAL INSECURITY: DO YOU FEEL ANYONE HAS EXPLOITED OR TAKEN ADVANTAGE OF YOU FINANCIALLY OR OF YOUR PERSONAL PROPERTY?: NO

## 2024-07-07 SDOH — HEALTH STABILITY: MENTAL HEALTH: HOW MANY STANDARD DRINKS CONTAINING ALCOHOL DO YOU HAVE ON A TYPICAL DAY?: PATIENT DOES NOT DRINK

## 2024-07-07 SDOH — ECONOMIC STABILITY: FOOD INSECURITY: WITHIN THE PAST 12 MONTHS, THE FOOD YOU BOUGHT JUST DIDN'T LAST AND YOU DIDN'T HAVE MONEY TO GET MORE.: NEVER TRUE

## 2024-07-07 SDOH — ECONOMIC STABILITY: INCOME INSECURITY: IN THE PAST 12 MONTHS, HAS THE ELECTRIC, GAS, OIL, OR WATER COMPANY THREATENED TO SHUT OFF SERVICE IN YOUR HOME?: NO

## 2024-07-07 SDOH — HEALTH STABILITY: MENTAL HEALTH: HOW OFTEN DO YOU HAVE A DRINK CONTAINING ALCOHOL?: NEVER

## 2024-07-07 SDOH — SOCIAL STABILITY: SOCIAL INSECURITY: WITHIN THE LAST YEAR, HAVE YOU BEEN AFRAID OF YOUR PARTNER OR EX-PARTNER?: NO

## 2024-07-07 SDOH — SOCIAL STABILITY: SOCIAL NETWORK: HOW OFTEN DO YOU GET TOGETHER WITH FRIENDS OR RELATIVES?: MORE THAN THREE TIMES A WEEK

## 2024-07-07 SDOH — HEALTH STABILITY: PHYSICAL HEALTH: ON AVERAGE, HOW MANY MINUTES DO YOU ENGAGE IN EXERCISE AT THIS LEVEL?: 0 MIN

## 2024-07-07 SDOH — SOCIAL STABILITY: SOCIAL NETWORK: HOW OFTEN DO YOU ATTENT MEETINGS OF THE CLUB OR ORGANIZATION YOU BELONG TO?: PATIENT DECLINED

## 2024-07-07 SDOH — SOCIAL STABILITY: SOCIAL INSECURITY: DO YOU FEEL UNSAFE GOING BACK TO THE PLACE WHERE YOU ARE LIVING?: NO

## 2024-07-07 SDOH — HEALTH STABILITY: MENTAL HEALTH: HOW OFTEN DO YOU HAVE 6 OR MORE DRINKS ON ONE OCCASION?: NEVER

## 2024-07-07 SDOH — SOCIAL STABILITY: SOCIAL INSECURITY: ARE YOU OR HAVE YOU BEEN THREATENED OR ABUSED PHYSICALLY, EMOTIONALLY, OR SEXUALLY BY ANYONE?: NO

## 2024-07-07 SDOH — ECONOMIC STABILITY: INCOME INSECURITY: HOW HARD IS IT FOR YOU TO PAY FOR THE VERY BASICS LIKE FOOD, HOUSING, MEDICAL CARE, AND HEATING?: PATIENT DECLINED

## 2024-07-07 SDOH — SOCIAL STABILITY: SOCIAL INSECURITY: WITHIN THE LAST YEAR, HAVE YOU BEEN HUMILIATED OR EMOTIONALLY ABUSED IN OTHER WAYS BY YOUR PARTNER OR EX-PARTNER?: NO

## 2024-07-07 SDOH — SOCIAL STABILITY: SOCIAL INSECURITY: ABUSE: ADULT

## 2024-07-07 SDOH — SOCIAL STABILITY: SOCIAL INSECURITY: DOES ANYONE TRY TO KEEP YOU FROM HAVING/CONTACTING OTHER FRIENDS OR DOING THINGS OUTSIDE YOUR HOME?: NO

## 2024-07-07 SDOH — ECONOMIC STABILITY: FOOD INSECURITY: WITHIN THE PAST 12 MONTHS, YOU WORRIED THAT YOUR FOOD WOULD RUN OUT BEFORE YOU GOT MONEY TO BUY MORE.: NEVER TRUE

## 2024-07-07 SDOH — HEALTH STABILITY: MENTAL HEALTH
HOW OFTEN DO YOU NEED TO HAVE SOMEONE HELP YOU WHEN YOU READ INSTRUCTIONS, PAMPHLETS, OR OTHER WRITTEN MATERIAL FROM YOUR DOCTOR OR PHARMACY?: PATIENT DECLINES TO RESPOND

## 2024-07-07 SDOH — ECONOMIC STABILITY: HOUSING INSECURITY: IN THE LAST 12 MONTHS, HOW MANY PLACES HAVE YOU LIVED?: 1

## 2024-07-07 SDOH — SOCIAL STABILITY: SOCIAL NETWORK: HOW OFTEN DO YOU ATTEND CHURCH OR RELIGIOUS SERVICES?: PATIENT DECLINED

## 2024-07-07 SDOH — SOCIAL STABILITY: SOCIAL INSECURITY: ARE THERE ANY APPARENT SIGNS OF INJURIES/BEHAVIORS THAT COULD BE RELATED TO ABUSE/NEGLECT?: NO

## 2024-07-07 SDOH — SOCIAL STABILITY: SOCIAL INSECURITY: HAVE YOU HAD ANY THOUGHTS OF HARMING ANYONE ELSE?: NO

## 2024-07-07 SDOH — SOCIAL STABILITY: SOCIAL NETWORK
DO YOU BELONG TO ANY CLUBS OR ORGANIZATIONS SUCH AS CHURCH GROUPS UNIONS, FRATERNAL OR ATHLETIC GROUPS, OR SCHOOL GROUPS?: PATIENT DECLINED

## 2024-07-07 SDOH — SOCIAL STABILITY: SOCIAL INSECURITY: HAVE YOU HAD THOUGHTS OF HARMING ANYONE ELSE?: NO

## 2024-07-07 SDOH — ECONOMIC STABILITY: INCOME INSECURITY: IN THE LAST 12 MONTHS, WAS THERE A TIME WHEN YOU WERE NOT ABLE TO PAY THE MORTGAGE OR RENT ON TIME?: PATIENT DECLINED

## 2024-07-07 SDOH — SOCIAL STABILITY: SOCIAL INSECURITY: HAS ANYONE EVER THREATENED TO HURT YOUR FAMILY OR YOUR PETS?: NO

## 2024-07-07 SDOH — SOCIAL STABILITY: SOCIAL INSECURITY: WERE YOU ABLE TO COMPLETE ALL THE BEHAVIORAL HEALTH SCREENINGS?: YES

## 2024-07-07 ASSESSMENT — LIFESTYLE VARIABLES
PRESCIPTION_ABUSE_PAST_12_MONTHS: NO
HOW OFTEN DO YOU HAVE 6 OR MORE DRINKS ON ONE OCCASION: NEVER
SKIP TO QUESTIONS 9-10: 1
AUDIT-C TOTAL SCORE: 0
SUBSTANCE_ABUSE_PAST_12_MONTHS: NO
SKIP TO QUESTIONS 9-10: 1
HOW OFTEN DO YOU HAVE A DRINK CONTAINING ALCOHOL: NEVER
HOW MANY STANDARD DRINKS CONTAINING ALCOHOL DO YOU HAVE ON A TYPICAL DAY: PATIENT DOES NOT DRINK
SUBSTANCE_ABUSE_PAST_12_MONTHS: NO
PRESCIPTION_ABUSE_PAST_12_MONTHS: NO
AUDIT-C TOTAL SCORE: 0
AUDIT-C TOTAL SCORE: 0

## 2024-07-07 ASSESSMENT — ACTIVITIES OF DAILY LIVING (ADL)
WALKS IN HOME: INDEPENDENT
DRESSING YOURSELF: INDEPENDENT
ADL_ASSISTANCE: INDEPENDENT
HEARING - LEFT EAR: FUNCTIONAL
GROOMING: INDEPENDENT
BATHING: INDEPENDENT
TOILETING: INDEPENDENT
DRESSING YOURSELF: INDEPENDENT
WALKS IN HOME: INDEPENDENT
FEEDING YOURSELF: INDEPENDENT
HEARING - RIGHT EAR: FUNCTIONAL
BATHING: INDEPENDENT
FEEDING YOURSELF: INDEPENDENT
ADLS_ADDRESSED: YES
GROOMING: INDEPENDENT
HEARING - LEFT EAR: FUNCTIONAL
ADEQUATE_TO_COMPLETE_ADL: YES
JUDGMENT_ADEQUATE_SAFELY_COMPLETE_DAILY_ACTIVITIES: YES
JUDGMENT_ADEQUATE_SAFELY_COMPLETE_DAILY_ACTIVITIES: YES
ADEQUATE_TO_COMPLETE_ADL: YES
HEARING - RIGHT EAR: FUNCTIONAL
TOILETING: INDEPENDENT
PATIENT'S MEMORY ADEQUATE TO SAFELY COMPLETE DAILY ACTIVITIES?: YES

## 2024-07-07 ASSESSMENT — COGNITIVE AND FUNCTIONAL STATUS - GENERAL
MOBILITY SCORE: 23
MOBILITY SCORE: 24
DAILY ACTIVITIY SCORE: 24
PATIENT BASELINE BEDBOUND: NO
CLIMB 3 TO 5 STEPS WITH RAILING: A LITTLE

## 2024-07-07 ASSESSMENT — PAIN SCALES - GENERAL
PAINLEVEL_OUTOF10: 0 - NO PAIN

## 2024-07-07 ASSESSMENT — PAIN - FUNCTIONAL ASSESSMENT
PAIN_FUNCTIONAL_ASSESSMENT: 0-10

## 2024-07-07 NOTE — PROGRESS NOTES
Physical Therapy    Physical Therapy Evaluation & Treatment    Patient Name: George Rowan  MRN: [id number]  Today's Date: 7/7/2024   Time Calculation  Start Time: 0850  Stop Time: 0925  Time Calculation (min): 35 min    Assessment/Plan   PT Assessment  PT Assessment Results: Decreased endurance, Impaired balance, Decreased mobility, Decreased strength  Rehab Prognosis: Good  Barriers to Discharge: endurance  Evaluation/Treatment Tolerance: Patient limited by fatigue  Medical Staff Made Aware: Yes  Strengths: Ability to acquire knowledge  Barriers to Participation: Comorbidities  End of Session Communication: Bedside nurse  Assessment Comment: 79 yo male admitted for acute hypoxic respiratory failure. Pt displayed endurance deficits, gait deficits, mild LE weakness and balance deficits. Pt would benefit from low intensity rehab upon discharge.  End of Session Patient Position: Bed, 3 rail up   IP OR SWING BED PT PLAN  Inpatient or Swing Bed: Inpatient  PT Plan  Treatment/Interventions: Transfer training, Gait training, Balance training, Stair training, Neuromuscular re-education, Endurance training, Strengthening, Therapeutic exercise, Therapeutic activity, Home exercise program  PT Plan: Ongoing PT  PT Frequency: 3 times per week  PT Discharge Recommendations: Low intensity level of continued care  Equipment Recommended upon Discharge: Other (comment) (NA)  PT Recommended Transfer Status: Contact guard  PT - OK to Discharge: Yes      Subjective     General Visit Information:  General  Reason for Referral: Impaired mobility  Referred By: Dr. Lindsay  Past Medical History Relevant to Rehab:  Cataract     Hyperlipidemia     Hypertension     Macular hole of left eye     Nephrosclerosis     Other seasonal allergic rhinitis      Seasonal allergies   Other specified diabetes mellitus without complications (Multi)      Diabetes mellitus of other type without complication   Personal history of diseases of the blood and  blood-forming organs and certain disorders involving the immune mechanism      History of autoimmune disorder   Personal history of other diseases of urinary system      History of kidney disease   Polyarticular gout     Pre-diabetes     Renal disorder     Spinal stenosis     STEMI (ST elevation myocardial infarction) (Multi)  Family/Caregiver Present: No  Prior to Session Communication: Bedside nurse  Patient Position Received: Bed, 3 rail up  Preferred Learning Style: verbal, visual  General Comment: Pt agreeable to PT evaluation.  Home Living:  Home Living  Type of Home: House  Lives With: Spouse  Home Adaptive Equipment: Cane (doesn't use cane at all)  Home Layout: Two level, Bed/bath upstairs, Laundry main level, Full bath main level  Home Access: Stairs to enter with rails  Entrance Stairs-Rails: Both  Entrance Stairs-Number of Steps: 3  Bathroom Shower/Tub: Tub/shower unit  Bathroom Toilet: Standard  Bathroom Equipment: None  Prior Level of Function:  Prior Function Per Pt/Caregiver Report  Level of [place]: Independent with ADLs and functional transfers, Independent with homemaking with ambulation  Receives Help From: Family  ADL Assistance: Independent  Homemaking Assistance: Independent  Ambulatory Assistance: Independent  Vocational: Retired  Precautions:  Precautions  Medical Precautions: Oxygen therapy device and L/min, Fall precautions  Vital Signs:  Vital Signs  Heart Rate: 80  Heart Rate Source: Monitor  SpO2: 95 %  BP: 117/73  MAP (mmHg): 86  BP Location: Right arm  BP Method: Automatic  Patient Position: Lying    Objective   Pain:  Pain Assessment  Pain Assessment: 0-10  0-10 (Numeric) Pain Score: 0 - No pain  Cognition:  Cognition  Overall Cognitive Status: Within Functional Limits    General Assessments:          Activity Tolerance  Endurance: Decreased tolerance for upright activites, Tolerates less than 10 min exercise, no significant change in vital signs (desat to 93% with 2L of O2  donned during short ambulation)    Sensation  Light Touch: No apparent deficits                 Coordination  Movements are Fluid and Coordinated: Yes         Static Sitting Balance  Static Sitting-Balance Support: Feet supported, No upper extremity supported  Static Sitting-Level of Assistance: Close supervision  Static Sitting-Comment/Number of Minutes: 2  Dynamic Sitting Balance  Dynamic Sitting-Balance Support: Feet supported, No upper extremity supported  Dynamic Sitting-Balance: Forward lean  Dynamic Sitting-Comments: close supervision    Static Standing Balance  Static Standing-Balance Support: No upper extremity supported  Static Standing-Level of Assistance: Close supervision  Static Standing-Comment/Number of Minutes: 5  Dynamic Standing Balance  Dynamic Standing-Balance Support: No upper extremity supported  Dynamic Standing-Balance: Forward lean  Dynamic Standing-Comments: close supervision  Functional Assessments:  ADL  ADL's Addressed: Yes  ADL Comments: toileting with distant supervision    Bed Mobility  Bed Mobility: Yes  Bed Mobility 1  Bed Mobility 1: Supine to sitting  Level of Assistance 1: Modified independent  Bed Mobility Comments 1: HOB elevated  Bed Mobility 2  Bed Mobility  2: Sitting to supine  Level of Assistance 2: Modified independent  Bed Mobility Comments 2: HOB elevated    Transfers  Transfer: Yes  Transfer 1  Transfer From 1: Sit to  Transfer to 1: Stand  Technique 1: Sit to stand, Stand to sit  Transfer Level of Assistance 1: Close supervision  Trials/Comments 1: x4 trials  Transfers 2  Transfer From 2: Stand to  Transfer to 2: Toilet  Technique 2: Stand to sit  Transfer Level of Assistance 2: Distant supervision  Trials/Comments 2: x1    Ambulation/Gait Training  Ambulation/Gait Training Performed: Yes  Ambulation/Gait Training 1  Surface 1: Level tile  Device 1: No device  Assistance 1: Close supervision  Quality of Gait 1: Decreased step length  Comments/Distance (ft) 1: 20 feet  x1, 10 feet x 2, 40 feet x 1 with 2L of O2 donned. Pt required seated rest break x 2 for 30 seconds due to SOB and feeilng light headed. SpO2 monitored and remained 93% or > during gait trial.    Stairs  Stairs: No  Extremity/Trunk Assessments:  RUE   RUE : Within Functional Limits  LUE   LUE: Within Functional Limits  RLE   RLE : Exceptions to WFL  Strength RLE  RLE Overall Strength: Greater than or equal to 3/5 as evidenced by functional mobility  R Hip Flexion: 4/5  R Hip Extension: 4/5  R Hip ABduction: 4/5  R Hip ADduction: 4/5  R Knee Flexion: 4/5  R Knee Extension: 4/5  R Ankle Dorsiflexion: 4+/5  R Ankle Plantar Flexion: 4+/5  LLE   LLE : Exceptions to WFL  Strength LLE  LLE Overall Strength: Greater than or equal to 3/5 as evidenced by functional mobility  L Hip Flexion: 4/5  L Hip Extension: 4/5  L Hip ABduction: 4/5  L Hip ADduction: 4/5  L Knee Flexion: 4/5  L Knee Extension: 4/5  L Ankle Dorsiflexion: 4+/5  L Ankle Plantar Flexion: 4+/5  Treatments:   Therapeutic Activity: Ambulation/Gait Training  Pt ambulated 20 feet x1, 10 feet x 2, 40 feet x 1 with 2L of O2 donned to improve his endurance to allow him to walk safely in his home with proper rest breaks.   Pt required seated rest break x 2 for 30 seconds due to SOB and feeilng light headed. SpO2 monitored and remained 93% or > during gait trial. Pt educated on proper pursed lip breathing technique.   Outcome Measures:  [facility] Basic Mobility  Turning from your back to your side while in a flat bed without using bedrails: None  Moving from lying on your back to sitting on the side of a flat bed without using bedrails: None  Moving to and from bed to chair (including a wheelchair): None  Standing up from a chair using your arms (e.g. wheelchair or bedside chair): None  To walk in hospital room: None  Climbing 3-5 steps with railing: A little  Basic Mobility - Total Score: 23    Encounter Problems       Encounter Problems (Active)       Mobility       LTG  - Patient will ambulate household distance       Start:  07/07/24    Expected End:  07/21/24       >100 feet           LTG - Patient will navigate 3 steps with L/R rail mod ind.       Start:  07/07/24    Expected End:  07/21/24            LTG - Patient will demonstrate safe mobility requirements       Start:  07/07/24    Expected End:  07/21/24            STG - Patient will ascend and descend a flight of stairs       Start:  07/07/24    Expected End:  07/21/24       15 step using one rail mod ind.            PT Problem       PT Goal 1       Start:  07/07/24    Expected End:  07/21/24       Pt will demonstrate good static standing balance to promote safe participation with out of bed activity, transfers, and mobility.                Education Documentation  Mobility Training, taught by Alvin Mendosa, PT at 7/7/2024  9:48 AM.  Learner: Patient  Readiness: Eager  Method: Explanation  Response: Verbalizes Understanding  Comment: pursed lip breathing    Education Comments  No comments found.

## 2024-07-07 NOTE — PROGRESS NOTES
George Rowan is a 78 y.o. male on day 1 of admission presenting with Acute hypoxic respiratory failure (Multi).      Subjective   Patient feeling better than overnight.  No chest pain still with some shortness of breath but greatly improved.      Objective     Last Recorded Vitals  /73 (BP Location: Right arm, Patient Position: Lying)   Pulse 80   Temp 36.9 °C (98.4 °F) (Temporal)   Resp 12   Wt 70.3 kg (154 lb 15.7 oz)   SpO2 95%   Intake/Output last 3 Shifts:    Intake/Output Summary (Last 24 hours) at 7/7/2024 0920  Last data filed at 7/7/2024 0700  Gross per 24 hour   Intake --   Output 300 ml   Net -300 ml       Admission Weight  Weight: 71.2 kg (157 lb) (07/06/24 1907)    Daily Weight  07/06/24 : 70.3 kg (154 lb 15.7 oz)    Image Results  XR chest 1 view  Narrative: Interpreted By:  Joe Perez,   STUDY:  XR CHEST 1 VIEW;  7/6/2024 8:06 pm      INDICATION:  Signs/Symptoms:Shortness of breath.      COMPARISON:  07/01/2024      ACCESSION NUMBER(S):  [id number]      ORDERING CLINICIAN:  BEE MONIQUE      FINDINGS:  Worsening aeration in the lungs. Bilateral pleural effusions,  increasing. Bibasilar and perihilar increased lung markings  especially on the left, also worsening. The cardiac silhouette is  mildly enlarged      Impression: Findings suggestive of worsening, now moderate, pulmonary edema with  bilateral effusions. Superimposed pneumonia difficult to exclude in  the proper clinical setting      MACRO:  None      Signed by: Joe Perez 7/6/2024 8:08 PM  Dictation workstation:   [id number]      Physical Exam  Generally no acute distress  HEENT PERRL EOMI  Cardiovascular S1-S2 heard regular rate rhythm, slight JVP noted  Lungs bibasilar crackles, clear to auscultation otherwise  Abdomen bowel sounds present  Extremities no clubbing cyanosis edema    Relevant Results  Scheduled medications  allopurinol, 50 mg, oral, Daily  aspirin, 81 mg, oral, Daily  atorvastatin, 20 mg, oral,  Daily  calcitriol, 0.5 mcg, oral, Daily  carvedilol, 3.125 mg, oral, BID  cefepime, 500 mg, intravenous, Once  fluticasone, 2 spray, Each Nostril, Daily  furosemide, 40 mg, intravenous, q12h  heparin (porcine), 5,000 Units, subcutaneous, q8h SUMANTH  hydroxychloroquine, 200 mg, oral, Daily  ipratropium-albuteroL, 3 mL, nebulization, TID  levothyroxine, 50 mcg, oral, Daily before breakfast  linezolid, 600 mg, intravenous, q12h  oxygen, , inhalation, Continuous - Inhalation  oxygen, , inhalation, Continuous - Inhalation  pantoprazole, 20 mg, oral, Daily before breakfast  potassium chloride CR, 10 mEq, oral, Once  sodium bicarbonate, 25 mEq, intravenous, Once      Continuous medications     PRN medications  PRN medications: acetaminophen **OR** acetaminophen **OR** acetaminophen, dextromethorphan-guaifenesin, guaiFENesin, ipratropium-albuteroL, ondansetron ODT **OR** ondansetron, polyethylene glycol, zolpidem  Results for orders placed or performed during the hospital encounter of 07/06/24 (from the past 24 hour(s))   CBC and Auto Differential   Result Value Ref Range    WBC 8.1 4.4 - 11.3 x10*3/uL    nRBC 0.0 0.0 - 0.0 /100 WBCs    RBC 3.13 (L) 4.50 - 5.90 x10*6/uL    Hemoglobin 10.2 (L) 13.5 - 17.5 g/dL    Hematocrit 29.8 (L) 41.0 - 52.0 %    MCV 95 80 - 100 fL    MCH 32.6 26.0 - 34.0 pg    MCHC 34.2 32.0 - 36.0 g/dL    RDW 14.7 (H) 11.5 - 14.5 %    Platelets 247 150 - 450 x10*3/uL    Neutrophils % 84.0 40.0 - 80.0 %    Immature Granulocytes %, Automated 0.5 0.0 - 0.9 %    Lymphocytes % 11.3 13.0 - 44.0 %    Monocytes % 3.7 2.0 - 10.0 %    Eosinophils % 0.1 0.0 - 6.0 %    Basophils % 0.4 0.0 - 2.0 %    Neutrophils Absolute 6.84 (H) 1.60 - 5.50 x10*3/uL    Immature Granulocytes Absolute, Automated 0.04 0.00 - 0.50 x10*3/uL    Lymphocytes Absolute 0.92 0.80 - 3.00 x10*3/uL    Monocytes Absolute 0.30 0.05 - 0.80 x10*3/uL    Eosinophils Absolute 0.01 0.00 - 0.40 x10*3/uL    Basophils Absolute 0.03 0.00 - 0.10 x10*3/uL    Sars-CoV-2 PCR   Result Value Ref Range    Coronavirus 2019, PCR Not Detected Not Detected   Influenza A, and B PCR   Result Value Ref Range    Flu A Result Not Detected Not Detected    Flu B Result Not Detected Not Detected   Urinalysis with Reflex Culture and Microscopic   Result Value Ref Range    Color, Urine Colorless (N) Light-Yellow, Yellow, Dark-Yellow    Appearance, Urine Clear Clear    Specific Gravity, Urine 1.007 1.005 - 1.035    pH, Urine 5.5 5.0, 5.5, 6.0, 6.5, 7.0, 7.5, 8.0    Protein, Urine 50 (1+) (A) NEGATIVE, 10 (TRACE), 20 (TRACE) mg/dL    Glucose, Urine Normal Normal mg/dL    Blood, Urine 0.03 (TRACE) (A) NEGATIVE    Ketones, Urine NEGATIVE NEGATIVE mg/dL    Bilirubin, Urine NEGATIVE NEGATIVE    Urobilinogen, Urine Normal Normal mg/dL    Nitrite, Urine NEGATIVE NEGATIVE    Leukocyte Esterase, Urine NEGATIVE NEGATIVE   Urinalysis Microscopic   Result Value Ref Range    WBC, Urine NONE 1-5, NONE /HPF    RBC, Urine NONE NONE, 1-2, 3-5 /HPF    Mucus, Urine FEW Reference range not established. /LPF    Hyaline Casts, Urine 1+ (A) NONE /LPF   Comprehensive metabolic panel   Result Value Ref Range    Glucose 117 (H) 65 - 99 mg/dL    Sodium 124 (L) 133 - 145 mmol/L    Potassium 3.2 (L) 3.4 - 5.1 mmol/L    Chloride 81 (L) 97 - 107 mmol/L    Bicarbonate 24 24 - 31 mmol/L    Urea Nitrogen 95 (H) 8 - 25 mg/dL    Creatinine 5.70 (H) 0.40 - 1.60 mg/dL    eGFR 10 (L) >60 mL/min/1.73m*2    Calcium 9.6 8.5 - 10.4 mg/dL    Albumin 3.7 3.5 - 5.0 g/dL    Alkaline Phosphatase 89 35 - 125 U/L    Total Protein 6.8 5.9 - 7.9 g/dL    AST 13 5 - 40 U/L    Bilirubin, Total 0.5 0.1 - 1.2 mg/dL    ALT 8 5 - 40 U/L    Anion Gap 19 <=19 mmol/L   Magnesium   Result Value Ref Range    Magnesium 1.90 1.60 - 3.10 mg/dL   NT Pro-BNP   Result Value Ref Range    PROBNP >70,000 (H) 0 - 852 pg/mL   Serial Troponin, Initial ([facility])   Result Value Ref Range    Troponin T, High Sensitivity 191 (HH) <=14 ng/L   D-dimer, quantitative    Result Value Ref Range    D-Dimer Non VTE, Quant (mg/L FEU) 0.79 (H) 0.19 - 0.50 mg/L FEU   Serial Troponin, 2 Hour ([facility])   Result Value Ref Range    Troponin T, High Sensitivity 200 (HH) <=14 ng/L   Serial Troponin, 6 Hour ([facility])   Result Value Ref Range    Troponin T, High Sensitivity 206 (HH) <=14 ng/L   Serial Troponin, 6 Hour ([facility])   Result Value Ref Range    Troponin T, High Sensitivity 211 (HH) <=14 ng/L   Renal function panel   Result Value Ref Range    Glucose 132 (H) 65 - 99 mg/dL    Sodium 127 (L) 133 - 145 mmol/L    Potassium 3.1 (L) 3.4 - 5.1 mmol/L    Chloride 82 (L) 97 - 107 mmol/L    Bicarbonate 22 (L) 24 - 31 mmol/L    Urea Nitrogen 99 (H) 8 - 25 mg/dL    Creatinine 5.80 (H) 0.40 - 1.60 mg/dL    eGFR 9 (L) >60 mL/min/1.73m*2    Calcium 9.4 8.5 - 10.4 mg/dL    Phosphorus 6.7 (H) 2.5 - 4.5 mg/dL    Albumin 3.8 3.5 - 5.0 g/dL    Anion Gap >19 (H) <=19 mmol/L   PST Top   Result Value Ref Range    Extra Tube Hold for add-ons.    Blood Gas Arterial Full Panel   Result Value Ref Range    POCT pH, Arterial 7.35 (L) 7.38 - 7.42 pH    POCT pCO2, Arterial 36 (L) 38 - 42 mm Hg    POCT pO2, Arterial 77 (L) 85 - 95 mm Hg    POCT SO2, Arterial 97 94 - 100 %    POCT Oxy Hemoglobin, Arterial 94.9 94.0 - 98.0 %    POCT Hematocrit Calculated, Arterial 29.0 (L) 41.0 - 52.0 %    POCT Sodium, Arterial 122 (L) 136 - 145 mmol/L    POCT Potassium, Arterial 2.9 (LL) 3.5 - 5.3 mmol/L    POCT Chloride, Arterial 85 (L) 98 - 107 mmol/L    POCT Ionized Calcium, Arterial 1.09 (L) 1.10 - 1.33 mmol/L    POCT Glucose, Arterial 230 (H) 74 - 99 mg/dL    POCT Lactate, Arterial 4.9 (HH) 0.4 - 2.0 mmol/L    POCT Base Excess, Arterial -5.2 (L) -2.0 - 3.0 mmol/L    POCT HCO3 Calculated, Arterial 19.9 (L) 22.0 - 26.0 mmol/L    POCT Hemoglobin, Arterial 9.5 (L) 13.5 - 17.5 g/dL    POCT Anion Gap, Arterial 20 10 - 25 mmo/L    Patient Temperature 37.0 degrees Celsius    FiO2 40 %    Apparatus CANNULA     Critical Called By STEPHON WARD RRT      Critical Called To DR MELENDEZ     Critical Call Time 426     Critical Read Back Y     Critical Note HIGH LLAC AND LOW K     Site of Arterial Puncture Radial Right     Adalid's Test Positive    TSH   Result Value Ref Range    Thyroid Stimulating Hormone 18.79 (H) 0.27 - 4.20 mIU/L   CBC and Auto Differential   Result Value Ref Range    WBC 11.4 (H) 4.4 - 11.3 x10*3/uL    nRBC 0.0 0.0 - 0.0 /100 WBCs    RBC 2.86 (L) 4.50 - 5.90 x10*6/uL    Hemoglobin 9.2 (L) 13.5 - 17.5 g/dL    Hematocrit 27.0 (L) 41.0 - 52.0 %    MCV 94 80 - 100 fL    MCH 32.2 26.0 - 34.0 pg    MCHC 34.1 32.0 - 36.0 g/dL    RDW 14.4 11.5 - 14.5 %    Platelets 229 150 - 450 x10*3/uL    Neutrophils % 84.9 40.0 - 80.0 %    Immature Granulocytes %, Automated 0.3 0.0 - 0.9 %    Lymphocytes % 7.7 13.0 - 44.0 %    Monocytes % 6.8 2.0 - 10.0 %    Eosinophils % 0.0 0.0 - 6.0 %    Basophils % 0.3 0.0 - 2.0 %    Neutrophils Absolute 9.70 (H) 1.60 - 5.50 x10*3/uL    Immature Granulocytes Absolute, Automated 0.04 0.00 - 0.50 x10*3/uL    Lymphocytes Absolute 0.88 0.80 - 3.00 x10*3/uL    Monocytes Absolute 0.78 0.05 - 0.80 x10*3/uL    Eosinophils Absolute 0.00 0.00 - 0.40 x10*3/uL    Basophils Absolute 0.03 0.00 - 0.10 x10*3/uL   Renal function panel   Result Value Ref Range    Glucose 162 (H) 65 - 99 mg/dL    Sodium 127 (L) 133 - 145 mmol/L    Potassium 3.4 3.4 - 5.1 mmol/L    Chloride 81 (L) 97 - 107 mmol/L    Bicarbonate 22 (L) 24 - 31 mmol/L    Urea Nitrogen 103 (H) 8 - 25 mg/dL    Creatinine 6.30 (H) 0.40 - 1.60 mg/dL    eGFR 8 (L) >60 mL/min/1.73m*2    Calcium 9.6 8.5 - 10.4 mg/dL    Phosphorus 7.9 (H) 2.5 - 4.5 mg/dL    Albumin 3.9 3.5 - 5.0 g/dL    Anion Gap >19 (H) <=19 mmol/L   Renal function panel   Result Value Ref Range    Glucose 124 (H) 65 - 99 mg/dL    Sodium 126 (L) 133 - 145 mmol/L    Potassium 3.3 (L) 3.4 - 5.1 mmol/L    Chloride 82 (L) 97 - 107 mmol/L    Bicarbonate 26 24 - 31 mmol/L    Urea Nitrogen 104 (H) 8 - 25 mg/dL    Creatinine 6.00 (H)  0.40 - 1.60 mg/dL    eGFR 9 (L) >60 mL/min/1.73m*2    Calcium 9.6 8.5 - 10.4 mg/dL    Phosphorus 7.4 (H) 2.5 - 4.5 mg/dL    Albumin 3.7 3.5 - 5.0 g/dL    Anion Gap 18 <=19 mmol/L   Blood Gas Lactic Acid, Venous   Result Value Ref Range    POCT Lactate, Venous 1.9 0.4 - 2.0 mmol/L     Impression: 78-year-old gentleman with end-stage renal disease on peritoneal dialysis admitted for acute hypoxic respiratory failure secondary to acute decompensated HFrEF.    Plan:    1.  Acute HFrEF/CAD  -Patient is on nasal cannula now, symptomatically doing much better  -Continue with diuresis  -Continue to trend troponins likely type II injury  -Continue cardioprotective measures  -Await cardiology input    2.  Hypoxic respiratory failure  -Intermittent BiPAP was done overnight, patient on nasal cannula satting well and in no respiratory distress currently.  -Appreciate pulmonary recs, continue with antibiotics in addition to Lasix, will need to recheck a chest x-ray once diuresis to see if there is any underlying infiltrate patient does have slight leukocytosis    3.  Stage renal disease on peritoneal dialysis  -Awaiting nephrology input regarding peritoneal dialysis orders, patient with some electrolyte abnormalities, correcting as needed and will follow-up with nephrology.    4.  Hypothyroidism  -Patient's TSH elevated, checking free T4 T3, may be altered secondary to acute illness, continue with Synthroid replacement    5.  SLE  -Continue with Plaquenil    DVT prophylaxis  Further medical management pending hospital course           Assessment/Plan   This patient currently has cardiac telemetry ordered; if you would like to modify or discontinue the telemetry order, click here to go to the orders activity to modify/discontinue the order.              Principal Problem:    Acute hypoxic respiratory failure (Multi)  Active Problems:    Ischemic cardiomyopathy    Systemic lupus erythematosus (Multi)    Nonsustained ventricular  tachycardia (Multi)    Aortic valve disease    Anemia of chronic disease    Coronary artery disease    Congestive heart failure due to cardiomyopathy (Multi)    Pulmonary edema ([facility])    Bilateral pleural effusion    Hyponatremia    Hypokalemia                  Will Chakraborty MD

## 2024-07-07 NOTE — CARE PLAN
Problem: Respiratory  Goal: Patent airway maintained this shift  Outcome: Progressing  Goal: Wean oxygen to maintain O2 saturation per order/standard this shift  Outcome: Progressing

## 2024-07-07 NOTE — CARE PLAN
Problem: Respiratory  Goal: No signs of respiratory distress (eg. Use of accessory muscles. Peds grunting)  Outcome: Progressing  Goal: Patent airway maintained this shift  7/7/2024 1846 by Anuja Mckinney, RRT  Outcome: Progressing  7/7/2024 0513 by Anuja Mckinney, RRT  Outcome: Progressing  Goal: Wean oxygen to maintain O2 saturation per order/standard this shift  7/7/2024 1846 by Anuja Mckinney, RRT  Outcome: Progressing  7/7/2024 0513 by Anuja Mckinney, RRT  Outcome: Progressing

## 2024-07-07 NOTE — SIGNIFICANT EVENT
I was contacted by the patient's SDU RN .  The patient  is developing worsening tachypnea and abdominal labored breathing.  Patient made to obtain stat arterial blood gas.  Will place patient on intermittent BiPAP device.  Will transfer patient to MICU for closer monitoring.  Will call patient's daughter to update her immediately.

## 2024-07-07 NOTE — CONSULTS
Inpatient consult to Nephrology  Consult performed by: Melo Kwok MD  Consult ordered by: Darion Lindsay MD  Reason for consult: CKD 5, fluid overload        History Of Present Illness  George Rowan is a 78-year-old man with history of stage V CKD (transitioning to ESRD and recently started peritoneal dialysis training), hypertension, coronary artery disease and HFrEF, admitted with pulmonary edema after presenting to the hospital with worsening dyspnea and hypoxemia.  He denies chest pain, cough, fever or chills    Renal consultation has been requested for management of fluid overload in the setting of advanced CKD.  He is known to our service and sees Dr. Mckyo as outpatient.  He recently started peritoneal dialysis training which has been going well.  He was on high-dose torsemide 100 mg twice daily with metolazone at home but with only modest diuretic response.    Past Medical History  He has a past medical history of Cataract, Hyperlipidemia, Hypertension, Macular hole of left eye, Nephrosclerosis, Other seasonal allergic rhinitis, Other specified diabetes mellitus without complications (Multi), Personal history of diseases of the blood and blood-forming organs and certain disorders involving the immune mechanism, Personal history of other diseases of urinary system, Polyarticular gout, Pre-diabetes, Renal disorder, Spinal stenosis, and STEMI (ST elevation myocardial infarction) (Multi) (01/2019).    Surgical History  He has a past surgical history that includes CT guided imaging for needle placement (06/12/2018); CT guided imaging for needle placement (08/22/2022); Vasectomy; Cataract extraction; Retinal detachment repair w/ scleral buckle (Left, 2019); Cardiac catheterization (01/20/2019); and Renal biopsy (08/2022).     Social History  He reports that he quit smoking about 11 years ago. His smoking use included cigarettes. He has never used smokeless tobacco. He reports that he does not  "currently use alcohol. He reports that he does not use drugs.  He lives with his daughter.     Family History  Family History   Problem Relation Name Age of Onset    Kidney failure Mother      Heart failure Mother      Heart disease Mother      Prostate cancer Father      Other (stomach mass) Sister Sister 1     Cancer Sister Sister 1    Mother was on dialysis.     Medications  Scheduled medications  allopurinol, 50 mg, oral, Daily  aspirin, 81 mg, oral, Daily  atorvastatin, 20 mg, oral, Daily  calcitriol, 0.5 mcg, oral, Daily  carvedilol, 3.125 mg, oral, BID  fluticasone, 2 spray, Each Nostril, Daily  furosemide, 40 mg, intravenous, q12h  heparin (porcine), 5,000 Units, subcutaneous, q8h SUMANTH  hydroxychloroquine, 200 mg, oral, Daily  ipratropium-albuteroL, 3 mL, nebulization, TID  levothyroxine, 50 mcg, oral, Daily before breakfast  linezolid, 600 mg, intravenous, q12h  oxygen, , inhalation, Continuous - Inhalation  oxygen, , inhalation, Continuous - Inhalation  pantoprazole, 20 mg, oral, Daily before breakfast  potassium chloride CR, 10 mEq, oral, Once  sodium bicarbonate, 25 mEq, intravenous, Once      Continuous medications     PRN medications  PRN medications: acetaminophen **OR** acetaminophen **OR** acetaminophen, dextromethorphan-guaifenesin, guaiFENesin, ipratropium-albuteroL, ondansetron ODT **OR** ondansetron, polyethylene glycol, zolpidem    Allergies  Tetracaine, Azathioprine, Fluorescein-benoxinate, and Other    Review of Systems  Nurse reported hallucinations overnight.  Other 10 point ROS negative except as stated in HPI.       Physical Exam  Vitals 24HR  Heart Rate:  [70-87]   Temp:  [35.9 °C (96.6 °F)-36.9 °C (98.4 °F)]   Resp:  [12-20]   BP: (113-134)/(62-82)   Height:  [177.8 cm (5' 10\")]   Weight:  [70.3 kg (154 lb 15.7 oz)-71.2 kg (157 lb)]   SpO2:  [95 %-100 %]     General: Elderly man, not in acute distress at rest  Eyes: Pale, anicteric  Lungs: Decreased BS bibasilarly basilar rales  Heart: " S1 and S2, regular  Abdomen: Soft, nontender, PD cath in place  Extremities: No pedal edema  Neuro: Awake and interactive       I&O 24HR    Intake/Output Summary (Last 24 hours) at 7/7/2024 1013  Last data filed at 7/7/2024 0700  Gross per 24 hour   Intake --   Output 300 ml   Net -300 ml       Relevant Results  Results for orders placed or performed during the hospital encounter of 07/06/24 (from the past 24 hour(s))   CBC and Auto Differential   Result Value Ref Range    WBC 8.1 4.4 - 11.3 x10*3/uL    nRBC 0.0 0.0 - 0.0 /100 WBCs    RBC 3.13 (L) 4.50 - 5.90 x10*6/uL    Hemoglobin 10.2 (L) 13.5 - 17.5 g/dL    Hematocrit 29.8 (L) 41.0 - 52.0 %    MCV 95 80 - 100 fL    MCH 32.6 26.0 - 34.0 pg    MCHC 34.2 32.0 - 36.0 g/dL    RDW 14.7 (H) 11.5 - 14.5 %    Platelets 247 150 - 450 x10*3/uL    Neutrophils % 84.0 40.0 - 80.0 %    Immature Granulocytes %, Automated 0.5 0.0 - 0.9 %    Lymphocytes % 11.3 13.0 - 44.0 %    Monocytes % 3.7 2.0 - 10.0 %    Eosinophils % 0.1 0.0 - 6.0 %    Basophils % 0.4 0.0 - 2.0 %    Neutrophils Absolute 6.84 (H) 1.60 - 5.50 x10*3/uL    Immature Granulocytes Absolute, Automated 0.04 0.00 - 0.50 x10*3/uL    Lymphocytes Absolute 0.92 0.80 - 3.00 x10*3/uL    Monocytes Absolute 0.30 0.05 - 0.80 x10*3/uL    Eosinophils Absolute 0.01 0.00 - 0.40 x10*3/uL    Basophils Absolute 0.03 0.00 - 0.10 x10*3/uL   Sars-CoV-2 PCR   Result Value Ref Range    Coronavirus 2019, PCR Not Detected Not Detected   Influenza A, and B PCR   Result Value Ref Range    Flu A Result Not Detected Not Detected    Flu B Result Not Detected Not Detected   Urinalysis with Reflex Culture and Microscopic   Result Value Ref Range    Color, Urine Colorless (N) Light-Yellow, Yellow, Dark-Yellow    Appearance, Urine Clear Clear    Specific Gravity, Urine 1.007 1.005 - 1.035    pH, Urine 5.5 5.0, 5.5, 6.0, 6.5, 7.0, 7.5, 8.0    Protein, Urine 50 (1+) (A) NEGATIVE, 10 (TRACE), 20 (TRACE) mg/dL    Glucose, Urine Normal Normal mg/dL     Blood, Urine 0.03 (TRACE) (A) NEGATIVE    Ketones, Urine NEGATIVE NEGATIVE mg/dL    Bilirubin, Urine NEGATIVE NEGATIVE    Urobilinogen, Urine Normal Normal mg/dL    Nitrite, Urine NEGATIVE NEGATIVE    Leukocyte Esterase, Urine NEGATIVE NEGATIVE   Urinalysis Microscopic   Result Value Ref Range    WBC, Urine NONE 1-5, NONE /HPF    RBC, Urine NONE NONE, 1-2, 3-5 /HPF    Mucus, Urine FEW Reference range not established. /LPF    Hyaline Casts, Urine 1+ (A) NONE /LPF   Comprehensive metabolic panel   Result Value Ref Range    Glucose 117 (H) 65 - 99 mg/dL    Sodium 124 (L) 133 - 145 mmol/L    Potassium 3.2 (L) 3.4 - 5.1 mmol/L    Chloride 81 (L) 97 - 107 mmol/L    Bicarbonate 24 24 - 31 mmol/L    Urea Nitrogen 95 (H) 8 - 25 mg/dL    Creatinine 5.70 (H) 0.40 - 1.60 mg/dL    eGFR 10 (L) >60 mL/min/1.73m*2    Calcium 9.6 8.5 - 10.4 mg/dL    Albumin 3.7 3.5 - 5.0 g/dL    Alkaline Phosphatase 89 35 - 125 U/L    Total Protein 6.8 5.9 - 7.9 g/dL    AST 13 5 - 40 U/L    Bilirubin, Total 0.5 0.1 - 1.2 mg/dL    ALT 8 5 - 40 U/L    Anion Gap 19 <=19 mmol/L   Magnesium   Result Value Ref Range    Magnesium 1.90 1.60 - 3.10 mg/dL   NT Pro-BNP   Result Value Ref Range    PROBNP >70,000 (H) 0 - 852 pg/mL   Serial Troponin, Initial ([facility])   Result Value Ref Range    Troponin T, High Sensitivity 191 (HH) <=14 ng/L   D-dimer, quantitative   Result Value Ref Range    D-Dimer Non VTE, Quant (mg/L FEU) 0.79 (H) 0.19 - 0.50 mg/L FEU   Serial Troponin, 2 Hour ([facility])   Result Value Ref Range    Troponin T, High Sensitivity 200 (HH) <=14 ng/L   Serial Troponin, 6 Hour ([facility])   Result Value Ref Range    Troponin T, High Sensitivity 206 (HH) <=14 ng/L   Serial Troponin, 6 Hour ([facility])   Result Value Ref Range    Troponin T, High Sensitivity 211 (HH) <=14 ng/L   Renal function panel   Result Value Ref Range    Glucose 132 (H) 65 - 99 mg/dL    Sodium 127 (L) 133 - 145 mmol/L    Potassium 3.1 (L) 3.4 - 5.1 mmol/L    Chloride 82 (L) 97 - 107 mmol/L     Bicarbonate 22 (L) 24 - 31 mmol/L    Urea Nitrogen 99 (H) 8 - 25 mg/dL    Creatinine 5.80 (H) 0.40 - 1.60 mg/dL    eGFR 9 (L) >60 mL/min/1.73m*2    Calcium 9.4 8.5 - 10.4 mg/dL    Phosphorus 6.7 (H) 2.5 - 4.5 mg/dL    Albumin 3.8 3.5 - 5.0 g/dL    Anion Gap >19 (H) <=19 mmol/L   PST Top   Result Value Ref Range    Extra Tube Hold for add-ons.    Blood Gas Arterial Full Panel   Result Value Ref Range    POCT pH, Arterial 7.35 (L) 7.38 - 7.42 pH    POCT pCO2, Arterial 36 (L) 38 - 42 mm Hg    POCT pO2, Arterial 77 (L) 85 - 95 mm Hg    POCT SO2, Arterial 97 94 - 100 %    POCT Oxy Hemoglobin, Arterial 94.9 94.0 - 98.0 %    POCT Hematocrit Calculated, Arterial 29.0 (L) 41.0 - 52.0 %    POCT Sodium, Arterial 122 (L) 136 - 145 mmol/L    POCT Potassium, Arterial 2.9 (LL) 3.5 - 5.3 mmol/L    POCT Chloride, Arterial 85 (L) 98 - 107 mmol/L    POCT Ionized Calcium, Arterial 1.09 (L) 1.10 - 1.33 mmol/L    POCT Glucose, Arterial 230 (H) 74 - 99 mg/dL    POCT Lactate, Arterial 4.9 (HH) 0.4 - 2.0 mmol/L    POCT Base Excess, Arterial -5.2 (L) -2.0 - 3.0 mmol/L    POCT HCO3 Calculated, Arterial 19.9 (L) 22.0 - 26.0 mmol/L    POCT Hemoglobin, Arterial 9.5 (L) 13.5 - 17.5 g/dL    POCT Anion Gap, Arterial 20 10 - 25 mmo/L    Patient Temperature 37.0 degrees Celsius    FiO2 40 %    Apparatus CANNULA     Critical Called By STEPHON WARD, RRT     Critical Called To DR MELENDEZ     Critical Call Time 426     Critical Read Back Y     Critical Note HIGH LLAC AND LOW K     Site of Arterial Puncture Radial Right     Adalid's Test Positive    TSH   Result Value Ref Range    Thyroid Stimulating Hormone 18.79 (H) 0.27 - 4.20 mIU/L   CBC and Auto Differential   Result Value Ref Range    WBC 11.4 (H) 4.4 - 11.3 x10*3/uL    nRBC 0.0 0.0 - 0.0 /100 WBCs    RBC 2.86 (L) 4.50 - 5.90 x10*6/uL    Hemoglobin 9.2 (L) 13.5 - 17.5 g/dL    Hematocrit 27.0 (L) 41.0 - 52.0 %    MCV 94 80 - 100 fL    MCH 32.2 26.0 - 34.0 pg    MCHC 34.1 32.0 - 36.0 g/dL    RDW 14.4 11.5  - 14.5 %    Platelets 229 150 - 450 x10*3/uL    Neutrophils % 84.9 40.0 - 80.0 %    Immature Granulocytes %, Automated 0.3 0.0 - 0.9 %    Lymphocytes % 7.7 13.0 - 44.0 %    Monocytes % 6.8 2.0 - 10.0 %    Eosinophils % 0.0 0.0 - 6.0 %    Basophils % 0.3 0.0 - 2.0 %    Neutrophils Absolute 9.70 (H) 1.60 - 5.50 x10*3/uL    Immature Granulocytes Absolute, Automated 0.04 0.00 - 0.50 x10*3/uL    Lymphocytes Absolute 0.88 0.80 - 3.00 x10*3/uL    Monocytes Absolute 0.78 0.05 - 0.80 x10*3/uL    Eosinophils Absolute 0.00 0.00 - 0.40 x10*3/uL    Basophils Absolute 0.03 0.00 - 0.10 x10*3/uL   Renal function panel   Result Value Ref Range    Glucose 162 (H) 65 - 99 mg/dL    Sodium 127 (L) 133 - 145 mmol/L    Potassium 3.4 3.4 - 5.1 mmol/L    Chloride 81 (L) 97 - 107 mmol/L    Bicarbonate 22 (L) 24 - 31 mmol/L    Urea Nitrogen 103 (H) 8 - 25 mg/dL    Creatinine 6.30 (H) 0.40 - 1.60 mg/dL    eGFR 8 (L) >60 mL/min/1.73m*2    Calcium 9.6 8.5 - 10.4 mg/dL    Phosphorus 7.9 (H) 2.5 - 4.5 mg/dL    Albumin 3.9 3.5 - 5.0 g/dL    Anion Gap >19 (H) <=19 mmol/L   Renal function panel   Result Value Ref Range    Glucose 124 (H) 65 - 99 mg/dL    Sodium 126 (L) 133 - 145 mmol/L    Potassium 3.3 (L) 3.4 - 5.1 mmol/L    Chloride 82 (L) 97 - 107 mmol/L    Bicarbonate 26 24 - 31 mmol/L    Urea Nitrogen 104 (H) 8 - 25 mg/dL    Creatinine 6.00 (H) 0.40 - 1.60 mg/dL    eGFR 9 (L) >60 mL/min/1.73m*2    Calcium 9.6 8.5 - 10.4 mg/dL    Phosphorus 7.4 (H) 2.5 - 4.5 mg/dL    Albumin 3.7 3.5 - 5.0 g/dL    Anion Gap 18 <=19 mmol/L   Blood Gas Lactic Acid, Venous   Result Value Ref Range    POCT Lactate, Venous 1.9 0.4 - 2.0 mmol/L   T4, free   Result Value Ref Range    Thyroxine, Free 1.70 0.90 - 1.70 ng/dL          Assessment/Plan   78-year-old man with history of stage V CKD (transitioning to ESRD and recently started peritoneal dialysis training), hypertension, coronary artery disease and HFrEF, admitted with pulmonary edema.    Stage V CKD,  transitioning to ESRD  Hypertension  Fluid overload/pulmonary edema  Hyponatremia  Hypokalemia  Metabolic acidosis  Secondary hyperparathyroidism, on calcitriol  Anemia in CKD, at goal    He is known to our service and sees Dr. Mckoy as outpatient.  He recently started peritoneal dialysis training but has had worsening dyspnea with inadequate diuresis at home despite torsemide 100 mg twice daily and metolazone.  Chest x-ray at presentation suggested worsening pulmonary edema.      I will increase furosemide to 80 mg IV twice daily and also plan for a couple PD exchanges using 2.5% dextrose solution.  He may need short-term hemodialysis for volume control if he has inadequate ultrafiltration and/or diuresis over the next couple of days.    Hyponatremia is likely secondary to combination of heart failure and decreased free water clearance in the setting of renal failure.  I expect this to improve with peritoneal dialysis and as we optimize his volume status.    Replace KCl 40 mEq p.o. x 1 and no need for bicarbonate supplementation.    Monitor anemia without BRAD for now.    Dose meds for ESRD/PD status.    Thank you for the opportunity to participate in his care.      Melo Kwok MD

## 2024-07-07 NOTE — H&P
History Of Present Illness      George Rowan is a 78 y.o. male presenting with Shortness of Breath.    Per ED records the patient is complaining of dyspnea x 1 week.  States he had an increase in his diuretic therapy earlier in the week but still not having any relief.  Patient's daughter in the emergency room mention the patient was saturating in the low 80s and he was unable to sleep.  Patient feels like he is taking shallow breaths.  Apparently is on peritoneal dialysis currently.     Patient is companied by family member per ED who took a pulse ox at home showing patient's pulse ox at 75%.  Patient reports worsening exertional shortness of breath over the last week.  Patient does report an increase in diuretics over the last week, no improvement of symptoms.  Patient complains of severe orthopnea making it impossible for him to sleep.  Patient states it feels like he is taking half breaths.  Denies any history of DVT or PE.  Denies any recent travel or sick contacts.  Was recently discharged from the hospital on 6/19 for CHF.      Vital signs upon arrival noted for Tmax 36.4, pulse rate 70, respiratory 17, /66.  Patient saturate 95% supplemental oxygen.    Patient's ED diagnostic workup is noted for a CBC with differential that was noted for mild anemia.  H&H was 10.2/29.8.  The patient's platelet count was normal at 247.  Rapid influenza and coronavirus testing were negative.  A D-dimer was obtained by the ED staff that was elevated.  Patient's blood chemistry noted for glucose 117.  The sodium was low at 124.  Patient's potassium was low at 3.2.  His BUN was elevated 95 and his creatinine was 5.7.  Patient's urinalysis was bland.  His proBNP was greater than 70,000.  His first set troponin level was 191 followed by 200.  He denied any overt chest pain.    EKG will be ordered by me.      Off noted 2D echocardiogram from June 18, 2024 was noted for the following:      CONCLUSIONS:     1. Left  ventricular systolic function is mildly to moderately decreased with a 30-35% estimated ejection fraction.   2. Basal and mid anterolateral wall, basal and mid inferior wall, basal and mid inferolateral wall, and basal inferoseptal segment are abnormal.   3. There is a moderate pleural effusion.   4. Aortic valve sclerosis.   5. Mild aortic valve regurgitation.   6. Left ventricular cavity size is moderately dilated.   7. There are multiple wall motion abnormalities.      EKG noted for normal sinus rhythm at 75 bpm.  Possible left atrial enlargement.  Nonspecific intraventricular block.  T wave abnormality, consider anterolateral ischemia.  QTc 509 ms.           Past Medical History        Past Medical History:   Diagnosis Date    Cataract     Hyperlipidemia     Hypertension     Macular hole of left eye     Nephrosclerosis     Other seasonal allergic rhinitis     Seasonal allergies    Other specified diabetes mellitus without complications (Multi)     Diabetes mellitus of other type without complication    Personal history of diseases of the blood and blood-forming organs and certain disorders involving the immune mechanism     History of autoimmune disorder    Personal history of other diseases of urinary system     History of kidney disease    Polyarticular gout     Pre-diabetes     Renal disorder     Spinal stenosis     STEMI (ST elevation myocardial infarction) (Multi) 01/2019           Surgical History          Past Surgical History:   Procedure Laterality Date    CARDIAC CATHETERIZATION  01/20/2019    CATARACT EXTRACTION      CT GUIDED IMAGING FOR NEEDLE PLACEMENT  06/12/2018    CT GUIDED IMAGING FOR NEEDLE PLACEMENT [facility]    CT GUIDED IMAGING FOR NEEDLE PLACEMENT  08/22/2022    CT GUIDED IMAGING FOR NEEDLE PLACEMENT [facility]    RENAL BIOPSY  08/2022    RETINAL DETACHMENT REPAIR W/ SCLERAL BUCKLE LE Left 2019    VASECTOMY              Social History      He reports that he quit smoking  about 11 years ago. His smoking use included cigarettes. He has never used smokeless tobacco. He reports that he does not currently use alcohol. He reports that he does not use drugs.        Family History        Family History   Problem Relation Name Age of Onset    Kidney failure Mother      Heart failure Mother      Heart disease Mother      Prostate cancer Father      Other (stomach mass) Sister Sister 1     Cancer Sister Sister 1             Allergies        Tetracaine, Azathioprine, Fluorescein-benoxinate, and Other        Review of Systems      14-point ROS otherwise negative, as per HPI/Interval History.    General: No change in weight. YEs weakenss. No Fevers/Chills/Night Sweats   Skin: No skin/hair/nail changes. No rashes or sores.  Head:  No trauma. No Headache/nasuea/vomitting.   Eyes: No visual changes. No tearing. No itching.   Ears: No hearing loss. No tinnitus. No vertigo. No discharge.  Nose, Sinuses: No rhinorrhea, No nasal congestion. No epistaxis.  Mouth, Throat, Neck: No bleeding gums, hoarseness, sore throat or swollen neck  Cardiac: No palpitations. No RAMON. No PND. No Orthopnea.   Respiratory: YEs Shortness of Breath. No wheezing. No cough. No hemoptysis.   GI: No nausea/vomiting. No indigestion. No diarrhea. No constipation.   Extremities: No numbness or tingling. No paresthesias.   Urinary: No change in urinary frequency. No change in hesitancy. No hematuria. No incontinence.       Physical Exam        Constitutional:  Pleasant  Eyes: PERRL, EOMI,   ENMT: mucous membranes moist  Head/Neck: Neck supple, No JVD,   Respiratory/Thorax: Bilateral crackles  Cardiovascular: Regular, rate and rhythm, no murmurs  Gastrointestinal: Soft, non-distended, +BS. PD catheter  Musculoskeletal: ROM intact, no joint swelling, normal strength  Extremities: peripheral pulses intact; no edema  Neurological: Alert and Oriented x 3; no focal deficits; gross motor and sensation intact; CN II-XII intact. No  "asterixis.  Psychological: Appropriate mood and behavior  Skin: No lesions, No rashes.         Last Recorded Vitals  Blood pressure 116/79, pulse 70, temperature 36.4 °C (97.5 °F), resp. rate 20, height 1.778 m (5' 10\"), weight 71.2 kg (157 lb), SpO2 99%.    Relevant Results    Lab Results   Component Value Date    WBC 8.1 07/06/2024    HGB 10.2 (L) 07/06/2024    HCT 29.8 (L) 07/06/2024    MCV 95 07/06/2024     07/06/2024       Lab Results   Component Value Date    GLUCOSE 117 (H) 07/06/2024    CALCIUM 9.6 07/06/2024     (L) 07/06/2024    K 3.2 (L) 07/06/2024    CO2 24 07/06/2024    CL 81 (L) 07/06/2024    BUN 95 (H) 07/06/2024    CREATININE 5.70 (H) 07/06/2024       Lab Results   Component Value Date    HGBA1C 6.2 (H) 05/28/2024         No CT head results found for the past 12 months      Scheduled medications  [START ON 7/7/2024] allopurinol, 150 mg, oral, Daily  [START ON 7/7/2024] aspirin, 81 mg, oral, Daily  [START ON 7/7/2024] atorvastatin, 20 mg, oral, Daily  [START ON 7/7/2024] calcitriol, 0.5 mcg, oral, Daily  [START ON 7/7/2024] carvedilol, 3.125 mg, oral, BID  [START ON 7/7/2024] fluticasone, 2 spray, Each Nostril, Daily  [START ON 7/7/2024] furosemide, 40 mg, intravenous, q12h  [START ON 7/7/2024] gabapentin, 300 mg, oral, Daily  heparin (porcine), 5,000 Units, subcutaneous, q8h SUMANTH  [START ON 7/7/2024] hydroxychloroquine, 200 mg, oral, Daily  [START ON 7/7/2024] levothyroxine, 50 mcg, oral, Daily before breakfast  [START ON 7/7/2024] pantoprazole, 20 mg, oral, Daily before breakfast      Continuous medications     PRN medications  PRN medications: acetaminophen **OR** acetaminophen **OR** acetaminophen, dextromethorphan-guaifenesin, guaiFENesin, ipratropium-albuteroL, ondansetron ODT **OR** ondansetron, polyethylene glycol        Assessment/Plan   Principal Problem:    Acute hypoxic respiratory failure (Multi)  Active Problems:    Ischemic cardiomyopathy    Systemic lupus erythematosus " (Multi)    Nonsustained ventricular tachycardia (Multi)    Aortic valve disease    Anemia of chronic disease    Coronary artery disease    Congestive heart failure due to cardiomyopathy (Multi)    Pulmonary edema (HHS-HCC)    Bilateral pleural effusion    Hyponatremia    Hypokalemia        George Rowan is a 78 y.o. male presenting with Shortness of Breath.  Patient admitted for further evaluation and management.        Acute Hypoxic Respiratory Failure     Admit to stepdown unit  Continuous telemetry monitoring and pulse oximetry  Most probably secondary to decompensated heart failure with concomitant pulmonary edema  Difficult to rule out bilateral pneumonia  IV diuresis  Will add empiric IV antibiotics  Sputum culture  Strict intake and output  Daily weights  Wean Oxygen as tolerated  Will consider obtaining arterial blood gas if patient's respiratory status declines        Pulmonary Edema/B/L Pleural Effusions/Presumed Decompensated Heart Failure (HFrEF)     Management as above  Recent repeat 2D echocardiogram noted  May need ICD in near Future      Hyponatremia/Hypokalemia    Neurochecks and Seizure Precaution  Will add on a TSH level for AM  Continue with Fluid Restriction of 1.2 L  Requested Urine Sodium and Urine Osm levels  Monitor Sodium Level to avoid correction  > 6-8 mmol per 24 hour period as possible  Serial RFP levels  Nephrology Consulation has been placed. Appreciate Recs.  Will hold potassium supplementation and ESRD      Elevated D-dimer level    This was ordered by the ED staff  I will defer the necessity for further imaging to our pulmonary team  Appreciate recommendation        Mild Aortic Stenosis/Pulmonary Edema      Continue with IV diuretics        Carotid Artery Stenosis     Patient follows with Dr. Reid  Continue aspirin and statin therapy        ESRD on PD     Avoid nephrotoxic agents  Nephrology consultation placed  Will order IV Lasix  Patient is torsemide 50 mg p.o. daily  Defer  further diuretics to nephrology specialist  ED spoke with Dr. Kwok who stated the patient is appropriate for tripoint and no need for urgent temporary HD dialysis catheter placement       Type II  MI Demand Ischemia versus NSTEMI     Patient denies any chest pain  Cardiac enzymes are actually downtrending from last hospitalization  Likely can [place] to ESRD and pulmonary edema       Anemia of chronic kidney disease    Defer EPO dose to Nephrology Team       CAD s/p PCI with Stents     He has roughly 3 stents that were placed by Dr. Valdez  Continue aspirin and statin therapy        Systemic Lupus Erythematosus     Continue hydroxychloroquine home dose      Gout    Continue with Allopurinol Renally adjusted dose per PharmD        Hypothyroidism     Continue home levothyroxine dose        BPH     Continue home Flomax dose        Hypertension      Continue to monitor BP and adjust hypertensive medications accordingly        Insomnia     Patient takes ramelteon which if NF here  Patient is requesting Ambien         GI + DVT Prophylaxis        Low-dose oral PPI  Heparin subcu               This Dictation was Transcribed using a Nuance Dragon Voice Recognition System Device (with Compatible Computer + Software) and as such may contain Grammatical Errors and Unintentional Typing Misprints.      I spent 32 minutes in the professional and overall care of this patient.      Darion Lindsay MD

## 2024-07-07 NOTE — CONSULTS
Consults  History Of Present Illness:    George Rowan is a 78 y.o. male presenting with shortness of breath.    The patient is a 78-year-old male who does have a history of coronary artery disease with an ischemic congestive cardiomyopathy.  The patient originally had a posterior wall ST elevation MI in 2/2019.  At that time cardiac catheterization demonstrated mild disease within the LMCA, a 60 to 70% calcified proximal to mid LAD stenosis with an intramyocardial bridge distally, complete occlusion of the mid LCx, and a small nondominant RCA with a 60 to 70% proximal stenosis.  The left ventricular ejection fraction was 55 to 59% by echo with posterior wall hypokinesis.  The patient ultimately underwent drug-eluting 2 overlapping stents to the LCx.  The patient was admitted to [facility] on 6/17/2024 with decompensated CHF large left pleural effusion for which she was diuresed.  His echocardiogram at that time demonstrated an LV ejection fraction of 30-35% with severe hypokinesis of the inferoposterior wall.  He was not placed on an ACE inhibitor or ARB or Arni at that time nor spironolactone because of his chronic kidney disease.  In addition the patient has severe carotid vascular disease with a greater than 70% stenosis to near occlusion of the left internal carotid artery and a 50 to 69% stenosis of the right internal carotid artery.  The patient also has stage V chronic kidney disease currently undergoing training for peritoneal dialysis.  At the time of his discharge she evidently was started on carvedilol 6.25 mg twice daily losartan 100 mg daily.  Patient's past medical history also includes former smoking smoking cessation 11 years.    Recently the patient has been struggling with increasing shortness of breath and difficulty sleeping.  He had not slept for 2 consecutive nights.  His daughter called for medical advice and stated that he had been on torsemide 100 mg twice daily with a recent  prescription for metabolism 5 mg.  His O2 saturations at home in the absence of supplemental oxygen were in the 85% range.  Based on this information it was advised that the patient be brought to the hospital.  Upon evaluation in the emergency room the patient had a CBC which included an hematocrit of 29.8.  Nasal swab was negative.  Comprehensive metabolic panel included serum sodium 124 potassium 3.2 BUN 95 creatinine 5.7.  proBNP was greater than 70,000.  The initial troponin was 191 repeated at 200.  The patient has been started IV Lasix 80 mg twice daily.  He currently is in sinus rhythm sinus bradycardia on carvedilol 3.125 mg twice daily.    Last Recorded Vitals:  Vitals:    07/07/24 0509 07/07/24 0800 07/07/24 0859 07/07/24 0905   BP:   117/73    BP Location:   Right arm    Patient Position:   Lying    Pulse:   80    Resp:       Temp:    36.9 °C (98.4 °F)   TempSrc:    Temporal   SpO2: 98% 95% 95%    Weight:       Height:           Last Labs:  CBC - 7/7/2024:  5:42 AM  11.4 9.2 229    27.0      CMP - 7/7/2024:  8:16 AM  9.6 6.8 13 --- 0.5   7.4 3.7 8 89      PTT - 8/22/2023:  1:24 PM  1.0   10.9 27.3     Hemoglobin A1C   Date/Time Value Ref Range Status   05/28/2024 11:02 AM 6.2 (H) 4.3 - 5.6 % Final     Comment:     American Diabetes Association guidelines indicate that patients with HgbA1c in the range 5.7-6.4% are at increased risk for development of diabetes, and intervention by lifestyle modification may be beneficial. HgbA1c greater or equal to 6.5% is considered diagnostic of diabetes.   08/22/2023 01:24 PM 6.0 4.0 - 6.0 % Final     Comment:     Hemoglobin A1C levels are related to mean blood glucose during the   preceding 2-3 months. The relationship table below may be used as a   general guide. Each 1% increase in HGB A1C is a reflection of an   increase in mean glucose of approximately 30 mg/dl.   Reference: Diabetes Care, volume 29, supplement 1 Jan. 2006                        HGB A1C  "................. Approx. Mean Glucose   _______________________________________________   6%   ...............................  120 mg/dl   7%   ...............................  150 mg/dl   8%   ...............................  180 mg/dl   9%   ...............................  210 mg/dl   10%  ...............................  240 mg/dl  Performed at [address] [address]     03/29/2023 09:09 AM 5.8 4.0 - 6.0 % Final     Comment:     Hemoglobin A1C levels are related to mean blood glucose during the   preceding 2-3 months. The relationship table below may be used as a   general guide. Each 1% increase in HGB A1C is a reflection of an   increase in mean glucose of approximately 30 mg/dl.   Reference: Diabetes Care, volume 29, supplement 1 Jan. 2006                        HGB A1C ................. Approx. Mean Glucose   _______________________________________________   6%   ...............................  120 mg/dl   7%   ...............................  150 mg/dl   8%   ...............................  180 mg/dl   9%   ...............................  210 mg/dl   10%  ...............................  240 mg/dl  Performed at [address] [address]       LDL Calculated   Date/Time Value Ref Range Status   04/24/2023 09:15 AM 76 65 - 130 MG/DL Final   10/13/2022 09:19 AM 63 (L) 65 - 130 MG/DL Final   04/05/2022 10:40 AM 58 (L) 65 - 130 MG/DL Final      Last I/O:  No intake/output data recorded.    Past Cardiology Tests (Last 3 Years):  EKG:  ECG 12 lead 06/17/2024      Electrocardiogram, 12-lead PRN ACS symptoms 06/17/2024      ECG 12 lead 10/16/2023    Echo:  Transthoracic Echo (TTE) Complete 06/18/2024    Ejection Fractions:  No results found for: \"EF\"  Cath:  No results found for this or any previous visit from the past 1095 days.    Stress Test:  Nuclear Stress Test 05/07/2024    Cardiac Imaging:  No results found for this or any previous visit from the past 1095 " days.      Past Medical History:  He has a past medical history of Cataract, Hyperlipidemia, Hypertension, Macular hole of left eye, Nephrosclerosis, Other seasonal allergic rhinitis, Other specified diabetes mellitus without complications (Multi), Personal history of diseases of the blood and blood-forming organs and certain disorders involving the immune mechanism, Personal history of other diseases of urinary system, Polyarticular gout, Pre-diabetes, Renal disorder, Spinal stenosis, and STEMI (ST elevation myocardial infarction) (Multi) (01/2019).    Past Surgical History:  He has a past surgical history that includes CT guided imaging for needle placement (06/12/2018); CT guided imaging for needle placement (08/22/2022); Vasectomy; Cataract extraction; Retinal detachment repair w/ scleral buckle (Left, 2019); Cardiac catheterization (01/20/2019); and Renal biopsy (08/2022).      Social History:  He reports that he quit smoking about 11 years ago. His smoking use included cigarettes. He has never used smokeless tobacco. He reports that he does not currently use alcohol. He reports that he does not use drugs.    Family History:  Family History   Problem Relation Name Age of Onset    Kidney failure Mother      Heart failure Mother      Heart disease Mother      Prostate cancer Father      Other (stomach mass) Sister Sister 1     Cancer Sister Sister 1         Allergies:  Tetracaine, Azathioprine, Fluorescein-benoxinate, and Other    Inpatient Medications:  Scheduled medications   Medication Dose Route Frequency    allopurinol  50 mg oral Daily    aspirin  81 mg oral Daily    atorvastatin  20 mg oral Daily    calcitriol  0.5 mcg oral Daily    carvedilol  3.125 mg oral BID    fluticasone  2 spray Each Nostril Daily    furosemide  40 mg intravenous Once    furosemide  80 mg intravenous q12h    heparin (porcine)  5,000 Units subcutaneous q8h [facility]    hydroxychloroquine  200 mg oral Daily    ipratropium-albuteroL  3 mL  nebulization TID    levothyroxine  50 mcg oral Daily before breakfast    linezolid  600 mg intravenous q12h    oxygen   inhalation Continuous - Inhalation    oxygen   inhalation Continuous - Inhalation    pantoprazole  20 mg oral Daily before breakfast    potassium chloride CR  10 mEq oral Once    sodium bicarbonate  25 mEq intravenous Once     PRN medications   Medication    acetaminophen    Or    acetaminophen    Or    acetaminophen    dextromethorphan-guaifenesin    guaiFENesin    ipratropium-albuteroL    ondansetron ODT    Or    ondansetron    polyethylene glycol    zolpidem     Continuous Medications   Medication Dose Last Rate     Outpatient Medications:  Current Outpatient Medications   Medication Instructions    acetaminophen (TYLENOL) 650 mg, oral, 3 times daily    allopurinol (ZYLOPRIM) 150 mg, oral, Daily    aspirin 81 mg capsule 1 tablet, oral, Daily    atorvastatin (LIPITOR) 20 mg, oral, Daily    calcitriol (ROCALTROL) 0.5 mcg, oral, Daily    carvedilol (COREG) 3.125 mg, oral, 2 times daily    epoetin clyde (EPOGEN,PROCRIT) 10,000 Units, subcutaneous, Patients receives on wednesdays    fluticasone (Flonase) 50 mcg/actuation nasal spray 2 sprays, Each Nostril, Daily    gabapentin (NEURONTIN) 300 mg, oral, Daily    gentamicin (Garamycin) 0.1 % cream apply to peritoneal dialysis catheter exit site DAILY and AS NEEDED    hydroxychloroquine (Plaquenil) 200 mg tablet oral, Daily    levothyroxine (SYNTHROID, LEVOXYL) 50 mcg, oral, Daily before breakfast, Take on an empty stomach.    torsemide (DEMADEX) 50 mg, oral, Daily    vitamin B complex (SUPER B-50 COMPLEX ORAL) 1 tablet, oral, Daily    zolpidem (AMBIEN) 5 mg, oral, Nightly PRN       Physical Exam:  The patient is a upper elderly  male sitting in bedside chair.  He appears relatively comfortable no respiratory distress on the supplemental O2 nasal oxygen.  JVP not elevated carotid and pulses 2+  Chest fair air movement breath sounds are slightly  diminished at the bases  Cardiac rhythm is regular no premature beats S1-S2 normal minimal systolic murmur  Abdomen is soft not focally tender  No peripheral edema.     Assessment/Plan     7/7: The patient is a 78-year-old male who has a history of coronary artery disease with acute posterior wall myocardial infarction with documented triple-vessel coronary artery disease including a complete 100% mid LCx occlusion for which overlapping drug-eluting stents were deployed in 2/2019.  The patient also has an ischemic congestive cardiomyopathy with recent echocardiogram in 6/2024 estimating the LV ejection fraction  At 30-35% with segmental wall motion abnormalities.  Patient also has severe carotid vascular disease left greater than right.  He is admitted with increasing shortness of breath due to pleural effusions related to his ischemic congestive cardiomyopathy and progressive chronic kidney disease.  Patient to be placed on more intense diuretic therapy and peritoneal dialysis though potentially might require short-term hemodialysis for fluid removal.  Patient will require supplemental nasal oxygen when prepared for home discharge.  Peripheral IV 07/06/24 20 G Left Antecubital (Active)   Site Assessment Clean;Dry;Intact 07/06/24 1605   Line Status Blood return noted 07/06/24 1605   Dressing Status Clean;Dry 07/06/24 1605   Number of days: 1       Code Status:  Full Code    I spent 30 minutes in the professional and overall care of this patient.        Maurilio Rodriguez MD

## 2024-07-07 NOTE — NURSING NOTE
Patient states he does not want to go on the bipap. He didn't like it this morning and doesn't want back on it.   Patient shows no signs of respiratory distress. RR 17, spo2 99% on 2Lpm. BS diminished clear.   Told patient we will place him on the bipap if he starts to show any signs of distress. Patient and family said ok

## 2024-07-07 NOTE — CONSULTS
Inpatient consult to Pulmonology  Consult performed by: Joseph Smith MD  Consult ordered by: Darion Lindsay MD          Critical Care Consult    Reason For Consult  Shortness of breath.    History Of Present Illness  George Rowan is a 78 y.o. male presenting with Acute hypoxic respiratory failure (Multi).  Shortness of breath x 1 week.  History of renal failure.  Currently on peritoneal dialysis at home. Did increase his diuretic therapy for about a week at home.  Pulse ox was 75% at home.-Severe orthopnea.  Initially on oxygen.  Overnight he got up and became more short of breath.  He also received Ambien last night.  He was placed on BiPAP.    Past Medical History  He has a past medical history of Cataract, Hyperlipidemia, Hypertension, Macular hole of left eye, Nephrosclerosis, Other seasonal allergic rhinitis, Other specified diabetes mellitus without complications (Multi), Personal history of diseases of the blood and blood-forming organs and certain disorders involving the immune mechanism, Personal history of other diseases of urinary system, Polyarticular gout, Pre-diabetes, Renal disorder, Spinal stenosis, and STEMI (ST elevation myocardial infarction) (Multi) (01/2019).    Surgical History  He has a past surgical history that includes CT guided imaging for needle placement (06/12/2018); CT guided imaging for needle placement (08/22/2022); Vasectomy; Cataract extraction; Retinal detachment repair w/ scleral buckle (Left, 2019); Cardiac catheterization (01/20/2019); and Renal biopsy (08/2022).     Social History  He reports that he quit smoking about 11 years ago. His smoking use included cigarettes. He has never used smokeless tobacco. He reports that he does not currently use alcohol. He reports that he does not use drugs.    Family History  Family History   Problem Relation Name Age of Onset    Kidney failure Mother      Heart failure Mother      Heart disease Mother      Prostate cancer Father    "   Other (stomach mass) Sister Sister 1     Cancer Sister Sister 1         Allergies  Tetracaine, Azathioprine, Fluorescein-benoxinate, and Other    Review of Systems   All other systems reviewed and are negative.      Last Recorded Vitals  Blood pressure 113/62, pulse 87, temperature 35.9 °C (96.6 °F), temperature source Temporal, resp. rate 12, height 1.778 m (5' 10\"), weight 70.3 kg (154 lb 15.7 oz), SpO2 95%.    Intake/Output    Intake/Output Summary (Last 24 hours) at 7/7/2024 0853  Last data filed at 7/7/2024 0700  Gross per 24 hour   Intake --   Output 300 ml   Net -300 ml       Physical Exam  Vitals reviewed.   Constitutional:       Appearance: Normal appearance.   HENT:      Head: Normocephalic and atraumatic.      Mouth/Throat:      Mouth: Mucous membranes are moist.   Eyes:      Extraocular Movements: Extraocular movements intact.      Pupils: Pupils are equal, round, and reactive to light.   Cardiovascular:      Rate and Rhythm: Normal rate and regular rhythm.   Pulmonary:      Effort: Pulmonary effort is normal.      Breath sounds: Normal breath sounds and air entry.   Abdominal:      General: Abdomen is flat. Bowel sounds are normal.      Palpations: Abdomen is soft.   Musculoskeletal:         General: Normal range of motion.      Cervical back: Normal range of motion and neck supple.   Skin:     General: Skin is warm and dry.   Neurological:      General: No focal deficit present.      Mental Status: He is alert and oriented to person, place, and time. Mental status is at baseline.         Oxygen Therapy  SpO2: 95 %  Medical Gas Therapy: Supplemental oxygen  O2 Delivery Method: CPAP/Bi-PAP mask  FiO2 (%):  [40 %] 40 %    Lines and Tubes:  Peripheral IV 07/06/24 20 G Left Antecubital (Active)   Placement Date/Time: 07/06/24 1604   Hand Hygiene Completed: Yes  Size (Gauge): 20 G  Orientation: Left  Location: Antecubital  Site Prep: Alcohol  Technique: Anatomical landmarks  Placed by: sf  Insertion " attempts: 1  Patient Tolerance: Tolerated well   Number of days: 0         Scheduled medications  allopurinol, 50 mg, oral, Daily  aspirin, 81 mg, oral, Daily  atorvastatin, 20 mg, oral, Daily  calcitriol, 0.5 mcg, oral, Daily  carvedilol, 3.125 mg, oral, BID  cefepime, 500 mg, intravenous, Once  fluticasone, 2 spray, Each Nostril, Daily  furosemide, 40 mg, intravenous, q12h  heparin (porcine), 5,000 Units, subcutaneous, q8h SUMANTH  hydroxychloroquine, 200 mg, oral, Daily  ipratropium-albuteroL, 3 mL, nebulization, TID  levothyroxine, 50 mcg, oral, Daily before breakfast  linezolid, 600 mg, intravenous, q12h  oxygen, , inhalation, Continuous - Inhalation  oxygen, , inhalation, Continuous - Inhalation  pantoprazole, 20 mg, oral, Daily before breakfast  potassium chloride CR, 10 mEq, oral, Once  sodium bicarbonate, 25 mEq, intravenous, Once      Continuous medications     PRN medications  PRN medications: acetaminophen **OR** acetaminophen **OR** acetaminophen, dextromethorphan-guaifenesin, guaiFENesin, ipratropium-albuteroL, ondansetron ODT **OR** ondansetron, polyethylene glycol, zolpidem    Relevant Results  Results from last 7 days   Lab Units 07/07/24  0542 07/06/24  1603   WBC AUTO x10*3/uL 11.4* 8.1   HEMOGLOBIN g/dL 9.2* 10.2*   HEMATOCRIT % 27.0* 29.8*   PLATELETS AUTO x10*3/uL 229 247      Results from last 7 days   Lab Units 07/07/24  0816 07/07/24  0542 07/07/24  0142 07/06/24  1831   SODIUM mmol/L  --  127* 127* 124*   POTASSIUM mmol/L  --  3.4 3.1* 3.2*   CHLORIDE mmol/L  --  81* 82* 81*   CO2 mmol/L  --  22* 22* 24   BUN mg/dL 104* 103* 99* 95*   CREATININE mg/dL  --  6.30* 5.80* 5.70*   GLUCOSE mg/dL 124* 162* 132* 117*   CALCIUM mg/dL 9.6 9.6 9.4 9.6      Results from last 7 days   Lab Units 07/07/24  0419   POCT PH, ARTERIAL pH 7.35*   POCT PCO2, ARTERIAL mm Hg 36*   POCT PO2, ARTERIAL mm Hg 77*   POCT HCO3 CALCULATED, ARTERIAL mmol/L 19.9*   POCT BASE EXCESS, ARTERIAL mmol/L -5.2*     XR chest 1 view  07/06/2024    Narrative  Interpreted By:  Joe Perez,  STUDY:  XR CHEST 1 VIEW;  7/6/2024 8:06 pm    INDICATION:  Signs/Symptoms:Shortness of breath.    COMPARISON:  07/01/2024    ACCESSION NUMBER(S):  [id number]    ORDERING CLINICIAN:  BEE MONIQUE    FINDINGS:  Worsening aeration in the lungs. Bilateral pleural effusions,  increasing. Bibasilar and perihilar increased lung markings  especially on the left, also worsening. The cardiac silhouette is  mildly enlarged    Impression  Findings suggestive of worsening, now moderate, pulmonary edema with  bilateral effusions. 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  Suspect mostly volume overload  Continue with BiPAP.  Continue with IV diuresis. / peritoneal dialysis  Renal following  Chest x-ray shows signs of worsening edema.  Cannot rule out possible pneumonia  Continue with antibiotics for now but can likely de-escalate soon.  Likely DC BiPAP this morning.  Previous ABG does not show signs of CO2 retention    Renal failure  Peritoneal dialysis  IV Lasix ordered.  To torsemide.  Nephrology following    Pulmonary edema  See above  Repeat echo    Hyponatremia/hypokalemia  Continue to replace.  Nephrology following      Joseph Smith MD      This critically ill patient continues to be at-risk for deterioration / failure due to the above mentioned dysfunctional unstable organ systems.   Critical care time is spent at bedside  includes review of diagnostic tests, labs, and radiographs, serial assessments and management of hemodynamics, respiratory status, and coordination of care with different members of interdisciplinary team  Assessment, impression and plans are reflected in the note above as well as the orders.     Critical concerns addressed:  Acute respiratory failure    Pulm edema  End-stage renal disease  Hyponatremia  Hypokalemia    Time Spent in critical Care, exclusive of procedures : 32 mins.     Disclaimer: Parts of this chart were dictated with voice recognition software. Please excuse any errors of grammar, spelling, or transcription which are not corrected. Please contact me with any questions regarding documentation.

## 2024-07-08 ENCOUNTER — APPOINTMENT (OUTPATIENT)
Dept: RADIOLOGY | Facility: HOSPITAL | Age: 79
DRG: 280 | End: 2024-07-08
Payer: MEDICARE

## 2024-07-08 ENCOUNTER — APPOINTMENT (OUTPATIENT)
Dept: CARDIOLOGY | Facility: HOSPITAL | Age: 79
DRG: 280 | End: 2024-07-08
Payer: MEDICARE

## 2024-07-08 LAB
ALBUMIN SERPL-MCNC: 3.7 G/DL (ref 3.5–5)
ANION GAP SERPL CALC-SCNC: 18 MMOL/L
ATRIAL RATE: 75 BPM
BUN SERPL-MCNC: 101 MG/DL (ref 8–25)
CALCIUM SERPL-MCNC: 9.4 MG/DL (ref 8.5–10.4)
CHLORIDE SERPL-SCNC: 83 MMOL/L (ref 97–107)
CO2 SERPL-SCNC: 24 MMOL/L (ref 24–31)
CREAT SERPL-MCNC: 6.2 MG/DL (ref 0.4–1.6)
EGFRCR SERPLBLD CKD-EPI 2021: 9 ML/MIN/1.73M*2
GLUCOSE SERPL-MCNC: 130 MG/DL (ref 65–99)
P AXIS: 11 DEGREES
P OFFSET: 160 MS
P ONSET: 100 MS
PHOSPHATE SERPL-MCNC: 7 MG/DL (ref 2.5–4.5)
POTASSIUM SERPL-SCNC: 3.3 MMOL/L (ref 3.4–5.1)
PR INTERVAL: 192 MS
Q ONSET: 212 MS
QRS COUNT: 13 BEATS
QRS DURATION: 140 MS
QT INTERVAL: 456 MS
QTC CALCULATION(BAZETT): 509 MS
QTC FREDERICIA: 490 MS
R AXIS: -1 DEGREES
SODIUM SERPL-SCNC: 125 MMOL/L (ref 133–145)
T AXIS: 204 DEGREES
T OFFSET: 440 MS
VENTRICULAR RATE: 75 BPM

## 2024-07-08 PROCEDURE — 94640 AIRWAY INHALATION TREATMENT: CPT

## 2024-07-08 PROCEDURE — 36415 COLL VENOUS BLD VENIPUNCTURE: CPT | Performed by: INTERNAL MEDICINE

## 2024-07-08 PROCEDURE — 90947 DIALYSIS REPEATED EVAL: CPT

## 2024-07-08 PROCEDURE — 2500000002 HC RX 250 W HCPCS SELF ADMINISTERED DRUGS (ALT 637 FOR MEDICARE OP, ALT 636 FOR OP/ED): Performed by: INTERNAL MEDICINE

## 2024-07-08 PROCEDURE — 99232 SBSQ HOSP IP/OBS MODERATE 35: CPT | Performed by: INTERNAL MEDICINE

## 2024-07-08 PROCEDURE — 2500000004 HC RX 250 GENERAL PHARMACY W/ HCPCS (ALT 636 FOR OP/ED): Performed by: INTERNAL MEDICINE

## 2024-07-08 PROCEDURE — 2500000005 HC RX 250 GENERAL PHARMACY W/O HCPCS: Performed by: INTERNAL MEDICINE

## 2024-07-08 PROCEDURE — 2500000001 HC RX 250 WO HCPCS SELF ADMINISTERED DRUGS (ALT 637 FOR MEDICARE OP): Performed by: INTERNAL MEDICINE

## 2024-07-08 PROCEDURE — 71045 X-RAY EXAM CHEST 1 VIEW: CPT | Performed by: RADIOLOGY

## 2024-07-08 PROCEDURE — 94660 CPAP INITIATION&MGMT: CPT

## 2024-07-08 PROCEDURE — 9420000001 HC RT PATIENT EDUCATION 5 MIN

## 2024-07-08 PROCEDURE — 71045 X-RAY EXAM CHEST 1 VIEW: CPT

## 2024-07-08 PROCEDURE — 97165 OT EVAL LOW COMPLEX 30 MIN: CPT | Mod: GO

## 2024-07-08 PROCEDURE — 80069 RENAL FUNCTION PANEL: CPT | Performed by: INTERNAL MEDICINE

## 2024-07-08 PROCEDURE — 2060000001 HC INTERMEDIATE ICU ROOM DAILY

## 2024-07-08 PROCEDURE — 93005 ELECTROCARDIOGRAM TRACING: CPT

## 2024-07-08 RX ORDER — METOLAZONE 5 MG/1
10 TABLET ORAL DAILY
Status: DISCONTINUED | OUTPATIENT
Start: 2024-07-08 | End: 2024-07-09 | Stop reason: HOSPADM

## 2024-07-08 ASSESSMENT — COGNITIVE AND FUNCTIONAL STATUS - GENERAL
DRESSING REGULAR LOWER BODY CLOTHING: A LITTLE
TOILETING: A LITTLE
DAILY ACTIVITIY SCORE: 20
HELP NEEDED FOR BATHING: A LITTLE
DRESSING REGULAR UPPER BODY CLOTHING: A LITTLE

## 2024-07-08 ASSESSMENT — PAIN - FUNCTIONAL ASSESSMENT
PAIN_FUNCTIONAL_ASSESSMENT: 0-10

## 2024-07-08 ASSESSMENT — PAIN SCALES - GENERAL
PAINLEVEL_OUTOF10: 0 - NO PAIN

## 2024-07-08 ASSESSMENT — ACTIVITIES OF DAILY LIVING (ADL)
BATHING_ASSISTANCE: STAND BY
ADL_ASSISTANCE: INDEPENDENT

## 2024-07-08 NOTE — PROGRESS NOTES
Occupational Therapy    Evaluation    Patient Name: George Rowan  MRN: [id number]  Today's Date: 7/8/2024  Time Calculation  Start Time: 0734  Stop Time: 0745  Time Calculation (min): 11 min    Assessment  IP OT Assessment  OT Assessment: Patient is a 78 year old male admitted with acute hypoxic respiratory failure. Patient is presenting slightly below baseline level with a decline in strength and activity tolerance. He would benefit from skilled OT to increase patient's safety and independence with daily tasks.  Prognosis: Good  Barriers to Discharge: Other (Comment) (decreased activity tolerance)  Evaluation/Treatment Tolerance: Patient tolerated treatment well  End of Session Communication: Bedside nurse  End of Session Patient Position: Up in chair, Alarm off, not on at start of session  Plan:  Treatment Interventions: ADL retraining, UE strengthening/ROM, Functional transfer training, Endurance training, Neuromuscular reeducation, Patient/family training, Compensatory technique education  OT Frequency: 3 times per week  OT Discharge Recommendations: Low intensity level of continued care  OT Recommended Transfer Status: Stand by assist  OT - OK to Discharge: Yes    Subjective   Current Problem:  1. Acute hypoxic respiratory failure (Multi)        2. Hypervolemia, unspecified hypervolemia type          General:  General  Reason for Referral: decline in ADLs  Referred By: Dr. Lindsay  Past Medical History Relevant to Rehab: Cataract, Hyperlipidemia  , Hypertension, Macular hole of left eye, Nephrosclerosis, Other seasonal allergic rhinitis      Seasonal allergies, Other specified diabetes mellitus without complications (Multi)      Diabetes mellitus of other type without complication, Personal history of diseases of the blood and blood-forming organs and certain disorders involving the immune mechanism      History of autoimmune disorder   Personal history of other diseases of urinary system, History of kidney  disease, Polyarticular gout, Pre-diabetes, Renal disorder, Spinal stenosis, STEMI (ST elevation myocardial infarction) (Multi)  Family/Caregiver Present: No  Prior to Session Communication: Bedside nurse  Patient Position Received: Up in chair, Alarm off, not on at start of session  Preferred Learning Style: verbal, visual  General Comment: Patient is a 78 year old male who presented to the ED with c/o of SOB. Patient admitted with acute hypoxic respiratory failure. Patient is cleared by nursing for therapy. Patient in the chair upon arrival and agreeable to participate.  Precautions:  Medical Precautions: Fall precautions, Oxygen therapy device and L/min (3L O2 via NC)  Vital Signs:  SpO2: 93 % (post mobility while on 3L)  Pain:  Pain Assessment  Pain Assessment: 0-10  0-10 (Numeric) Pain Score: 0 - No pain    Objective   Cognition:  Overall Cognitive Status: Within Functional Limits  Insight: Mild  Impulsive: Mildly  Processing Speed: Delayed     Home Living:  Type of Home: House  Lives With: Spouse  Home Adaptive Equipment: Cane  Home Layout: Two level, Laundry main level, Bed/bath upstairs, Full bath main level, Stairs to alternate level with rails  Alternate Level Stairs-Rails: Right  Alternate Level Stairs-Number of Steps: 14  Home Access: Stairs to enter with rails  Entrance Stairs-Rails: Both  Entrance Stairs-Number of Steps: 3  Bathroom Shower/Tub: Tub/shower unit  Bathroom Toilet: Standard  Bathroom Equipment: None   Prior Function:  Level of [place]: Independent with ADLs and functional transfers, Independent with homemaking with ambulation  Receives Help From: Family  ADL Assistance: Independent  Homemaking Assistance: Independent  Ambulatory Assistance: Independent  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[employer])  IADL History:  Homemaking Responsibilities: Yes  IADL Comments: patient shares IADLs with spouse  ADL:  Eating Assistance: Independent  Eating Deficit: Setup  Grooming Assistance:  Stand by  Grooming Deficit: Supervision/safety (per clinical judgement)  Bathing Assistance: Stand by  Bathing Deficit: Supervision/safety, Increased time to complete  (per clinical judgement)  UE Dressing Assistance: Stand by  UE Dressing Deficit: Setup (per clinical judgement)  LE Dressing Assistance: Stand by  LE Dressing Deficit: Steadying, Supervision/safety, Increased time to complete (per clinical judgement)  Toileting Assistance with Device: Stand by  Toileting Deficit: Supervison/safety, Increased time to complete (per clinical judgement)  Activity Tolerance:  Endurance: Decreased tolerance for upright activites, Tolerates less than 10 min exercise, no significant change in vital signs  Activity Tolerance Comments: 93% post functional mobility while on 3L O2 via NC  Bed Mobility/Transfers: Bed Mobility  Bed Mobility:  (patient in the chair and remains in the chair)    Transfers  Transfer: Yes  Transfer 1  Transfer From 1: Chair with arms to  Transfer to 1: Stand  Technique 1: Sit to stand  Transfer Level of Assistance 1: Close supervision    Functional Mobility:  Functional Mobility  Functional Mobility Performed: Yes  Functional Mobility 1  Surface 1: Level tile  Device 1: No device  Assistance 1: Close supervision  Comments 1: < household distances  Standing Balance:  Static Standing Balance  Static Standing-Balance Support: No upper extremity supported  Static Standing-Level of Assistance: Close supervision    IADL's:   Homemaking Responsibilities: Yes  IADL Comments: patient shares IADLs with spouse    Sensation:  Sensation Comment: patient denies numbness/tingling  Strength:  Strength Comments: B UE 3+/5 grossly  Coordination:  Movements are Fluid and Coordinated: Yes   Hand Function:  Hand Function  Gross Grasp: Functional  Coordination: Functional  Extremities: RUE   RUE : Within Functional Limits and LUE   LUE: Within Functional Limits    Outcome Measures: [facility] Daily Activity  Putting on and taking  off regular lower body clothing: A little  Bathing (including washing, rinsing, drying): A little  Putting on and taking off regular upper body clothing: A little  Toileting, which includes using toilet, bedpan or urinal: A little  Taking care of personal grooming such as brushing teeth: None  Eating Meals: None  Daily Activity - Total Score: 20    Education Documentation  Body Mechanics, taught by Shayna Mcknight OT at 7/8/2024  9:19 AM.  Learner: Patient  Readiness: Acceptance  Method: Explanation, Demonstration  Response: Needs Reinforcement    Precautions, taught by Shayna Mcknight OT at 7/8/2024  9:19 AM.  Learner: Patient  Readiness: Acceptance  Method: Explanation, Demonstration  Response: Needs Reinforcement    ADL Training, taught by Shayna Mcknight OT at 7/8/2024  9:19 AM.  Learner: Patient  Readiness: Acceptance  Method: Explanation, Demonstration  Response: Needs Reinforcement    Education Comments  No comments found.      Goals:   Encounter Problems       Encounter Problems (Active)       OT Goals       ADLs (Progressing)       Start:  07/08/24    Expected End:  07/29/24       Patient will complete ADL tasks with Mod I, using AE as needed, in order to increase patient's safety and independence with self-care tasks.         Functional Transfers (Progressing)       Start:  07/08/24    Expected End:  07/29/24       Patient will complete functional transfers with Mod I, using least restrictive device, in order to increase patient's safety and independence with daily tasks.         B UE Strengthening (Progressing)       Start:  07/08/24    Expected End:  07/29/24       Patient will increase B UE strength to 4+/5 for functional transfers.         Functional Mobility  (Progressing)       Start:  07/08/24    Expected End:  07/29/24       Patient will demonstrate the ability to complete item retrieval and functional mobility with Mod I, using least restrictive device, in order to increase patient's safety and  independence with daily tasks.

## 2024-07-08 NOTE — SIGNIFICANT EVENT
07/08/24 0741   Medical Gas Therapy/Pulse Ox   Medical Gas Therapy Supplemental oxygen   O2 Delivery Method Nasal cannula   SpO2 95 %   Pulse Oximetry Type Continuous     Patient is on 3L nc. It is documented that he is on 6L in the flowsheet at 0800. This is due to a duplicate O2 order (3L documented twice). He is on 3L with no distress

## 2024-07-08 NOTE — PROGRESS NOTES
CONSULT PROGRESS NOTES    SERVICE DATE: 7/8/2024   SERVICE TIME: 9:10 AM    CONSULTING SERVICE: Nephrology    ASSESSMENT AND PLAN   1.  End-stage renal disease  2.  Fluid overload  3.  Hyponatremia  4.  Hypokalemia  5.  Acidosis  6.  Anemia chronic kidney disease    Difficult situation, as he is uremic and in need of maintenance dialysis initiation.  He has central fluid overload, but no peripheral overload.  He needs volume removal.  To achieve this, continue IV diuretic therapy with Lasix, continue oral diuretic therapy with metolazone.  Additionally, I will do 3 rounds of peritoneal dialysis cycles today with a 2.5% dextrose solution if available.  He is getting ultrafiltration with this.  I mentioned the idea of initiation of intermittent hemodialysis for a few sessions to really get more volume off, but neither him nor his daughter are excited about this prospect.  Hyponatremia from fluid overload, hypokalemia mild, acidosis from CKD.  He is anemic as well, hemoglobin had been reasonably stable.  May consider dosing him with Retacrit while here.  Case discussed with Santo Smith and Josh.  I will follow him.    SUBJECTIVE  INTERVAL HPI: He had 2 exchanges yesterday on peritoneal dialysis with roughly 600-700 mL ultrafiltration.  He remains on broad-spectrum antibiotics.  He still has decent urine output.  He still on IV Lasix.  He feels better.  He is on 3 L supplemental oxygen.  He feels weak.    MEDICATIONS:  allopurinol, 50 mg, oral, Daily  aspirin, 81 mg, oral, Daily  atorvastatin, 20 mg, oral, Daily  calcitriol, 0.5 mcg, oral, Daily  carvedilol, 3.125 mg, oral, BID  dextrose 2.5 % - LOW calcium 2.5 mEq/L 2,000 mL peritoneal dialysate, , intraperitoneal, q4h  fluticasone, 2 spray, Each Nostril, Daily  furosemide, 80 mg, intravenous, q12h  heparin (porcine), 5,000 Units, subcutaneous, q8h SUMANTH  hydroxychloroquine, 200 mg, oral, Daily  ipratropium-albuteroL, 3 mL, nebulization, TID  levothyroxine, 50 mcg,  oral, Daily before breakfast  linezolid, 600 mg, intravenous, q12h  metOLazone, 10 mg, oral, Daily  oxygen, , inhalation, Continuous - Inhalation  pantoprazole, 20 mg, oral, Daily before breakfast           PRN medications: acetaminophen **OR** acetaminophen **OR** acetaminophen, dextromethorphan-guaifenesin, guaiFENesin, ipratropium-albuteroL, ondansetron ODT **OR** ondansetron, polyethylene glycol, zolpidem     OBJECTIVE  PHYSICAL EXAM:   Heart Rate:  [59-85]   Temp:  [36 °C (96.8 °F)-36.6 °C (97.9 °F)]   Resp:  [16-34]   BP: (102-134)/(55-79)   Weight:  [71.7 kg (158 lb 1.1 oz)]   SpO2:  [90 %-100 %]   Body mass index is 22.68 kg/m².  This is a chronically ill-appearing  man  Left lower quadrant peritoneal dialysis catheter in place  He has decreased breath sounds at the bases  Systolic murmur  No asterixis  Moves 4 limbs spontaneously  Absolutely no peripheral edema is present  He has some conjunctival injection bilaterally  Moist mucosa  Hearing intact  Phonation intact  Appropriate affect    DATA:   Labs:  Results for orders placed or performed during the hospital encounter of 07/06/24 (from the past 96 hour(s))   CBC and Auto Differential   Result Value Ref Range    WBC 8.1 4.4 - 11.3 x10*3/uL    nRBC 0.0 0.0 - 0.0 /100 WBCs    RBC 3.13 (L) 4.50 - 5.90 x10*6/uL    Hemoglobin 10.2 (L) 13.5 - 17.5 g/dL    Hematocrit 29.8 (L) 41.0 - 52.0 %    MCV 95 80 - 100 fL    MCH 32.6 26.0 - 34.0 pg    MCHC 34.2 32.0 - 36.0 g/dL    RDW 14.7 (H) 11.5 - 14.5 %    Platelets 247 150 - 450 x10*3/uL    Neutrophils % 84.0 40.0 - 80.0 %    Immature Granulocytes %, Automated 0.5 0.0 - 0.9 %    Lymphocytes % 11.3 13.0 - 44.0 %    Monocytes % 3.7 2.0 - 10.0 %    Eosinophils % 0.1 0.0 - 6.0 %    Basophils % 0.4 0.0 - 2.0 %    Neutrophils Absolute 6.84 (H) 1.60 - 5.50 x10*3/uL    Immature Granulocytes Absolute, Automated 0.04 0.00 - 0.50 x10*3/uL    Lymphocytes Absolute 0.92 0.80 - 3.00 x10*3/uL    Monocytes Absolute 0.30 0.05 -  0.80 x10*3/uL    Eosinophils Absolute 0.01 0.00 - 0.40 x10*3/uL    Basophils Absolute 0.03 0.00 - 0.10 x10*3/uL   Sars-CoV-2 PCR   Result Value Ref Range    Coronavirus 2019, PCR Not Detected Not Detected   Influenza A, and B PCR   Result Value Ref Range    Flu A Result Not Detected Not Detected    Flu B Result Not Detected Not Detected   Urinalysis with Reflex Culture and Microscopic   Result Value Ref Range    Color, Urine Colorless (N) Light-Yellow, Yellow, Dark-Yellow    Appearance, Urine Clear Clear    Specific Gravity, Urine 1.007 1.005 - 1.035    pH, Urine 5.5 5.0, 5.5, 6.0, 6.5, 7.0, 7.5, 8.0    Protein, Urine 50 (1+) (A) NEGATIVE, 10 (TRACE), 20 (TRACE) mg/dL    Glucose, Urine Normal Normal mg/dL    Blood, Urine 0.03 (TRACE) (A) NEGATIVE    Ketones, Urine NEGATIVE NEGATIVE mg/dL    Bilirubin, Urine NEGATIVE NEGATIVE    Urobilinogen, Urine Normal Normal mg/dL    Nitrite, Urine NEGATIVE NEGATIVE    Leukocyte Esterase, Urine NEGATIVE NEGATIVE   Urinalysis Microscopic   Result Value Ref Range    WBC, Urine NONE 1-5, NONE /HPF    RBC, Urine NONE NONE, 1-2, 3-5 /HPF    Mucus, Urine FEW Reference range not established. /LPF    Hyaline Casts, Urine 1+ (A) NONE /LPF   Osmolality, urine   Result Value Ref Range    Osmolality, Urine Random 284 200 - 1,200 mOsm/kg   Sodium, Urine Random   Result Value Ref Range    Sodium, Urine Random 72 NOT ESTABLISHED mmol/L    Creatinine, Urine Random 41.7 NOT ESTABLISHED mg/dL    Sodium/Creatinine Ratio 173 Not established. mmol/g Creat   Comprehensive metabolic panel   Result Value Ref Range    Glucose 117 (H) 65 - 99 mg/dL    Sodium 124 (L) 133 - 145 mmol/L    Potassium 3.2 (L) 3.4 - 5.1 mmol/L    Chloride 81 (L) 97 - 107 mmol/L    Bicarbonate 24 24 - 31 mmol/L    Urea Nitrogen 95 (H) 8 - 25 mg/dL    Creatinine 5.70 (H) 0.40 - 1.60 mg/dL    eGFR 10 (L) >60 mL/min/1.73m*2    Calcium 9.6 8.5 - 10.4 mg/dL    Albumin 3.7 3.5 - 5.0 g/dL    Alkaline Phosphatase 89 35 - 125 U/L    Total  Protein 6.8 5.9 - 7.9 g/dL    AST 13 5 - 40 U/L    Bilirubin, Total 0.5 0.1 - 1.2 mg/dL    ALT 8 5 - 40 U/L    Anion Gap 19 <=19 mmol/L   Magnesium   Result Value Ref Range    Magnesium 1.90 1.60 - 3.10 mg/dL   NT Pro-BNP   Result Value Ref Range    PROBNP >70,000 (H) 0 - 852 pg/mL   Serial Troponin, Initial ([facility])   Result Value Ref Range    Troponin T, High Sensitivity 191 (HH) <=14 ng/L   D-dimer, quantitative   Result Value Ref Range    D-Dimer Non VTE, Quant (mg/L FEU) 0.79 (H) 0.19 - 0.50 mg/L FEU   Serial Troponin, 2 Hour ([facility])   Result Value Ref Range    Troponin T, High Sensitivity 200 (HH) <=14 ng/L   Osmolality   Result Value Ref Range    Osmolality, Serum 297 280 - 300 mOsm/kg   ECG 12 Lead   Result Value Ref Range    Ventricular Rate 75 BPM    Atrial Rate 75 BPM    MN Interval 192 ms    QRS Duration 140 ms    QT Interval 456 ms    QTC Calculation(Bazett) 509 ms    P Axis 11 degrees    R Axis -1 degrees    T Axis 204 degrees    QRS Count 13 beats    Q Onset 212 ms    P Onset 100 ms    P Offset 160 ms    T Offset 440 ms    QTC Fredericia 490 ms   Serial Troponin, 6 Hour ([facility])   Result Value Ref Range    Troponin T, High Sensitivity 206 (HH) <=14 ng/L   Serial Troponin, 6 Hour ([facility])   Result Value Ref Range    Troponin T, High Sensitivity 211 (HH) <=14 ng/L   Renal function panel   Result Value Ref Range    Glucose 132 (H) 65 - 99 mg/dL    Sodium 127 (L) 133 - 145 mmol/L    Potassium 3.1 (L) 3.4 - 5.1 mmol/L    Chloride 82 (L) 97 - 107 mmol/L    Bicarbonate 22 (L) 24 - 31 mmol/L    Urea Nitrogen 99 (H) 8 - 25 mg/dL    Creatinine 5.80 (H) 0.40 - 1.60 mg/dL    eGFR 9 (L) >60 mL/min/1.73m*2    Calcium 9.4 8.5 - 10.4 mg/dL    Phosphorus 6.7 (H) 2.5 - 4.5 mg/dL    Albumin 3.8 3.5 - 5.0 g/dL    Anion Gap >19 (H) <=19 mmol/L   PST Top   Result Value Ref Range    Extra Tube Hold for add-ons.    Blood Gas Arterial Full Panel   Result Value Ref Range    POCT pH, Arterial 7.35 (L) 7.38 - 7.42 pH    POCT pCO2,  Arterial 36 (L) 38 - 42 mm Hg    POCT pO2, Arterial 77 (L) 85 - 95 mm Hg    POCT SO2, Arterial 97 94 - 100 %    POCT Oxy Hemoglobin, Arterial 94.9 94.0 - 98.0 %    POCT Hematocrit Calculated, Arterial 29.0 (L) 41.0 - 52.0 %    POCT Sodium, Arterial 122 (L) 136 - 145 mmol/L    POCT Potassium, Arterial 2.9 (LL) 3.5 - 5.3 mmol/L    POCT Chloride, Arterial 85 (L) 98 - 107 mmol/L    POCT Ionized Calcium, Arterial 1.09 (L) 1.10 - 1.33 mmol/L    POCT Glucose, Arterial 230 (H) 74 - 99 mg/dL    POCT Lactate, Arterial 4.9 (HH) 0.4 - 2.0 mmol/L    POCT Base Excess, Arterial -5.2 (L) -2.0 - 3.0 mmol/L    POCT HCO3 Calculated, Arterial 19.9 (L) 22.0 - 26.0 mmol/L    POCT Hemoglobin, Arterial 9.5 (L) 13.5 - 17.5 g/dL    POCT Anion Gap, Arterial 20 10 - 25 mmo/L    Patient Temperature 37.0 degrees Celsius    FiO2 40 %    Apparatus CANNULA     Critical Called By STEPHON WARD, RRT     Critical Called To DR MELENDEZ     Critical Call Time 426     Critical Read Back Y     Critical Note HIGH LLAC AND LOW K     Site of Arterial Puncture Radial Right     Adalid's Test Positive    TSH   Result Value Ref Range    Thyroid Stimulating Hormone 18.79 (H) 0.27 - 4.20 mIU/L   CBC and Auto Differential   Result Value Ref Range    WBC 11.4 (H) 4.4 - 11.3 x10*3/uL    nRBC 0.0 0.0 - 0.0 /100 WBCs    RBC 2.86 (L) 4.50 - 5.90 x10*6/uL    Hemoglobin 9.2 (L) 13.5 - 17.5 g/dL    Hematocrit 27.0 (L) 41.0 - 52.0 %    MCV 94 80 - 100 fL    MCH 32.2 26.0 - 34.0 pg    MCHC 34.1 32.0 - 36.0 g/dL    RDW 14.4 11.5 - 14.5 %    Platelets 229 150 - 450 x10*3/uL    Neutrophils % 84.9 40.0 - 80.0 %    Immature Granulocytes %, Automated 0.3 0.0 - 0.9 %    Lymphocytes % 7.7 13.0 - 44.0 %    Monocytes % 6.8 2.0 - 10.0 %    Eosinophils % 0.0 0.0 - 6.0 %    Basophils % 0.3 0.0 - 2.0 %    Neutrophils Absolute 9.70 (H) 1.60 - 5.50 x10*3/uL    Immature Granulocytes Absolute, Automated 0.04 0.00 - 0.50 x10*3/uL    Lymphocytes Absolute 0.88 0.80 - 3.00 x10*3/uL    Monocytes Absolute  0.78 0.05 - 0.80 x10*3/uL    Eosinophils Absolute 0.00 0.00 - 0.40 x10*3/uL    Basophils Absolute 0.03 0.00 - 0.10 x10*3/uL   Renal function panel   Result Value Ref Range    Glucose 162 (H) 65 - 99 mg/dL    Sodium 127 (L) 133 - 145 mmol/L    Potassium 3.4 3.4 - 5.1 mmol/L    Chloride 81 (L) 97 - 107 mmol/L    Bicarbonate 22 (L) 24 - 31 mmol/L    Urea Nitrogen 103 (H) 8 - 25 mg/dL    Creatinine 6.30 (H) 0.40 - 1.60 mg/dL    eGFR 8 (L) >60 mL/min/1.73m*2    Calcium 9.6 8.5 - 10.4 mg/dL    Phosphorus 7.9 (H) 2.5 - 4.5 mg/dL    Albumin 3.9 3.5 - 5.0 g/dL    Anion Gap >19 (H) <=19 mmol/L   Renal function panel   Result Value Ref Range    Glucose 124 (H) 65 - 99 mg/dL    Sodium 126 (L) 133 - 145 mmol/L    Potassium 3.3 (L) 3.4 - 5.1 mmol/L    Chloride 82 (L) 97 - 107 mmol/L    Bicarbonate 26 24 - 31 mmol/L    Urea Nitrogen 104 (H) 8 - 25 mg/dL    Creatinine 6.00 (H) 0.40 - 1.60 mg/dL    eGFR 9 (L) >60 mL/min/1.73m*2    Calcium 9.6 8.5 - 10.4 mg/dL    Phosphorus 7.4 (H) 2.5 - 4.5 mg/dL    Albumin 3.7 3.5 - 5.0 g/dL    Anion Gap 18 <=19 mmol/L   Blood Gas Lactic Acid, Venous   Result Value Ref Range    POCT Lactate, Venous 1.9 0.4 - 2.0 mmol/L   T4, free   Result Value Ref Range    Thyroxine, Free 1.70 0.90 - 1.70 ng/dL   T3, free   Result Value Ref Range    Triiodothyronine, Free 2.7 2.3 - 4.2 pg/mL   Renal Function Panel   Result Value Ref Range    Glucose 130 (H) 65 - 99 mg/dL    Sodium 125 (L) 133 - 145 mmol/L    Potassium 3.3 (L) 3.4 - 5.1 mmol/L    Chloride 83 (L) 97 - 107 mmol/L    Bicarbonate 24 24 - 31 mmol/L    Urea Nitrogen 101 (H) 8 - 25 mg/dL    Creatinine 6.20 (H) 0.40 - 1.60 mg/dL    eGFR 9 (L) >60 mL/min/1.73m*2    Calcium 9.4 8.5 - 10.4 mg/dL    Phosphorus 7.0 (H) 2.5 - 4.5 mg/dL    Albumin 3.7 3.5 - 5.0 g/dL    Anion Gap 18 <=19 mmol/L         SIGNATURE: Jose Antonio Mckoy MD PATIENT NAME: George Rowan   DATE: July 8, 2024 MRN: [id number]   TIME: 9:11 AM PAGER: [phone]

## 2024-07-08 NOTE — PROGRESS NOTES
Pulmonary Progress Note    George Rowan is a 78 y.o. male on day 2 of admission presenting with Acute hypoxic respiratory failure (Multi).    Subjective   Out of bed and up in chair  Resting comfortably on oxygen    Objective   Vital Signs      7/8/2024     2:00 AM 7/8/2024     3:00 AM 7/8/2024     4:00 AM 7/8/2024     5:00 AM 7/8/2024     6:00 AM 7/8/2024     8:08 AM 7/8/2024     8:16 AM   Vitals   Systolic 120 126 126       Diastolic 71 70 70       Heart Rate 71 68 67  79 71    Temp  36 °C (96.8 °F)    36.3 °C (97.3 °F)    Resp  20 18    34   Weight (lb)    158.07      BMI    22.68 kg/m2      BSA (m2)    1.88 m2          Oxygen Therapy  SpO2: 100 %  Medical Gas Therapy: Supplemental oxygen  O2 Delivery Method: Nasal cannula    FiO2 (%):  [28 %-40 %] 40 %    Intake/Output previous 24 hours:    Intake/Output Summary (Last 24 hours) at 7/8/2024 0821  Last data filed at 7/8/2024 0700  Gross per 24 hour   Intake 2920 ml   Output 1400 ml   Net 1520 ml       Physical Exam  ...  Lines and Tubes:  Peripheral IV 07/06/24 20 G Left Antecubital (Active)   Placement Date/Time: 07/06/24 1604   Hand Hygiene Completed: Yes  Size (Gauge): 20 G  Orientation: Left  Location: Antecubital  Site Prep: Alcohol  Technique: Anatomical landmarks  Placed by: sf  Insertion attempts: 1  Patient Tolerance: Tolerated well   Number of days: 1       External Urinary Catheter Male (Active)   Placement Date: 07/07/24   External Catheter Type: Male   Number of days: 1         Scheduled medications  allopurinol, 50 mg, oral, Daily  aspirin, 81 mg, oral, Daily  atorvastatin, 20 mg, oral, Daily  calcitriol, 0.5 mcg, oral, Daily  carvedilol, 3.125 mg, oral, BID  fluticasone, 2 spray, Each Nostril, Daily  furosemide, 80 mg, intravenous, q12h  heparin (porcine), 5,000 Units, subcutaneous, q8h SUMANTH  hydroxychloroquine, 200 mg, oral, Daily  ipratropium-albuteroL, 3 mL, nebulization, TID  levothyroxine, 50 mcg, oral, Daily before breakfast  linezolid,  600 mg, intravenous, q12h  oxygen, , inhalation, Continuous - Inhalation  pantoprazole, 20 mg, oral, Daily before breakfast      Continuous medications     PRN medications  PRN medications: acetaminophen **OR** acetaminophen **OR** acetaminophen, dextromethorphan-guaifenesin, guaiFENesin, ipratropium-albuteroL, ondansetron ODT **OR** ondansetron, polyethylene glycol, zolpidem    Relevant Results  Results from last 7 days   Lab Units 07/07/24  0542 07/06/24  1603   WBC AUTO x10*3/uL 11.4* 8.1   HEMOGLOBIN g/dL 9.2* 10.2*   HEMATOCRIT % 27.0* 29.8*   PLATELETS AUTO x10*3/uL 229 247      Results from last 7 days   Lab Units 07/08/24  0625 07/07/24  0816 07/07/24  0542   SODIUM mmol/L 125* 126* 127*   POTASSIUM mmol/L 3.3* 3.3* 3.4   CHLORIDE mmol/L 83* 82* 81*   CO2 mmol/L 24 26 22*   BUN mg/dL 101* 104* 103*   CREATININE mg/dL 6.20* 6.00* 6.30*   GLUCOSE mg/dL 130* 124* 162*   CALCIUM mg/dL 9.4 9.6 9.6      Results from last 7 days   Lab Units 07/07/24  0419   POCT PH, ARTERIAL pH 7.35*   POCT PCO2, ARTERIAL mm Hg 36*   POCT PO2, ARTERIAL mm Hg 77*   POCT HCO3 CALCULATED, ARTERIAL mmol/L 19.9*   POCT BASE EXCESS, ARTERIAL mmol/L -5.2*     XR chest 1 view 07/06/2024    Narrative  Interpreted By:  Joe Perez,  STUDY:  XR CHEST 1 VIEW;  7/6/2024 8:06 pm    INDICATION:  Signs/Symptoms:Shortness of breath.    COMPARISON:  07/01/2024    ACCESSION NUMBER(S):  [id number]    ORDERING CLINICIAN:  BEE MONIQUE    FINDINGS:  Worsening aeration in the lungs. Bilateral pleural effusions,  increasing. Bibasilar and perihilar increased lung markings  especially on the left, also worsening. The cardiac silhouette is  mildly enlarged    Impression  Findings suggestive of worsening, now moderate, pulmonary edema with  bilateral effusions. Superimposed pneumonia difficult to exclude in  the proper clinical setting    MACRO:  None    Signed by: Joe Perez 7/6/2024 8:08 PM  Dictation workstation:   [id number]      Patient  Active Problem List   Diagnosis    Vitamin D deficiency    Venous insufficiency    Systemic lupus erythematosus (Multi)    Systemic involvement of connective tissue (Multi)    Sinus bradycardia    Secondary hyperparathyroidism (Multi)    Rhinitis    Renal failure    Polyneuropathy associated with underlying disease (Multi)    Peripheral motor neuropathy    Other specified disorders of kidney and ureter    Otalgia of both ears    Occlusion of left internal carotid artery    Nonsustained ventricular tachycardia (Multi)    Nephrosclerosis    Myocardial infarction (Multi)    Mixed hyperlipidemia    Mild aortic valve stenosis    Insomnia due to medical condition    Inflammatory polyarthropathy (Multi)    Hypoglycemia    Hypertension secondary to other renal disorders    History of myocardial infarction    History of coronary artery stent placement    Glomerular disease in systemic lupus erythematosus (Multi)    Essential hypertension    Dyslipidemia    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)    Chronic renal insufficiency, stage III (moderate) (Multi)    Carotid artery stenosis    Blood glucose abnormal    Bilateral sensorineural hearing loss    Autoimmune hemolytic anemia (Multi)    Aortic valve sclerosis    Aortic valve disease    Anemia of chronic renal failure    Anemia of chronic disease    Acute ST elevation myocardial infarction (STEMI) (Multi)    Abscess of upper limb    CKD (chronic kidney disease) stage 4, GFR 15-29 ml/min (Multi)    Coronary artery disease    Electrocardiogram abnormal    Breast tenderness    Chest pain    Dizziness    History of hypertension    History of immune disorder    Overweight with body mass index (BMI) 25.0-29.9    Reactive depression    Seasonal allergies    Acute hypoxic respiratory failure (Multi)    Aortic stenosis    Congestive heart failure due to cardiomyopathy (Multi)    CKD stage 5 secondary to  hypertension (Multi)    Ischemic cardiomyopathy    Pulmonary edema (HHS-HCC)    Bilateral pleural effusion    Hyponatremia    Hypokalemia     Assessment/Plan     Principal Problem:    Acute hypoxic respiratory failure (Multi)  Active Problems:    Ischemic cardiomyopathy    Systemic lupus erythematosus (Multi)    Nonsustained ventricular tachycardia (Multi)    Aortic valve disease    Anemia of chronic disease    Coronary artery disease    Congestive heart failure due to cardiomyopathy (Multi)    Pulmonary edema (HHS-HCC)    Bilateral pleural effusion    Hyponatremia    Hypokalemia     Acute hypoxic respiratory failure.  Suspect mostly volume overload  Continue with BiPAP at HS  Continue with IV diuresis. / peritoneal dialysis  Renal following  Initial Chest x-ray shows signs of worsening edema.  Cannot rule out possible pneumonia  Will repeat chest xray today  Continue with antibiotics (linezolid) for now but can likely de-escalate soon.       Acute on chronic Renal failure  Peritoneal dialysis  IV Lasix ordered.  Oral torsemide.  Nephrology following  May need hemodialysis     Pulmonary edema  See above  Repeat echo     Hyponatremia/hypokalemia  Continue to replace.  Nephrology following    Joseph Smith MD

## 2024-07-08 NOTE — PROGRESS NOTES
George Rowan is a 78 y.o. male on day 2 of admission presenting with Acute hypoxic respiratory failure (Multi).      Subjective   Patient reports he feels okay today. Denies pain or SOB. Coughing some but says he hasn't really been coughing otherwise.       Objective     Last Recorded Vitals  /79   Pulse 85   Temp 36.3 °C (97.3 °F) (Temporal)   Resp (!) 34   Wt 71.7 kg (158 lb 1.1 oz)   SpO2 100%   Intake/Output last 3 Shifts:    Intake/Output Summary (Last 24 hours) at 7/8/2024 0920  Last data filed at 7/8/2024 0700  Gross per 24 hour   Intake 2920 ml   Output 1400 ml   Net 1520 ml       Admission Weight  Weight: 71.2 kg (157 lb) (07/06/24 1907)    Daily Weight  07/08/24 : 71.7 kg (158 lb 1.1 oz)    Image Results  ECG 12 Lead  Normal sinus rhythm  Possible Left atrial enlargement  Nonspecific intraventricular block  Nonspecific ST and T wave abnormality  Prolonged QT  Abnormal ECG    Confirmed by Maurilio Rodriguez ([id number]) on 7/8/2024 8:53:51 AM      Physical Exam   General: alert, no diaphoresis   Lungs: CTA BL   Heart: RRR, bibasilar rales no LE edema BL   GI: abdomen soft, nontender, nondistended, BS present   MSK: no joint effusion or deformity   Skin: no rashes, erythema, or ecchymosis   Neuro: grossly normal cognition, motor strength, sensation      Relevant Results               Assessment/Plan   This patient currently has cardiac telemetry ordered; if you would like to modify or discontinue the telemetry order, click here to go to the orders activity to modify/discontinue the order.              Principal Problem:    Acute hypoxic respiratory failure (Multi)  Active Problems:    Ischemic cardiomyopathy    Systemic lupus erythematosus (Multi)    Nonsustained ventricular tachycardia (Multi)    Aortic valve disease    Anemia of chronic disease    Coronary artery disease    Congestive heart failure due to cardiomyopathy (Multi)    Pulmonary edema (HHS-HCC)    Bilateral pleural effusion     Hyponatremia    Hypokalemia    Acute on chronic heart failure with reduced ejection fraction  - EF 30-35%  - Dr. Donaldson on consult  - attempting to diurese and ultrafiltration PD  - long discussion with Dr. Mckoy  - adjusting to IV lasix + metolazone today  - PD today, as mentioned above  - hopefully can avoid starting HD  - started on coreg unable to take ACE/ARB    Hypoxic respiratory failure  - down to 3L, BIPAP at night  - overall improved with fluid removal  - Dr. Smith following    Pneumonia- bacterial, possible  - on zyvox for now; pulm managing    ESRD on PD  - as above    Hypothyroidism  - synthroid    SLE  - on plaquenil    DVT ppx-- on heparin       Dispo: low intensity needs recommended per OT. Dr. Mckoy still working on fluid removal. Timing of discharge unknown at this time but will be here at least another 1-2 days.       Nirali Moreno DO

## 2024-07-08 NOTE — PROGRESS NOTES
07/08/24 1554   Discharge Planning   Living Arrangements Spouse/significant other   Support Systems Children;Spouse/significant other;Family members   Assistance Needed New to PD.  Nephrology educating and setting up equipment & staff.  Possible new home O2.   Type of Residence Private residence   Number of Stairs to Enter Residence 3   Number of Stairs Within Residence 13   Home or Post Acute Services Other (Comment)  (PD staff & nephrology.  Possible new home O2.)   Patient expects to be discharged to: Home with no additional needs.   Does the patient need discharge transport arranged? No

## 2024-07-08 NOTE — PROGRESS NOTES
"George Rowan is a 78 y.o. male on day 2 of admission presenting with Acute hypoxic respiratory failure (Multi).    Subjective   Resting comfortably.  Breathing is much better however he is still requiring 6 L supplemental oxygen via nasal cannula and saturating in the mid 90s.       Objective     Physical Exam   Gen: NAD   Neck: no JVD, carotid upstroke is brisk and without delay   Heart: rrr, s1s2+ no mrg   Lungs: Diminished basilar breath sounds bilateral left greater than right, faint scattered rales   Ext: warm no edema  Last Recorded Vitals  Blood pressure 111/79, pulse 85, temperature 36.3 °C (97.3 °F), temperature source Temporal, resp. rate (!) 34, height 1.778 m (5' 10\"), weight 71.7 kg (158 lb 1.1 oz), SpO2 100%.  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  The patient also has an ischemic congestive cardiomyopathy with recent echocardiogram in 6/2024 estimating the LV ejection fraction  At 30-35% with segmental wall motion abnormalities.  Patient also has severe carotid vascular disease left greater than right.  He is " admitted with increasing shortness of breath due to pleural effusions related to his ischemic congestive cardiomyopathy and progressive chronic kidney disease.  Patient to be placed on more intense diuretic therapy and peritoneal dialysis though potentially might require short-term hemodialysis for fluid removal.  Patient will require supplemental nasal oxygen when prepared for home discharge.    7/8: Showing some clinical improvement.  Would continue IV diuresis.  The patient did receive 2 dialysis sessions yesterday.  Will discuss his volume status with his nephrologist Dr. Mckoy.  Would continue carvedilol.  Unfortunately his renal failure precludes her use of ACE-Inh/ARB/ARNI/SGLT2 Inh. Would like to work on euvolemia, at some point further working up his cardiomyopathy and aortic stenosis would be reasonable. Will continue to follow       I spent 35 minutes in the professional and overall care of this patient.      Zackery Donaldson, DO

## 2024-07-08 NOTE — NURSING NOTE
Pt requested water at this time. Son in room and requested that bipap be left off at least for 2 mins to observe his pox while on room air and at rest. His pox decreased 88% while on RA at rest before 2mins. Son would like pt to be evaluated for home o2 and have a sleep study while hospitalized for home cpap so that patient can have o2 and cpap machine at home prior to discharge.

## 2024-07-09 ENCOUNTER — PHARMACY VISIT (OUTPATIENT)
Dept: PHARMACY | Facility: CLINIC | Age: 79
End: 2024-07-09
Payer: COMMERCIAL

## 2024-07-09 ENCOUNTER — PATIENT OUTREACH (OUTPATIENT)
Dept: CASE MANAGEMENT | Facility: HOSPITAL | Age: 79
End: 2024-07-09
Payer: MEDICARE

## 2024-07-09 VITALS
TEMPERATURE: 96.8 F | HEIGHT: 70 IN | DIASTOLIC BLOOD PRESSURE: 64 MMHG | BODY MASS INDEX: 22.03 KG/M2 | WEIGHT: 153.88 LBS | OXYGEN SATURATION: 95 % | RESPIRATION RATE: 16 BRPM | SYSTOLIC BLOOD PRESSURE: 106 MMHG | HEART RATE: 63 BPM

## 2024-07-09 LAB
ALBUMIN SERPL-MCNC: 3.6 G/DL (ref 3.5–5)
ANION GAP SERPL CALC-SCNC: 17 MMOL/L
BUN SERPL-MCNC: 93 MG/DL (ref 8–25)
CALCIUM SERPL-MCNC: 9.6 MG/DL (ref 8.5–10.4)
CHLORIDE SERPL-SCNC: 86 MMOL/L (ref 97–107)
CO2 SERPL-SCNC: 28 MMOL/L (ref 24–31)
CREAT SERPL-MCNC: 5.8 MG/DL (ref 0.4–1.6)
EGFRCR SERPLBLD CKD-EPI 2021: 9 ML/MIN/1.73M*2
GLUCOSE SERPL-MCNC: 134 MG/DL (ref 65–99)
PHOSPHATE SERPL-MCNC: 6.5 MG/DL (ref 2.5–4.5)
POTASSIUM SERPL-SCNC: 2.4 MMOL/L (ref 3.4–5.1)
SODIUM SERPL-SCNC: 131 MMOL/L (ref 133–145)

## 2024-07-09 PROCEDURE — 2500000002 HC RX 250 W HCPCS SELF ADMINISTERED DRUGS (ALT 637 FOR MEDICARE OP, ALT 636 FOR OP/ED): Performed by: INTERNAL MEDICINE

## 2024-07-09 PROCEDURE — 2500000004 HC RX 250 GENERAL PHARMACY W/ HCPCS (ALT 636 FOR OP/ED): Performed by: INTERNAL MEDICINE

## 2024-07-09 PROCEDURE — 2500000001 HC RX 250 WO HCPCS SELF ADMINISTERED DRUGS (ALT 637 FOR MEDICARE OP): Performed by: INTERNAL MEDICINE

## 2024-07-09 PROCEDURE — 84100 ASSAY OF PHOSPHORUS: CPT | Performed by: INTERNAL MEDICINE

## 2024-07-09 PROCEDURE — 94640 AIRWAY INHALATION TREATMENT: CPT

## 2024-07-09 PROCEDURE — 36415 COLL VENOUS BLD VENIPUNCTURE: CPT | Performed by: INTERNAL MEDICINE

## 2024-07-09 PROCEDURE — 90947 DIALYSIS REPEATED EVAL: CPT

## 2024-07-09 PROCEDURE — RXMED WILLOW AMBULATORY MEDICATION CHARGE

## 2024-07-09 PROCEDURE — 99232 SBSQ HOSP IP/OBS MODERATE 35: CPT | Performed by: INTERNAL MEDICINE

## 2024-07-09 PROCEDURE — 9420000001 HC RT PATIENT EDUCATION 5 MIN

## 2024-07-09 PROCEDURE — 94660 CPAP INITIATION&MGMT: CPT

## 2024-07-09 PROCEDURE — 2500000002 HC RX 250 W HCPCS SELF ADMINISTERED DRUGS (ALT 637 FOR MEDICARE OP, ALT 636 FOR OP/ED): Performed by: EMERGENCY MEDICINE

## 2024-07-09 PROCEDURE — 2500000005 HC RX 250 GENERAL PHARMACY W/O HCPCS: Performed by: INTERNAL MEDICINE

## 2024-07-09 RX ORDER — POTASSIUM CHLORIDE 20 MEQ/1
40 TABLET, EXTENDED RELEASE ORAL 2 TIMES DAILY
Qty: 28 TABLET | Refills: 0 | Status: SHIPPED | OUTPATIENT
Start: 2024-07-09 | End: 2024-07-16

## 2024-07-09 RX ORDER — POTASSIUM CHLORIDE 20 MEQ/1
40 TABLET, EXTENDED RELEASE ORAL ONCE
Status: COMPLETED | OUTPATIENT
Start: 2024-07-09 | End: 2024-07-09

## 2024-07-09 RX ORDER — METOLAZONE 10 MG/1
10 TABLET ORAL DAILY
Qty: 7 TABLET | Refills: 0 | Status: SHIPPED | OUTPATIENT
Start: 2024-07-10

## 2024-07-09 RX ORDER — ALBUTEROL SULFATE 90 UG/1
2 AEROSOL, METERED RESPIRATORY (INHALATION) EVERY 4 HOURS PRN
Qty: 8.5 G | Refills: 0 | Status: SHIPPED | OUTPATIENT
Start: 2024-07-09

## 2024-07-09 RX ORDER — CEPHALEXIN 500 MG/1
500 CAPSULE ORAL 2 TIMES DAILY
Qty: 5 CAPSULE | Refills: 0 | Status: SHIPPED | OUTPATIENT
Start: 2024-07-09 | End: 2024-07-12

## 2024-07-09 RX ORDER — TORSEMIDE 100 MG/1
100 TABLET ORAL 2 TIMES DAILY
Qty: 30 TABLET | Refills: 0 | Status: SHIPPED | OUTPATIENT
Start: 2024-07-09 | End: 2024-07-24

## 2024-07-09 RX ORDER — CEPHALEXIN 500 MG/1
500 CAPSULE ORAL 2 TIMES DAILY
Status: DISCONTINUED | OUTPATIENT
Start: 2024-07-09 | End: 2024-07-09 | Stop reason: HOSPADM

## 2024-07-09 ASSESSMENT — COGNITIVE AND FUNCTIONAL STATUS - GENERAL
MOVING TO AND FROM BED TO CHAIR: A LITTLE
HELP NEEDED FOR BATHING: A LITTLE
CLIMB 3 TO 5 STEPS WITH RAILING: A LITTLE
MOBILITY SCORE: 19
DRESSING REGULAR LOWER BODY CLOTHING: A LITTLE
DRESSING REGULAR UPPER BODY CLOTHING: A LITTLE
PERSONAL GROOMING: A LITTLE
WALKING IN HOSPITAL ROOM: A LITTLE
TURNING FROM BACK TO SIDE WHILE IN FLAT BAD: A LITTLE
TOILETING: A LOT
DAILY ACTIVITIY SCORE: 18
STANDING UP FROM CHAIR USING ARMS: A LITTLE

## 2024-07-09 ASSESSMENT — PAIN - FUNCTIONAL ASSESSMENT
PAIN_FUNCTIONAL_ASSESSMENT: 0-10

## 2024-07-09 ASSESSMENT — PAIN SCALES - GENERAL
PAINLEVEL_OUTOF10: 0 - NO PAIN

## 2024-07-09 NOTE — PROGRESS NOTES
"George Rowan is a 78 y.o. male on day 3 of admission presenting with Acute hypoxic respiratory failure (Multi).    Subjective   Resting comfortably       Objective     Physical Exam   Gen: NAD   Neck: no JVD, carotid upstroke is brisk and without delay   Heart: rrr, s1s2+ 2 out of 6 early systolic ejection murmur right upper sternal border   Lungs: CTA   Ext: warm no edema  Last Recorded Vitals  Blood pressure 115/56, pulse 84, temperature 36 °C (96.8 °F), temperature source Temporal, resp. rate 17, height 1.778 m (5' 10\"), weight 69.8 kg (153 lb 14.1 oz), SpO2 94%.  Intake/Output last 3 Shifts:  I/O last 3 completed shifts:  In: 8008 (114.7 mL/kg) [P.O.:508; Other:6000; IV Piggyback:1500]  Out: 8050 (115.3 mL/kg) [Urine:1250 (0.5 mL/kg/hr); Other:6800]  Weight: 69.8 kg         Assessment/Plan   Principal Problem:    Acute hypoxic respiratory failure (Multi)  Active Problems:    Ischemic cardiomyopathy    Systemic lupus erythematosus (Multi)    Nonsustained ventricular tachycardia (Multi)    Aortic valve disease    Anemia of chronic disease    Coronary artery disease    Congestive heart failure due to cardiomyopathy (Multi)    Pulmonary edema (HHS-HCC)    Bilateral pleural effusion    Hyponatremia    Hypokalemia    7/7: The patient is a 78-year-old male who has a history of coronary artery disease with acute posterior wall myocardial infarction with documented triple-vessel coronary artery disease including a complete 100% mid LCx occlusion for which overlapping drug-eluting stents were deployed in 2/2019.  The patient also has an ischemic congestive cardiomyopathy with recent echocardiogram in 6/2024 estimating the LV ejection fraction  At 30-35% with segmental wall motion abnormalities.  Patient also has severe carotid vascular disease left greater than right.  He is admitted with increasing shortness of breath due to pleural effusions related to his ischemic congestive cardiomyopathy and progressive chronic " kidney disease.  Patient to be placed on more intense diuretic therapy and peritoneal dialysis though potentially might require short-term hemodialysis for fluid removal.  Patient will require supplemental nasal oxygen when prepared for home discharge.     7/8: Showing some clinical improvement.  Would continue IV diuresis.  The patient did receive 2 dialysis sessions yesterday.  Will discuss his volume status with his nephrologist Dr. Mckoy.  Would continue carvedilol.  Unfortunately his renal failure precludes her use of ACE-Inh/ARB/ARNI/SGLT2 Inh. Would like to work on euvolemia, at some point further working up his cardiomyopathy and aortic stenosis would be reasonable. Will continue to follow    7/9: Agree with transition to oral diuretic therapy to use in conjunction with peritoneal dialysis for volume removal.  Would be reasonable to discharge him from a cardiac standpoint.  I discussed the a forementioned proposed workup of his aortic valve and cardiomyopathy he will further this discussion with Dr. Valdez        I spent 35 minutes in the professional and overall care of this patient.      Zackery Donaldson, DO

## 2024-07-09 NOTE — NURSING NOTE
Bipap ordered as prn. Patient did not have any respiratory issues with sob, desaturations, increase work of breathing this evening. Bipap was not needed at this time. Will continue to monitor and use as patient needs.

## 2024-07-09 NOTE — DOCUMENTATION CLARIFICATION NOTE
"    PATIENT:               DALILA PHIPPS  ACCT #:                  [id number]  MRN:                       [id number]  :                       1945  ADMIT DATE:       2024 3:33 PM  DISCH DATE:  RESPONDING PROVIDER #:        [id number]          PROVIDER RESPONSE TEXT:    Type II MI 2/2 Acute Systolic CHF    CDI QUERY TEXT:    Clarification        Instruction:    Based on your assessment of the patient and the clinical information, please provide the requested documentation by clicking on the appropriate radio button and enter any additional information if prompted.    Question: Is there a diagnosis indicative of the patient elevated Troponins and symptoms    When answering this query, please exercise your independent professional judgment. The fact that a question is being asked, does not imply that any particular answer is desired or expected.    The patient's clinical indicators include:  Clinical Information:  Pt to ER for eval of worsening SOB x1 week, had increase in diuretics earlier this week and still has no relief.     ED PN:  \"Upon my evaluation, this patient had a high probability of imminent or life-threatening deterioration due to CHF exacerbation, acute hypoxic respiratory failure, \"     H/P:  \"He denied any overt chest pain.\"  \"Type II  MI Demand Ischemia versus NSTEMI\"     Cards Consult:  \": The patient is a 78-year-old male who has a history of coronary artery disease with acute posterior wall myocardial infarction with documented triple-vessel coronary artery disease including a complete 100% mid LCx occlusion for which overlapping drug-eluting stents were deployed in 2019.\"    Clinical Indicators:  EKG;  ECG 12 Lead  Performed at  2321, HR of 75, NSR, NAD, QTc 509, no sign of STEMI, no Q wave or T wave abnormality noted.  pBNP:  70,000  Creat:  5.7/5.8  Troponin:  191/200/206/211  Lactate: 4.9/1.9 (0400/0800)   CXR:  XR chest 1 view  Final Result :  Findings suggestive of " "worsening, now moderate, pulmonary edema with  bilateral effusions. Superimposed pneumonia difficult to exclude in  the proper clinical setting\"    7/6 H/P:  Off noted 2D echocardiogram from June 18, 2024 was noted for the following:  \" 1. Left ventricular systolic function is mildly to moderately decreased with a 30-35% estimated ejection fraction.  2. Basal and mid anterolateral wall, basal and mid inferior wall, basal and mid inferolateral wall, and basal inferoseptal segment are abnormal.  3. There is a moderate pleural effusion.  4. Aortic valve sclerosis.  5. Mild aortic valve regurgitation.  6. Left ventricular cavity size is moderately dilated.  7. There are multiple wall motion abnormalities.    Treatment:  pBNP; CXR; Troponin; Lactate venous and arterial; CXR; Troponin, serial; BMP; EKG; Cards Consult    Risk Factors:  Age; CAD with Stents; SLE; ESRD; Anemia of chronic dx; Cardiomyopathy; STEMI  Options provided:  -- Type II MI 2/2 Acute Systolic CHF  -- Acute Myocardial Injury 2/2 Acute Systolic CHF  -- Chronic Myocardial Injury  -- STEMI  -- Other - I will add my own diagnosis  -- Refer to Clinical Documentation Reviewer    Query created by: Lillie Kyle on 7/9/2024 2:06 PM      Electronically signed by:  AIDAN CEDILLO DO 7/9/2024 3:11 PM          "

## 2024-07-09 NOTE — DISCHARGE SUMMARY
Discharge Diagnosis  Acute hypoxic respiratory failure (Multi)    Issues Requiring Follow-Up  Follow up with Dr. Mckoy and Dr. Valdez    Discharge Meds     Your medication list        START taking these medications        Instructions Last Dose Given Next Dose Due   cephalexin 500 mg capsule  Commonly known as: Keflex      Take 1 capsule (500 mg) by mouth 2 times a day for 5 doses.       Klor-Con M20 20 mEq ER tablet  Generic drug: potassium chloride CR      Take 2 tablets (40 mEq) by mouth 2 times a day for 7 days. Do not crush or chew.       metOLazone 10 mg tablet  Commonly known as: Zaroxolyn  Start taking on: July 10, 2024      Take 1 tablet (10 mg) by mouth once daily.              CONTINUE taking these medications        Instructions Last Dose Given Next Dose Due   acetaminophen 325 mg tablet  Commonly known as: Tylenol      Take 2 tablets (650 mg) by mouth 3 times a day.       allopurinol 300 mg tablet  Commonly known as: Zyloprim      TAKE ONE-HALF TABLET BY MOUTH  ONCE DAILY       aspirin 81 mg capsule           atorvastatin 20 mg tablet  Commonly known as: Lipitor      TAKE 1 TABLET BY MOUTH ONCE  DAILY       calcitriol 0.5 mcg capsule  Commonly known as: Rocaltrol      Take 1 capsule (0.5 mcg) by mouth once daily.       carvedilol 3.125 mg tablet  Commonly known as: Coreg      Take 1 tablet (3.125 mg) by mouth 2 times a day.       epoetin clyde 10,000 unit/mL injection  Commonly known as: Epogen,Procrit           fluticasone 50 mcg/actuation nasal spray  Commonly known as: Flonase      USE 2 SPRAYS IN BOTH  NOSTRILS ONCE DAILY       gabapentin 300 mg capsule  Commonly known as: Neurontin      TAKE 1 CAPSULE BY MOUTH ONCE  DAILY       gentamicin 0.1 % cream  Commonly known as: Garamycin           hydroxychloroquine 200 mg tablet  Commonly known as: Plaquenil      TAKE 1 TABLET BY MOUTH ONCE  DAILY       levothyroxine 50 mcg tablet  Commonly known as: Synthroid, Levoxyl      TAKE 1 TABLET BY MOUTH ONCE   DAILY IN THE MORNING ON AN EMPTY STOMACH       SUPER B-50 COMPLEX ORAL           torsemide 100 mg tablet  Commonly known as: Demadex      Take 1 tablet (100 mg) by mouth 2 times a day for 15 days.       zolpidem 5 mg tablet  Commonly known as: Ambien      Take 1 tablet (5 mg) by mouth as needed at bedtime for sleep.                 Where to Get Your Medications        These medications were sent to [id number] - [place], [place] - [address]  [address] [place] [id number]      Phone: [phone]   metOLazone 10 mg tablet       These medications were sent to [facility]  [address], [address], [facility] [address]      Hours: 9 AM to 6 PM Mon-Fri, 9 AM to 1 PM Sat Phone: [phone]   cephalexin 500 mg capsule  Klor-Con M20 20 mEq ER tablet  torsemide 100 mg tablet         Test Results Pending At Discharge  Pending Labs       Order Current Status    Serial Troponin, 2 Hour ([facility]) Collected (07/06/24 1911)    Troponin T Series, High Sensitivity (0, 2HR, 6HR) In process            Hospital Course  This is a 79 yo man with h/o CKD stage 5, DM, HTN, chronic systolic heart failure, aortic valve disease who came to Er with acute on chronic heart failure and progressive renal failure. He was diuresed and also given aggressive peritoneal dialysis while here by Dr. Mckoy. He has been undergoing training to do home PD but had not started it yet when he came in. He had respiratory failure requiring oxygen and ultimately needed BIPAP for a little while for respiratory failure. He is being discharged with high dose diuretics and follows up with Dr. Mckoy on Friday, he will initiate PD and stop diuretics per his timing. Patient needs to follow up with Dr. Valdez for work up of cardiomyopathy and aortic valve. Patient leaving with 3 days of keflex for possible PD catheter contamination.    Pertinent Physical Exam At Time of Discharge  Physical Exam  General: alert, no diaphoresis    HENT: mucous membranes moist, external ears normal, no rhinorrhea   Eyes: no icterus or injection, no discharge   Lungs: faint bibasilar rales   Heart: RRR, no LE edema BL   GI: abdomen soft, nontender, nondistended, BS present   MSK: no joint effusion or deformity   Skin: no rashes, erythema, or ecchymosis   Neuro: grossly normal cognition, motor strength, sensation      Outpatient Follow-Up  Future Appointments   Date Time Provider Department Center   7/23/2024 10:00 AM Maurice Felix MD [id number] [facility]   8/21/2024 10:30 AM Loy Beltran MD [id number] [facility]   9/17/2024  9:15 AM Seb Valdez DO [id number] [facility]     Discharge time spent: 35 min    Nirali Moreno DO

## 2024-07-09 NOTE — PROGRESS NOTES
CONSULT PROGRESS NOTES    SERVICE DATE: 7/9/2024   SERVICE TIME: 9:23 AM    CONSULTING SERVICE: Nephrology    ASSESSMENT AND PLAN   1.  End-stage renal disease  2.  Fluid overload  3.  Hyponatremia  4.  Hypokalemia  5.  Acidosis  6.  Anemia chronic kidney disease    He needs maintenance dialysis initiation.  He has central fluid overload, but no peripheral overload.  He needs continued efforts at volume removal.  We are using peritoneal dialysis with some ultrafiltration as well as diuretic therapy until he begins home cycling dialysis, which will happen within a few days of discharge.  Use 1-2 more cycles of peritoneal dialysis today during the day prior to discharge.  At this point, use torsemide 100 mg p.o. twice daily and metolazone 10 mg daily for his maintenance diuretic therapy for a few days until he gets on peritoneal dialysis at home.  For the possible contamination of his PD catheter, use Keflex 500 mg p.o. twice daily for 3 days.  Okay to stop linezolid at this point, not clear what we are treating with this.    40 mill equivalents p.o. twice daily potassium replacement.  He is anemic as well, hemoglobin had been reasonably stable.    Case discussed with Santo Smith and Josh.  I have no specific objection to discharge today.  I have a follow-up appointment with him in 3 days.  Case discussed extensively with bedside nursing as well as the patient's daughter over the phone.    SUBJECTIVE  INTERVAL HPI: Feels a little weak, not sleeping well.  Has a decent appetite, no nausea, no vomiting, no diarrhea, no abdominal pain.  Possible contamination overnight of his PD catheter.  The ultrafiltration on peritoneal dialysis was not accurately recorded due to incorrect calibration of the scale.  Suggested between 800 and 1000 mL ultrafiltration in addition to his urine output.  He feels better today, breathing improved.    MEDICATIONS:  allopurinol, 50 mg, oral, Daily  aspirin, 81 mg, oral,  Daily  atorvastatin, 20 mg, oral, Daily  calcitriol, 0.5 mcg, oral, Daily  carvedilol, 3.125 mg, oral, BID  cephalexin, 500 mg, oral, BID  dextrose 2.5 % - LOW calcium 2.5 mEq/L 2,000 mL peritoneal dialysate, , intraperitoneal, q4h  fluticasone, 2 spray, Each Nostril, Daily  furosemide, 80 mg, intravenous, q12h  heparin (porcine), 5,000 Units, subcutaneous, q8h SUMANTH  hydroxychloroquine, 200 mg, oral, Daily  ipratropium-albuteroL, 3 mL, nebulization, TID  levothyroxine, 50 mcg, oral, Daily before breakfast  metOLazone, 10 mg, oral, Daily  oxygen, , inhalation, Continuous - Inhalation  pantoprazole, 20 mg, oral, Daily before breakfast  potassium chloride CR, 40 mEq, oral, Once  potassium chloride CR, 40 mEq, oral, Once           PRN medications: acetaminophen **OR** acetaminophen **OR** acetaminophen, dextromethorphan-guaifenesin, guaiFENesin, ipratropium-albuteroL, ondansetron ODT **OR** ondansetron, polyethylene glycol, zolpidem     OBJECTIVE  PHYSICAL EXAM:   Heart Rate:  [62-94]   Temp:  [35.8 °C (96.4 °F)-36.4 °C (97.5 °F)]   Resp:  [16-20]   BP: (109-140)/(56-95)   Weight:  [69.8 kg (153 lb 14.1 oz)-71.7 kg (158 lb 1.1 oz)]   SpO2:  [85 %-100 %]   Body mass index is 22.08 kg/m².  This is a chronically ill-appearing  man  Left lower quadrant peritoneal dialysis catheter in place  He has decreased breath sounds at the bases  Systolic murmur  No asterixis  Moves 4 limbs spontaneously  Absolutely no peripheral edema is present  He has some conjunctival injection bilaterally  Moist mucosa  Hearing intact  Phonation intact  Appropriate affect    DATA:   Labs:  Results for orders placed or performed during the hospital encounter of 07/06/24 (from the past 96 hour(s))   CBC and Auto Differential   Result Value Ref Range    WBC 8.1 4.4 - 11.3 x10*3/uL    nRBC 0.0 0.0 - 0.0 /100 WBCs    RBC 3.13 (L) 4.50 - 5.90 x10*6/uL    Hemoglobin 10.2 (L) 13.5 - 17.5 g/dL    Hematocrit 29.8 (L) 41.0 - 52.0 %    MCV 95 80 - 100  fL    MCH 32.6 26.0 - 34.0 pg    MCHC 34.2 32.0 - 36.0 g/dL    RDW 14.7 (H) 11.5 - 14.5 %    Platelets 247 150 - 450 x10*3/uL    Neutrophils % 84.0 40.0 - 80.0 %    Immature Granulocytes %, Automated 0.5 0.0 - 0.9 %    Lymphocytes % 11.3 13.0 - 44.0 %    Monocytes % 3.7 2.0 - 10.0 %    Eosinophils % 0.1 0.0 - 6.0 %    Basophils % 0.4 0.0 - 2.0 %    Neutrophils Absolute 6.84 (H) 1.60 - 5.50 x10*3/uL    Immature Granulocytes Absolute, Automated 0.04 0.00 - 0.50 x10*3/uL    Lymphocytes Absolute 0.92 0.80 - 3.00 x10*3/uL    Monocytes Absolute 0.30 0.05 - 0.80 x10*3/uL    Eosinophils Absolute 0.01 0.00 - 0.40 x10*3/uL    Basophils Absolute 0.03 0.00 - 0.10 x10*3/uL   Sars-CoV-2 PCR   Result Value Ref Range    Coronavirus 2019, PCR Not Detected Not Detected   Influenza A, and B PCR   Result Value Ref Range    Flu A Result Not Detected Not Detected    Flu B Result Not Detected Not Detected   Urinalysis with Reflex Culture and Microscopic   Result Value Ref Range    Color, Urine Colorless (N) Light-Yellow, Yellow, Dark-Yellow    Appearance, Urine Clear Clear    Specific Gravity, Urine 1.007 1.005 - 1.035    pH, Urine 5.5 5.0, 5.5, 6.0, 6.5, 7.0, 7.5, 8.0    Protein, Urine 50 (1+) (A) NEGATIVE, 10 (TRACE), 20 (TRACE) mg/dL    Glucose, Urine Normal Normal mg/dL    Blood, Urine 0.03 (TRACE) (A) NEGATIVE    Ketones, Urine NEGATIVE NEGATIVE mg/dL    Bilirubin, Urine NEGATIVE NEGATIVE    Urobilinogen, Urine Normal Normal mg/dL    Nitrite, Urine NEGATIVE NEGATIVE    Leukocyte Esterase, Urine NEGATIVE NEGATIVE   Urinalysis Microscopic   Result Value Ref Range    WBC, Urine NONE 1-5, NONE /HPF    RBC, Urine NONE NONE, 1-2, 3-5 /HPF    Mucus, Urine FEW Reference range not established. /LPF    Hyaline Casts, Urine 1+ (A) NONE /LPF   Osmolality, urine   Result Value Ref Range    Osmolality, Urine Random 284 200 - 1,200 mOsm/kg   Sodium, Urine Random   Result Value Ref Range    Sodium, Urine Random 72 NOT ESTABLISHED mmol/L     Creatinine, Urine Random 41.7 NOT ESTABLISHED mg/dL    Sodium/Creatinine Ratio 173 Not established. mmol/g Creat   Comprehensive metabolic panel   Result Value Ref Range    Glucose 117 (H) 65 - 99 mg/dL    Sodium 124 (L) 133 - 145 mmol/L    Potassium 3.2 (L) 3.4 - 5.1 mmol/L    Chloride 81 (L) 97 - 107 mmol/L    Bicarbonate 24 24 - 31 mmol/L    Urea Nitrogen 95 (H) 8 - 25 mg/dL    Creatinine 5.70 (H) 0.40 - 1.60 mg/dL    eGFR 10 (L) >60 mL/min/1.73m*2    Calcium 9.6 8.5 - 10.4 mg/dL    Albumin 3.7 3.5 - 5.0 g/dL    Alkaline Phosphatase 89 35 - 125 U/L    Total Protein 6.8 5.9 - 7.9 g/dL    AST 13 5 - 40 U/L    Bilirubin, Total 0.5 0.1 - 1.2 mg/dL    ALT 8 5 - 40 U/L    Anion Gap 19 <=19 mmol/L   Magnesium   Result Value Ref Range    Magnesium 1.90 1.60 - 3.10 mg/dL   NT Pro-BNP   Result Value Ref Range    PROBNP >70,000 (H) 0 - 852 pg/mL   Serial Troponin, Initial ([facility])   Result Value Ref Range    Troponin T, High Sensitivity 191 (HH) <=14 ng/L   D-dimer, quantitative   Result Value Ref Range    D-Dimer Non VTE, Quant (mg/L FEU) 0.79 (H) 0.19 - 0.50 mg/L FEU   Serial Troponin, 2 Hour ([facility])   Result Value Ref Range    Troponin T, High Sensitivity 200 (HH) <=14 ng/L   Osmolality   Result Value Ref Range    Osmolality, Serum 297 280 - 300 mOsm/kg   ECG 12 Lead   Result Value Ref Range    Ventricular Rate 75 BPM    Atrial Rate 75 BPM    SC Interval 192 ms    QRS Duration 140 ms    QT Interval 456 ms    QTC Calculation(Bazett) 509 ms    P Axis 11 degrees    R Axis -1 degrees    T Axis 204 degrees    QRS Count 13 beats    Q Onset 212 ms    P Onset 100 ms    P Offset 160 ms    T Offset 440 ms    QTC Fredericia 490 ms   Serial Troponin, 6 Hour ([facility])   Result Value Ref Range    Troponin T, High Sensitivity 206 (HH) <=14 ng/L   Serial Troponin, 6 Hour ([facility])   Result Value Ref Range    Troponin T, High Sensitivity 211 (HH) <=14 ng/L   Renal function panel   Result Value Ref Range    Glucose 132 (H) 65 - 99 mg/dL    Sodium  127 (L) 133 - 145 mmol/L    Potassium 3.1 (L) 3.4 - 5.1 mmol/L    Chloride 82 (L) 97 - 107 mmol/L    Bicarbonate 22 (L) 24 - 31 mmol/L    Urea Nitrogen 99 (H) 8 - 25 mg/dL    Creatinine 5.80 (H) 0.40 - 1.60 mg/dL    eGFR 9 (L) >60 mL/min/1.73m*2    Calcium 9.4 8.5 - 10.4 mg/dL    Phosphorus 6.7 (H) 2.5 - 4.5 mg/dL    Albumin 3.8 3.5 - 5.0 g/dL    Anion Gap >19 (H) <=19 mmol/L   PST Top   Result Value Ref Range    Extra Tube Hold for add-ons.    Blood Gas Arterial Full Panel   Result Value Ref Range    POCT pH, Arterial 7.35 (L) 7.38 - 7.42 pH    POCT pCO2, Arterial 36 (L) 38 - 42 mm Hg    POCT pO2, Arterial 77 (L) 85 - 95 mm Hg    POCT SO2, Arterial 97 94 - 100 %    POCT Oxy Hemoglobin, Arterial 94.9 94.0 - 98.0 %    POCT Hematocrit Calculated, Arterial 29.0 (L) 41.0 - 52.0 %    POCT Sodium, Arterial 122 (L) 136 - 145 mmol/L    POCT Potassium, Arterial 2.9 (LL) 3.5 - 5.3 mmol/L    POCT Chloride, Arterial 85 (L) 98 - 107 mmol/L    POCT Ionized Calcium, Arterial 1.09 (L) 1.10 - 1.33 mmol/L    POCT Glucose, Arterial 230 (H) 74 - 99 mg/dL    POCT Lactate, Arterial 4.9 (HH) 0.4 - 2.0 mmol/L    POCT Base Excess, Arterial -5.2 (L) -2.0 - 3.0 mmol/L    POCT HCO3 Calculated, Arterial 19.9 (L) 22.0 - 26.0 mmol/L    POCT Hemoglobin, Arterial 9.5 (L) 13.5 - 17.5 g/dL    POCT Anion Gap, Arterial 20 10 - 25 mmo/L    Patient Temperature 37.0 degrees Celsius    FiO2 40 %    Apparatus CANNULA     Critical Called By STEPHON WARD, RRT     Critical Called To DR MELENDEZ     Critical Call Time 426     Critical Read Back Y     Critical Note HIGH LLAC AND LOW K     Site of Arterial Puncture Radial Right     Adalid's Test Positive    TSH   Result Value Ref Range    Thyroid Stimulating Hormone 18.79 (H) 0.27 - 4.20 mIU/L   CBC and Auto Differential   Result Value Ref Range    WBC 11.4 (H) 4.4 - 11.3 x10*3/uL    nRBC 0.0 0.0 - 0.0 /100 WBCs    RBC 2.86 (L) 4.50 - 5.90 x10*6/uL    Hemoglobin 9.2 (L) 13.5 - 17.5 g/dL    Hematocrit 27.0 (L) 41.0 -  52.0 %    MCV 94 80 - 100 fL    MCH 32.2 26.0 - 34.0 pg    MCHC 34.1 32.0 - 36.0 g/dL    RDW 14.4 11.5 - 14.5 %    Platelets 229 150 - 450 x10*3/uL    Neutrophils % 84.9 40.0 - 80.0 %    Immature Granulocytes %, Automated 0.3 0.0 - 0.9 %    Lymphocytes % 7.7 13.0 - 44.0 %    Monocytes % 6.8 2.0 - 10.0 %    Eosinophils % 0.0 0.0 - 6.0 %    Basophils % 0.3 0.0 - 2.0 %    Neutrophils Absolute 9.70 (H) 1.60 - 5.50 x10*3/uL    Immature Granulocytes Absolute, Automated 0.04 0.00 - 0.50 x10*3/uL    Lymphocytes Absolute 0.88 0.80 - 3.00 x10*3/uL    Monocytes Absolute 0.78 0.05 - 0.80 x10*3/uL    Eosinophils Absolute 0.00 0.00 - 0.40 x10*3/uL    Basophils Absolute 0.03 0.00 - 0.10 x10*3/uL   Renal function panel   Result Value Ref Range    Glucose 162 (H) 65 - 99 mg/dL    Sodium 127 (L) 133 - 145 mmol/L    Potassium 3.4 3.4 - 5.1 mmol/L    Chloride 81 (L) 97 - 107 mmol/L    Bicarbonate 22 (L) 24 - 31 mmol/L    Urea Nitrogen 103 (H) 8 - 25 mg/dL    Creatinine 6.30 (H) 0.40 - 1.60 mg/dL    eGFR 8 (L) >60 mL/min/1.73m*2    Calcium 9.6 8.5 - 10.4 mg/dL    Phosphorus 7.9 (H) 2.5 - 4.5 mg/dL    Albumin 3.9 3.5 - 5.0 g/dL    Anion Gap >19 (H) <=19 mmol/L   Renal function panel   Result Value Ref Range    Glucose 124 (H) 65 - 99 mg/dL    Sodium 126 (L) 133 - 145 mmol/L    Potassium 3.3 (L) 3.4 - 5.1 mmol/L    Chloride 82 (L) 97 - 107 mmol/L    Bicarbonate 26 24 - 31 mmol/L    Urea Nitrogen 104 (H) 8 - 25 mg/dL    Creatinine 6.00 (H) 0.40 - 1.60 mg/dL    eGFR 9 (L) >60 mL/min/1.73m*2    Calcium 9.6 8.5 - 10.4 mg/dL    Phosphorus 7.4 (H) 2.5 - 4.5 mg/dL    Albumin 3.7 3.5 - 5.0 g/dL    Anion Gap 18 <=19 mmol/L   Blood Gas Lactic Acid, Venous   Result Value Ref Range    POCT Lactate, Venous 1.9 0.4 - 2.0 mmol/L   T4, free   Result Value Ref Range    Thyroxine, Free 1.70 0.90 - 1.70 ng/dL   T3, free   Result Value Ref Range    Triiodothyronine, Free 2.7 2.3 - 4.2 pg/mL   Renal Function Panel   Result Value Ref Range    Glucose 130 (H)  65 - 99 mg/dL    Sodium 125 (L) 133 - 145 mmol/L    Potassium 3.3 (L) 3.4 - 5.1 mmol/L    Chloride 83 (L) 97 - 107 mmol/L    Bicarbonate 24 24 - 31 mmol/L    Urea Nitrogen 101 (H) 8 - 25 mg/dL    Creatinine 6.20 (H) 0.40 - 1.60 mg/dL    eGFR 9 (L) >60 mL/min/1.73m*2    Calcium 9.4 8.5 - 10.4 mg/dL    Phosphorus 7.0 (H) 2.5 - 4.5 mg/dL    Albumin 3.7 3.5 - 5.0 g/dL    Anion Gap 18 <=19 mmol/L   Renal Function Panel   Result Value Ref Range    Glucose 134 (H) 65 - 99 mg/dL    Sodium 131 (L) 133 - 145 mmol/L    Potassium 2.4 (LL) 3.4 - 5.1 mmol/L    Chloride 86 (L) 97 - 107 mmol/L    Bicarbonate 28 24 - 31 mmol/L    Urea Nitrogen 93 (H) 8 - 25 mg/dL    Creatinine 5.80 (H) 0.40 - 1.60 mg/dL    eGFR 9 (L) >60 mL/min/1.73m*2    Calcium 9.6 8.5 - 10.4 mg/dL    Phosphorus 6.5 (H) 2.5 - 4.5 mg/dL    Albumin 3.6 3.5 - 5.0 g/dL    Anion Gap 17 <=19 mmol/L         SIGNATURE: Jose Antonio Mckoy MD PATIENT NAME: George Rowan   DATE: July 9, 2024 MRN: [id number]   TIME: 9:23 AM PAGER: [phone]

## 2024-07-09 NOTE — PROGRESS NOTES
Occupational Therapy                 Therapy Communication Note    Patient Name: George Rowan  MRN: [id number]  Today's Date: 7/9/2024     Discipline: Occupational Therapy    Missed Visit Reason: Missed Visit Reason: Other (Comment) (Nsg requesting OT return in PM; dressing change to be completed at this time)    Missed Time: Attempt    Comment:

## 2024-07-09 NOTE — PROGRESS NOTES
07/09/24 0855   Discharge Planning   Home or Post Acute Services   (Nephrology arranging PD as outpatient.)   Patient expects to be discharged to: Home with no new needs.  Nephrology arranging/educating PD.   Does the patient need discharge transport arranged? No

## 2024-07-09 NOTE — PROGRESS NOTES
Pulmonary Progress Note    George Rowan is a 78 y.o. male on day 3 of admission presenting with Acute hypoxic respiratory failure (Multi).    Subjective   Out of bed and up in chair  Resting comfortably on oxygen    Objective   Vital Signs      7/8/2024     7:00 PM 7/8/2024     8:00 PM 7/8/2024    10:00 PM 7/9/2024    12:00 AM 7/9/2024     2:00 AM 7/9/2024     4:00 AM 7/9/2024     7:08 AM   Vitals   Systolic 117 125  140  125 115   Diastolic 69 77  79  67 56   Heart Rate 80 79 87 87 94 84    Temp 36.4 °C (97.5 °F)   36.2 °C (97.2 °F)  36.3 °C (97.3 °F) 36 °C (96.8 °F)   Resp 16   16  16 17   Weight (lb)      153.88    BMI      22.08 kg/m2    BSA (m2)      1.86 m2        Oxygen Therapy  SpO2: 94 %  Medical Gas Therapy: Supplemental oxygen  O2 Delivery Method: Nasal cannula    FiO2 (%):  [28 %] 28 %    Intake/Output previous 24 hours:    Intake/Output Summary (Last 24 hours) at 7/9/2024 0938  Last data filed at 7/9/2024 0930  Gross per 24 hour   Intake 5188 ml   Output 7250 ml   Net -2062 ml       Physical Exam  Vitals reviewed.   Constitutional:       Appearance: Normal appearance.   HENT:      Head: Normocephalic and atraumatic.      Mouth/Throat:      Mouth: Mucous membranes are moist.   Eyes:      Extraocular Movements: Extraocular movements intact.      Pupils: Pupils are equal, round, and reactive to light.   Cardiovascular:      Rate and Rhythm: Normal rate and regular rhythm.   Pulmonary:      Effort: Pulmonary effort is normal.      Breath sounds: Normal breath sounds and air entry.   Abdominal:      General: Abdomen is flat. Bowel sounds are normal.      Palpations: Abdomen is soft.   Musculoskeletal:         General: Normal range of motion.      Cervical back: Normal range of motion and neck supple.   Skin:     General: Skin is warm and dry.   Neurological:      General: No focal deficit present.      Mental Status: He is alert and oriented to person, place, and time. Mental status is at baseline.        ...  Lines and Tubes:  Peripheral IV 07/06/24 20 G Left Antecubital (Active)   Placement Date/Time: 07/06/24 1604   Hand Hygiene Completed: Yes  Size (Gauge): 20 G  Orientation: Left  Location: Antecubital  Site Prep: Alcohol  Technique: Anatomical landmarks  Placed by: sf  Insertion attempts: 1  Patient Tolerance: Tolerated well   Number of days: 1       External Urinary Catheter Male (Active)   Placement Date: 07/07/24   External Catheter Type: Male   Number of days: 1         Scheduled medications  allopurinol, 50 mg, oral, Daily  aspirin, 81 mg, oral, Daily  atorvastatin, 20 mg, oral, Daily  calcitriol, 0.5 mcg, oral, Daily  carvedilol, 3.125 mg, oral, BID  cephalexin, 500 mg, oral, BID  dextrose 2.5 % - LOW calcium 2.5 mEq/L 2,000 mL peritoneal dialysate, , intraperitoneal, q4h  fluticasone, 2 spray, Each Nostril, Daily  heparin (porcine), 5,000 Units, subcutaneous, q8h SUMANTH  hydroxychloroquine, 200 mg, oral, Daily  ipratropium-albuteroL, 3 mL, nebulization, TID  levothyroxine, 50 mcg, oral, Daily before breakfast  metOLazone, 10 mg, oral, Daily  oxygen, , inhalation, Continuous - Inhalation  pantoprazole, 20 mg, oral, Daily before breakfast  potassium chloride CR, 40 mEq, oral, Once  potassium chloride CR, 40 mEq, oral, Once      Continuous medications     PRN medications  PRN medications: acetaminophen **OR** acetaminophen **OR** acetaminophen, dextromethorphan-guaifenesin, guaiFENesin, ipratropium-albuteroL, ondansetron ODT **OR** ondansetron, polyethylene glycol, zolpidem    Relevant Results  Results from last 7 days   Lab Units 07/07/24  0542 07/06/24  1603   WBC AUTO x10*3/uL 11.4* 8.1   HEMOGLOBIN g/dL 9.2* 10.2*   HEMATOCRIT % 27.0* 29.8*   PLATELETS AUTO x10*3/uL 229 247      Results from last 7 days   Lab Units 07/09/24  0426 07/08/24  0625 07/07/24  0816   SODIUM mmol/L 131* 125* 126*   POTASSIUM mmol/L 2.4* 3.3* 3.3*   CHLORIDE mmol/L 86* 83* 82*   CO2 mmol/L 28 24 26   BUN mg/dL 93* 101* 104*    CREATININE mg/dL 5.80* 6.20* 6.00*   GLUCOSE mg/dL 134* 130* 124*   CALCIUM mg/dL 9.6 9.4 9.6      Results from last 7 days   Lab Units 07/07/24  0419   POCT PH, ARTERIAL pH 7.35*   POCT PCO2, ARTERIAL mm Hg 36*   POCT PO2, ARTERIAL mm Hg 77*   POCT HCO3 CALCULATED, ARTERIAL mmol/L 19.9*   POCT BASE EXCESS, ARTERIAL mmol/L -5.2*     XR chest 1 view 07/06/2024    Narrative  Interpreted By:  Joe Perez,  STUDY:  XR CHEST 1 VIEW;  7/6/2024 8:06 pm    INDICATION:  Signs/Symptoms:Shortness of breath.    COMPARISON:  07/01/2024    ACCESSION NUMBER(S):  [id number]    ORDERING CLINICIAN:  BEE MONIQUE    FINDINGS:  Worsening aeration in the lungs. Bilateral pleural effusions,  increasing. Bibasilar and perihilar increased lung markings  especially on the left, also worsening. The cardiac silhouette is  mildly enlarged    Impression  Findings suggestive of worsening, now moderate, pulmonary edema with  bilateral effusions. Superimposed pneumonia difficult to exclude in  the proper clinical setting    MACRO:  None    Signed by: Jeo Perez 7/6/2024 8:08 PM  Dictation workstation:   [id number]      Patient Active Problem List   Diagnosis    Vitamin D deficiency    Venous insufficiency    Systemic lupus erythematosus (Multi)    Systemic involvement of connective tissue (Multi)    Sinus bradycardia    Secondary hyperparathyroidism (Multi)    Rhinitis    Renal failure    Polyneuropathy associated with underlying disease (Multi)    Peripheral motor neuropathy    Other specified disorders of kidney and ureter    Otalgia of both ears    Occlusion of left internal carotid artery    Nonsustained ventricular tachycardia (Multi)    Nephrosclerosis    Myocardial infarction (Multi)    Mixed hyperlipidemia    Mild aortic valve stenosis    Insomnia due to medical condition    Inflammatory polyarthropathy (Multi)    Hypoglycemia    Hypertension secondary to other renal disorders    History of myocardial infarction    History  of coronary artery stent placement    Glomerular disease in systemic lupus erythematosus (Multi)    Essential hypertension    Dyslipidemia    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)    Chronic renal insufficiency, stage III (moderate) (Multi)    Carotid artery stenosis    Blood glucose abnormal    Bilateral sensorineural hearing loss    Autoimmune hemolytic anemia (Multi)    Aortic valve sclerosis    Aortic valve disease    Anemia of chronic renal failure    Anemia of chronic disease    Acute ST elevation myocardial infarction (STEMI) (Multi)    Abscess of upper limb    CKD (chronic kidney disease) stage 4, GFR 15-29 ml/min (Multi)    Coronary artery disease    Electrocardiogram abnormal    Breast tenderness    Chest pain    Dizziness    History of hypertension    History of immune disorder    Overweight with body mass index (BMI) 25.0-29.9    Reactive depression    Seasonal allergies    Acute hypoxic respiratory failure (Multi)    Aortic stenosis    Congestive heart failure due to cardiomyopathy (Multi)    CKD stage 5 secondary to hypertension (Multi)    Ischemic cardiomyopathy    Pulmonary edema (HHS-HCC)    Bilateral pleural effusion    Hyponatremia    Hypokalemia     Assessment/Plan     Principal Problem:    Acute hypoxic respiratory failure (Multi)  Active Problems:    Ischemic cardiomyopathy    Systemic lupus erythematosus (Multi)    Nonsustained ventricular tachycardia (Multi)    Aortic valve disease    Anemia of chronic disease    Coronary artery disease    Congestive heart failure due to cardiomyopathy (Multi)    Pulmonary edema (HHS-HCC)    Bilateral pleural effusion    Hyponatremia    Hypokalemia     Acute hypoxic respiratory failure.  Suspect mostly volume overload  Continue with diuretics per renal  Continue peritoneal dialysis  Renal following    Initial Chest x-ray shows signs of worsening edema.  Cannot rule out possible  pneumonia  Chest x-ray from yesterday shows improvement  Monitor off antibiotics    Hypoxia  Obtain nocturnal pulse ox.  Question sleep apnea    Acute on chronic Renal failure  Peritoneal dialysis  Metolazone  Nephrology following     Pulmonary edema  See above  Repeat echo     Hyponatremia/hypokalemia  Continue to replace.  Nephrology following    Joseph Smith MD

## 2024-07-09 NOTE — DISCHARGE INSTRUCTIONS
Follow up with Dr. Mckoy on Friday for previously scheduled appointment      Follow up with Dr. Valdez-- needs further work up of valve.

## 2024-07-09 NOTE — PROGRESS NOTES
CHF Clinical Nurse Navigator Documentation    Congestive Heart Failure disease education was performed by the Clinical Nurse Navigator with a good understanding: yes, patient & his daughter verbalized an understanding.  Mini-Cog test completed: N/A    Living With Heart Failure Education booklet provided:  yes  CHF signs and symptoms discussed and when to call cardiologist:  yes  Compliant with home medications: yes  Low sodium nutrition education reviewed: yes, patient currently follows low Na diet.  Fluid Restriction Education reviewed: yes, patient is currently following fluid restriction of 45 oz daily  Daily Weight Education reviewed: yes, has been performing daily weights.    Cardiovascular Rehab Referral ordered:  no  Follow-up with Cardiologist after discharge education provided: yes, pta daughter to make follow up with Dr cady Simms to Beds discussed: yes   Meds to Beds Opt In charted: yes    Comments: Met with patient & his daughter today for information exchange & CHF education. Discussed HF Navigator role/HF program. Patient is agreeable to being followed post hospital discharge to help manage CHF. Patient has been prepping to start peritoneal dialysis. Patient & daughter have a good understanding of  managing CKD & CHF.  2D echo completed 6/18/24 showed rEF 30-35%.  Discharge plan-home today. I will continue to follow.

## 2024-07-10 ENCOUNTER — DOCUMENTATION (OUTPATIENT)
Dept: PRIMARY CARE | Facility: CLINIC | Age: 79
End: 2024-07-10
Payer: MEDICARE

## 2024-07-10 ENCOUNTER — PATIENT OUTREACH (OUTPATIENT)
Dept: PRIMARY CARE | Facility: CLINIC | Age: 79
End: 2024-07-10
Payer: MEDICARE

## 2024-07-10 NOTE — PROGRESS NOTES
Discharge Facility: [facility]  Discharge Diagnosis: Acute hypoxic respiratory failure  Admission Date: 07/06/2024  Discharge Date: 07/09/2024    PCP Appointment Date: 07/15/2024, scheduled by this   Specialist Appointment Date: Rheum 07/23/2024, Neuro 08/21/2024, Cardio 09/17/2024  Hospital Encounter and Summary Linked: Yes    See discharge assessment below for further details    Engagement  Call Start Time: 0930 (7/10/2024  9:41 AM)    Medications  Medications reviewed with patient/caregiver?: Yes (7/10/2024  9:41 AM)  Is the patient having any side effects they believe may be caused by any medication additions or changes?: No (7/10/2024  9:41 AM)  Does the patient have all medications ordered at discharge?: Yes (7/10/2024  9:41 AM)  Prescription Comments: Scripts given at discharge for Torsemide, Keflex, Klor-Con and Metolazone (7/10/2024  9:41 AM)  Is the patient taking all medications as directed (includes completed medication regime)?: Yes (7/10/2024  9:41 AM)  Medication Comments: Patients daughter denies any issues affording medication (7/10/2024  9:41 AM)    Appointments  Does the patient have a primary care provider?: Yes (7/10/2024  9:41 AM)  Care Management Interventions: Verified appointment date/time/provider (Scheduled by this ) (7/10/2024  9:41 AM)  Has the patient kept scheduled appointments due by today?: Yes (7/10/2024  9:41 AM)    Self Management  What is the home health agency?: N/A (7/10/2024  9:41 AM)  What Durable Medical Equipment (DME) was ordered?: N/A (7/10/2024  9:41 AM)    Patient Teaching  Does the patient have access to their discharge instructions?: Yes (7/10/2024  9:41 AM)  Care Management Interventions: Reviewed instructions with patient (7/10/2024  9:41 AM)  What is the patient's perception of their health status since discharge?: Improving (7/10/2024  9:41 AM)  Is the patient/caregiver able to teach back the hierarchy of who to call/visit for symptoms/problems? PCP, Specialist,  Home Health nurse, Urgent Care, ED, 911: Yes (7/10/2024  9:41 AM)  Patient/Caregiver Education Comments: CM spoke to patients daughter Berta via phone. She states that the patient is doing okay at home. He is not sleeping well and a [employer] message has been sent to PCP requesting a CPAP. The patients son states that he was monitoring the patinet while he slept last night. He states that the patient stops breathing for 13-16 second following labored breathing. He took his Sp02 while he slept and report his readings at 77%, 78% and 83%. The family is requesting a sleep study ASAP. They have calls in to cardiology and nephrology as well. PCP follow up appointment has been scheduled by this CM for 07/15/2024. They were very thankful for this call. (7/10/2024  9:41 AM)

## 2024-07-11 ENCOUNTER — TELEPHONE (OUTPATIENT)
Dept: INTENSIVE CARE | Facility: HOSPITAL | Age: 79
End: 2024-07-11

## 2024-07-11 ENCOUNTER — OFFICE VISIT (OUTPATIENT)
Dept: PRIMARY CARE | Facility: CLINIC | Age: 79
End: 2024-07-11
Payer: MEDICARE

## 2024-07-11 VITALS
WEIGHT: 144 LBS | RESPIRATION RATE: 19 BRPM | HEART RATE: 71 BPM | HEIGHT: 67 IN | BODY MASS INDEX: 22.6 KG/M2 | SYSTOLIC BLOOD PRESSURE: 108 MMHG | DIASTOLIC BLOOD PRESSURE: 68 MMHG | OXYGEN SATURATION: 98 % | TEMPERATURE: 97.9 F

## 2024-07-11 DIAGNOSIS — N18.5 CKD STAGE 5 SECONDARY TO HYPERTENSION (MULTI): ICD-10-CM

## 2024-07-11 DIAGNOSIS — R06.81 APNEA: ICD-10-CM

## 2024-07-11 DIAGNOSIS — I35.0 MILD AORTIC VALVE STENOSIS: ICD-10-CM

## 2024-07-11 DIAGNOSIS — N18.5 ANEMIA OF CHRONIC RENAL FAILURE, STAGE 5 (MULTI): ICD-10-CM

## 2024-07-11 DIAGNOSIS — I25.2 HISTORY OF MYOCARDIAL INFARCTION: ICD-10-CM

## 2024-07-11 DIAGNOSIS — I65.22 OCCLUSION OF LEFT INTERNAL CAROTID ARTERY: ICD-10-CM

## 2024-07-11 DIAGNOSIS — Z95.5 HISTORY OF CORONARY ARTERY STENT PLACEMENT: ICD-10-CM

## 2024-07-11 DIAGNOSIS — I10 ESSENTIAL HYPERTENSION: ICD-10-CM

## 2024-07-11 DIAGNOSIS — D63.1 ANEMIA OF CHRONIC RENAL FAILURE, STAGE 5 (MULTI): ICD-10-CM

## 2024-07-11 DIAGNOSIS — I25.5 ISCHEMIC CARDIOMYOPATHY: Primary | ICD-10-CM

## 2024-07-11 DIAGNOSIS — I12.0 CKD STAGE 5 SECONDARY TO HYPERTENSION (MULTI): ICD-10-CM

## 2024-07-11 DIAGNOSIS — G47.34 NOCTURNAL HYPOXIA: ICD-10-CM

## 2024-07-11 DIAGNOSIS — I15.1 HYPERTENSION SECONDARY TO OTHER RENAL DISORDERS: ICD-10-CM

## 2024-07-11 PROBLEM — I50.22 CHRONIC SYSTOLIC CONGESTIVE HEART FAILURE (MULTI): Status: ACTIVE | Noted: 2024-07-11

## 2024-07-11 PROCEDURE — 3074F SYST BP LT 130 MM HG: CPT | Performed by: FAMILY MEDICINE

## 2024-07-11 PROCEDURE — 1159F MED LIST DOCD IN RCRD: CPT | Performed by: FAMILY MEDICINE

## 2024-07-11 PROCEDURE — 99496 TRANSJ CARE MGMT HIGH F2F 7D: CPT | Performed by: FAMILY MEDICINE

## 2024-07-11 PROCEDURE — 3078F DIAST BP <80 MM HG: CPT | Performed by: FAMILY MEDICINE

## 2024-07-11 PROCEDURE — 1157F ADVNC CARE PLAN IN RCRD: CPT | Performed by: FAMILY MEDICINE

## 2024-07-11 PROCEDURE — 1111F DSCHRG MED/CURRENT MED MERGE: CPT | Performed by: FAMILY MEDICINE

## 2024-07-11 PROCEDURE — 1123F ACP DISCUSS/DSCN MKR DOCD: CPT | Performed by: FAMILY MEDICINE

## 2024-07-11 PROCEDURE — 1126F AMNT PAIN NOTED NONE PRSNT: CPT | Performed by: FAMILY MEDICINE

## 2024-07-11 ASSESSMENT — PAIN SCALES - GENERAL: PAINLEVEL: 0-NO PAIN

## 2024-07-11 NOTE — PROGRESS NOTES
"Subjective   Patient ID: George Rowan is a 78 y.o. male who presents for Follow-up (PATIENT  HERE FOR FOLLOW UP [facility] ADMISSION ON 07/06 . HE  WAS ADMITTED FOR RESPIRATORY INSUFFICIENCY. HE ALSO WOULD LIKE A CPAP MACHINE  BUT HAS NOT HAD SLEEP STUDY).    HPI Pt admitted 7/6-7/9 with acute hypoxic respiratory failure. (Acute on chronic CHF) Hx of CKD stage 5, HTN, DM, Chronic heart failure, aortic valve disease.  He was diuresed and given peritoneal dialysis.  Was on Bipap for some time.   Seen by Dr Rodriguez inpt for his ischemic congestive cardiomyopathy.    He presents with his daughter today who gives much history.  He is having trouble sleeping and they states he stops breathing a lot and pulse ox is dropping in the mid and low 80s at times.    His breathing is currently improved and he has been trained on PD and will be starting this today.      Review of Systems see HPI    Objective   /68 (BP Location: Left arm, Patient Position: Sitting, BP Cuff Size: Adult)   Pulse 71   Temp 36.6 °C (97.9 °F) (Skin)   Resp 19   Ht 1.702 m (5' 7\")   Wt 65.3 kg (144 lb)   SpO2 98%   BMI 22.55 kg/m²     Physical Exam  Constitutional:       General: He is not in acute distress.     Appearance: Normal appearance.   Cardiovascular:      Rate and Rhythm: Normal rate and regular rhythm.      Heart sounds: No murmur heard.  Pulmonary:      Breath sounds: Normal breath sounds. No wheezing.   Neurological:      Mental Status: He is alert.         Assessment/Plan   Problem List Items Addressed This Visit             ICD-10-CM    Occlusion of left internal carotid artery I65.22    Mild aortic valve stenosis I35.0    Hypertension secondary to other renal disorders I15.1    History of myocardial infarction I25.2    History of coronary artery stent placement Z95.5    Essential hypertension I10    Anemia of chronic renal failure N18.9, D63.1    CKD stage 5 secondary to hypertension (Multi) I12.0, N18.5    Ischemic " cardiomyopathy - Primary I25.5     Other Visit Diagnoses         Codes    Apnea     R06.81    Nocturnal hypoxia     G47.34    Chronic bronchitis, unspecified chronic bronchitis type (Multi)    Pt currently needs nebulizer and albuterol 2.5/Ipratropium bromide 0.5 per nebulizer  J42        Follow up with cardiology and nephrology near future.  Will set up home sleep study asap/night oximetry for eval.  Reviewed hospital records etc.

## 2024-07-12 ENCOUNTER — PATIENT OUTREACH (OUTPATIENT)
Dept: CASE MANAGEMENT | Facility: HOSPITAL | Age: 79
End: 2024-07-12
Payer: MEDICARE

## 2024-07-12 NOTE — PROGRESS NOTES
Questions for Outreach call to CHF patient in HF Nurse Navigator Program:  Have follow up appointments been scheduled for Cardiologist? completed  Are daily weights being completed? yes  Is low sodium diet for symptom control being followed? yes  Are all medications being taken with no issues/questions? yes  Are you experiencing any HF symptoms? no  Do you know when to reach out to the doctor? Yes, reviewed HF flare up symptoms to report.  Comments: Spoke with George's daughter she reports that he is doing well with managing his CHF & Peritoneal dialysis. No issues/concerned voiced. I will continue to follow.

## 2024-07-15 ENCOUNTER — APPOINTMENT (OUTPATIENT)
Dept: PRIMARY CARE | Facility: CLINIC | Age: 79
End: 2024-07-15
Payer: MEDICARE

## 2024-07-15 NOTE — DOCUMENTATION CLARIFICATION NOTE
"    PATIENT:               DALILA PHIPPS  ACCT #:                  [id number]  MRN:                       [id number]  :                       1945  ADMIT DATE:       2024 3:33 PM  DISCH DATE:        2024 4:00 PM  RESPONDING PROVIDER #:        [id number]          PROVIDER RESPONSE TEXT:    Pneumonia ruled out after further workup    CDI QUERY TEXT:    Clarification        Instruction:    Based on your assessment of the patient and the clinical information, please provide the requested documentation by clicking on the appropriate radio button and enter any additional information if prompted.    Question: Please further specify the type of pneumonia being treated    When answering this query, please exercise your independent professional judgment. The fact that a question is being asked, does not imply that any particular answer is desired or expected.    The patient's clinical indicators include:  Clinical Information:  Patient is a 78 male with history of end-stage renal disease, on peritoneal dialysis , STEMI, diabetes, who presents to ED for shortness of breath and hypoxia     ED PN:  \"acute hypoxic respiratory failure,\"  \"Superimposed pneumonia difficult to exclude in\"     Pulm PN:  \"Findings suggestive of worsening, now moderate, pulmonary edema with  bilateral effusions. Superimposed pneumonia difficult to exclude in  the proper clinical setting\"    Clinical Indicators:  WBC:  8.1/11.4  LA:  1.9   CXR:  FINDINGS:  Worsening aeration in the lungs.Bilateral pleural effusions,  increasing.Bibasilar and perihilar increased lung markings  especially on the left, also worsening.The cardiac silhouette is  mildly enlarged  IMPRESSION:  Findings suggestive of worsening, now moderate, pulmonary edema with  bilateral effusions.Superimposed pneumonia difficult to exclude in  the proper clinical setting   AB.35/36/77/19.9    Treatment:  Maxipime 500 mg IV X1; CXR; Linezolid " 600 mg IV 7/7-present; ABG    Risk Factors:  HTN; DM; CAD with Stents; CHF; SLE; CKD  Options provided:  -- MRSA PNA  -- Pneumonia ruled out after further workup  -- Bacterial PNA  -- Other - I will add my own diagnosis  -- Refer to Clinical Documentation Reviewer    Query created by: Lillie Kyle on 7/9/2024 1:34 PM      Electronically signed by:  DONNA DAVIS MD 7/15/2024 2:35 PM

## 2024-07-17 ENCOUNTER — APPOINTMENT (OUTPATIENT)
Dept: INFUSION THERAPY | Facility: CLINIC | Age: 79
End: 2024-07-17
Payer: MEDICARE

## 2024-07-19 ENCOUNTER — PATIENT OUTREACH (OUTPATIENT)
Dept: CASE MANAGEMENT | Facility: HOSPITAL | Age: 79
End: 2024-07-19
Payer: MEDICARE

## 2024-07-19 NOTE — PROGRESS NOTES
Questions for Outreach call to CHF patient in HF Nurse Navigator Program:  Have follow up appointments been scheduled for Cardiologist? completed  Are daily weights being completed? yes  Is low sodium diet for symptom control being followed? yes  Are all medications being taken with no issues/questions? yes  Are you experiencing any HF symptoms? no  Do you know when to reach out to the doctor? Yes, reviewed HF flare up symptoms to report.  Comments: Spoke with George's daughter, reports that he is doing very well managing CHF & Peritoneal dialysis. Nephrology placed him on  daily 50 oz fluid restriction. I will continue to follow.

## 2024-07-23 ENCOUNTER — APPOINTMENT (OUTPATIENT)
Dept: RHEUMATOLOGY | Facility: CLINIC | Age: 79
End: 2024-07-23
Payer: MEDICARE

## 2024-07-24 ENCOUNTER — PATIENT OUTREACH (OUTPATIENT)
Dept: PRIMARY CARE | Facility: CLINIC | Age: 79
End: 2024-07-24
Payer: MEDICARE

## 2024-07-24 NOTE — PROGRESS NOTES
Call regarding appt. with PCP on 07/11/2024 after hospitalization.  At time of outreach call the patients daughter Berta feels as if their condition has improved since last visit.  Reviewed the PCP appointment with the pt and addressed any questions or concerns.     Patient is awaiting delivery of home sleep study supplies.

## 2024-07-26 ENCOUNTER — PATIENT OUTREACH (OUTPATIENT)
Dept: CASE MANAGEMENT | Facility: HOSPITAL | Age: 79
End: 2024-07-26
Payer: MEDICARE

## 2024-07-26 NOTE — PROGRESS NOTES
Questions for Outreach call to CHF patient in HF Nurse Navigator Program:  Have follow up appointments been scheduled for Cardiologist? completed  Are daily weights being completed? yes  Is low sodium diet for symptom control being followed? yes  Are all medications being taken with no issues/questions? yes  Are you experiencing any HF symptoms? no  Do you know when to reach out to the doctor? Yes, verbalized HF flare up symptoms to report.  Comments: Spoke with George's daughter reports that they are doing well managing CHF & Peritoneal Dialysis. Every 2 weeks) George develops dyspnea/weakness & weight gain. Report Nephrologist has been adjusting dialysis solutions , which has resolved George's symptoms.   BP runs low-cardiac medications have been adjusted. I will continue to follow.

## 2024-07-30 ENCOUNTER — TELEPHONE (OUTPATIENT)
Dept: CARDIOLOGY | Facility: CLINIC | Age: 79
End: 2024-07-30
Payer: MEDICARE

## 2024-07-30 NOTE — TELEPHONE ENCOUNTER
Patients daughter called the office today stating that George has been weak, RAMON, low BP (82/40, 96/57, 104/62).  He has an appointment 8/6/24.  She is asking if you want to order labs or an echo before that appointment.  He had an echo 6/18/24 while admitted to the hospital.  Please advise, thanks  Call back Oralia [phone]    Patients daughter called today regarding if any labs need ordered before appointment?  Please advise, thanks

## 2024-07-31 ENCOUNTER — APPOINTMENT (OUTPATIENT)
Dept: INFUSION THERAPY | Facility: CLINIC | Age: 79
End: 2024-07-31
Payer: MEDICARE

## 2024-08-01 ENCOUNTER — PATIENT OUTREACH (OUTPATIENT)
Dept: CASE MANAGEMENT | Facility: HOSPITAL | Age: 79
End: 2024-08-01
Payer: MEDICARE

## 2024-08-01 NOTE — PROGRESS NOTES
Questions for Outreach call to CHF patient in HF Nurse Navigator Program:  Have follow up appointments been scheduled for Cardiologist? yes  Are daily weights being completed? yes  Is low sodium diet for symptom control being followed? yes  Are all medications being taken with no issues/questions? yes  Are you experiencing any HF symptoms? Intermittent dyspnea, weakness & dizziness  Do you know when to reach out to the doctor? yes  Comments: Spoke with George's daughter reports that they are doing well with managing Peritoneal Dialysis & CHF.   George c/o intermittent dizziness, weakness & dyspnea. They  addressed this with Nephrologist couple weeks ago, adjusted dialysis solutions(helped @ [facility], but symptoms continue come & go). Call has been placed to Cardiologist, Dr Valdez -they are waiting for return call. Sleep study test was also completed. They are just waiting for results from PCP. I will continue to follow.

## 2024-08-02 NOTE — TELEPHONE ENCOUNTER
Patients daughter called the office stating that George's BP is low 80/52.  She is asking if he needs to change any medication dosages or any advice.  Thanks  Call back Oralia [phone]

## 2024-08-05 DIAGNOSIS — I42.9 CONGESTIVE HEART FAILURE DUE TO CARDIOMYOPATHY (MULTI): ICD-10-CM

## 2024-08-05 DIAGNOSIS — I50.9 CONGESTIVE HEART FAILURE DUE TO CARDIOMYOPATHY (MULTI): ICD-10-CM

## 2024-08-05 RX ORDER — ALBUTEROL SULFATE 90 UG/1
2 INHALANT RESPIRATORY (INHALATION) EVERY 4 HOURS PRN
Qty: 8.5 G | Refills: 2 | Status: SHIPPED | OUTPATIENT
Start: 2024-08-05

## 2024-08-06 ENCOUNTER — OFFICE VISIT (OUTPATIENT)
Dept: CARDIOLOGY | Facility: CLINIC | Age: 79
End: 2024-08-06
Payer: MEDICARE

## 2024-08-06 VITALS
HEART RATE: 59 BPM | DIASTOLIC BLOOD PRESSURE: 50 MMHG | SYSTOLIC BLOOD PRESSURE: 104 MMHG | WEIGHT: 148 LBS | OXYGEN SATURATION: 100 % | BODY MASS INDEX: 23.18 KG/M2

## 2024-08-06 DIAGNOSIS — I50.20 SYSTOLIC HEART FAILURE, UNSPECIFIED HF CHRONICITY (MULTI): Primary | ICD-10-CM

## 2024-08-06 PROCEDURE — 1036F TOBACCO NON-USER: CPT | Performed by: INTERNAL MEDICINE

## 2024-08-06 PROCEDURE — 3078F DIAST BP <80 MM HG: CPT | Performed by: INTERNAL MEDICINE

## 2024-08-06 PROCEDURE — 1157F ADVNC CARE PLAN IN RCRD: CPT | Performed by: INTERNAL MEDICINE

## 2024-08-06 PROCEDURE — 1126F AMNT PAIN NOTED NONE PRSNT: CPT | Performed by: INTERNAL MEDICINE

## 2024-08-06 PROCEDURE — 1159F MED LIST DOCD IN RCRD: CPT | Performed by: INTERNAL MEDICINE

## 2024-08-06 PROCEDURE — 99214 OFFICE O/P EST MOD 30 MIN: CPT | Performed by: INTERNAL MEDICINE

## 2024-08-06 PROCEDURE — 1111F DSCHRG MED/CURRENT MED MERGE: CPT | Performed by: INTERNAL MEDICINE

## 2024-08-06 PROCEDURE — 1123F ACP DISCUSS/DSCN MKR DOCD: CPT | Performed by: INTERNAL MEDICINE

## 2024-08-06 PROCEDURE — 3074F SYST BP LT 130 MM HG: CPT | Performed by: INTERNAL MEDICINE

## 2024-08-06 RX ORDER — RAMELTEON 8 MG/1
8 TABLET ORAL NIGHTLY
COMMUNITY

## 2024-08-06 ASSESSMENT — PAIN SCALES - GENERAL: PAINLEVEL: 0-NO PAIN

## 2024-08-06 NOTE — ASSESSMENT & PLAN NOTE
His heart failure volume management now being managed by peritoneal dialysis    He remains on carvedilol 3.125 mg twice daily and if he cannot tolerate that medication because of hypotension which has been a problem in the evening he will hold the evening dose of carvedilol if his systolic pressures below 100.  If necessary we can try to cut his carvedilol in half and I discussed this with him and his daughter

## 2024-08-06 NOTE — PATIENT INSTRUCTIONS
Ischemic cardiomyopathy  Left ventricular ejection fraction approximately 35% on most recent echocardiography.    Mild aortic valve stenosis  Mild to moderate aortic valve stenosis confirmed on his recent echocardiogram in July of 2024    Congestive heart failure due to cardiomyopathy (Multi)  His heart failure volume management now being managed by peritoneal dialysis    He remains on carvedilol 3.125 mg twice daily and if he cannot tolerate that medication because of hypotension which has been a problem in the evening he will hold the evening dose of carvedilol if his systolic pressures below 100.  If necessary we can try to cut his carvedilol in half and I discussed this with him and his daughter

## 2024-08-06 NOTE — PROGRESS NOTES
Post-partum Note,   She is a  27y woman who is now post-partum day: 1    Subjective:  The patient feels well.  She is ambulating.   She is tolerating regular diet.  She denies nausea and vomiting; denies fever.  She is voiding.  Her pain is controlled.  She reports normal postpartum bleeding.  She is breastfeeding.  She is bonding with infant.    Physical exam:    Vital Signs Last 24 Hrs  T(C): 36.6 (15 Nov 2022 06:30), Max: 36.9 (15 Nov 2022 01:31)  T(F): 97.8 (15 Nov 2022 06:30), Max: 98.5 (15 Nov 2022 01:31)  HR: 79 (15 Nov 2022 06:30) (67 - 213)  BP: 114/63 (15 Nov 2022 06:30) (107/64 - 181/68)  BP(mean): --  RR: 18 (15 Nov 2022 06:30) (16 - 18)  SpO2: 100% (15 Nov 2022 06:30) (73% - 100%)    Parameters below as of 15 Nov 2022 06:30  Patient On (Oxygen Delivery Method): room air        Gen: NAD  Breast: Soft, nontender, not engorged.  Abdomen: Soft, nontender, no distension , firm uterine fundus at umbilicus.  Pelvic: Normal lochia noted  Ext: No calf tenderness    LABS:                        10.4   15.75 )-----------( 169      ( 15 Nov 2022 05:55 )             31.2       Rubella status:     Allergies    No Known Allergies    Intolerances      MEDICATIONS  (STANDING):  acetaminophen     Tablet .. 975 milliGRAM(s) Oral <User Schedule>  calcium carbonate    500 mG (Tums) Chewable 1 Tablet(s) Chew every 8 hours  diphtheria/tetanus/pertussis (acellular) Vaccine (Adacel) 0.5 milliLiter(s) IntraMuscular once  ibuprofen  Tablet. 600 milliGRAM(s) Oral every 6 hours  prenatal multivitamin 1 Tablet(s) Oral daily  sodium chloride 0.9% lock flush 3 milliLiter(s) IV Push every 8 hours    MEDICATIONS  (PRN):  benzocaine 20%/menthol 0.5% Spray 1 Spray(s) Topical every 6 hours PRN for Perineal discomfort  dibucaine 1% Ointment 1 Application(s) Topical every 6 hours PRN Perineal discomfort  diphenhydrAMINE 25 milliGRAM(s) Oral every 6 hours PRN Pruritus  hydrocortisone 1% Cream 1 Application(s) Topical every 6 hours PRN Moderate Pain (4-6)  lanolin Ointment 1 Application(s) Topical every 6 hours PRN nipple soreness  magnesium hydroxide Suspension 30 milliLiter(s) Oral two times a day PRN Constipation  oxyCODONE    IR 5 milliGRAM(s) Oral every 3 hours PRN Moderate to Severe Pain (4-10)  oxyCODONE    IR 5 milliGRAM(s) Oral once PRN Moderate to Severe Pain (4-10)  pramoxine 1%/zinc 5% Cream 1 Application(s) Topical every 4 hours PRN Moderate Pain (4-6)  simethicone 80 milliGRAM(s) Chew every 4 hours PRN Gas  witch hazel Pads 1 Application(s) Topical every 4 hours PRN Perineal discomfort        Assessment and Plan  PPD #1 s/p   Doing well.  Increase ambulation.  Encourage breastfeeding.  PP & PPD Instructions reviewed.  PP educational materials reviewed & provided     D/C home possibly today.     MD Betancourt       Subjective      Chief Complaint   Patient presents with    Follow-up        Follow-up after recent hospitalization at [facility] for another episode of acute and chronic systolic heart failure     He has started his peritoneal dialysis sessions, was sent home on carvedilol 6.25 mg twice daily and losartan 100 mg daily but he has been concerned about the blood pressures being too low which I think is a legitimate concern especially in the setting of aortic valve stenosis.     Previous: Posterior wall STEMI February 2019 with catheterization showing mid circumflex occlusion left main had mild disease LAD had a 60 to 70% calcified proximal to mid stenosis with an intramyocardial bridge distally and right coronary artery small nondominant with 60 to 70% proximal stenosis.  Left ventricular ejection fraction 55 to 59% with posterior wall hypokinesis.  Drug-eluting stent deployment successful in his circumflex with a 3/12 mm Synergy stent dilated to 3.43 mm in overlapping 3/16 and 3/12 millimeters Synergy stents to mid vessel dilated to 3.32 mm     He has unilateral severe  carotid stenosis with carotid occlusion managed medically in the setting of his chronic kidney disease.     He has chronic kidney disease and that is now followed by his nephrologist,     His echocardiogram confirmed inferoposterior wall hypokinesis with moderately severe LV systolic dysfunction and left ventricular ejection fraction 30 to 35% and mild to moderate aortic valve stenosis.  He is not on ACE inhibitor/ARB/Arni or spironolactone due to his end-stage renal disease.              Review of Systems   All other systems reviewed and are negative.       Objective   Physical Exam  Constitutional:       Appearance: Normal appearance.   HENT:      Head: Normocephalic and atraumatic.   Eyes:      Pupils: Pupils are equal, round, and reactive to light.   Cardiovascular:      Rate and Rhythm: Normal rate and regular rhythm.      Pulses:  Normal pulses.      Heart sounds: Normal heart sounds.   Pulmonary:      Effort: Pulmonary effort is normal.      Breath sounds: Normal breath sounds.   Abdominal:      General: Abdomen is flat. Bowel sounds are normal.      Palpations: Abdomen is soft.   Musculoskeletal:         General: Normal range of motion.      Cervical back: Normal range of motion.   Skin:     General: Skin is warm and dry.   Neurological:      General: No focal deficit present.   Psychiatric:         Mood and Affect: Mood normal.         Judgment: Judgment normal.          Lab Review:   Not applicable    Ischemic cardiomyopathy  Left ventricular ejection fraction approximately 35% on most recent echocardiography.    Mild aortic valve stenosis  Mild to moderate aortic valve stenosis confirmed on his recent echocardiogram in July of 2024    Congestive heart failure due to cardiomyopathy (Multi)  His heart failure volume management now being managed by peritoneal dialysis    He remains on carvedilol 3.125 mg twice daily and if he cannot tolerate that medication because of hypotension which has been a problem in the evening he will hold the evening dose of carvedilol if his systolic pressures below 100.  If necessary we can try to cut his carvedilol in half and I discussed this with him and his daughter

## 2024-08-08 ENCOUNTER — TELEPHONE (OUTPATIENT)
Dept: PRIMARY CARE | Facility: CLINIC | Age: 79
End: 2024-08-08
Payer: MEDICARE

## 2024-08-09 ENCOUNTER — PATIENT OUTREACH (OUTPATIENT)
Dept: CASE MANAGEMENT | Facility: HOSPITAL | Age: 79
End: 2024-08-09
Payer: MEDICARE

## 2024-08-09 NOTE — PROGRESS NOTES
Questions for Outreach call to CHF patient in HF Nurse Navigator Program:  Have follow up appointments been scheduled for Cardiologist? Completed  Are daily weights being completed? Yes, performs peritoneal dialysis  Is low sodium diet for symptom control being followed? yes  Are all medications being taken with no issues/questions? yes  Are you experiencing any HF symptoms? no  Do you know when to reach out to the doctor? Verbalized HF flare up symptoms to report to MD.  Comments: Spoke with George's daughter reports he is doing well. Completed follow up with his cardiologist, Dr Valdez-addressed C/O dizziness & low BP. Parameters have been set on Coreg (hold for SBP < 100). George's daughter & son have been assisting with his care-they are doing well  helping him to manage his CHF,Peritoneal dialysis & C-PAP machine to be delivered next week. HF goals have been met. Closing HF case today.

## 2024-08-09 NOTE — TELEPHONE ENCOUNTER
Hello, I spoke with your daughter yesterday about a nebulizer. I'm just writing to let you know that the paperwork has been faxed over for that.

## 2024-08-13 ENCOUNTER — HOSPITAL ENCOUNTER (INPATIENT)
Facility: HOSPITAL | Age: 79
LOS: 4 days | Discharge: HOME | DRG: 871 | End: 2024-08-18
Attending: EMERGENCY MEDICINE | Admitting: INTERNAL MEDICINE
Payer: MEDICARE

## 2024-08-13 DIAGNOSIS — E87.70 HYPERVOLEMIA, UNSPECIFIED HYPERVOLEMIA TYPE: ICD-10-CM

## 2024-08-13 DIAGNOSIS — N18.6 ESRD (END STAGE RENAL DISEASE) ON DIALYSIS (MULTI): ICD-10-CM

## 2024-08-13 DIAGNOSIS — Z99.2 ESRD (END STAGE RENAL DISEASE) ON DIALYSIS (MULTI): ICD-10-CM

## 2024-08-13 DIAGNOSIS — J96.11 CHRONIC RESPIRATORY FAILURE WITH HYPOXIA (MULTI): ICD-10-CM

## 2024-08-13 DIAGNOSIS — I20.9 ANGINA PECTORIS (CMS-HCC): ICD-10-CM

## 2024-08-13 DIAGNOSIS — I12.0 CKD STAGE 5 SECONDARY TO HYPERTENSION (MULTI): ICD-10-CM

## 2024-08-13 DIAGNOSIS — I21.4 NSTEMI (NON-ST ELEVATED MYOCARDIAL INFARCTION) (MULTI): ICD-10-CM

## 2024-08-13 DIAGNOSIS — Z74.09 IMPAIRED MOBILITY: ICD-10-CM

## 2024-08-13 DIAGNOSIS — N18.5 CKD STAGE 5 SECONDARY TO HYPERTENSION (MULTI): ICD-10-CM

## 2024-08-13 DIAGNOSIS — A41.9 SEPTICEMIA (MULTI): ICD-10-CM

## 2024-08-13 DIAGNOSIS — R53.1 WEAKNESS: ICD-10-CM

## 2024-08-13 DIAGNOSIS — A41.9 SEPSIS (MULTI): Primary | ICD-10-CM

## 2024-08-13 LAB
ANION GAP BLDV CALCULATED.4IONS-SCNC: 22 MMOL/L (ref 10–25)
BASE EXCESS BLDV CALC-SCNC: -8.5 MMOL/L (ref -2–3)
BASOPHILS # BLD AUTO: 0.04 X10*3/UL (ref 0–0.1)
BASOPHILS NFR BLD AUTO: 0.4 %
BODY TEMPERATURE: 37 DEGREES CELSIUS
CA-I BLDV-SCNC: 1.24 MMOL/L (ref 1.1–1.33)
CHLORIDE BLDV-SCNC: 90 MMOL/L (ref 98–107)
EOSINOPHIL # BLD AUTO: 0.22 X10*3/UL (ref 0–0.4)
EOSINOPHIL NFR BLD AUTO: 2.3 %
ERYTHROCYTE [DISTWIDTH] IN BLOOD BY AUTOMATED COUNT: 17.2 % (ref 11.5–14.5)
GLUCOSE BLDV-MCNC: ABNORMAL MG/DL
HCO3 BLDV-SCNC: 18.4 MMOL/L (ref 22–26)
HCT VFR BLD AUTO: 26.7 % (ref 41–52)
HCT VFR BLD EST: 26 % (ref 41–52)
HGB BLD-MCNC: 8.5 G/DL (ref 13.5–17.5)
HGB BLDV-MCNC: 8.8 G/DL (ref 13.5–17.5)
IMM GRANULOCYTES # BLD AUTO: 0.06 X10*3/UL (ref 0–0.5)
IMM GRANULOCYTES NFR BLD AUTO: 0.6 % (ref 0–0.9)
INHALED O2 CONCENTRATION: 0 %
LACTATE BLDV-SCNC: 7.5 MMOL/L (ref 0.4–2)
LYMPHOCYTES # BLD AUTO: 1.51 X10*3/UL (ref 0.8–3)
LYMPHOCYTES NFR BLD AUTO: 15.8 %
MCH RBC QN AUTO: 34.3 PG (ref 26–34)
MCHC RBC AUTO-ENTMCNC: 31.8 G/DL (ref 32–36)
MCV RBC AUTO: 108 FL (ref 80–100)
MONOCYTES # BLD AUTO: 0.39 X10*3/UL (ref 0.05–0.8)
MONOCYTES NFR BLD AUTO: 4.1 %
NEUTROPHILS # BLD AUTO: 7.34 X10*3/UL (ref 1.6–5.5)
NEUTROPHILS NFR BLD AUTO: 76.8 %
NRBC BLD-RTO: 2.6 /100 WBCS (ref 0–0)
OXYHGB MFR BLDV: 17.6 % (ref 45–75)
PCO2 BLDV: 43 MM HG (ref 41–51)
PH BLDV: 7.24 PH (ref 7.33–7.43)
PLATELET # BLD AUTO: 149 X10*3/UL (ref 150–450)
PO2 BLDV: 22 MM HG (ref 35–45)
POTASSIUM BLDV-SCNC: 4.4 MMOL/L (ref 3.5–5.3)
RBC # BLD AUTO: 2.48 X10*6/UL (ref 4.5–5.9)
SAO2 % BLDV: 18 % (ref 45–75)
SODIUM BLDV-SCNC: 126 MMOL/L (ref 136–145)
WBC # BLD AUTO: 9.6 X10*3/UL (ref 4.4–11.3)

## 2024-08-13 PROCEDURE — 84484 ASSAY OF TROPONIN QUANT: CPT | Performed by: EMERGENCY MEDICINE

## 2024-08-13 PROCEDURE — 96367 TX/PROPH/DG ADDL SEQ IV INF: CPT

## 2024-08-13 PROCEDURE — 36415 COLL VENOUS BLD VENIPUNCTURE: CPT | Performed by: EMERGENCY MEDICINE

## 2024-08-13 PROCEDURE — 84132 ASSAY OF SERUM POTASSIUM: CPT | Performed by: EMERGENCY MEDICINE

## 2024-08-13 PROCEDURE — 93010 ELECTROCARDIOGRAM REPORT: CPT | Performed by: INTERNAL MEDICINE

## 2024-08-13 PROCEDURE — 85025 COMPLETE CBC W/AUTO DIFF WBC: CPT | Performed by: EMERGENCY MEDICINE

## 2024-08-13 PROCEDURE — 71045 X-RAY EXAM CHEST 1 VIEW: CPT | Performed by: RADIOLOGY

## 2024-08-13 PROCEDURE — 92950 HEART/LUNG RESUSCITATION CPR: CPT | Performed by: EMERGENCY MEDICINE

## 2024-08-13 PROCEDURE — 99291 CRITICAL CARE FIRST HOUR: CPT | Mod: 25

## 2024-08-13 RX ORDER — VANCOMYCIN 1 G/200ML
1000 INJECTION, SOLUTION INTRAVENOUS ONCE
Status: COMPLETED | OUTPATIENT
Start: 2024-08-13 | End: 2024-08-14

## 2024-08-13 ASSESSMENT — PAIN - FUNCTIONAL ASSESSMENT: PAIN_FUNCTIONAL_ASSESSMENT: 0-10

## 2024-08-13 ASSESSMENT — PAIN SCALES - GENERAL: PAINLEVEL_OUTOF10: 0 - NO PAIN

## 2024-08-14 PROBLEM — J96.10 CHRONIC RESPIRATORY FAILURE (MULTI): Status: ACTIVE | Noted: 2024-08-14

## 2024-08-14 PROBLEM — A41.9 SEPSIS (MULTI): Status: ACTIVE | Noted: 2024-01-01

## 2024-08-14 PROBLEM — R53.1 WEAKNESS: Status: ACTIVE | Noted: 2024-08-14

## 2024-08-14 PROBLEM — Z99.2 ESRD (END STAGE RENAL DISEASE) ON DIALYSIS (MULTI): Status: ACTIVE | Noted: 2024-01-01

## 2024-08-14 PROBLEM — E87.29 METABOLIC ACIDOSIS, INCREASED ANION GAP (IAG): Status: ACTIVE | Noted: 2024-01-01

## 2024-08-14 PROBLEM — E87.20 LACTIC ACID ACIDOSIS: Status: ACTIVE | Noted: 2024-08-14

## 2024-08-14 PROBLEM — Z99.2: Status: ACTIVE | Noted: 2024-08-14

## 2024-08-14 PROBLEM — N18.6 ESRD (END STAGE RENAL DISEASE) ON DIALYSIS (MULTI): Status: ACTIVE | Noted: 2024-08-14

## 2024-08-14 LAB
ALBUMIN SERPL-MCNC: 3.2 G/DL (ref 3.5–5)
ALP BLD-CCNC: 150 U/L (ref 35–125)
ALT SERPL-CCNC: 171 U/L (ref 5–40)
ANION GAP BLDA CALCULATED.4IONS-SCNC: 12 MMO/L (ref 10–25)
ANION GAP SERPL CALC-SCNC: 18 MMOL/L
ANION GAP SERPL CALC-SCNC: >19 MMOL/L
APPEARANCE UR: CLEAR
ARTERIAL PATENCY WRIST A: POSITIVE
AST SERPL-CCNC: 119 U/L (ref 5–40)
BASE EXCESS BLDA CALC-SCNC: 3.5 MMOL/L (ref -2–3)
BASOPHILS # BLD AUTO: 0.03 X10*3/UL (ref 0–0.1)
BASOPHILS NFR BLD AUTO: 0.2 %
BILIRUB SERPL-MCNC: 0.2 MG/DL (ref 0.1–1.2)
BILIRUB UR STRIP.AUTO-MCNC: NEGATIVE MG/DL
BODY TEMPERATURE: 37 DEGREES CELSIUS
BUN SERPL-MCNC: 84 MG/DL (ref 8–25)
BUN SERPL-MCNC: 89 MG/DL (ref 8–25)
CA-I BLDA-SCNC: 1.15 MMOL/L (ref 1.1–1.33)
CALCIUM SERPL-MCNC: 9.4 MG/DL (ref 8.5–10.4)
CALCIUM SERPL-MCNC: 9.5 MG/DL (ref 8.5–10.4)
CHLORIDE BLDA-SCNC: 94 MMOL/L (ref 98–107)
CHLORIDE SERPL-SCNC: 90 MMOL/L (ref 97–107)
CHLORIDE SERPL-SCNC: 94 MMOL/L (ref 97–107)
CO2 SERPL-SCNC: 17 MMOL/L (ref 24–31)
CO2 SERPL-SCNC: 22 MMOL/L (ref 24–31)
COLOR UR: YELLOW
CREAT SERPL-MCNC: 8.6 MG/DL (ref 0.4–1.6)
CREAT SERPL-MCNC: 9 MG/DL (ref 0.4–1.6)
EGFRCR SERPLBLD CKD-EPI 2021: 6 ML/MIN/1.73M*2
EGFRCR SERPLBLD CKD-EPI 2021: 6 ML/MIN/1.73M*2
EOSINOPHIL # BLD AUTO: 0.04 X10*3/UL (ref 0–0.4)
EOSINOPHIL NFR BLD AUTO: 0.3 %
ERYTHROCYTE [DISTWIDTH] IN BLOOD BY AUTOMATED COUNT: 16.7 % (ref 11.5–14.5)
EST. AVERAGE GLUCOSE BLD GHB EST-MCNC: 137 MG/DL
FLUAV RNA RESP QL NAA+PROBE: NOT DETECTED
FLUBV RNA RESP QL NAA+PROBE: NOT DETECTED
GLUCOSE BLD MANUAL STRIP-MCNC: 101 MG/DL (ref 74–99)
GLUCOSE BLD MANUAL STRIP-MCNC: 101 MG/DL (ref 74–99)
GLUCOSE BLD MANUAL STRIP-MCNC: 166 MG/DL (ref 74–99)
GLUCOSE BLD MANUAL STRIP-MCNC: 200 MG/DL (ref 74–99)
GLUCOSE BLD MANUAL STRIP-MCNC: 220 MG/DL (ref 74–99)
GLUCOSE BLDA-MCNC: 90 MG/DL (ref 74–99)
GLUCOSE SERPL-MCNC: 256 MG/DL (ref 65–99)
GLUCOSE SERPL-MCNC: 95 MG/DL (ref 65–99)
GLUCOSE UR STRIP.AUTO-MCNC: NORMAL MG/DL
HBA1C MFR BLD: 6.4 %
HCO3 BLDA-SCNC: 27.8 MMOL/L (ref 22–26)
HCT VFR BLD AUTO: 25.3 % (ref 41–52)
HCT VFR BLD EST: 24 % (ref 41–52)
HGB BLD-MCNC: 8.2 G/DL (ref 13.5–17.5)
HGB BLDA-MCNC: 8 G/DL (ref 13.5–17.5)
IMM GRANULOCYTES # BLD AUTO: 0.08 X10*3/UL (ref 0–0.5)
IMM GRANULOCYTES NFR BLD AUTO: 0.6 % (ref 0–0.9)
INHALED O2 CONCENTRATION: 21 %
KETONES UR STRIP.AUTO-MCNC: NEGATIVE MG/DL
LACTATE BLDA-SCNC: 2.2 MMOL/L (ref 0.4–2)
LACTATE BLDV-SCNC: 1.5 MMOL/L (ref 0.4–2)
LEUKOCYTE ESTERASE UR QL STRIP.AUTO: NEGATIVE
LYMPHOCYTES # BLD AUTO: 0.9 X10*3/UL (ref 0.8–3)
LYMPHOCYTES NFR BLD AUTO: 6.2 %
MCH RBC QN AUTO: 33.7 PG (ref 26–34)
MCHC RBC AUTO-ENTMCNC: 32.4 G/DL (ref 32–36)
MCV RBC AUTO: 104 FL (ref 80–100)
MONOCYTES # BLD AUTO: 0.98 X10*3/UL (ref 0.05–0.8)
MONOCYTES NFR BLD AUTO: 6.7 %
NEUTROPHILS # BLD AUTO: 12.51 X10*3/UL (ref 1.6–5.5)
NEUTROPHILS NFR BLD AUTO: 86 %
NITRITE UR QL STRIP.AUTO: NEGATIVE
NRBC BLD-RTO: 1.5 /100 WBCS (ref 0–0)
OXYHGB MFR BLDA: 92.7 % (ref 94–98)
PCO2 BLDA: 40 MM HG (ref 38–42)
PH BLDA: 7.45 PH (ref 7.38–7.42)
PH UR STRIP.AUTO: 5 [PH]
PHOSPHATE SERPL-MCNC: 6.9 MG/DL (ref 2.5–4.5)
PLATELET # BLD AUTO: 150 X10*3/UL (ref 150–450)
PO2 BLDA: 72 MM HG (ref 85–95)
POTASSIUM BLDA-SCNC: 3.6 MMOL/L (ref 3.5–5.3)
POTASSIUM SERPL-SCNC: 4.2 MMOL/L (ref 3.4–5.1)
POTASSIUM SERPL-SCNC: 4.6 MMOL/L (ref 3.4–5.1)
PROCALCITONIN SERPL-MCNC: 0.24 NG/ML
PROT SERPL-MCNC: 5.5 G/DL (ref 5.9–7.9)
PROT UR STRIP.AUTO-MCNC: ABNORMAL MG/DL
RBC # BLD AUTO: 2.43 X10*6/UL (ref 4.5–5.9)
RBC # UR STRIP.AUTO: NEGATIVE /UL
RBC #/AREA URNS AUTO: NORMAL /HPF
SAO2 % BLDA: 94 % (ref 94–100)
SARS-COV-2 RNA RESP QL NAA+PROBE: NOT DETECTED
SODIUM BLDA-SCNC: 130 MMOL/L (ref 136–145)
SODIUM SERPL-SCNC: 131 MMOL/L (ref 133–145)
SODIUM SERPL-SCNC: 134 MMOL/L (ref 133–145)
SP GR UR STRIP.AUTO: 1.02
SPECIMEN DRAWN FROM PATIENT: ABNORMAL
SQUAMOUS #/AREA URNS AUTO: NORMAL /HPF
T4 FREE SERPL-MCNC: 1.3 NG/DL (ref 0.9–1.7)
TROPONIN T SERPL-MCNC: 103 NG/L
TROPONIN T SERPL-MCNC: 127 NG/L
TROPONIN T SERPL-MCNC: 156 NG/L
TSH SERPL DL<=0.05 MIU/L-ACNC: 17.12 MIU/L (ref 0.27–4.2)
UROBILINOGEN UR STRIP.AUTO-MCNC: NORMAL MG/DL
WBC # BLD AUTO: 14.5 X10*3/UL (ref 4.4–11.3)
WBC #/AREA URNS AUTO: NORMAL /HPF

## 2024-08-14 PROCEDURE — 2500000005 HC RX 250 GENERAL PHARMACY W/O HCPCS: Performed by: EMERGENCY MEDICINE

## 2024-08-14 PROCEDURE — 36600 WITHDRAWAL OF ARTERIAL BLOOD: CPT

## 2024-08-14 PROCEDURE — 99221 1ST HOSP IP/OBS SF/LOW 40: CPT | Performed by: INTERNAL MEDICINE

## 2024-08-14 PROCEDURE — 87899 AGENT NOS ASSAY W/OPTIC: CPT | Mod: TRILAB,WESLAB | Performed by: INTERNAL MEDICINE

## 2024-08-14 PROCEDURE — 94660 CPAP INITIATION&MGMT: CPT

## 2024-08-14 PROCEDURE — 97165 OT EVAL LOW COMPLEX 30 MIN: CPT | Mod: GO

## 2024-08-14 PROCEDURE — 84484 ASSAY OF TROPONIN QUANT: CPT | Performed by: EMERGENCY MEDICINE

## 2024-08-14 PROCEDURE — 82947 ASSAY GLUCOSE BLOOD QUANT: CPT

## 2024-08-14 PROCEDURE — 83036 HEMOGLOBIN GLYCOSYLATED A1C: CPT | Performed by: INTERNAL MEDICINE

## 2024-08-14 PROCEDURE — 71250 CT THORAX DX C-: CPT | Performed by: SURGERY

## 2024-08-14 PROCEDURE — 2500000001 HC RX 250 WO HCPCS SELF ADMINISTERED DRUGS (ALT 637 FOR MEDICARE OP): Performed by: INTERNAL MEDICINE

## 2024-08-14 PROCEDURE — 96375 TX/PRO/DX INJ NEW DRUG ADDON: CPT

## 2024-08-14 PROCEDURE — 2500000002 HC RX 250 W HCPCS SELF ADMINISTERED DRUGS (ALT 637 FOR MEDICARE OP, ALT 636 FOR OP/ED): Performed by: INTERNAL MEDICINE

## 2024-08-14 PROCEDURE — 2060000001 HC INTERMEDIATE ICU ROOM DAILY

## 2024-08-14 PROCEDURE — 97530 THERAPEUTIC ACTIVITIES: CPT | Mod: GO

## 2024-08-14 PROCEDURE — 94762 N-INVAS EAR/PLS OXIMTRY CONT: CPT

## 2024-08-14 PROCEDURE — 2500000004 HC RX 250 GENERAL PHARMACY W/ HCPCS (ALT 636 FOR OP/ED): Performed by: INTERNAL MEDICINE

## 2024-08-14 PROCEDURE — 84439 ASSAY OF FREE THYROXINE: CPT | Performed by: INTERNAL MEDICINE

## 2024-08-14 PROCEDURE — 87636 SARSCOV2 & INF A&B AMP PRB: CPT | Performed by: NURSE PRACTITIONER

## 2024-08-14 PROCEDURE — 84100 ASSAY OF PHOSPHORUS: CPT | Performed by: NURSE PRACTITIONER

## 2024-08-14 PROCEDURE — 87040 BLOOD CULTURE FOR BACTERIA: CPT | Mod: TRILAB | Performed by: EMERGENCY MEDICINE

## 2024-08-14 PROCEDURE — 36415 COLL VENOUS BLD VENIPUNCTURE: CPT | Performed by: EMERGENCY MEDICINE

## 2024-08-14 PROCEDURE — 84443 ASSAY THYROID STIM HORMONE: CPT | Performed by: INTERNAL MEDICINE

## 2024-08-14 PROCEDURE — 85025 COMPLETE CBC W/AUTO DIFF WBC: CPT | Performed by: INTERNAL MEDICINE

## 2024-08-14 PROCEDURE — 2500000001 HC RX 250 WO HCPCS SELF ADMINISTERED DRUGS (ALT 637 FOR MEDICARE OP): Performed by: NURSE PRACTITIONER

## 2024-08-14 PROCEDURE — 97161 PT EVAL LOW COMPLEX 20 MIN: CPT | Mod: GP

## 2024-08-14 PROCEDURE — 87449 NOS EACH ORGANISM AG IA: CPT | Mod: TRILAB,WESLAB | Performed by: INTERNAL MEDICINE

## 2024-08-14 PROCEDURE — 2500000004 HC RX 250 GENERAL PHARMACY W/ HCPCS (ALT 636 FOR OP/ED): Performed by: EMERGENCY MEDICINE

## 2024-08-14 PROCEDURE — 83605 ASSAY OF LACTIC ACID: CPT | Performed by: EMERGENCY MEDICINE

## 2024-08-14 PROCEDURE — 81001 URINALYSIS AUTO W/SCOPE: CPT | Performed by: EMERGENCY MEDICINE

## 2024-08-14 PROCEDURE — 84132 ASSAY OF SERUM POTASSIUM: CPT | Performed by: INTERNAL MEDICINE

## 2024-08-14 PROCEDURE — 84145 PROCALCITONIN (PCT): CPT | Mod: TRILAB,WESLAB | Performed by: INTERNAL MEDICINE

## 2024-08-14 PROCEDURE — 74176 CT ABD & PELVIS W/O CONTRAST: CPT | Performed by: SURGERY

## 2024-08-14 PROCEDURE — 84132 ASSAY OF SERUM POTASSIUM: CPT | Performed by: EMERGENCY MEDICINE

## 2024-08-14 PROCEDURE — 96365 THER/PROPH/DIAG IV INF INIT: CPT

## 2024-08-14 PROCEDURE — 2500000005 HC RX 250 GENERAL PHARMACY W/O HCPCS: Performed by: INTERNAL MEDICINE

## 2024-08-14 RX ORDER — CALCITRIOL 0.25 UG/1
0.5 CAPSULE ORAL DAILY
Status: DISCONTINUED | OUTPATIENT
Start: 2024-08-14 | End: 2024-08-14

## 2024-08-14 RX ORDER — ONDANSETRON 4 MG/1
4 TABLET, ORALLY DISINTEGRATING ORAL EVERY 8 HOURS PRN
Status: DISCONTINUED | OUTPATIENT
Start: 2024-08-14 | End: 2024-08-18 | Stop reason: HOSPADM

## 2024-08-14 RX ORDER — GUAIFENESIN 600 MG/1
600 TABLET, EXTENDED RELEASE ORAL EVERY 12 HOURS PRN
Status: DISCONTINUED | OUTPATIENT
Start: 2024-08-14 | End: 2024-08-18 | Stop reason: HOSPADM

## 2024-08-14 RX ORDER — ACETAMINOPHEN 325 MG/1
650 TABLET ORAL EVERY 4 HOURS PRN
Status: DISCONTINUED | OUTPATIENT
Start: 2024-08-14 | End: 2024-08-18 | Stop reason: HOSPADM

## 2024-08-14 RX ORDER — HEPARIN SODIUM 5000 [USP'U]/ML
5000 INJECTION, SOLUTION INTRAVENOUS; SUBCUTANEOUS EVERY 8 HOURS
Status: DISCONTINUED | OUTPATIENT
Start: 2024-08-14 | End: 2024-08-18 | Stop reason: HOSPADM

## 2024-08-14 RX ORDER — LEVOTHYROXINE SODIUM 50 UG/1
50 TABLET ORAL DAILY
Status: DISCONTINUED | OUTPATIENT
Start: 2024-08-14 | End: 2024-08-15

## 2024-08-14 RX ORDER — ONDANSETRON HYDROCHLORIDE 2 MG/ML
4 INJECTION, SOLUTION INTRAVENOUS EVERY 8 HOURS PRN
Status: DISCONTINUED | OUTPATIENT
Start: 2024-08-14 | End: 2024-08-18 | Stop reason: HOSPADM

## 2024-08-14 RX ORDER — DEXTROSE 50 % IN WATER (D50W) INTRAVENOUS SYRINGE
25
Status: DISCONTINUED | OUTPATIENT
Start: 2024-08-14 | End: 2024-08-18 | Stop reason: HOSPADM

## 2024-08-14 RX ORDER — DEXTROSE 50 % IN WATER (D50W) INTRAVENOUS SYRINGE
12.5
Status: DISCONTINUED | OUTPATIENT
Start: 2024-08-14 | End: 2024-08-18 | Stop reason: HOSPADM

## 2024-08-14 RX ORDER — NAPROXEN SODIUM 220 MG/1
81 TABLET, FILM COATED ORAL DAILY
Status: DISCONTINUED | OUTPATIENT
Start: 2024-08-14 | End: 2024-08-18 | Stop reason: HOSPADM

## 2024-08-14 RX ORDER — SODIUM BICARBONATE 1 MEQ/ML
50 SYRINGE (ML) INTRAVENOUS ONCE
Status: DISCONTINUED | OUTPATIENT
Start: 2024-08-14 | End: 2024-08-14

## 2024-08-14 RX ORDER — GUAIFENESIN/DEXTROMETHORPHAN 100-10MG/5
5 SYRUP ORAL EVERY 4 HOURS PRN
Status: DISCONTINUED | OUTPATIENT
Start: 2024-08-14 | End: 2024-08-18 | Stop reason: HOSPADM

## 2024-08-14 RX ORDER — PANTOPRAZOLE SODIUM 20 MG/1
20 TABLET, DELAYED RELEASE ORAL
Status: DISCONTINUED | OUTPATIENT
Start: 2024-08-14 | End: 2024-08-18 | Stop reason: HOSPADM

## 2024-08-14 RX ORDER — CALCIUM CARBONATE 200(500)MG
1000 TABLET,CHEWABLE ORAL 3 TIMES DAILY
Status: DISCONTINUED | OUTPATIENT
Start: 2024-08-14 | End: 2024-08-18 | Stop reason: HOSPADM

## 2024-08-14 RX ORDER — HYDROXYCHLOROQUINE SULFATE 200 MG/1
200 TABLET, FILM COATED ORAL DAILY
Status: DISCONTINUED | OUTPATIENT
Start: 2024-08-14 | End: 2024-08-18 | Stop reason: HOSPADM

## 2024-08-14 RX ORDER — ACETAMINOPHEN 160 MG/5ML
650 SOLUTION ORAL EVERY 4 HOURS PRN
Status: DISCONTINUED | OUTPATIENT
Start: 2024-08-14 | End: 2024-08-18 | Stop reason: HOSPADM

## 2024-08-14 RX ORDER — LINEZOLID 2 MG/ML
600 INJECTION, SOLUTION INTRAVENOUS EVERY 12 HOURS
Status: DISCONTINUED | OUTPATIENT
Start: 2024-08-14 | End: 2024-08-16

## 2024-08-14 RX ORDER — ACETAMINOPHEN 650 MG/1
650 SUPPOSITORY RECTAL EVERY 4 HOURS PRN
Status: DISCONTINUED | OUTPATIENT
Start: 2024-08-14 | End: 2024-08-18 | Stop reason: HOSPADM

## 2024-08-14 RX ORDER — SODIUM BICARBONATE 1 MEQ/ML
50 SYRINGE (ML) INTRAVENOUS ONCE
Status: COMPLETED | OUTPATIENT
Start: 2024-08-14 | End: 2024-08-14

## 2024-08-14 RX ORDER — ATORVASTATIN CALCIUM 20 MG/1
20 TABLET, FILM COATED ORAL NIGHTLY
Status: DISCONTINUED | OUTPATIENT
Start: 2024-08-14 | End: 2024-08-18 | Stop reason: HOSPADM

## 2024-08-14 RX ORDER — ALBUTEROL SULFATE 0.83 MG/ML
3 SOLUTION RESPIRATORY (INHALATION) EVERY 4 HOURS PRN
Status: DISCONTINUED | OUTPATIENT
Start: 2024-08-14 | End: 2024-08-18 | Stop reason: HOSPADM

## 2024-08-14 SDOH — SOCIAL STABILITY: SOCIAL INSECURITY: ARE YOU OR HAVE YOU BEEN THREATENED OR ABUSED PHYSICALLY, EMOTIONALLY, OR SEXUALLY BY ANYONE?: NO

## 2024-08-14 SDOH — SOCIAL STABILITY: SOCIAL INSECURITY: DOES ANYONE TRY TO KEEP YOU FROM HAVING/CONTACTING OTHER FRIENDS OR DOING THINGS OUTSIDE YOUR HOME?: NO

## 2024-08-14 SDOH — SOCIAL STABILITY: SOCIAL INSECURITY: DO YOU FEEL ANYONE HAS EXPLOITED OR TAKEN ADVANTAGE OF YOU FINANCIALLY OR OF YOUR PERSONAL PROPERTY?: NO

## 2024-08-14 SDOH — SOCIAL STABILITY: SOCIAL INSECURITY: HAVE YOU HAD THOUGHTS OF HARMING ANYONE ELSE?: NO

## 2024-08-14 SDOH — SOCIAL STABILITY: SOCIAL INSECURITY: HAS ANYONE EVER THREATENED TO HURT YOUR FAMILY OR YOUR PETS?: NO

## 2024-08-14 SDOH — SOCIAL STABILITY: SOCIAL INSECURITY: ARE THERE ANY APPARENT SIGNS OF INJURIES/BEHAVIORS THAT COULD BE RELATED TO ABUSE/NEGLECT?: NO

## 2024-08-14 SDOH — SOCIAL STABILITY: SOCIAL INSECURITY: ABUSE: ADULT

## 2024-08-14 SDOH — SOCIAL STABILITY: SOCIAL INSECURITY: HAVE YOU HAD ANY THOUGHTS OF HARMING ANYONE ELSE?: NO

## 2024-08-14 SDOH — SOCIAL STABILITY: SOCIAL INSECURITY: DO YOU FEEL UNSAFE GOING BACK TO THE PLACE WHERE YOU ARE LIVING?: NO

## 2024-08-14 SDOH — SOCIAL STABILITY: SOCIAL INSECURITY: WERE YOU ABLE TO COMPLETE ALL THE BEHAVIORAL HEALTH SCREENINGS?: YES

## 2024-08-14 ASSESSMENT — ENCOUNTER SYMPTOMS
EYES NEGATIVE: 1
CONSTITUTIONAL NEGATIVE: 1
MUSCULOSKELETAL NEGATIVE: 1
RESPIRATORY NEGATIVE: 1
NEUROLOGICAL NEGATIVE: 1
PSYCHIATRIC NEGATIVE: 1
CARDIOVASCULAR NEGATIVE: 1
ENDOCRINE NEGATIVE: 1
GASTROINTESTINAL NEGATIVE: 1

## 2024-08-14 ASSESSMENT — LIFESTYLE VARIABLES
AUDIT-C TOTAL SCORE: 0
HOW OFTEN DO YOU HAVE A DRINK CONTAINING ALCOHOL: NEVER
SKIP TO QUESTIONS 9-10: 1
PRESCIPTION_ABUSE_PAST_12_MONTHS: NO
SUBSTANCE_ABUSE_PAST_12_MONTHS: NO
HOW OFTEN DO YOU HAVE 6 OR MORE DRINKS ON ONE OCCASION: NEVER
HOW MANY STANDARD DRINKS CONTAINING ALCOHOL DO YOU HAVE ON A TYPICAL DAY: PATIENT DOES NOT DRINK
AUDIT-C TOTAL SCORE: 0

## 2024-08-14 ASSESSMENT — ACTIVITIES OF DAILY LIVING (ADL)
ASSISTIVE_DEVICE: WALKER
DRESSING YOURSELF: NEEDS ASSISTANCE
TOILETING: NEEDS ASSISTANCE
ADL_ASSISTANCE: INDEPENDENT
BATHING: NEEDS ASSISTANCE
GROOMING: NEEDS ASSISTANCE
HEARING - RIGHT EAR: FUNCTIONAL
JUDGMENT_ADEQUATE_SAFELY_COMPLETE_DAILY_ACTIVITIES: YES
ADEQUATE_TO_COMPLETE_ADL: YES
FEEDING YOURSELF: INDEPENDENT
HEARING - LEFT EAR: FUNCTIONAL
WALKS IN HOME: NEEDS ASSISTANCE
PATIENT'S MEMORY ADEQUATE TO SAFELY COMPLETE DAILY ACTIVITIES?: YES
BATHING_ASSISTANCE: STAND BY
LACK_OF_TRANSPORTATION: NO
ADL_ASSISTANCE: INDEPENDENT

## 2024-08-14 ASSESSMENT — COGNITIVE AND FUNCTIONAL STATUS - GENERAL
HELP NEEDED FOR BATHING: A LITTLE
DAILY ACTIVITIY SCORE: 19
CLIMB 3 TO 5 STEPS WITH RAILING: A LOT
TOILETING: A LITTLE
DRESSING REGULAR LOWER BODY CLOTHING: A LITTLE
PERSONAL GROOMING: A LITTLE
TURNING FROM BACK TO SIDE WHILE IN FLAT BAD: A LITTLE
MOVING TO AND FROM BED TO CHAIR: A LITTLE
PERSONAL GROOMING: A LITTLE
DRESSING REGULAR UPPER BODY CLOTHING: A LITTLE
CLIMB 3 TO 5 STEPS WITH RAILING: A LOT
DRESSING REGULAR LOWER BODY CLOTHING: A LITTLE
TURNING FROM BACK TO SIDE WHILE IN FLAT BAD: A LITTLE
DAILY ACTIVITIY SCORE: 19
STANDING UP FROM CHAIR USING ARMS: A LITTLE
WALKING IN HOSPITAL ROOM: A LITTLE
CLIMB 3 TO 5 STEPS WITH RAILING: A LOT
MOBILITY SCORE: 18
WALKING IN HOSPITAL ROOM: A LITTLE
DRESSING REGULAR LOWER BODY CLOTHING: A LITTLE
DRESSING REGULAR UPPER BODY CLOTHING: A LITTLE
PERSONAL GROOMING: A LITTLE
PATIENT BASELINE BEDBOUND: NO
HELP NEEDED FOR BATHING: A LITTLE
HELP NEEDED FOR BATHING: A LOT
STANDING UP FROM CHAIR USING ARMS: A LITTLE
MOBILITY SCORE: 18
DRESSING REGULAR UPPER BODY CLOTHING: A LITTLE
TOILETING: A LITTLE
STANDING UP FROM CHAIR USING ARMS: A LITTLE
DRESSING REGULAR UPPER BODY CLOTHING: A LITTLE
MOVING TO AND FROM BED TO CHAIR: A LITTLE
MOVING TO AND FROM BED TO CHAIR: A LITTLE
MOBILITY SCORE: 18
HELP NEEDED FOR BATHING: A LITTLE
DRESSING REGULAR LOWER BODY CLOTHING: A LITTLE
DAILY ACTIVITIY SCORE: 18
DAILY ACTIVITIY SCORE: 19
WALKING IN HOSPITAL ROOM: A LITTLE
TOILETING: A LITTLE
PERSONAL GROOMING: A LITTLE
TOILETING: A LITTLE
MOVING TO AND FROM BED TO CHAIR: A LITTLE
MOBILITY SCORE: 18
STANDING UP FROM CHAIR USING ARMS: A LITTLE
WALKING IN HOSPITAL ROOM: A LITTLE
CLIMB 3 TO 5 STEPS WITH RAILING: A LOT

## 2024-08-14 ASSESSMENT — PATIENT HEALTH QUESTIONNAIRE - PHQ9
2. FEELING DOWN, DEPRESSED OR HOPELESS: NOT AT ALL
SUM OF ALL RESPONSES TO PHQ9 QUESTIONS 1 & 2: 0
SUM OF ALL RESPONSES TO PHQ9 QUESTIONS 1 & 2: 0
2. FEELING DOWN, DEPRESSED OR HOPELESS: NOT AT ALL
1. LITTLE INTEREST OR PLEASURE IN DOING THINGS: NOT AT ALL
1. LITTLE INTEREST OR PLEASURE IN DOING THINGS: NOT AT ALL

## 2024-08-14 ASSESSMENT — PAIN SCALES - GENERAL
PAINLEVEL_OUTOF10: 0 - NO PAIN

## 2024-08-14 ASSESSMENT — PAIN - FUNCTIONAL ASSESSMENT
PAIN_FUNCTIONAL_ASSESSMENT: 0-10
PAIN_FUNCTIONAL_ASSESSMENT: 0-10

## 2024-08-14 NOTE — CARE PLAN
The patient's goals for the shift include getting up and walking, no shortness of breath    The clinical goals for the shift include remain hemodynamically stable

## 2024-08-14 NOTE — CARE PLAN
The patient's goals for the shift include      The clinical goals for the shift include        Problem: Skin  Goal: Participates in plan/prevention/treatment measures  8/14/2024 0440 by Carine Galindo RN  Outcome: Progressing  Flowsheets (Taken 8/14/2024 0440)  Participates in plan/prevention/treatment measures:   Discuss with provider PT/OT consult   Elevate heels   Increase activity/out of bed for meals  8/14/2024 0439 by Carine Galindo RN  Outcome: Progressing  Goal: Prevent/minimize sheer/friction injuries  8/14/2024 0440 by Carine Galindo RN  Outcome: Progressing  Flowsheets (Taken 8/14/2024 0440)  Prevent/minimize sheer/friction injuries:   Complete micro-shifts as needed if patient unable. Adjust patient position to relieve pressure points, not a full turn   Increase activity/out of bed for meals   Use pull sheet   HOB 30 degrees or less   Turn/reposition every 2 hours/use positioning/transfer devices   Utilize specialty bed per algorithm  8/14/2024 0439 by Carine Galindo RN  Outcome: Progressing     Problem: Fall/Injury  Goal: Not fall by end of shift  8/14/2024 0440 by Carine Galindo RN  Outcome: Progressing  8/14/2024 0439 by Carine Galindo RN  Outcome: Progressing  Goal: Be free from injury by end of the shift  8/14/2024 0440 by Carine Galindo RN  Outcome: Progressing  8/14/2024 0439 by Carine Galindo RN  Outcome: Progressing     Problem: Pain - Adult  Goal: Verbalizes/displays adequate comfort level or baseline comfort level  8/14/2024 0440 by Carine Galindo RN  Outcome: Progressing  Flowsheets (Taken 8/14/2024 0440)  Verbalizes/displays adequate comfort level or baseline comfort level:   Encourage patient to monitor pain and request assistance   Assess pain using appropriate pain scale   Administer analgesics based on type and severity of pain and evaluate response   Implement non-pharmacological measures as appropriate and evaluate  response   Consider cultural and social influences on pain and pain management   Notify Licensed Independent Practitioner if interventions unsuccessful or patient reports new pain  8/14/2024 0439 by Carine Galindo RN  Outcome: Progressing     Problem: Safety - Adult  Goal: Free from fall injury  8/14/2024 0440 by Carine Galindo RN  Outcome: Progressing  Flowsheets (Taken 8/14/2024 0440)  Free from fall injury:   Instruct family/caregiver on patient safety   Based on caregiver fall risk screen, instruct family/caregiver to ask for assistance with transferring infant if caregiver noted to have fall risk factors  8/14/2024 0439 by Carine Galindo RN  Outcome: Progressing     Problem: Discharge Planning  Goal: Discharge to home or other facility with appropriate resources  8/14/2024 0440 by Carine Galindo RN  Outcome: Progressing  Flowsheets (Taken 8/14/2024 0440)  Discharge to home or other facility with appropriate resources:   Identify barriers to discharge with patient and caregiver   Arrange for needed discharge resources and transportation as appropriate   Identify discharge learning needs (meds, wound care, etc)   Arrange for interpreters to assist at discharge as needed   Refer to discharge planning if patient needs post-hospital services based on physician order or complex needs related to functional status, cognitive ability or social support system  8/14/2024 0439 by Carine Galindo RN  Outcome: Progressing     Problem: Chronic Conditions and Co-morbidities  Goal: Patient's chronic conditions and co-morbidity symptoms are monitored and maintained or improved  8/14/2024 0440 by Carine Galindo RN  Outcome: Progressing  Flowsheets (Taken 8/14/2024 0440)  Care Plan - Patient's Chronic Conditions and Co-Morbidity Symptoms are Monitored and Maintained or Improved:   Monitor and assess patient's chronic conditions and comorbid symptoms for stability, deterioration, or  improvement   Collaborate with multidisciplinary team to address chronic and comorbid conditions and prevent exacerbation or deterioration   Update acute care plan with appropriate goals if chronic or comorbid symptoms are exacerbated and prevent overall improvement and discharge  8/14/2024 0439 by Carine Galindo RN  Outcome: Progressing

## 2024-08-14 NOTE — PROGRESS NOTES
George Rowan is a 78 y.o. male on day 0 of admission presenting with Metabolic acidosis, increased anion gap (IAG).      Subjective   Patient resting in bed comfortably. He denies any chest pain or abdominal pain or SOB. Patient's daughter is at the bedside, she is very involved in his care. The daughter changed the catheter dressing while I was present.  The are around the tub was red and no drainage was present and could not be expressed however there was dry purulent drainage on dressing.        Objective     Last Recorded Vitals  BP (!) 113/45 (BP Location: Right arm, Patient Position: Sitting) Comment: 95/56,97/57  Pulse 55   Temp 36.4 °C (97.5 °F) (Temporal)   Resp 18   Wt 71.3 kg (157 lb 3 oz)   SpO2 97%   Intake/Output last 3 Shifts:    Intake/Output Summary (Last 24 hours) at 8/14/2024 1533  Last data filed at 8/14/2024 1445  Gross per 24 hour   Intake 790 ml   Output 25 ml   Net 765 ml       Admission Weight  Weight: 75.5 kg (166 lb 7.2 oz) (08/13/24 2331)    Daily Weight  08/14/24 : 71.3 kg (157 lb 3 oz)    Image Results  CT chest abdomen pelvis wo IV contrast  Narrative: Interpreted By:  Justin Denson,   STUDY:  CT CHEST ABDOMEN PELVIS WO CONTRAST; ;  8/14/2024 12:39 am      INDICATION:  Signs/Symptoms:Sepsis.      COMPARISON:  CT chest of 01/09/2018      ACCESSION NUMBER(S):  [id number]      ORDERING CLINICIAN:  GRAEME FLORES      TECHNIQUE:  Noncontrast CT of the chest, abdomen and pelvis with multiplanar  reformations.      FINDINGS:  CHEST:      VESSELS: Aorta is normal in caliber. Atherosclerotic changes in the  aorta and coronary arteries. HEART: Normal size. Hypoattenuation of  myocardium relative to blood pool suggests anemia. Mitral annular  calcification. No pericardial effusion. MEDIASTINUM AND CESAR: No  pathologically enlarged thoracic lymph nodes. Esophagus appears  within normal limits. No abnormal mediastinal air or fluid. LUNG,  PLEURA, LARGE AIRWAYS: Respiratory motion artifact  limits evaluation  of the lung parenchyma. Mild-to-moderate emphysema. Mild smooth  interlobular septal thickening suggesting acute pulmonary  interstitial edema. Patchy reticulonodular and ground-glass  opacities, suspicious for multifocal bilateral pneumonia. Moderate to  large right and small to moderate left lateral pleural effusions. No  pneumothorax. CHEST WALL AND LOWER NECK: Within normal limits.      ABDOMEN:      BONES: No acute osseous abnormality. Large Schmorl's node in the  superior L3 endplate. Severe degenerative changes of the imaged  spine. ABDOMINAL WALL: Fat containing left inguinal hernia. Anasarca.      LIVER: Within normal limits. Riedel's lobe noted  BILE DUCTS: Normal caliber.  GALLBLADDER: No calcified cholelithiasis. Nonspecific wall thickening  pericholecystic fluid is nonspecific in the setting of ascites  PANCREAS: Within normal limits. SPLEEN: Within normal limits.  ADRENALS: Within normal limits.  KIDNEYS: Renal cortical atrophy. Small right interpolar cortical  cyst. Vascular calcifications about both renal sinuses. Otherwise,  normal URETERS: No hydroureter.      PELVIS:      REPRODUCTIVE ORGANS: Prostate is not enlarged.  BLADDER: Within normal limits.      VESSELS: Severe atherosclerosis. Fusiform infrarenal aortic ectasia.  No aneurysmal dilation is seen. RETROPERITONEUM: Within normal limits.  BOWEL: No dilated or thickened bowel. Stomach appears within normal  limits.  Normal appendix. PERITONEUM: Peritoneal dialysis catheter is  coiled along the superior aspect of the bladder. Small volume of  ascites. No pneumoperitoneum or loculated intraperitoneal collection.      Impression: Respiratory motion artifact limits evaluation of the lung parenchyma.  Mild-to-moderate emphysema. Mild smooth interlobular septal  thickening suggesting acute pulmonary interstitial edema. Patchy  reticulonodular and ground-glass opacities, suspicious for multifocal  bilateral pneumonia. Moderate to  large right and small to moderate  left lateral pleural effusions. No pneumothorax.      No definite evidence of acute pathology in the abdomen or pelvis.      Peritoneal dialysis catheter is coiled along the superior aspect of  the bladder. Small volume of ascites. No pneumoperitoneum or  loculated intraperitoneal collection.      Additional findings as discussed above.      MACRO:  None      Signed by: Justin Denson 8/14/2024 12:58 AM  Dictation workstation:   [id number]  XR chest 1 view  Narrative: Interpreted By:  Tito Concepcion,   STUDY:  XR CHEST 1 VIEW;  8/13/2024 11:44 pm      INDICATION:  Signs/Symptoms:Shortness of breath.      COMPARISON:  Chest radiograph 07/08/2024      ACCESSION NUMBER(S):  [id number]      ORDERING CLINICIAN:  GRAEME FLORES      FINDINGS:  A defibrillation pad overlies the right thorax. Heart is mildly  enlarged. There is central pulmonary vascular congestion and  perihilar opacities. Probable small left pleural effusion. No  discernible pneumothorax. No acute osseous abnormality.      Impression: 1. Mild cardiomegaly and central vascular congestion.  2. Possible small left pleural effusion.          Signed by: Tito Concepcion 8/13/2024 11:59 PM  Dictation workstation:   [id number]      Physical Exam  Constitutional:       Appearance: He is obese. He is ill-appearing.   Cardiovascular:      Rate and Rhythm: Regular rhythm. Bradycardia present.      Pulses: Normal pulses.      Heart sounds: Normal heart sounds.   Pulmonary:      Effort: Pulmonary effort is normal.      Breath sounds: Normal breath sounds.   Abdominal:      General: Bowel sounds are normal. There is distension.      Palpations: Abdomen is soft. There is no mass.      Tenderness: There is no abdominal tenderness. There is no guarding or rebound.      Hernia: No hernia is present.   Musculoskeletal:         General: Normal range of motion.   Skin:     General: Skin is warm and dry.   Neurological:      Mental Status: He is  alert and oriented to person, place, and time.         Relevant Results             Results for orders placed or performed during the hospital encounter of 08/13/24 (from the past 24 hour(s))   CBC and Auto Differential   Result Value Ref Range    WBC 9.6 4.4 - 11.3 x10*3/uL    nRBC 2.6 (H) 0.0 - 0.0 /100 WBCs    RBC 2.48 (L) 4.50 - 5.90 x10*6/uL    Hemoglobin 8.5 (L) 13.5 - 17.5 g/dL    Hematocrit 26.7 (L) 41.0 - 52.0 %     (H) 80 - 100 fL    MCH 34.3 (H) 26.0 - 34.0 pg    MCHC 31.8 (L) 32.0 - 36.0 g/dL    RDW 17.2 (H) 11.5 - 14.5 %    Platelets 149 (L) 150 - 450 x10*3/uL    Neutrophils % 76.8 40.0 - 80.0 %    Immature Granulocytes %, Automated 0.6 0.0 - 0.9 %    Lymphocytes % 15.8 13.0 - 44.0 %    Monocytes % 4.1 2.0 - 10.0 %    Eosinophils % 2.3 0.0 - 6.0 %    Basophils % 0.4 0.0 - 2.0 %    Neutrophils Absolute 7.34 (H) 1.60 - 5.50 x10*3/uL    Immature Granulocytes Absolute, Automated 0.06 0.00 - 0.50 x10*3/uL    Lymphocytes Absolute 1.51 0.80 - 3.00 x10*3/uL    Monocytes Absolute 0.39 0.05 - 0.80 x10*3/uL    Eosinophils Absolute 0.22 0.00 - 0.40 x10*3/uL    Basophils Absolute 0.04 0.00 - 0.10 x10*3/uL   Basic metabolic panel   Result Value Ref Range    Glucose 256 (H) 65 - 99 mg/dL    Sodium 131 (L) 133 - 145 mmol/L    Potassium 4.6 3.4 - 5.1 mmol/L    Chloride 90 (L) 97 - 107 mmol/L    Bicarbonate 17 (L) 24 - 31 mmol/L    Urea Nitrogen 84 (H) 8 - 25 mg/dL    Creatinine 8.60 (H) 0.40 - 1.60 mg/dL    eGFR 6 (L) >60 mL/min/1.73m*2    Calcium 9.4 8.5 - 10.4 mg/dL    Anion Gap >19 (H) <=19 mmol/L   Serial Troponin, Initial ([facility])   Result Value Ref Range    Troponin T, High Sensitivity 103 (HH) <=14 ng/L   Blood Gas Venous Full Panel   Result Value Ref Range    POCT pH, Venous 7.24 (LL) 7.33 - 7.43 pH    POCT pCO2, Venous 43 41 - 51 mm Hg    POCT pO2, Venous 22 (L) 35 - 45 mm Hg    POCT SO2, Venous 18 (L) 45 - 75 %    POCT Oxy Hemoglobin, Venous 17.6 (L) 45.0 - 75.0 %    POCT Hematocrit Calculated, Venous  26.0 (L) 41.0 - 52.0 %    POCT Sodium, Venous 126 (L) 136 - 145 mmol/L    POCT Potassium, Venous 4.4 3.5 - 5.3 mmol/L    POCT Chloride, Venous 90 (L) 98 - 107 mmol/L    POCT Ionized Calicum, Venous 1.24 1.10 - 1.33 mmol/L    POCT Glucose, Venous      POCT Lactate, Venous 7.5 (HH) 0.4 - 2.0 mmol/L    POCT Base Excess, Venous -8.5 (L) -2.0 - 3.0 mmol/L    POCT HCO3 Calculated, Venous 18.4 (L) 22.0 - 26.0 mmol/L    POCT Hemoglobin, Venous 8.8 (L) 13.5 - 17.5 g/dL    POCT Anion Gap, Venous 22.0 10.0 - 25.0 mmol/L    Patient Temperature 37.0 degrees Celsius    FiO2 0 %   Blood Culture    Specimen: Peripheral Venipuncture; Blood culture   Result Value Ref Range    Blood Culture Loaded on Instrument - Culture in progress    Blood Culture    Specimen: Peripheral Venipuncture; Blood culture   Result Value Ref Range    Blood Culture Loaded on Instrument - Culture in progress    Blood Gas Arterial Full Panel   Result Value Ref Range    POCT pH, Arterial 7.45 (H) 7.38 - 7.42 pH    POCT pCO2, Arterial 40 38 - 42 mm Hg    POCT pO2, Arterial 72 (L) 85 - 95 mm Hg    POCT SO2, Arterial 94 94 - 100 %    POCT Oxy Hemoglobin, Arterial 92.7 (L) 94.0 - 98.0 %    POCT Hematocrit Calculated, Arterial 24.0 (L) 41.0 - 52.0 %    POCT Sodium, Arterial 130 (L) 136 - 145 mmol/L    POCT Potassium, Arterial 3.6 3.5 - 5.3 mmol/L    POCT Chloride, Arterial 94 (L) 98 - 107 mmol/L    POCT Ionized Calcium, Arterial 1.15 1.10 - 1.33 mmol/L    POCT Glucose, Arterial 90 74 - 99 mg/dL    POCT Lactate, Arterial 2.2 (H) 0.4 - 2.0 mmol/L    POCT Base Excess, Arterial 3.5 (H) -2.0 - 3.0 mmol/L    POCT HCO3 Calculated, Arterial 27.8 (H) 22.0 - 26.0 mmol/L    POCT Hemoglobin, Arterial 8.0 (L) 13.5 - 17.5 g/dL    POCT Anion Gap, Arterial 12 10 - 25 mmo/L    Patient Temperature 37.0 degrees Celsius    FiO2 21 %    Site of Arterial Puncture Radial Right     Adalid's Test Positive    Serial Troponin, 2 Hour ([facility])   Result Value Ref Range    Troponin T, High  Sensitivity 127 (HH) <=14 ng/L   Blood Gas Lactic Acid, Venous   Result Value Ref Range    POCT Lactate, Venous 1.5 0.4 - 2.0 mmol/L   POCT GLUCOSE   Result Value Ref Range    POCT Glucose 101 (H) 74 - 99 mg/dL   Comprehensive metabolic panel   Result Value Ref Range    Glucose 95 65 - 99 mg/dL    Sodium 134 133 - 145 mmol/L    Potassium 4.2 3.4 - 5.1 mmol/L    Chloride 94 (L) 97 - 107 mmol/L    Bicarbonate 22 (L) 24 - 31 mmol/L    Urea Nitrogen 89 (H) 8 - 25 mg/dL    Creatinine 9.00 (H) 0.40 - 1.60 mg/dL    eGFR 6 (L) >60 mL/min/1.73m*2    Calcium 9.5 8.5 - 10.4 mg/dL    Albumin 3.2 (L) 3.5 - 5.0 g/dL    Alkaline Phosphatase 150 (H) 35 - 125 U/L    Total Protein 5.5 (L) 5.9 - 7.9 g/dL     (H) 5 - 40 U/L    Bilirubin, Total 0.2 0.1 - 1.2 mg/dL     (H) 5 - 40 U/L    Anion Gap 18 <=19 mmol/L   CBC and Auto Differential   Result Value Ref Range    WBC 14.5 (H) 4.4 - 11.3 x10*3/uL    nRBC 1.5 (H) 0.0 - 0.0 /100 WBCs    RBC 2.43 (L) 4.50 - 5.90 x10*6/uL    Hemoglobin 8.2 (L) 13.5 - 17.5 g/dL    Hematocrit 25.3 (L) 41.0 - 52.0 %     (H) 80 - 100 fL    MCH 33.7 26.0 - 34.0 pg    MCHC 32.4 32.0 - 36.0 g/dL    RDW 16.7 (H) 11.5 - 14.5 %    Platelets 150 150 - 450 x10*3/uL    Neutrophils % 86.0 40.0 - 80.0 %    Immature Granulocytes %, Automated 0.6 0.0 - 0.9 %    Lymphocytes % 6.2 13.0 - 44.0 %    Monocytes % 6.7 2.0 - 10.0 %    Eosinophils % 0.3 0.0 - 6.0 %    Basophils % 0.2 0.0 - 2.0 %    Neutrophils Absolute 12.51 (H) 1.60 - 5.50 x10*3/uL    Immature Granulocytes Absolute, Automated 0.08 0.00 - 0.50 x10*3/uL    Lymphocytes Absolute 0.90 0.80 - 3.00 x10*3/uL    Monocytes Absolute 0.98 (H) 0.05 - 0.80 x10*3/uL    Eosinophils Absolute 0.04 0.00 - 0.40 x10*3/uL    Basophils Absolute 0.03 0.00 - 0.10 x10*3/uL   TSH with reflex to Free T4 if abnormal   Result Value Ref Range    Thyroid Stimulating Hormone 17.12 (H) 0.27 - 4.20 mIU/L   Hemoglobin A1c   Result Value Ref Range    Hemoglobin A1C 6.4 (H) See  below %    Estimated Average Glucose 137 Not Established mg/dL   Procalcitonin   Result Value Ref Range    Procalcitonin 0.24 (H) <=0.07 ng/mL   Thyroxine, Free   Result Value Ref Range    Thyroxine, Free 1.30 0.90 - 1.70 ng/dL   Serial Troponin, 6 Hour ([facility])   Result Value Ref Range    Troponin T, High Sensitivity 156 (HH) <=14 ng/L   Phosphorus   Result Value Ref Range    Phosphorus 6.9 (H) 2.5 - 4.5 mg/dL   POCT GLUCOSE   Result Value Ref Range    POCT Glucose 101 (H) 74 - 99 mg/dL   POCT GLUCOSE   Result Value Ref Range    POCT Glucose 220 (H) 74 - 99 mg/dL   Sars-CoV-2 PCR   Result Value Ref Range    Coronavirus 2019, PCR Not Detected Not Detected   Influenza A, and B PCR   Result Value Ref Range    Flu A Result Not Detected Not Detected    Flu B Result Not Detected Not Detected       Assessment/Plan                  Assessment & Plan  Metabolic acidosis, increased anion gap (IAG)    Ischemic cardiomyopathy    Systemic lupus erythematosus (Multi)    Nonsustained ventricular tachycardia (Multi)    Coronary artery disease    Chronic systolic congestive heart failure (Multi)    Weakness    Lactic acid acidosis    Peritoneal dialysis catheter in situ (CMS-HCC)    ESRD (end stage renal disease) on dialysis (Multi)    Chronic respiratory failure (Multi)    Sepsis (Multi)         Generalized Weakness 2/2 Presumed Sepsis 2/2 Mutifocal Bilateral Pneumonia Versus UTI versus Alternative source  Continuous cardiac pulse oximetry monitoring  Patient noted to be hypotensive/bradycardic/tachypneic in the ED; blood pressure on the low side, heart rate in the 50's and patient is does not have tachypnea. He has been stable overnight  Elevated lactic acid level  Continue with broad-spectrum IV antibiotics; Vancomycin and Zosyn  Follow-up blood cultures and urine cultures  Procalcitonin level requested  Infectious disease consultation  Urine Legionella and urine streptococcal antigen levels; still unable to obtain urine  sample  Aspiration precautions/fall precautions  Holding all sedatives and gabapentinoids    High anion gap metabolic acidosis secondary to lactic acidosis   Possibly due to a combination of worsening renal function (Uremia) and/or sepsis   Acute Pulmonary Intersitital Edema/B/L Pleural Effusions/Presumed Decompensated Heart Failure (HFrEF)   Management as above  Recent repeat 2D echocardiogram noted  May need ICD in near Future considering cardiomyopathy  Defer further management to cardiology   Transient Bradycardia in the ED/Elevated Troponin level   Will hold all AV sarai blocking agents  Holding Coreg  Defer further management to cardiology  Type 2 Demand Ischemia in the setting of ESRD and presumed Sepsis   Ischemic Cardiomyopathy   Meticulous volume management  ESRD on PD   Cr tends to be elevating recently. Worsening Uremia  Avoid nephrotoxic agents  Nephrology consultation placed  Defer further diuretics to nephrology specialist  Peritoneal dialysis catheter is coiled along the superior aspect of  the bladder per CT.  Defer to Nephrology   Anemia of chronic kidney disease  Defer EPO dose to Nephrology Team  CAD s/p PCI with Stents  He has roughly 3 stents that were placed by Dr. Valdez  Continue aspirin and statin therapy  H/o Carotid Artery Stenosis  Patient follows with Dr. Reid  Continue aspirin and statin therapy  Systemic Lupus Erythematosus  Continue hydroxychloroquine home dose  Gout  Holding Allopurinol 2/2 renal Function for now   Hypothyroidism  Continue home levothyroxine dose  Hypertension    Monitor BP for any need in adjustments to medication  GI + DVT Prophylaxis   Low-dose oral PPI  Heparin subcu (monitor platelets closely)  Plan of Care  Patient will resume his nighttime peritoneal dialysis tonight. His family will set up and run machine with their supplies.   Plan of care discussed with patient, daughter at bedside and collaborating physician.             Carmela Ham, APRN-CNP

## 2024-08-14 NOTE — PROGRESS NOTES
Occupational Therapy    Evaluation/Treatment    Patient Name: George Rowan  MRN: [id number]  Today's Date: 8/14/2024  Time Calculation  Start Time: 0948  Stop Time: 1018  Time Calculation (min): 30 min    Assessment  IP OT Assessment  OT Assessment: Pt is 77 y/o male admitted for metabolic acidosis, increased anion gap. Pt presents w/ decreased ADL skills/ functional mobilty, decreased activity tolerance. Recommend OT services to address above deficits.  Prognosis: Good  Barriers to Discharge: None  Evaluation/Treatment Tolerance: Patient tolerated treatment well  Medical Staff Made Aware: Yes  End of Session Communication: Bedside nurse  End of Session Patient Position: Up in chair, Alarm on  Plan:  Treatment Interventions: ADL retraining, Functional transfer training, Endurance training, Patient/family training  OT Frequency: 3 times per week  OT Discharge Recommendations: Low intensity level of continued care  Equipment Recommended upon Discharge: Wheeled walker  OT - OK to Discharge: Yes    Subjective   Current Problem:  1. Sepsis (Multi)        2. Weakness        3. Septicemia (Multi)        4. Impaired mobility          General:  General  Reason for Referral: decreasded ADL's  Referred By: Dr. Lindsay  Past Medical History Relevant to Rehab: ESRD w/ peritoneal dialysis, CHF, HTN, CAD, Stemi, Cardiac stent x3, macular hole Lt eye, nephroscoliosis, DM, SLE, spinal stenosis, retinal detachnebt, chr. resp. failure.  Family/Caregiver Present: No  Co-Treatment: PT  Co-Treatment Reason: partial co-eval for pt safety and tolerance d/t LBP, c/o dizziness  Prior to Session Communication: Bedside nurse  Patient Position Received: Up in chair  Preferred Learning Style: verbal  General Comment: clearred by nursing to see for eval, pt agreeable to therapy  Precautions:  Medical Precautions: Fall precautions  Vital Signs:  Heart Rate Source: Monitor  SpO2: 97 %  BP: (!) 113/45 (95/56,97/57)  BP Location: Right arm  BP Method:  Automatic  Patient Position: Sitting  Pain:  Pain Assessment  Pain Assessment: 0-10  0-10 (Numeric) Pain Score: 0 - No pain    Objective   Cognition:  Overall Cognitive Status: Within Functional Limits  Orientation Level: Oriented X4           Home Living:  Type of Home: House  Lives With: Spouse, Adult children (son, dtr there to check in every day)  Home Adaptive Equipment: Cane  Home Layout: Multi-level  Home Access: Stairs to enter with rails  Entrance Stairs-Rails: Right  Entrance Stairs-Number of Steps: 3  Bathroom Shower/Tub: Walk-in shower  Bathroom Toilet: Standard  Bathroom Equipment: Grab bars in shower, Shower chair with back   Prior Function:  Level of [place]: Independent with ADLs and functional transfers, Independent with homemaking with ambulation  Receives Help From: Family  ADL Assistance: Independent  Homemaking Assistance: Independent (Shares w/ spouse and son)  Ambulatory Assistance: Independent (uses cane)  Vocational: Part time employment ([employer] )  Hand Dominance: Right  Prior Function Comments: Pt reports he does his own peritoneal dialysis every night.  IADL History:     ADL:  Eating Assistance: Stand by  Eating Deficit: Setup (for breakfast)  Grooming Assistance: Stand by  Grooming Deficit: Setup, Wash/dry face, Teeth care, Wash/dry hands (wash hands at sink, remiander seated bedside,)  Bathing Assistance: Stand by  Bathing Deficit: Supervision/safety, Increased time to complete  (per clinical judgement)  UE Dressing Assistance: Stand by  UE Dressing Deficit: Setup (per clinical judgement)  LE Dressing Assistance: Stand by  LE Dressing Deficit: Setup, Steadying, Supervision/safety (per clinical judgement)  Toileting Assistance with Device: Stand by  Toileting Deficit: Supervison/safety (for hygiene)  Functional Assistance: Stand by  Functional Deficit: Toilet transfer, Supervision/safety  Activity Tolerance:  Endurance: Decreased tolerance for upright activites  Activity  Tolerance Comments: fair, pt reports weakness inlegs w/ increased mobility, low BP  Bed Mobility/Transfers: Transfers  Transfer: Yes  Transfer 1  Transfer From 1: Chair with arms to  Transfer to 1: Stand  Technique 1: Sit to stand, Stand to sit  Transfer Device 1: Walker  Transfer Level of Assistance 1: Minimum assistance  Trials/Comments 1: to/ from chair , verbal cues for hand placement.  Transfers 2  Transfer From 2: Stand to  Transfer to 2: Sit, Toilet  Technique 2: Stand to sit, Sit to stand  Transfer Device 2: Walker  Transfer Level of Assistance 2: Minimum assistance  Trials/Comments 2: Verbal cues for hand plcement      Functional Mobility:  Functional Mobility  Functional Mobility Performed: Yes  Functional Mobility 1  Surface 1: Level tile  Device 1: Rolling walker  Assistance 1: Minimum assistance  Comments 1: Pt ambulated from chair to bathroom to doorwa y and back to chair w/ CGA and FWW.  Modalities:     IADL's:      Vision: Vision - Basic Assessment  Current Vision: No visual deficits  Sensation:  Light Touch: No apparent deficits  Strength:  Strength Comments: BUE's 4/5  Perception:     Coordination:  Movements are Fluid and Coordinated: Yes   Hand Function:  Hand Function  Gross Grasp: Functional  Coordination: Functional  Extremities: RUE   RUE : Within Functional Limits and LUE   LUE: Within Functional Limits    Treatment: transfer section    Outcome Measures: [facility] Daily Activity  Putting on and taking off regular lower body clothing: A little  Bathing (including washing, rinsing, drying): A little  Putting on and taking off regular upper body clothing: A little  Toileting, which includes using toilet, bedpan or urinal: A little  Taking care of personal grooming such as brushing teeth: A little  Eating Meals: None  Daily Activity - Total Score: 19      Education Documentation  ADL Training, taught by Ana Franco OT at 8/14/2024 12:21 PM.  Learner: Patient  Readiness: Acceptance  Method:  Explanation  Response: Needs Reinforcement    Education Comments  No comments found.      Goals:   Encounter Problems       Encounter Problems (Active)       OT Goals       ADL's (Progressing)       Start:  08/14/24    Expected End:  08/28/24       Pt will complete bathing, dressing, grooming and toileting tasks independently .         Functional transfers (Progressing)       Start:  08/14/24    Expected End:  08/28/24       Pt will demon bed, chair and toilet transfers w/ Mod I w/ LRD, elev seat heights using B arm supports for safety and increased independence in daily task and functional mobility.          Functional endurance  (Progressing)       Start:  08/14/24    Expected End:  08/28/24       Pt will tolerate 20 minutes of functional activity to improve functional endurance for daily taska and functional mobility.

## 2024-08-14 NOTE — PROGRESS NOTES
Physical Therapy    Physical Therapy Evaluation    Patient Name: George Rowan  MRN: [id number]  Today's Date: 8/14/2024   Time Calculation  Start Time: 1001  Stop Time: 1017  Time Calculation (min): 16 min    Assessment/Plan   PT Assessment  PT Assessment Results: Decreased strength, Decreased endurance, Impaired balance, Decreased mobility  Rehab Prognosis: Good  Barriers to Discharge: none  Evaluation/Treatment Tolerance: Patient limited by fatigue  Medical Staff Made Aware: Yes  Strengths: Ability to acquire knowledge, Premorbid level of function  Barriers to Participation: Comorbidities  End of Session Communication: Bedside nurse  Assessment Comment: Pt gives effort, wants to be active, limited by weakness, decreased functional endurance. Pt would benefit from continued PT services to progress functional strength and endurance for safe return to PLOF.  End of Session Patient Position: Up in chair, Alarm on  IP OR SWING BED PT PLAN  Inpatient or Swing Bed: Inpatient  PT Plan  Treatment/Interventions: Transfer training, Gait training, Stair training, Balance training, Strengthening, Endurance training, Therapeutic exercise, Therapeutic activity  PT Plan: Ongoing PT  PT Frequency: 4 times per week  PT Discharge Recommendations: Low intensity level of continued care  Equipment Recommended upon Discharge: Wheeled walker  PT Recommended Transfer Status: Assist x1, Assistive device  PT - OK to Discharge: Yes      Subjective   General Visit Information:  General  Reason for Referral: impaired mobility, metabolic acidosis  Referred By: Dr Lindsay  Past Medical History Relevant to Rehab: ESRD w/ peritoneal dialysis, CHF, HTN, CAD, STEMI, cardiac stent x 3, macular hole Lt eye, nephroscoliosis, DM, SLE, spinal stenosis, retinal detachment, chronic resp failure  Family/Caregiver Present: No  Co-Treatment: OT  Co-Treatment Reason: Pt safety and tolerance w/ h/o LBP, c/o dizziness.  Prior to Session Communication: Bedside  nurse  Patient Position Received: Up in chair  Preferred Learning Style: verbal  General Comment: Pt is a 78 y.o. male adm w/ c/o generalized weakness, dizziness, dx w/ metabolic acidosis, increased anion gap. Pt agreeable to PT eval, c/o feeling weak in LEs.  Home Living:  Home Living  Type of Home: House  Lives With: Spouse, Adult children (son)  Home Adaptive Equipment: Cane  Home Layout: Multi-level  Home Access: Stairs to enter with rails  Entrance Stairs-Rails: Right  Entrance Stairs-Number of Steps: 3  Bathroom Shower/Tub: Walk-in shower  Bathroom Toilet: Standard  Bathroom Equipment: Grab bars in shower, Shower chair without back  Prior Level of Function:  Prior Function Per Pt/Caregiver Report  Level of [place]: Independent with ADLs and functional transfers, Independent with homemaking with ambulation  Receives Help From: Family  ADL Assistance: Independent  Homemaking Assistance: Independent (shares w/ spouse and son; son cooks)  Ambulatory Assistance: Independent (uses cane)  Vocational: Part time employment ([employer] )  Prior Function Comments: reports that he does his own peritoneal dialysis every night  Precautions:  Precautions  Medical Precautions: Fall precautions, Oxygen therapy device and L/min  Vital Signs:  Vital Signs  Heart Rate: 55  Heart Rate Source: Monitor  SpO2: 97 %  Patient Position: Sitting    Objective   Pain:  Pain Assessment  Pain Assessment: 0-10  0-10 (Numeric) Pain Score: 0 - No pain  Cognition:  Cognition  Overall Cognitive Status: Within Functional Limits    General Assessments:  General Observation  General Observation: Pt appeared fatigued (reports has not slept much over the past few nights)     Activity Tolerance  Endurance: Decreased tolerance for upright activites  Activity Tolerance Comments: Fair- w/ reports of LE weakness, low BP, reports of dizziness on admission    Strength  Strength Comments: BLEs 3+/5 or >     Dynamic Standing Balance  Dynamic  Standing-Balance Support: Bilateral upper extremity supported (2WW)  Dynamic Standing-Level of Assistance: Contact guard, Minimum assistance  Dynamic Standing-Balance: Turning (amb)  Dynamic Standing-Comments: Fair/Fair- (c/o LE weakness and fatigue)  Functional Assessments:  Bed Mobility  Bed Mobility: No    Transfers  Transfer: Yes  Transfer 1  Technique 1: Sit to stand, Stand to sit  Transfer Device 1: Walker  Transfer Level of Assistance 1: Minimum assistance, Contact guard  Trials/Comments 1: from/to chair w/ arms, to/from commode, cues for safe hand placement    Ambulation/Gait Training  Ambulation/Gait Training Performed: Yes  Ambulation/Gait Training 1  Surface 1: Level tile  Device 1: Rolling walker  Assistance 1: Contact guard, Minimum assistance  Comments/Distance (ft) 1: Pt amb ~12' x 1, 40' x 1, slow pace, denied dizziness or SOB, did report BLEs starting to get fatigued w/ amb; no LOB.  Extremity/Trunk Assessments:  RLE   RLE : Within Functional Limits  LLE   LLE : Within Functional Limits  Outcome Measures:  [facility] Basic Mobility  Turning from your back to your side while in a flat bed without using bedrails: None  Moving from lying on your back to sitting on the side of a flat bed without using bedrails: A little  Moving to and from bed to chair (including a wheelchair): A little  Standing up from a chair using your arms (e.g. wheelchair or bedside chair): A little  To walk in hospital room: A little  Climbing 3-5 steps with railing: A lot  Basic Mobility - Total Score: 18    Encounter Problems       Encounter Problems (Active)       Balance       LTG - Patient will maintain balance to allow for safe mobility (Progressing)       Start:  08/14/24    Expected End:  08/23/24               Mobility       STG - Patient will ambulate 200' w/ LRAD and distant supervision  (Progressing)       Start:  08/14/24    Expected End:  08/23/24            STG - Patient will ascend and descend four to six stairs x 2 w/  1 rail and supervision  (Progressing)       Start:  08/14/24    Expected End:  08/23/24            Pt will tolerate BLE exercises to promote functional strength, balance and endurance (Progressing)       Start:  08/14/24    Expected End:  08/23/24               PT Transfers       STG - Patient to transfer to and from sit to supine IND (Progressing)       Start:  08/14/24    Expected End:  08/23/24            STG - Patient will transfer sit to and from stand MOD I (Progressing)       Start:  08/14/24    Expected End:  08/23/24                   Education Documentation  Mobility Training, taught by Kylee Ramirez, PT at 8/14/2024 10:49 AM.  Learner: Patient  Readiness: Acceptance  Method: Explanation  Response: Verbalizes Understanding    Education Comments  No comments found.

## 2024-08-14 NOTE — ED PROVIDER NOTES
HPI   Chief Complaint   Patient presents with    Dizziness     Patient brought in from home by EMS. EMS was called to home twice. At 9pm patient became dizzy after walking upstairs. Pox dropped per family - 80's and HR elevated. Patient improved and refused to go to ED. At 1030pm Patient had second episode and EMS called again for dizziness. Patient family states patient has been more weak recently. BP low - 80's systolic and on arrival to ED HR 30-40's.       HPI  78-year-old male peritoneal dialysis patient brought in for evaluation for weakness.  Patient's son states patient has not been sleeping well recently he bought some over-the-counter CBD Gummies and gave them to him this afternoon and has been sleeping well.  No nausea or vomiting.  Patient's prescribe carvedilol in the morning and evening unless his blood pressure is below 100.  Patient is having some cough and chest pressure recently.  No nausea or vomiting.  No diarrhea.  Patient's only complaint is weakness.      Patient History   Past Medical History:   Diagnosis Date    Cataract     Hyperlipidemia     Hypertension     Macular hole of left eye     Nephrosclerosis     Other seasonal allergic rhinitis     Seasonal allergies    Other specified diabetes mellitus without complications (Multi)     Diabetes mellitus of other type without complication    Personal history of diseases of the blood and blood-forming organs and certain disorders involving the immune mechanism     History of autoimmune disorder    Personal history of other diseases of urinary system     History of kidney disease    Polyarticular gout     Pre-diabetes     Renal disorder     Spinal stenosis     STEMI (ST elevation myocardial infarction) (Multi) 01/2019     Past Surgical History:   Procedure Laterality Date    CARDIAC CATHETERIZATION  01/20/2019    CATARACT EXTRACTION      CT GUIDED IMAGING FOR NEEDLE PLACEMENT  06/12/2018    CT GUIDED IMAGING FOR NEEDLE PLACEMENT [facility]    CT GUIDED IMAGING FOR NEEDLE PLACEMENT  2022    CT GUIDED IMAGING FOR NEEDLE PLACEMENT [facility]    RENAL BIOPSY  2022    RETINAL DETACHMENT REPAIR W/ [facility]CLERAL BUCKLE [facility] 2019    VASECTOMY       Family History   Problem Relation Name Age of Onset    Kidney failure Mother      Heart failure Mother      Heart disease Mother      Prostate cancer Father      Other (stomach mass) Sister Sister 1     Cancer Sister Sister 1      Social History     Tobacco Use    Smoking status: Former     Current packs/day: 0.00     Types: Cigarettes     Quit date: 2013     Years since quittin.6    Smokeless tobacco: Never   Vaping Use    Vaping status: Never Used   Substance Use Topics    Alcohol use: Not Currently    Drug use: Never       Physical Exam   ED Triage Vitals   Temperature Heart Rate Respirations BP   24 2331 24 2323 24   36.2 °C (97.1 °F) (!) 36 20 (!) 76/40      Pulse Ox Temp Source Heart Rate Source Patient Position   24 2330 24 2331 24 --   (!) 70 % Temporal Monitor       BP Location FiO2 (%)     -- --             Physical Exam  General:  Awake, alert, no acute distress.  Head: Normocephalic, Atraumatic  Neck: Supple, trachea midline, no stridor  Skin: Warm and dry, no rashes   Lungs: Clear to auscultation bilaterally no acute respiratory distress, speaking in full sentences without difficulty  CV: Bradycardic rate, regular rhythm with no obvious murmurs gallops rubs noted, no jugular venous distention, no pedal edema   Abdomen: Soft, nontender, nondistended, positive bowel sounds, no peritoneal signs  Neuro:  No gross focal neurologic deficits, NIH is 0  Musculoskeletal:  Full range of motion in all 4 extremities  Psychiatric:  Alert oriented x 3,     ED Course & MDM   ED Course as of 24   e Aug 13, 2024   2322 My EKG interpretation:  Junctional rhythm 45 bpm left axis deviation right bundle branch block QTc  474 no ectopy or acute ischemic changes noted [KW]      ED Course User Index  [KW] Mick Weber DO         Diagnoses as of 08/14/24 0229   Weakness   Septicemia (Multi)   Sepsis (Multi)                 No data recorded     Phuc Coma Scale Score: 15 (08/14/24 0134 : Lata Snyder RN)                           Medical Decision Making  Patient was seen immediately upon arrival.  He was initially severely bradycardic with a heart rate in the high 30s to 40s but did stabilize and his blood pressure stabilized to about 100 systolic which is not abnormal for this patient.  He is in no respiratory distress.  His venous blood gas showed a pH of 7.25 and lactic acid of 7.5.  He was treated with Zosyn and vancomycin for concern for infection.  I feel the acidosis is more likely metabolic than respiratory.  He was placed on a nonrebreather mask.  He is not a candidate for 30 cc/kg of IV fluids as he is a dialysis and congestive heart failure patient.  He requires admission to the hospital.  Contact the hospitalist for admission.  Discussed all the findings and the plan for admission with the patient and family at bedside and answered all her questions.    35 minutes of total critical care time includes review of laboratory data, radiology results, discussion with consultants, and monitoring for potential decompensation.  Interventions performed as documented above.  Total care time is exclusive of any separately billable procedures    Procedure  Procedures     Mick Weber DO  08/14/24 0111       Mick Weber DO  08/14/24 0229

## 2024-08-14 NOTE — CONSULTS
Inpatient consult to Cardiology  Consult performed by: Justin Beckett MD  Consult ordered by: Darion Lindsay MD  Reason for consult: Bradycardia        History Of Present Illness:    George Rowan is a 78 y.o. male presenting with generalized weakness and dizziness.  This 78-year-old white male who is followed by my partner Dr. Valdez, who has coronary artery disease he had a myocardial infarction in 2019.  Catheter is at that time showed that the circumflex was occluded LAD and right coronary artery had lesions on him.  He had angioplasty of the right coronary artery.  Subsequent nuclear stress test that showed ejection fraction is 36% range.  He does have end-stage renal disease and is on peritoneal dialysis.  He was admitted here in June with shortness of breath felt to be pulmonary edema.  Troponin was elevated at that time going from 312-577.  He has been doing fairly well since then and was admitted yesterday with generalized weakness.  Evidently he is having problems sleeping he was given CBD Gummies which may have made him worse.  In the emergency room was found that his H&H was down at 8.5 and 26.7.  BUN and creatinine are 89 and 9.0.  He has been having some episodes of low blood pressures in the 70s.  Chest x-ray was done showing mild cardiomegaly and mild central venous congestion.  CT of the chest and abdomen that showed mild to moderate emphysema some pulmonary residual edema was also noted.  Troponins were drawn and they are 127 with repeat of 156.  His TSH is markedly elevated at 17.12 blood gases were drawn pH 7.45 pCO2 40 with a peak O2 of 72.  An EKG was done showing a sinus bradycardia with episodes of functional escape rhythm.  Also has a right bundle branch block pattern.  He has been placed on the monitor and continues to show his sinus bradycardia.  He has been on his peritoneal dialysis he has been tolerating this well.  Is not having fevers nor chills.     Last Recorded  Vitals:  Vitals:    08/14/24 0744 08/14/24 0750 08/14/24 0751 08/14/24 0757   BP: 77/50   102/57   BP Location: Left arm   Right arm   Patient Position: Lying   Lying   Pulse: 55   56   Resp: 18      Temp: 36.2 °C (97.2 °F)      TempSrc: Temporal      SpO2: 90% 99% 99%    Weight:       Height:           Last Labs:  CBC - 8/14/2024:  4:28 AM  14.5 8.2 150    25.3      CMP - 8/14/2024:  4:28 AM  9.5 5.5 119 --- 0.2   6.5 3.2 171 150      PTT - 8/22/2023:  1:24 PM  1.0   10.9 27.3     Hemoglobin A1C   Date/Time Value Ref Range Status   05/28/2024 11:02 AM 6.2 (H) 4.3 - 5.6 % Final     Comment:     American Diabetes Association guidelines indicate that patients with HgbA1c in the range 5.7-6.4% are at increased risk for development of diabetes, and intervention by lifestyle modification may be beneficial. HgbA1c greater or equal to 6.5% is considered diagnostic of diabetes.   08/22/2023 01:24 PM 6.0 4.0 - 6.0 % Final     Comment:     Hemoglobin A1C levels are related to mean blood glucose during the   preceding 2-3 months. The relationship table below may be used as a   general guide. Each 1% increase in HGB A1C is a reflection of an   increase in mean glucose of approximately 30 mg/dl.   Reference: Diabetes Care, volume 29, supplement 1 Jan. 2006                        HGB A1C ................. Approx. Mean Glucose   _______________________________________________   6%   ...............................  120 mg/dl   7%   ...............................  150 mg/dl   8%   ...............................  180 mg/dl   9%   ...............................  210 mg/dl   10%  ...............................  240 mg/dl  Performed at [address] [address]     03/29/2023 09:09 AM 5.8 4.0 - 6.0 % Final     Comment:     Hemoglobin A1C levels are related to mean blood glucose during the   preceding 2-3 months. The relationship table below may be used as a   general guide. Each 1% increase in HGB A1C is a  "reflection of an   increase in mean glucose of approximately 30 mg/dl.   Reference: Diabetes Care, volume 29, supplement 1 Jan. 2006                        HGB A1C ................. Approx. Mean Glucose   _______________________________________________   6%   ...............................  120 mg/dl   7%   ...............................  150 mg/dl   8%   ...............................  180 mg/dl   9%   ...............................  210 mg/dl   10%  ...............................  240 mg/dl  Performed at [address] [address]       LDL Calculated   Date/Time Value Ref Range Status   04/24/2023 09:15 AM 76 65 - 130 MG/DL Final   10/13/2022 09:19 AM 63 (L) 65 - 130 MG/DL Final   04/05/2022 10:40 AM 58 (L) 65 - 130 MG/DL Final      Last I/O:  I/O last 3 completed shifts:  In: 250 (3.5 mL/kg) [IV Piggyback:250]  Out: - (0 mL/kg)   Weight: 71.3 kg     Past Cardiology Tests (Last 3 Years):  EKG:  ECG 12 Lead 07/06/2024      ECG 12 lead 06/17/2024      Electrocardiogram, 12-lead PRN ACS symptoms 06/17/2024      ECG 12 lead 10/16/2023    Echo:  Transthoracic Echo (TTE) Complete 06/18/2024    Ejection Fractions:  No results found for: \"EF\"  Cath:  No results found for this or any previous visit from the past 1095 days.    Stress Test:  Nuclear Stress Test 05/07/2024    Cardiac Imaging:  No results found for this or any previous visit from the past 1095 days.      Past Medical History:  He has a past medical history of Cataract, Hyperlipidemia, Hypertension, Macular hole of left eye, Nephrosclerosis, Other seasonal allergic rhinitis, Other specified diabetes mellitus without complications (Multi), Personal history of diseases of the blood and blood-forming organs and certain disorders involving the immune mechanism, Personal history of other diseases of urinary system, Polyarticular gout, Pre-diabetes, Renal disorder, Spinal stenosis, and STEMI (ST elevation myocardial infarction) (Multi) " (01/2019).    Past Surgical History:  He has a past surgical history that includes CT guided imaging for needle placement (06/12/2018); CT guided imaging for needle placement (08/22/2022); Vasectomy; Cataract extraction; Retinal detachment repair w/ scleral buckle (Left, 2019); Cardiac catheterization (01/20/2019); and Renal biopsy (08/2022).      Social History:  He reports that he quit smoking about 11 years ago. His smoking use included cigarettes. He has never used smokeless tobacco. He reports that he does not currently use alcohol. He reports that he does not use drugs.    Family History:  Family History   Problem Relation Name Age of Onset    Kidney failure Mother      Heart failure Mother      Heart disease Mother      Prostate cancer Father      Other (stomach mass) Sister Sister 1     Cancer Sister Sister 1         Allergies:  Tetracaine, Azathioprine, Fluorescein-benoxinate, and Other    Inpatient Medications:  Scheduled medications   Medication Dose Route Frequency    aspirin  81 mg oral Daily    atorvastatin  20 mg oral Nightly    calcitriol  0.5 mcg oral Daily    heparin (porcine)  5,000 Units subcutaneous q8h    hydroxychloroquine  200 mg oral Daily    levothyroxine  50 mcg oral Daily    linezolid  600 mg intravenous q12h    oxygen   inhalation Continuous - Inhalation    oxygen   inhalation Continuous - Inhalation    pantoprazole  20 mg oral Daily before breakfast    piperacillin-tazobactam  2.25 g intravenous q6h     PRN medications   Medication    acetaminophen    Or    acetaminophen    Or    acetaminophen    albuterol    dextromethorphan-guaifenesin    dextrose    dextrose    glucagon    guaiFENesin    ondansetron ODT    Or    ondansetron     Continuous Medications   Medication Dose Last Rate     Outpatient Medications:  Current Outpatient Medications   Medication Instructions    acetaminophen (TYLENOL) 650 mg, oral, 3 times daily    albuterol (ProAir HFA) 90 mcg/actuation inhaler 2 puffs,  inhalation, Every 4 hours PRN    allopurinol (ZYLOPRIM) 150 mg, oral, Daily    aspirin 81 mg capsule 1 tablet, oral, Daily    atorvastatin (LIPITOR) 20 mg, oral, Daily    calcitriol (ROCALTROL) 0.5 mcg, oral, Daily    carvedilol (COREG) 3.125 mg, oral, 2 times daily    epoetin clyde (EPOGEN,PROCRIT) 10,000 Units, subcutaneous, Patients receives on wednesdays    fluticasone (Flonase) 50 mcg/actuation nasal spray 2 sprays, Each Nostril, Daily    gabapentin (NEURONTIN) 300 mg, oral, Daily    gentamicin (Garamycin) 0.1 % cream apply to peritoneal dialysis catheter exit site DAILY and AS NEEDED    hydroxychloroquine (Plaquenil) 200 mg tablet oral, Daily    levothyroxine (SYNTHROID, LEVOXYL) 50 mcg, oral, Daily before breakfast, Take on an empty stomach.    metOLazone (ZAROXOLYN) 10 mg, oral, Daily    ramelteon (ROZEREM) 8 mg, oral, Nightly, Bedtime     torsemide (DEMADEX) 100 mg, oral, 2 times daily    zolpidem (AMBIEN) 5 mg, oral, Nightly PRN       Physical Exam:  General well developed well-nourished white male no acute distress  Head is normal cephalic  Neck shows no JVD  Lungs are clear  Cardiovascular regular rate and rhythm no murmur appreciated  Abdomen soft   and rectal are deferred  Extremities show no pitting edema     Assessment/Plan   1 end-stage renal disease on peritoneal dialysis  2 chronic congestive heart failure with ejection fraction of 39%  3 hypotension  4 acute congestive heart failure secondary to his end-stage renal failure  5 elevated troponin  6 hypothyroidism  7 anemia    This is a 78-year-old male who comes in not feeling quite right he has had some hypotension and also bradycardia.  He does have poor left her function with ejection fraction 39% which has been stable.  He has end-stage renal failure on dialysis.  Troponin is elevated feel is secondary to his end-stage renal failure.  His bradycardia can also be secondary to his hypothyroidism he is on low-dose of Synthroid this may be needed  to be increased.  Will also decrease his Coreg.  If his blood pressure remains low we will start him on midodrine.  He may have some fluid overload and needs to be further dialyzed.  Will follow with you       Code Status:  Full Code    I spent 35 minutes in the professional and overall care of this patient.        Justin Beckett MD

## 2024-08-14 NOTE — NURSING NOTE
Evaluation of patient on 100% nrb.   Placement of nasal spo2 monitor the spo2 was 100% titrate oxygen down to 5Lpm spo2 remains at 100%. Patient respiratory rate 17   In no respiratory distress.  Support oxygen as needed

## 2024-08-14 NOTE — CONSULTS
CONSULT: Nephrology SERVICE    SERVICE DATE: 8/14/2024   SERVICE TIME:  11:22 AM    REASON FOR CONSULT: Maintenance of dialysis  REQUESTING PHYSICIAN: Dr. Lindsay  PRIMARY CARE PHYSICIAN: Carl York, DO    ASSESSMENT AND PLAN  78-year-old peritoneal dialysis patient coming in with weakness, hypoxia, hypotension, bradycardia.  1.  End-stage renal disease  2.  Anemia of chronic kidney disease  3.  Hypotension  4.  Fluid overload    I will authorize the usage of his CCPD tonight.  His daughter will bring in all the requisite materials to allow him to perform his hemodialysis.  He has been commenced on IV broad-spectrum antibiotics by the primary team, presumably treating a pneumonia at present.  I really do not have any other major source for his sepsis.  The bradycardia was likely worsened by the carvedilol in addition to the uncontrolled hypothyroidism.  He needs compliance and possibly a dose adjustment to his levothyroxine.  Using 1.5% dextrose solutions, his hypotension precludes aggressive ultrafiltration on his peritoneal dialysis, nor does he seem that fluid overloaded peripherally.  He likely has all central fluid overload.  I am going to dose him with a few rounds of IV iron while he is here, but he did just receive high-dose erythropoietin stimulating agent on Friday.  I do not think he needs a transfusion right now.  I will continue to follow this patient.  Thank you for the consultation.    SUBJECTIVE  Mr. Rowan is a 78 y.o. peritoneal dialysis patient presenting to the hospital with weakness and hypoxia, consulted for maintenance of peritoneal dialysis.  This patient regularly dialyzes on CCPD every evening.  Dialysis has been largely uneventful.  I saw this patient in office 5 days ago.  His main complaint was weakness.  He was noted to have a hemoglobin in the eights.  I had increased his IV iron and erythropoietin stimulating agents in the outpatient setting.  He had a tiny bit of erythema  around his catheter exit site, but really nothing else remarkable with regard to his peritoneal dialysis.  He has endorsed no fevers, no cough, no chest pain.  He is short of breath, he is particularly short of breath on exertion.  Climbing up the stairs made him very weak.  He called EMS twice and finally agreed to go to the hospital on the second time.  He almost fell over twice.  Blood pressure was very low, pulse rate very low, pulse oximetry very low as well.  He had not taken his evening carvedilol, which is a low-dose anyway.  He tells me that he has been intermittently compliant with his levothyroxine.  He sometimes forgets to take it in the morning.  He says he takes it maybe 4-5 times per week.  TSH noted to be high.  He is being evaluated by cardiology.  He is being treated for sepsis.  He did not receive aggressive IV fluids, but his blood pressure has been reasonably stable in the 90s during this admission.    PAST MEDICAL HISTORY:   Past Medical History:   Diagnosis Date    Cataract     Hyperlipidemia     Hypertension     Macular hole of left eye     Nephrosclerosis     Other seasonal allergic rhinitis     Seasonal allergies    Other specified diabetes mellitus without complications (Multi)     Diabetes mellitus of other type without complication    Personal history of diseases of the blood and blood-forming organs and certain disorders involving the immune mechanism     History of autoimmune disorder    Personal history of other diseases of urinary system     History of kidney disease    Polyarticular gout     Pre-diabetes     Renal disorder     Spinal stenosis     STEMI (ST elevation myocardial infarction) (Multi) 01/2019     PAST SURGICAL HISTORY:   Past Surgical History:   Procedure Laterality Date    CARDIAC CATHETERIZATION  01/20/2019    CATARACT EXTRACTION      CT GUIDED IMAGING FOR NEEDLE PLACEMENT  06/12/2018    CT GUIDED IMAGING FOR NEEDLE PLACEMENT [facility]    CT GUIDED IMAGING  "FOR NEEDLE PLACEMENT  2022    CT GUIDED IMAGING FOR NEEDLE PLACEMENT [facility]GAC[facility]    RENAL BIOPSY  2022    RETINAL DETACHMENT REPAIR W/ SCLERAL BUCKLE LE Left 2019    VASECTOMY       FAMILY HISTORY:   Family History   Problem Relation Name Age of Onset    Kidney failure Mother      Heart failure Mother      Heart disease Mother      Prostate cancer Father      Other (stomach mass) Sister Sister 1     Cancer Sister Sister 1      SOCIAL HISTORY:   Social History     Tobacco Use    Smoking status: Former     Current packs/day: 0.00     Types: Cigarettes     Quit date: 2013     Years since quittin.6    Smokeless tobacco: Never   Vaping Use    Vaping status: Never Used   Substance Use Topics    Alcohol use: Not Currently    Drug use: Never     MEDICATIONS:  aspirin, 81 mg, oral, Daily  atorvastatin, 20 mg, oral, Nightly  calcitriol, 0.5 mcg, oral, Daily  heparin (porcine), 5,000 Units, subcutaneous, q8h  hydroxychloroquine, 200 mg, oral, Daily  levothyroxine, 50 mcg, oral, Daily  linezolid, 600 mg, intravenous, q12h  oxygen, , inhalation, Continuous - Inhalation  oxygen, , inhalation, Continuous - Inhalation  pantoprazole, 20 mg, oral, Daily before breakfast  piperacillin-tazobactam, 2.25 g, intravenous, q6h           PRN medications: acetaminophen **OR** acetaminophen **OR** acetaminophen, albuterol, dextromethorphan-guaifenesin, dextrose, dextrose, glucagon, guaiFENesin, ondansetron ODT **OR** ondansetron   CURRENT ALLERGIES:   Allergies as of 2024 - Reviewed 2024   Allergen Reaction Noted    Tetracaine Other and Swelling 2023    Azathioprine Other 2023    Fluorescein-benoxinate Swelling 2023    Other Unknown 2023       COMPLETE REVIEW OF SYSTEMS:    Full ROS was negative unless mentioned above    OBJECTIVE  PHYSICAL EXAM  Heart Rate:  [36-56]   Temp:  [35.9 °C (96.6 °F)-36.4 °C (97.5 °F)]   Resp:  [14-22]   BP: ()/(40-61)   Height:  [170.2 cm (5' 7\")] "   Weight:  [71.3 kg (157 lb 3 oz)-75.5 kg (166 lb 7.2 oz)]   SpO2:  [70 %-100 %]    Body mass index is 24.62 kg/m².  This is a chronically ill-appearing  man  Lethargic affect  No obvious skin rashes  Hearing intact  Phonation intact  Moist mucosa  Bradycardic  Lungs are clear to auscultation bilaterally  Abdomen is soft, nondistended, nontender, positive bowel sounds, PD catheter in left lower quadrant  No Lindo catheter in place, no suprapubic tenderness to palpation  No extremity edema  Moves 4 limbs spontaneously  No obvious joint deformities  No lymphadenopathy    DATA:   Labs:  Results for orders placed or performed during the hospital encounter of 08/13/24 (from the past 96 hour(s))   CBC and Auto Differential   Result Value Ref Range    WBC 9.6 4.4 - 11.3 x10*3/uL    nRBC 2.6 (H) 0.0 - 0.0 /100 WBCs    RBC 2.48 (L) 4.50 - 5.90 x10*6/uL    Hemoglobin 8.5 (L) 13.5 - 17.5 g/dL    Hematocrit 26.7 (L) 41.0 - 52.0 %     (H) 80 - 100 fL    MCH 34.3 (H) 26.0 - 34.0 pg    MCHC 31.8 (L) 32.0 - 36.0 g/dL    RDW 17.2 (H) 11.5 - 14.5 %    Platelets 149 (L) 150 - 450 x10*3/uL    Neutrophils % 76.8 40.0 - 80.0 %    Immature Granulocytes %, Automated 0.6 0.0 - 0.9 %    Lymphocytes % 15.8 13.0 - 44.0 %    Monocytes % 4.1 2.0 - 10.0 %    Eosinophils % 2.3 0.0 - 6.0 %    Basophils % 0.4 0.0 - 2.0 %    Neutrophils Absolute 7.34 (H) 1.60 - 5.50 x10*3/uL    Immature Granulocytes Absolute, Automated 0.06 0.00 - 0.50 x10*3/uL    Lymphocytes Absolute 1.51 0.80 - 3.00 x10*3/uL    Monocytes Absolute 0.39 0.05 - 0.80 x10*3/uL    Eosinophils Absolute 0.22 0.00 - 0.40 x10*3/uL    Basophils Absolute 0.04 0.00 - 0.10 x10*3/uL   Basic metabolic panel   Result Value Ref Range    Glucose 256 (H) 65 - 99 mg/dL    Sodium 131 (L) 133 - 145 mmol/L    Potassium 4.6 3.4 - 5.1 mmol/L    Chloride 90 (L) 97 - 107 mmol/L    Bicarbonate 17 (L) 24 - 31 mmol/L    Urea Nitrogen 84 (H) 8 - 25 mg/dL    Creatinine 8.60 (H) 0.40 - 1.60 mg/dL     eGFR 6 (L) >60 mL/min/1.73m*2    Calcium 9.4 8.5 - 10.4 mg/dL    Anion Gap >19 (H) <=19 mmol/L   Serial Troponin, Initial ([facility])   Result Value Ref Range    Troponin T, High Sensitivity 103 (HH) <=14 ng/L   Blood Gas Venous Full Panel   Result Value Ref Range    POCT pH, Venous 7.24 (LL) 7.33 - 7.43 pH    POCT pCO2, Venous 43 41 - 51 mm Hg    POCT pO2, Venous 22 (L) 35 - 45 mm Hg    POCT SO2, Venous 18 (L) 45 - 75 %    POCT Oxy Hemoglobin, Venous 17.6 (L) 45.0 - 75.0 %    POCT Hematocrit Calculated, Venous 26.0 (L) 41.0 - 52.0 %    POCT Sodium, Venous 126 (L) 136 - 145 mmol/L    POCT Potassium, Venous 4.4 3.5 - 5.3 mmol/L    POCT Chloride, Venous 90 (L) 98 - 107 mmol/L    POCT Ionized Calicum, Venous 1.24 1.10 - 1.33 mmol/L    POCT Glucose, Venous      POCT Lactate, Venous 7.5 (HH) 0.4 - 2.0 mmol/L    POCT Base Excess, Venous -8.5 (L) -2.0 - 3.0 mmol/L    POCT HCO3 Calculated, Venous 18.4 (L) 22.0 - 26.0 mmol/L    POCT Hemoglobin, Venous 8.8 (L) 13.5 - 17.5 g/dL    POCT Anion Gap, Venous 22.0 10.0 - 25.0 mmol/L    Patient Temperature 37.0 degrees Celsius    FiO2 0 %   Blood Culture    Specimen: Peripheral Venipuncture; Blood culture   Result Value Ref Range    Blood Culture Loaded on Instrument - Culture in progress    Blood Culture    Specimen: Peripheral Venipuncture; Blood culture   Result Value Ref Range    Blood Culture Loaded on Instrument - Culture in progress    Blood Gas Arterial Full Panel   Result Value Ref Range    POCT pH, Arterial 7.45 (H) 7.38 - 7.42 pH    POCT pCO2, Arterial 40 38 - 42 mm Hg    POCT pO2, Arterial 72 (L) 85 - 95 mm Hg    POCT SO2, Arterial 94 94 - 100 %    POCT Oxy Hemoglobin, Arterial 92.7 (L) 94.0 - 98.0 %    POCT Hematocrit Calculated, Arterial 24.0 (L) 41.0 - 52.0 %    POCT Sodium, Arterial 130 (L) 136 - 145 mmol/L    POCT Potassium, Arterial 3.6 3.5 - 5.3 mmol/L    POCT Chloride, Arterial 94 (L) 98 - 107 mmol/L    POCT Ionized Calcium, Arterial 1.15 1.10 - 1.33 mmol/L    POCT  Glucose, Arterial 90 74 - 99 mg/dL    POCT Lactate, Arterial 2.2 (H) 0.4 - 2.0 mmol/L    POCT Base Excess, Arterial 3.5 (H) -2.0 - 3.0 mmol/L    POCT HCO3 Calculated, Arterial 27.8 (H) 22.0 - 26.0 mmol/L    POCT Hemoglobin, Arterial 8.0 (L) 13.5 - 17.5 g/dL    POCT Anion Gap, Arterial 12 10 - 25 mmo/L    Patient Temperature 37.0 degrees Celsius    FiO2 21 %    Site of Arterial Puncture Radial Right     Adalid's Test Positive    Serial Troponin, 2 Hour ([facility])   Result Value Ref Range    Troponin T, High Sensitivity 127 (HH) <=14 ng/L   Blood Gas Lactic Acid, Venous   Result Value Ref Range    POCT Lactate, Venous 1.5 0.4 - 2.0 mmol/L   POCT GLUCOSE   Result Value Ref Range    POCT Glucose 101 (H) 74 - 99 mg/dL   Comprehensive metabolic panel   Result Value Ref Range    Glucose 95 65 - 99 mg/dL    Sodium 134 133 - 145 mmol/L    Potassium 4.2 3.4 - 5.1 mmol/L    Chloride 94 (L) 97 - 107 mmol/L    Bicarbonate 22 (L) 24 - 31 mmol/L    Urea Nitrogen 89 (H) 8 - 25 mg/dL    Creatinine 9.00 (H) 0.40 - 1.60 mg/dL    eGFR 6 (L) >60 mL/min/1.73m*2    Calcium 9.5 8.5 - 10.4 mg/dL    Albumin 3.2 (L) 3.5 - 5.0 g/dL    Alkaline Phosphatase 150 (H) 35 - 125 U/L    Total Protein 5.5 (L) 5.9 - 7.9 g/dL     (H) 5 - 40 U/L    Bilirubin, Total 0.2 0.1 - 1.2 mg/dL     (H) 5 - 40 U/L    Anion Gap 18 <=19 mmol/L   CBC and Auto Differential   Result Value Ref Range    WBC 14.5 (H) 4.4 - 11.3 x10*3/uL    nRBC 1.5 (H) 0.0 - 0.0 /100 WBCs    RBC 2.43 (L) 4.50 - 5.90 x10*6/uL    Hemoglobin 8.2 (L) 13.5 - 17.5 g/dL    Hematocrit 25.3 (L) 41.0 - 52.0 %     (H) 80 - 100 fL    MCH 33.7 26.0 - 34.0 pg    MCHC 32.4 32.0 - 36.0 g/dL    RDW 16.7 (H) 11.5 - 14.5 %    Platelets 150 150 - 450 x10*3/uL    Neutrophils % 86.0 40.0 - 80.0 %    Immature Granulocytes %, Automated 0.6 0.0 - 0.9 %    Lymphocytes % 6.2 13.0 - 44.0 %    Monocytes % 6.7 2.0 - 10.0 %    Eosinophils % 0.3 0.0 - 6.0 %    Basophils % 0.2 0.0 - 2.0 %    Neutrophils  Absolute 12.51 (H) 1.60 - 5.50 x10*3/uL    Immature Granulocytes Absolute, Automated 0.08 0.00 - 0.50 x10*3/uL    Lymphocytes Absolute 0.90 0.80 - 3.00 x10*3/uL    Monocytes Absolute 0.98 (H) 0.05 - 0.80 x10*3/uL    Eosinophils Absolute 0.04 0.00 - 0.40 x10*3/uL    Basophils Absolute 0.03 0.00 - 0.10 x10*3/uL   TSH with reflex to Free T4 if abnormal   Result Value Ref Range    Thyroid Stimulating Hormone 17.12 (H) 0.27 - 4.20 mIU/L   Hemoglobin A1c   Result Value Ref Range    Hemoglobin A1C 6.4 (H) See below %    Estimated Average Glucose 137 Not Established mg/dL   Thyroxine, Free   Result Value Ref Range    Thyroxine, Free 1.30 0.90 - 1.70 ng/dL   Serial Troponin, 6 Hour ([facility])   Result Value Ref Range    Troponin T, High Sensitivity 156 (HH) <=14 ng/L   POCT GLUCOSE   Result Value Ref Range    POCT Glucose 101 (H) 74 - 99 mg/dL       SIGNATURE: Jose Antonio Mckoy MD PATIENT NAME: George Rowan   DATE: August 14, 2024 MRN: [id number]   TIME: 11:22 AM PAGER: [phone]

## 2024-08-14 NOTE — H&P
History Of Present Illness      George Rowan is a 78 y.o. male presenting with Generalized Weakness.    Patient presents with generalized weakness and dizziness.  ED physicians note the patient's son stated the patient has not been sleeping well and he recently bought some over-the-counter CBD Gummies and gave them to him this afternoon.  No nausea or vomiting.  Noted to be having some cough and chest pressure recently.  No diarrhea.  His only complaint is weakness.    The patient's ED diagnostic workup was noted for Tmax 36.2, pulse rate initially documented as 36 but then documented to 56.  The patient's initial blood pressure was low and 76/40 then followed by 97/46.  Most recent 110/57.  Saturating 100% on supplemental oxygen by nasal cannula.    Today the patient's ED diagnostic workup was noted for a CBC with differential consistent with a normal WBC count of 9.6.  The patient's H&H was low at 8.5/26.7.  The patient's platelet count was low normal at 149.  Patient's blood chemistry noted for an elevated glucose of 256.  The sodium was 131.  Patient's chloride was 90.  The bicarbonate was low at 17.  Anion gap elevated greater than 19.  His creatinine was noted to be 8.6.  BUN was elevated at 84.  Venous blood gas was obtained and noted for a pH of 7.24.    VBG noted for a normal pCO2 of 43, pO2 was low at 22.  The lactic acid was elevated at 7.5 in the VBG.  First set troponin level was noted to be 103.  Troponin level back on July 6 was 211.    A chest x-ray from today was read as mild cardiomegaly and central vascular congestion.  Possible small left pleural effusion.    Our last note from Dr. Mckoy mentions that the patient will require maintenance dialysis initiation.  It was last hospital discharge in July 9, 2024.We are using peritoneal dialysis with some ultrafiltration as well as diuretic therapy until he begins home cycling dialysis, which will happen within a few days of discharge. At this  point, use torsemide 100 mg p.o. twice daily and metolazone 10 mg daily for his maintenance diuretic therapy for a few days until he gets on peritoneal dialysis at home. For the possible contamination of his PD catheter, use Keflex 500 mg p.o. twice daily for 3 days.    EKG noted for possible junctional rhythm at 45 bpm  Right bundle branch block  Left anterior fascicular block  QTc 474 ms  Bifascicular block        In the ED the patient was administered empiric broad-spectrum IV antibiotics with IV Zosyn and IV vancomycin.  Cultures were obtained.  Urinalysis is still pending.  Sodium bicarbonate 50 mEq IV push x 1 was ordered.  A CT chest abdomen pelvis without IV contrast was requested by me and is still pending.  Arterial blood gas was requested by me and is still pending as well.      Arterial blood gas has been obtained.  Noted for pH of 7.45, pCO2 40, pO2 72, oxygen saturation 92.7.  Lactic acid improved to 2.2.      CT chest abdomen pelvis without IV contrast result obtained.  Noted for the following:      IMPRESSION:    Respiratory motion artifact limits evaluation of the lung parenchyma.  Mild-to-moderate emphysema. Mild smooth interlobular septal  thickening suggesting acute pulmonary interstitial edema. Patchy  reticulonodular and ground-glass opacities, suspicious for multifocal  bilateral pneumonia. Moderate to large right and small to moderate  left lateral pleural effusions. No pneumothorax.      No definite evidence of acute pathology in the abdomen or pelvis.      Peritoneal dialysis catheter is coiled along the superior aspect of  the bladder. Small volume of ascites. No pneumoperitoneum or  loculated intraperitoneal collection.      Additional findings as discussed above.       Noted that patient had recent 2D echocardiogram on June 18, 2024.    That was noted for the following:    CONCLUSIONS:     1. Left ventricular systolic function is mildly to moderately decreased with a 30-35% estimated  "ejection fraction.   2. Basal and mid anterolateral wall, basal and mid inferior wall, basal and mid inferolateral wall, and basal inferoseptal segment are abnormal.   3. There is a moderate pleural effusion.   4. Aortic valve sclerosis.   5. Mild aortic valve regurgitation.   6. Left ventricular cavity size is moderately dilated.   7. There are multiple wall motion abnormalities      Gabriela the patient's daughter as well as his wife and son were present during my encounter.  Gabriela is extremely involved with her father's health care and has several logs of all of his PD dialysis sessions.  She is involved with preparing the diasylate for his PD sessions.  She states that Dr. Mckoy has scheduled a PET test to evaluate the efficiency of the PD for her father which is scheduled later this month. He already may have had a \"adequacy\" test.     The daughter is requesting that the patient wears BiPAP nightly to help with his sleep.      Gabriela confirms that the patient's diuretics discontinued roughly 1 week ago.  This consists of the metazalone and torsemide.      Past Medical History      Past Medical History:   Diagnosis Date    Cataract     Hyperlipidemia     Hypertension     Macular hole of left eye     Nephrosclerosis     Other seasonal allergic rhinitis     Seasonal allergies    Other specified diabetes mellitus without complications (Multi)     Diabetes mellitus of other type without complication    Personal history of diseases of the blood and blood-forming organs and certain disorders involving the immune mechanism     History of autoimmune disorder    Personal history of other diseases of urinary system     History of kidney disease    Polyarticular gout     Pre-diabetes     Renal disorder     Spinal stenosis     STEMI (ST elevation myocardial infarction) (Multi) 01/2019         Surgical History        Past Surgical History:   Procedure Laterality Date    CARDIAC CATHETERIZATION  01/20/2019    CATARACT " EXTRACTION      CT GUIDED IMAGING FOR NEEDLE PLACEMENT  06/12/2018    CT GUIDED IMAGING FOR NEEDLE PLACEMENT [facility]    CT GUIDED IMAGING FOR NEEDLE PLACEMENT  08/22/2022    CT GUIDED IMAGING FOR NEEDLE PLACEMENT [facility]    RENAL BIOPSY  08/2022    RETINAL DETACHMENT REPAIR W/ SCLERAL BUCKLE LE Left 2019    VASECTOMY            Social History      He reports that he quit smoking about 11 years ago. His smoking use included cigarettes. He has never used smokeless tobacco. He reports that he does not currently use alcohol. He reports that he does not use drugs.        Family History        Family History   Problem Relation Name Age of Onset    Kidney failure Mother      Heart failure Mother      Heart disease Mother      Prostate cancer Father      Other (stomach mass) Sister Sister 1     Cancer Sister Sister 1           Allergies        Tetracaine, Azathioprine, Fluorescein-benoxinate, and Other        Review of Systems      14-point ROS otherwise negative, as per HPI/Interval History.    General: No change in weight. No weakenss. No Fevers/Chills/Night Sweats   Skin: No skin/hair/nail changes. No rashes or sores.  Head:  No trauma. No Headache/nasuea/vomitting.   Eyes: No visual changes. No tearing. No itching.   Ears: No hearing loss. No tinnitus. No vertigo. No discharge.  Nose, Sinuses: No rhinorrhea, No nasal congestion. No epistaxis.  Mouth, Throat, Neck: No bleeding gums, hoarseness, sore throat or swollen neck  Cardiac: No palpitations. No RAMON. No PND. No Orthopnea.   Respiratory: No Shortness of Breath. No wheezing. No cough. No hemoptysis.   GI: No nausea/vomiting. No indigestion. No diarrhea. No constipation.   Extremities: No numbness or tingling. No paresthesias.   Urinary: No change in urinary frequency. No change in hesitancy. No hematuria. No incontinence.         Physical Exam        Constitutional:  Pleasant.  Appears weak  Eyes: PERRL, EOMI,   ENMT: mucous membranes  "moist  Head/Neck: Neck supple, No JVD,   Respiratory/Thorax: Patent airways, CTAB,   Cardiovascular: Sinus bradycardia   + no murmurs  Gastrointestinal: Soft, non-distended, +BS.  PD catheter  Musculoskeletal: ROM intact, no joint swelling, normal strength  Extremities: peripheral pulses intact; no edema  Neurological: Alert and Oriented x 3; no focal deficits; gross motor and sensation intact; CN II-XII intact. No asterixis.  Psychological: Appropriate mood and behavior  Skin: No lesions, No rashes.         Last Recorded Vitals  Blood pressure 89/57, pulse 53, temperature 36.2 °C (97.1 °F), temperature source Temporal, resp. rate (!) 21, height 1.702 m (5' 7\"), weight 75.5 kg (166 lb 7.2 oz), SpO2 96%.    Relevant Results    Lab Results   Component Value Date    WBC 9.6 08/13/2024    HGB 8.5 (L) 08/13/2024    HCT 26.7 (L) 08/13/2024     (H) 08/13/2024     (L) 08/13/2024       Lab Results   Component Value Date    GLUCOSE 256 (H) 08/13/2024    CALCIUM 9.4 08/13/2024     (L) 08/13/2024    K 4.6 08/13/2024    CO2 17 (L) 08/13/2024    CL 90 (L) 08/13/2024    BUN 84 (H) 08/13/2024    CREATININE 8.60 (H) 08/13/2024       Lab Results   Component Value Date    HGBA1C 6.2 (H) 05/28/2024         No CT head results found for the past 12 months      Scheduled medications  aspirin, 81 mg, oral, Daily  atorvastatin, 20 mg, oral, Nightly  calcitriol, 0.5 mcg, oral, Daily  heparin (porcine), 5,000 Units, subcutaneous, q8h  hydroxychloroquine, 200 mg, oral, Daily  levothyroxine, 50 mcg, oral, Daily  linezolid, 600 mg, intravenous, q12h  oxygen, , inhalation, Continuous - Inhalation  pantoprazole, 20 mg, oral, Daily before breakfast  piperacillin-tazobactam, 2.25 g, intravenous, q6h      Continuous medications     PRN medications  PRN medications: acetaminophen **OR** acetaminophen **OR** acetaminophen, albuterol, dextromethorphan-guaifenesin, dextrose, dextrose, glucagon, guaiFENesin, ondansetron ODT **OR** " ondansetron        Assessment/Plan   Principal Problem:    Metabolic acidosis, increased anion gap (IAG)  Active Problems:    Ischemic cardiomyopathy    Systemic lupus erythematosus (Multi)    Nonsustained ventricular tachycardia (Multi)    Coronary artery disease    Chronic systolic congestive heart failure (Multi)    Weakness    Lactic acid acidosis    Peritoneal dialysis catheter in situ (CMS-HCC)    ESRD (end stage renal disease) on dialysis (Multi)    Chronic respiratory failure (Multi)    Sepsis (Multi)          George Rowan is a 78 y.o. male presenting with Generalized Weakness.  Patient admitted for further evaluation and management.        Generalized Weakness 2/2 Presumed Sepsis 2/2 Mutifocal Bilateral Pneumonia Versus UTI versus Alternative source    Admit to stepdown unit  Continuous cardiac pulse oximetry monitoring  Patient noted to be hypotensive/bradycardic/tachypneic in the ED  Elevated lactic acid level  Continue with broad-spectrum IV antibiotics  Follow-up blood cultures and urine cultures  Procalcitonin level requested  Infectious disease consultation  Urine Legionella and urine streptococcal antigen levels  Aspiration precautions/fall precautions  Holding all sedatives and gabapentinoids       High anion gap metabolic acidosis secondary to lactic acidosis    Possibly due to a combination of worsening renal function (Uremia) and/or sepsis      Acute Pulmonary Intersitital Edema/B/L Pleural Effusions/Presumed Decompensated Heart Failure (HFrEF)     Management as above  Recent repeat 2D echocardiogram noted  May need ICD in near Future considering cardiomyopathy  Defer further management to cardiology       Transient Bradycardia in the ED/Elevated Troponin level    Will hold all AV sarai blocking agents  Holding Coreg  Defer further management to cardiology  Type 2 Demand Ischemia in the setting of ESRD and presumed Sepsis      Ischemic Cardiomyopathy     Meticulous volume management       ESRD  on PD     Cr tends to be elevating recently. Worsening Uremia  Avoid nephrotoxic agents  Nephrology consultation placed  Defer further diuretics to nephrology specialist  Peritoneal dialysis catheter is coiled along the superior aspect of  the bladder per CT.  Defer to Nephrology       Anemia of chronic kidney disease     Defer EPO dose to Nephrology Team        CAD s/p PCI with Stents     He has roughly 3 stents that were placed by Dr. Valdez  Continue aspirin and statin therapy       H/o Carotid Artery Stenosis     Patient follows with Dr. Reid  Continue aspirin and statin therapy      Systemic Lupus Erythematosus     Continue hydroxychloroquine home dose        Gout     Holding Allopurinol 2/2 renal Function for now         Hypothyroidism     Continue home levothyroxine dose        Hypertension      Continue to monitor BP and adjust hypertensive medications accordingly        GI + DVT Prophylaxis     Low-dose oral PPI  Heparin subcu (monitor platelets closely)        Advanced Care Directives      Full Code           This Dictation was Transcribed using a Nuance Dragon Voice Recognition System Device (with Compatible Computer + Software) and as such may contain Grammatical Errors and Unintentional Typing Misprints.      I spent 45 minutes in the professional and overall care of this patient.      Darion Lindsay MD

## 2024-08-14 NOTE — CONSULTS
Inpatient consult to Infectious Diseases  Consult performed by: Suyapa Shay, APRN-CNP  Consult ordered by: Darion Lindsay MD  Reason for consult: sepsis          Primary MD: Carl York DO      History Of Present Illness  George Rowan is a 78 y.o. male ending to the emergency room with generalized weakness.  Past history of end-stage renal disease on peritoneal dialysis.  Seen and examined.  He is sitting up in chair.  He is afebrile, denies chills.  He is bradycardic, hypotensive.  He was  hypoxic on room air and placed on low-flow oxygen.  He reports some abdominal discomfort.  He reports he feels like he has a stomach issue starting, he feels lie he will have diarrhea soon.  Labs with leukocytosis.  Elevated Liver Function Test. Blood culture pending.  CT of chest abdomen pelvis shows moderate emphysema.    Laureano interstitial edema, patchy groundglass opacities.  Moderate to large right and small to moderate left pleural effusions.  He is on Zyvox, Zosyn.     Past Medical History  He has a past medical history of Cataract, Hyperlipidemia, Hypertension, Macular hole of left eye, Nephrosclerosis, Other seasonal allergic rhinitis, Other specified diabetes mellitus without complications (Multi), Personal history of diseases of the blood and blood-forming organs and certain disorders involving the immune mechanism, Personal history of other diseases of urinary system, Polyarticular gout, Pre-diabetes, Renal disorder, Spinal stenosis, and STEMI (ST elevation myocardial infarction) (Multi) (01/2019).    Surgical History  He has a past surgical history that includes CT guided imaging for needle placement (06/12/2018); CT guided imaging for needle placement (08/22/2022); Vasectomy; Cataract extraction; Retinal detachment repair w/ scleral buckle (Left, 2019); Cardiac catheterization (01/20/2019); and Renal biopsy (08/2022).     Social History     Occupational History    Not on file   Tobacco Use    Smoking  status: Former     Current packs/day: 0.00     Types: Cigarettes     Quit date: 2013     Years since quittin.6    Smokeless tobacco: Never   Vaping Use    Vaping status: Never Used   Substance and Sexual Activity    Alcohol use: Not Currently    Drug use: Never    Sexual activity: Defer     Travel History   Travel since 24    No documented travel since 24            Family History  Family History   Problem Relation Name Age of Onset    Kidney failure Mother      Heart failure Mother      Heart disease Mother      Prostate cancer Father      Other (stomach mass) Sister Sister 1     Cancer Sister Sister 1      Allergies  Tetracaine, Azathioprine, Fluorescein-benoxinate, and Other     Immunization History   Administered Date(s) Administered    Flu vaccine, quadrivalent, high-dose, preservative free, age 65y+ (FLUZONE) 2020, 2020, 10/27/2021    Pfizer Purple Cap SARS-CoV-2 2021, 2021, 2021    Pneumococcal conjugate vaccine, 13-valent (PREVNAR 13) 2021     Medications  Home medications:  Medications Prior to Admission   Medication Sig Dispense Refill Last Dose    acetaminophen (Tylenol) 325 mg tablet Take 2 tablets (650 mg) by mouth 3 times a day. 30 tablet 0     albuterol (ProAir HFA) 90 mcg/actuation inhaler Inhale 2 puffs every 4 hours if needed for wheezing or shortness of breath. 8.5 g 2     allopurinol (Zyloprim) 300 mg tablet TAKE ONE-HALF TABLET BY MOUTH  ONCE DAILY 90 tablet 3     aspirin 81 mg capsule Take 1 tablet by mouth once daily.       atorvastatin (Lipitor) 20 mg tablet TAKE 1 TABLET BY MOUTH ONCE  DAILY 100 tablet 2     calcitriol (Rocaltrol) 0.5 mcg capsule Take 1 capsule (0.5 mcg) by mouth once daily. 30 capsule 1     carvedilol (Coreg) 3.125 mg tablet Take 1 tablet (3.125 mg) by mouth 2 times a day. (Patient not taking: Reported on 2024) 180 tablet 3     epoetin clyde (Epogen,Procrit) 10,000 unit/mL injection Inject 1 mL (10,000 Units) under  the skin. Patients receives on wednesdays       fluticasone (Flonase) 50 mcg/actuation nasal spray USE 2 SPRAYS IN BOTH  NOSTRILS ONCE DAILY 48 g 11     gabapentin (Neurontin) 300 mg capsule TAKE 1 CAPSULE BY MOUTH ONCE  DAILY 100 capsule 2     gentamicin (Garamycin) 0.1 % cream apply to peritoneal dialysis catheter exit site DAILY and AS NEEDED       hydroxychloroquine (Plaquenil) 200 mg tablet TAKE 1 TABLET BY MOUTH ONCE  DAILY 100 tablet 2     levothyroxine (Synthroid, Levoxyl) 50 mcg tablet TAKE 1 TABLET BY MOUTH ONCE  DAILY IN THE MORNING ON AN EMPTY STOMACH 100 tablet 2     metOLazone (Zaroxolyn) 10 mg tablet Take 1 tablet (10 mg) by mouth once daily. (Patient taking differently: Take 1 tablet (10 mg) by mouth once daily. prn) 7 tablet 0     ramelteon (Rozerem) 8 mg tablet Take 1 tablet (8 mg) by mouth once daily at bedtime. Bedtime       torsemide (Demadex) 100 mg tablet Take 1 tablet (100 mg) by mouth 2 times a day for 15 days. (Patient taking differently: Take 0.5 tablets (50 mg) by mouth 2 times a day.) 30 tablet 0     zolpidem (Ambien) 5 mg tablet Take 1 tablet (5 mg) by mouth as needed at bedtime for sleep. (Patient not taking: Reported on 8/6/2024) 30 tablet 0      Current medications:  Scheduled medications  aspirin, 81 mg, oral, Daily  atorvastatin, 20 mg, oral, Nightly  calcitriol, 0.5 mcg, oral, Daily  heparin (porcine), 5,000 Units, subcutaneous, q8h  hydroxychloroquine, 200 mg, oral, Daily  iron sucrose, 200 mg, intravenous, Daily  levothyroxine, 50 mcg, oral, Daily  linezolid, 600 mg, intravenous, q12h  oxygen, , inhalation, Continuous - Inhalation  oxygen, , inhalation, Continuous - Inhalation  pantoprazole, 20 mg, oral, Daily before breakfast  piperacillin-tazobactam, 2.25 g, intravenous, q6h      Continuous medications     PRN medications  PRN medications: acetaminophen **OR** acetaminophen **OR** acetaminophen, albuterol, dextromethorphan-guaifenesin, dextrose, dextrose, glucagon,  "guaiFENesin, ondansetron ODT **OR** ondansetron    Review of Systems   Constitutional: Negative.    HENT: Negative.     Eyes: Negative.    Respiratory: Negative.     Cardiovascular: Negative.         Bradycardia   Gastrointestinal: Negative.         Abdominal discomfort, peritoneal catheter    Endocrine: Negative.    Genitourinary: Negative.    Musculoskeletal: Negative.    Skin: Negative.    Neurological: Negative.    Psychiatric/Behavioral: Negative.          Objective  Range of Vitals (last 24 hours)  Heart Rate:  [36-56]   Temp:  [35.9 °C (96.6 °F)-36.4 °C (97.5 °F)]   Resp:  [14-22]   BP: ()/(40-61)   Height:  [170.2 cm (5' 7\")]   Weight:  [71.3 kg (157 lb 3 oz)-75.5 kg (166 lb 7.2 oz)]   SpO2:  [70 %-100 %]   Daily Weight  08/14/24 : 71.3 kg (157 lb 3 oz)    Body mass index is 24.62 kg/m².     Physical Exam  Constitutional:       Appearance: Normal appearance.   HENT:      Head: Normocephalic and atraumatic.      Nose: Nose normal.      Mouth/Throat:      Mouth: Mucous membranes are moist.      Pharynx: Oropharynx is clear.   Eyes:      General: No scleral icterus.  Cardiovascular:      Rate and Rhythm: Regular rhythm. Bradycardia present.   Pulmonary:      Effort: Pulmonary effort is normal.      Breath sounds: Normal breath sounds.   Abdominal:      General: Bowel sounds are normal.      Palpations: Abdomen is soft.      Comments: Generalized Abdominal discomfort, peritoneal dialysis catheter   Musculoskeletal:         General: Normal range of motion.      Cervical back: Normal range of motion and neck supple.   Skin:     General: Skin is warm and dry.   Neurological:      Mental Status: He is alert.      Comments: Awake, alert   Psychiatric:         Mood and Affect: Mood normal.         Behavior: Behavior normal.          Relevant Results  Outside Hospital Results  No  Labs  Results from last 72 hours   Lab Units 08/14/24  0428 08/13/24  2338   WBC AUTO x10*3/uL 14.5* 9.6   HEMOGLOBIN g/dL 8.2* 8.5* "   HEMATOCRIT % 25.3* 26.7*   PLATELETS AUTO x10*3/uL 150 149*   NEUTROS PCT AUTO % 86.0 76.8   LYMPHS PCT AUTO % 6.2 15.8   MONOS PCT AUTO % 6.7 4.1   EOS PCT AUTO % 0.3 2.3     Results from last 72 hours   Lab Units 08/14/24 0428 08/13/24  2338   SODIUM mmol/L 134 131*   POTASSIUM mmol/L 4.2 4.6   CHLORIDE mmol/L 94* 90*   CO2 mmol/L 22* 17*   BUN mg/dL 89* 84*   CREATININE mg/dL 9.00* 8.60*   GLUCOSE mg/dL 95 256*   CALCIUM mg/dL 9.5 9.4   ANION GAP mmol/L 18 >19*   EGFR mL/min/1.73m*2 6* 6*     Results from last 72 hours   Lab Units 08/14/24 0428   ALK PHOS U/L 150*   BILIRUBIN TOTAL mg/dL 0.2   PROTEIN TOTAL g/dL 5.5*   ALT U/L 171*   AST U/L 119*   ALBUMIN g/dL 3.2*     Estimated Creatinine Clearance: 6.3 mL/min (A) (by C-G formula based on SCr of 9 mg/dL (H)).  C-Reactive Protein   Date Value Ref Range Status   10/05/2023 <0.10 <1.00 mg/dL Final     CRP   Date Value Ref Range Status   04/21/2022 0.3 0 - 2.0 MG/DL Final     Comment:     LESS THAN  Performed at [address] [address]     10/27/2021 0.3 0 - 2.0 MG/DL Final     Comment:     LESS THAN  Performed at [address] [address]       Sedimentation Rate   Date Value Ref Range Status   10/05/2023 15 0 - 20 mm/h Final   03/29/2023 40 (H) 0 - 20 MM/HR Final     Comment:     Performed at [address] [address]   04/21/2022 32 (H) 0 - 12 MM/HR Final     Comment:     Performed at [address] [address]     HIV 1 and 2 Screen   Date Value Ref Range Status   05/16/2018   Final    Non Reactive  Reference range: NONREACTIVE     HIV Information: [place] Rev. Code 3701.243(E):  This information has been disclosed to you from confidential records  protected from disclosure by state law. You shall make no further  disclosure of this information without the specific, written, and  informed release of the individual to whom it pertains, or as  otherwise permitted by state law. A general  authorization for the  release of medical or other information is not sufficient for the  purpose of the release of HIV test results or diagnoses.  Performed at the [facility] unless   otherwise noted.       Hepatitis C Ab   Date Value Ref Range Status   05/16/2018   Final    Negative  Reference range: NEGATIVE  Performed at the [facility] unless   otherwise noted.       Microbiology    Blood culture pending       Imaging  CT chest abdomen pelvis wo IV contrast    Result Date: 8/14/2024  Interpreted By:  Justin Denson, STUDY: CT CHEST ABDOMEN PELVIS WO CONTRAST; ;  8/14/2024 12:39 am   INDICATION: Signs/Symptoms:Sepsis.   COMPARISON: CT chest of 01/09/2018   ACCESSION NUMBER(S): [id number]   ORDERING CLINICIAN: GRAEME FLORES   TECHNIQUE: Noncontrast CT of the chest, abdomen and pelvis with multiplanar reformations.   FINDINGS: CHEST:   VESSELS: Aorta is normal in caliber. Atherosclerotic changes in the aorta and coronary arteries. HEART: Normal size. Hypoattenuation of myocardium relative to blood pool suggests anemia. Mitral annular calcification. No pericardial effusion. MEDIASTINUM AND CESAR: No pathologically enlarged thoracic lymph nodes. Esophagus appears within normal limits. No abnormal mediastinal air or fluid. LUNG, PLEURA, LARGE AIRWAYS: Respiratory motion artifact limits evaluation of the lung parenchyma. Mild-to-moderate emphysema. Mild smooth interlobular septal thickening suggesting acute pulmonary interstitial edema. Patchy reticulonodular and ground-glass opacities, suspicious for multifocal bilateral pneumonia. Moderate to large right and small to moderate left lateral pleural effusions. No pneumothorax. CHEST WALL AND LOWER NECK: Within normal limits.   ABDOMEN:   BONES: No acute osseous abnormality. Large Schmorl's node in the superior L3 endplate. Severe degenerative changes of the imaged spine. ABDOMINAL WALL: Fat containing left inguinal  hernia. Anasarca.   LIVER: Within normal limits. Riedel's lobe noted BILE DUCTS: Normal caliber. GALLBLADDER: No calcified cholelithiasis. Nonspecific wall thickening pericholecystic fluid is nonspecific in the setting of ascites PANCREAS: Within normal limits. SPLEEN: Within normal limits. ADRENALS: Within normal limits. KIDNEYS: Renal cortical atrophy. Small right interpolar cortical cyst. Vascular calcifications about both renal sinuses. Otherwise, normal URETERS: No hydroureter.   PELVIS:   REPRODUCTIVE ORGANS: Prostate is not enlarged. BLADDER: Within normal limits.   VESSELS: Severe atherosclerosis. Fusiform infrarenal aortic ectasia. No aneurysmal dilation is seen. RETROPERITONEUM: Within normal limits. BOWEL: No dilated or thickened bowel. Stomach appears within normal limits.  Normal appendix. PERITONEUM: Peritoneal dialysis catheter is coiled along the superior aspect of the bladder. Small volume of ascites. No pneumoperitoneum or loculated intraperitoneal collection.       Respiratory motion artifact limits evaluation of the lung parenchyma. Mild-to-moderate emphysema. Mild smooth interlobular septal thickening suggesting acute pulmonary interstitial edema. Patchy reticulonodular and ground-glass opacities, suspicious for multifocal bilateral pneumonia. Moderate to large right and small to moderate left lateral pleural effusions. No pneumothorax.   No definite evidence of acute pathology in the abdomen or pelvis.   Peritoneal dialysis catheter is coiled along the superior aspect of the bladder. Small volume of ascites. No pneumoperitoneum or loculated intraperitoneal collection.   Additional findings as discussed above.   MACRO: None   Signed by: Justin Denson 8/14/2024 12:58 AM Dictation workstation:   [id number]    XR chest 1 view    Result Date: 8/13/2024  Interpreted By:  Tito Concepcion, STUDY: XR CHEST 1 VIEW;  8/13/2024 11:44 pm   INDICATION: Signs/Symptoms:Shortness of breath.   COMPARISON: Chest  radiograph 07/08/2024   ACCESSION NUMBER(S): [id number]   ORDERING CLINICIAN: GRAEME FLORES   FINDINGS: A defibrillation pad overlies the right thorax. Heart is mildly enlarged. There is central pulmonary vascular congestion and perihilar opacities. Probable small left pleural effusion. No discernible pneumothorax. No acute osseous abnormality.       1. Mild cardiomegaly and central vascular congestion. 2. Possible small left pleural effusion.     Signed by: Tito Concepcion 8/13/2024 11:59 PM Dictation workstation:   [id number]     Assessment/Plan   Acute hypoxic respiratory failure, on low flow oxygen  Pneumonia-gram negative verses gram positive verses atypical   End Stage renal disease on peritoneal dialysis  Hypotension  Bradycardia      Zyvox  Zosyn  Legionella urine antigen  Monitor temperature and WBC  Follow blood culture  COVID/flu PCR negative   Supplemental oxygen, as tolerated  Monitor blood pressure  Cardiology follow-up  Nephrology follow-up  Further recommendation based on work up    Total time spent caring for the patient today was 35 minutes. This includes time spent before the visit reviewing the chart, time spent during the visit, and time spent after the visit on documentation.       Suyapa Shay, APRN-CNP

## 2024-08-15 ENCOUNTER — PHARMACY VISIT (OUTPATIENT)
Dept: PHARMACY | Facility: CLINIC | Age: 79
End: 2024-08-15
Payer: COMMERCIAL

## 2024-08-15 LAB
ANION GAP SERPL CALC-SCNC: 19 MMOL/L
BUN SERPL-MCNC: 83 MG/DL (ref 8–25)
CALCIUM SERPL-MCNC: 8.4 MG/DL (ref 8.5–10.4)
CHLORIDE SERPL-SCNC: 91 MMOL/L (ref 97–107)
CO2 SERPL-SCNC: 21 MMOL/L (ref 24–31)
CREAT SERPL-MCNC: 8.4 MG/DL (ref 0.4–1.6)
EGFRCR SERPLBLD CKD-EPI 2021: 6 ML/MIN/1.73M*2
ERYTHROCYTE [DISTWIDTH] IN BLOOD BY AUTOMATED COUNT: 17.2 % (ref 11.5–14.5)
GLUCOSE BLD MANUAL STRIP-MCNC: 113 MG/DL (ref 74–99)
GLUCOSE BLD MANUAL STRIP-MCNC: 124 MG/DL (ref 74–99)
GLUCOSE BLD MANUAL STRIP-MCNC: 166 MG/DL (ref 74–99)
GLUCOSE BLD MANUAL STRIP-MCNC: 98 MG/DL (ref 74–99)
GLUCOSE SERPL-MCNC: 147 MG/DL (ref 65–99)
HCT VFR BLD AUTO: 23.7 % (ref 41–52)
HGB BLD-MCNC: 7.8 G/DL (ref 13.5–17.5)
LEGIONELLA AG UR QL: NEGATIVE
MCH RBC QN AUTO: 34.1 PG (ref 26–34)
MCHC RBC AUTO-ENTMCNC: 32.9 G/DL (ref 32–36)
MCV RBC AUTO: 104 FL (ref 80–100)
NRBC BLD-RTO: 1.2 /100 WBCS (ref 0–0)
PLATELET # BLD AUTO: 137 X10*3/UL (ref 150–450)
POTASSIUM SERPL-SCNC: 3.4 MMOL/L (ref 3.4–5.1)
RBC # BLD AUTO: 2.29 X10*6/UL (ref 4.5–5.9)
S PNEUM AG UR QL: NEGATIVE
SODIUM SERPL-SCNC: 131 MMOL/L (ref 133–145)
TROPONIN T SERPL-MCNC: 221 NG/L
WBC # BLD AUTO: 11.1 X10*3/UL (ref 4.4–11.3)

## 2024-08-15 PROCEDURE — 2500000002 HC RX 250 W HCPCS SELF ADMINISTERED DRUGS (ALT 637 FOR MEDICARE OP, ALT 636 FOR OP/ED): Performed by: INTERNAL MEDICINE

## 2024-08-15 PROCEDURE — 99232 SBSQ HOSP IP/OBS MODERATE 35: CPT | Performed by: INTERNAL MEDICINE

## 2024-08-15 PROCEDURE — 36415 COLL VENOUS BLD VENIPUNCTURE: CPT | Performed by: INTERNAL MEDICINE

## 2024-08-15 PROCEDURE — 82947 ASSAY GLUCOSE BLOOD QUANT: CPT

## 2024-08-15 PROCEDURE — 2500000005 HC RX 250 GENERAL PHARMACY W/O HCPCS: Performed by: INTERNAL MEDICINE

## 2024-08-15 PROCEDURE — 2500000004 HC RX 250 GENERAL PHARMACY W/ HCPCS (ALT 636 FOR OP/ED): Performed by: INTERNAL MEDICINE

## 2024-08-15 PROCEDURE — 85027 COMPLETE CBC AUTOMATED: CPT | Performed by: NURSE PRACTITIONER

## 2024-08-15 PROCEDURE — 93010 ELECTROCARDIOGRAM REPORT: CPT | Performed by: INTERNAL MEDICINE

## 2024-08-15 PROCEDURE — 97530 THERAPEUTIC ACTIVITIES: CPT | Mod: GP

## 2024-08-15 PROCEDURE — 94762 N-INVAS EAR/PLS OXIMTRY CONT: CPT

## 2024-08-15 PROCEDURE — 97116 GAIT TRAINING THERAPY: CPT | Mod: GP

## 2024-08-15 PROCEDURE — 80048 BASIC METABOLIC PNL TOTAL CA: CPT | Performed by: NURSE PRACTITIONER

## 2024-08-15 PROCEDURE — 2060000001 HC INTERMEDIATE ICU ROOM DAILY

## 2024-08-15 PROCEDURE — 97110 THERAPEUTIC EXERCISES: CPT | Mod: GO

## 2024-08-15 PROCEDURE — 71045 X-RAY EXAM CHEST 1 VIEW: CPT | Performed by: RADIOLOGY

## 2024-08-15 PROCEDURE — 87081 CULTURE SCREEN ONLY: CPT | Mod: TRILAB,WESLAB | Performed by: NURSE PRACTITIONER

## 2024-08-15 PROCEDURE — 2500000001 HC RX 250 WO HCPCS SELF ADMINISTERED DRUGS (ALT 637 FOR MEDICARE OP): Performed by: INTERNAL MEDICINE

## 2024-08-15 PROCEDURE — 84484 ASSAY OF TROPONIN QUANT: CPT | Performed by: INTERNAL MEDICINE

## 2024-08-15 PROCEDURE — 36415 COLL VENOUS BLD VENIPUNCTURE: CPT | Performed by: NURSE PRACTITIONER

## 2024-08-15 PROCEDURE — 2500000001 HC RX 250 WO HCPCS SELF ADMINISTERED DRUGS (ALT 637 FOR MEDICARE OP): Performed by: NURSE PRACTITIONER

## 2024-08-15 PROCEDURE — RXMED WILLOW AMBULATORY MEDICATION CHARGE

## 2024-08-15 RX ORDER — LEVOTHYROXINE SODIUM 125 UG/1
62.5 TABLET ORAL DAILY
Qty: 15 TABLET | Refills: 0 | Status: SHIPPED | OUTPATIENT
Start: 2024-08-16 | End: 2024-09-15

## 2024-08-15 RX ORDER — LIDOCAINE 560 MG/1
1 PATCH PERCUTANEOUS; TOPICAL; TRANSDERMAL NIGHTLY
Status: DISCONTINUED | OUTPATIENT
Start: 2024-08-15 | End: 2024-08-18 | Stop reason: HOSPADM

## 2024-08-15 RX ORDER — LEVOTHYROXINE SODIUM 125 UG/1
62.5 TABLET ORAL DAILY
Status: DISCONTINUED | OUTPATIENT
Start: 2024-08-16 | End: 2024-08-18 | Stop reason: HOSPADM

## 2024-08-15 RX ORDER — CYCLOBENZAPRINE HCL 5 MG
7.5 TABLET ORAL ONCE
Status: DISCONTINUED | OUTPATIENT
Start: 2024-08-15 | End: 2024-08-15

## 2024-08-15 RX ORDER — LIDOCAINE 560 MG/1
1 PATCH PERCUTANEOUS; TOPICAL; TRANSDERMAL DAILY
Status: DISCONTINUED | OUTPATIENT
Start: 2024-08-16 | End: 2024-08-15

## 2024-08-15 RX ORDER — SIMETHICONE 80 MG
40 TABLET,CHEWABLE ORAL ONCE
Status: COMPLETED | OUTPATIENT
Start: 2024-08-15 | End: 2024-08-15

## 2024-08-15 RX ORDER — CALCIUM CARBONATE 200(500)MG
1000 TABLET,CHEWABLE ORAL 3 TIMES DAILY
Start: 2024-08-15 | End: 2024-08-19

## 2024-08-15 RX ORDER — METOLAZONE 10 MG/1
10 TABLET ORAL DAILY
Start: 2024-08-15 | End: 2024-08-19

## 2024-08-15 RX ORDER — TIZANIDINE 2 MG/1
1 TABLET ORAL ONCE
Status: COMPLETED | OUTPATIENT
Start: 2024-08-15 | End: 2024-08-15

## 2024-08-15 ASSESSMENT — PAIN SCALES - GENERAL
PAINLEVEL_OUTOF10: 0 - NO PAIN
PAINLEVEL_OUTOF10: 3

## 2024-08-15 ASSESSMENT — COGNITIVE AND FUNCTIONAL STATUS - GENERAL
MOVING TO AND FROM BED TO CHAIR: A LITTLE
TURNING FROM BACK TO SIDE WHILE IN FLAT BAD: A LITTLE
MOBILITY SCORE: 19
WALKING IN HOSPITAL ROOM: A LITTLE
CLIMB 3 TO 5 STEPS WITH RAILING: A LITTLE
TOILETING: A LITTLE
DAILY ACTIVITIY SCORE: 20
STANDING UP FROM CHAIR USING ARMS: A LITTLE
HELP NEEDED FOR BATHING: A LITTLE
DRESSING REGULAR LOWER BODY CLOTHING: A LITTLE
DRESSING REGULAR UPPER BODY CLOTHING: A LITTLE

## 2024-08-15 ASSESSMENT — PAIN DESCRIPTION - LOCATION: LOCATION: HEAD

## 2024-08-15 ASSESSMENT — PAIN - FUNCTIONAL ASSESSMENT
PAIN_FUNCTIONAL_ASSESSMENT: 0-10
PAIN_FUNCTIONAL_ASSESSMENT: 0-10

## 2024-08-15 ASSESSMENT — ACTIVITIES OF DAILY LIVING (ADL)
LACK_OF_TRANSPORTATION: NO
HOME_MANAGEMENT_TIME_ENTRY: 4

## 2024-08-15 NOTE — CARE PLAN
"The patient's goals for the shift include \"Im wanting to go to sleep\"    The clinical goals for the shift include Pt will remain hemodynamically stable for entire shift.      "

## 2024-08-15 NOTE — SIGNIFICANT EVENT
Patient was getting ready to be discharged and started complaints nurse about discomfort along his substernal area.  I went and evaluated the patient.  Patient with history of ischemic cardiomyopathy who is admitted for weakness and subsequently found to have metabolic abnormalities and bradycardia.  Patient was being seen by cardiology and nephrology.  Patient stated that he began to feel this way prior to discharge and this was after he worked with occupational therapy doing exercises.  He states the discomfort feels like something is poking or pushing in his substernal region.  He also admits to chronic neck pain.    Afebrile vital signs are stable  VEE NELSON my  Cardiovascular S1-S2 regular rate rhythm    EKG comparing admission EKG, looks like T waves are flipped in the anterior and lateral leads prior to admission EKG.  V1 does have some convex 72 is compared to prior were T waves were inverted, lateral leads also with similar flipping of T waves.  There is not appear to be ST elevation.    Plan:  -Cycle troponins x 3, repeat EKG in 4 hours.  Patient's blood pressure is a little low so holding off on statin at this time.  Patient with bradycardia did not tolerate beta-blocker.  Will let our night float colleague know about the current condition of this patient and will follow up throughout the evening and further medical management is necessary from a cardiac standpoint we will reach out to the on-call cardiology team.  Clinically stable at this time.  Patient will not be discharged

## 2024-08-15 NOTE — NURSING NOTE
"Dtr given requested blankets for \"covering the vents to stop the air flow\". \"It prevents infection\". I educated the dtr and patient that I would not be able to intervene with the machine and asked if she would be her to answer any problems with the machine. She stated \"oh no he is 100% able to use the machine\" pointing at the peritoneal dialysis unit brought from home. Family did teach back that patient would be able to complete any PD needs or we will call dtr to return.  "

## 2024-08-15 NOTE — PROGRESS NOTES
08/15/24 1438   Discharge Planning   Living Arrangements Spouse/significant other   Support Systems Spouse/significant other   Assistance Needed None - Family is very involved.  Family assists with peritoneal dialysis.   Type of Residence Private residence   Home or Post Acute Services None   Expected Discharge Disposition Home   Does the patient need discharge transport arranged? No   Financial Resource Strain   How hard is it for you to pay for the very basics like food, housing, medical care, and heating? Not very   Housing Stability   In the last 12 months, was there a time when you were not able to pay the mortgage or rent on time? N   In the past 12 months, how many times have you moved where you were living? 1   At any time in the past 12 months, were you homeless or living in a shelter (including now)? N   Transportation Needs   In the past 12 months, has lack of transportation kept you from medical appointments or from getting medications? no   In the past 12 months, has lack of transportation kept you from meetings, work, or from getting things needed for daily living? No

## 2024-08-15 NOTE — PROGRESS NOTES
George Rowan is a 78 y.o. male on day 1 of admission presenting with Metabolic acidosis, increased anion gap (IAG).    Subjective   Interval History:   Afebrile, no chills  Feeling better today  Remains on room air  No shortness of breath   No coughing   No abdominal pain ,nausea, vomiting, or diarrhea             Objective   Range of Vitals (last 24 hours)  Heart Rate:  [56-67]   Temp:  [36.2 °C (97.2 °F)-36.5 °C (97.7 °F)]   Resp:  [12-23]   BP: ()/(50-57)   SpO2:  [82 %-97 %]   Daily Weight  08/14/24 : 71.3 kg (157 lb 3 oz)    Body mass index is 24.62 kg/m².    Physical Exam  Constitutional:       Appearance: Normal appearance.   HENT:      Head: Normocephalic and atraumatic.      Nose: Nose normal.      Mouth/Throat:      Mouth: Mucous membranes are moist.      Pharynx: Oropharynx is clear.   Eyes:      General: No scleral icterus.  Cardiovascular:      Rate and Rhythm: Regular rhythm present.   Pulmonary:      Effort: Pulmonary effort is normal.      Breath sounds: Normal breath sounds.   Abdominal:      General: Bowel sounds are normal.      Palpations: Abdomen is soft. PD cathter, no tenderness   Musculoskeletal:         General: Normal range of motion.      Cervical back: Normal range of motion and neck supple.   Skin:     General: Skin is warm and dry.   Neurological:      Mental Status: He is alert.      Comments: Awake, alert   Psychiatric:         Mood and Affect: Mood normal.         Behavior: Behavior normal.     Antibiotics  gentamicin - 0.1 %  linezolid - 600 mg/300 mL  piperacillin-tazobactam - 2.25 gram/50 mL    Relevant Results  Labs  Results from last 72 hours   Lab Units 08/15/24  0912 08/14/24  0428 08/13/24  2338   WBC AUTO x10*3/uL 11.1 14.5* 9.6   HEMOGLOBIN g/dL 7.8* 8.2* 8.5*   HEMATOCRIT % 23.7* 25.3* 26.7*   PLATELETS AUTO x10*3/uL 137* 150 149*   NEUTROS PCT AUTO %  --  86.0 76.8   LYMPHS PCT AUTO %  --  6.2 15.8   MONOS PCT AUTO %  --  6.7 4.1   EOS PCT AUTO %  --  0.3 2.3      Results from last 72 hours   Lab Units 08/15/24  0912 08/14/24  0711 08/14/24  0428 08/13/24  2338   SODIUM mmol/L 131*  --  134 131*   POTASSIUM mmol/L 3.4  --  4.2 4.6   CHLORIDE mmol/L 91*  --  94* 90*   CO2 mmol/L 21*  --  22* 17*   BUN mg/dL 83*  --  89* 84*   CREATININE mg/dL 8.40*  --  9.00* 8.60*   GLUCOSE mg/dL 147*  --  95 256*   CALCIUM mg/dL 8.4*  --  9.5 9.4   ANION GAP mmol/L 19  --  18 >19*   EGFR mL/min/1.73m*2 6*  --  6* 6*   PHOSPHORUS mg/dL  --  6.9*  --   --      Results from last 72 hours   Lab Units 08/14/24  0428   ALK PHOS U/L 150*   BILIRUBIN TOTAL mg/dL 0.2   PROTEIN TOTAL g/dL 5.5*   ALT U/L 171*   AST U/L 119*   ALBUMIN g/dL 3.2*     Estimated Creatinine Clearance: 6.8 mL/min (A) (by C-G formula based on SCr of 8.4 mg/dL (H)).  C-Reactive Protein   Date Value Ref Range Status   10/05/2023 <0.10 <1.00 mg/dL Final     CRP   Date Value Ref Range Status   04/21/2022 0.3 0 - 2.0 MG/DL Final     Comment:     LESS THAN  Performed at [address] [address]     10/27/2021 0.3 0 - 2.0 MG/DL Final     Comment:     LESS THAN  Performed at [address] [address]       Microbiology  Legionella Urine antigen pending  Strep pneumonia antigen pending  MRSA PCR pending  Blood culture pending      Imaging  CT chest abdomen pelvis wo IV contrast    Result Date: 8/14/2024  Interpreted By:  Justin Denson, STUDY: CT CHEST ABDOMEN PELVIS WO CONTRAST; ;  8/14/2024 12:39 am   INDICATION: Signs/Symptoms:Sepsis.   COMPARISON: CT chest of 01/09/2018   ACCESSION NUMBER(S): [id number]   ORDERING CLINICIAN: GRAEME FLORES   TECHNIQUE: Noncontrast CT of the chest, abdomen and pelvis with multiplanar reformations.   FINDINGS: CHEST:   VESSELS: Aorta is normal in caliber. Atherosclerotic changes in the aorta and coronary arteries. HEART: Normal size. Hypoattenuation of myocardium relative to blood pool suggests anemia. Mitral annular calcification. No pericardial  effusion. MEDIASTINUM AND CESAR: No pathologically enlarged thoracic lymph nodes. Esophagus appears within normal limits. No abnormal mediastinal air or fluid. LUNG, PLEURA, LARGE AIRWAYS: Respiratory motion artifact limits evaluation of the lung parenchyma. Mild-to-moderate emphysema. Mild smooth interlobular septal thickening suggesting acute pulmonary interstitial edema. Patchy reticulonodular and ground-glass opacities, suspicious for multifocal bilateral pneumonia. Moderate to large right and small to moderate left lateral pleural effusions. No pneumothorax. CHEST WALL AND LOWER NECK: Within normal limits.   ABDOMEN:   BONES: No acute osseous abnormality. Large Schmorl's node in the superior L3 endplate. Severe degenerative changes of the imaged spine. ABDOMINAL WALL: Fat containing left inguinal hernia. Anasarca.   LIVER: Within normal limits. Riedel's lobe noted BILE DUCTS: Normal caliber. GALLBLADDER: No calcified cholelithiasis. Nonspecific wall thickening pericholecystic fluid is nonspecific in the setting of ascites PANCREAS: Within normal limits. SPLEEN: Within normal limits. ADRENALS: Within normal limits. KIDNEYS: Renal cortical atrophy. Small right interpolar cortical cyst. Vascular calcifications about both renal sinuses. Otherwise, normal URETERS: No hydroureter.   PELVIS:   REPRODUCTIVE ORGANS: Prostate is not enlarged. BLADDER: Within normal limits.   VESSELS: Severe atherosclerosis. Fusiform infrarenal aortic ectasia. No aneurysmal dilation is seen. RETROPERITONEUM: Within normal limits. BOWEL: No dilated or thickened bowel. Stomach appears within normal limits.  Normal appendix. PERITONEUM: Peritoneal dialysis catheter is coiled along the superior aspect of the bladder. Small volume of ascites. No pneumoperitoneum or loculated intraperitoneal collection.       Respiratory motion artifact limits evaluation of the lung parenchyma. Mild-to-moderate emphysema. Mild smooth interlobular septal  thickening suggesting acute pulmonary interstitial edema. Patchy reticulonodular and ground-glass opacities, suspicious for multifocal bilateral pneumonia. Moderate to large right and small to moderate left lateral pleural effusions. No pneumothorax.   No definite evidence of acute pathology in the abdomen or pelvis.   Peritoneal dialysis catheter is coiled along the superior aspect of the bladder. Small volume of ascites. No pneumoperitoneum or loculated intraperitoneal collection.   Additional findings as discussed above.   MACRO: None   Signed by: Justin Denson 8/14/2024 12:58 AM Dictation workstation:   [id number]    XR chest 1 view    Result Date: 8/13/2024  Interpreted By:  Tito Concepcion, STUDY: XR CHEST 1 VIEW;  8/13/2024 11:44 pm   INDICATION: Signs/Symptoms:Shortness of breath.   COMPARISON: Chest radiograph 07/08/2024   ACCESSION NUMBER(S): [id number]   ORDERING CLINICIAN: GRAEME FLORES   FINDINGS: A defibrillation pad overlies the right thorax. Heart is mildly enlarged. There is central pulmonary vascular congestion and perihilar opacities. Probable small left pleural effusion. No discernible pneumothorax. No acute osseous abnormality.       1. Mild cardiomegaly and central vascular congestion. 2. Possible small left pleural effusion.     Signed by: Tito Concepcion 8/13/2024 11:59 PM Dictation workstation:   [id number]        Assessment/Plan   Acute hypoxic respiratory failure, resolved , on room air  Possible Pneumonia-Abnormal CT chest  End Stage renal disease on peritoneal dialysis  Hypotension, resolved   Bradycardia, resolved         Zyvox-for now  Zosyn-for now   Follow up Legionella urine antigen  Monitor temperature and WBC  Follow blood culture  Supplemental oxygen, as tolerated  Monitor blood pressure  Cardiology follow-up  Nephrology follow-up  Evaluate for Discontinue verses de-escalation of antibiotics if work up remains negative      Total time spent caring for the patient today was 20  minutes. This includes time spent before the visit reviewing the chart, time spent during the visit, and time spent after the visit on documentation.       SHOLA Ascencio-CNP

## 2024-08-15 NOTE — PROGRESS NOTES
Physical Therapy    Physical Therapy Treatment    Patient Name: George Rowan  MRN: [id number]  Today's Date: 8/15/2024  Time Calculation  Start Time: 1410  Stop Time: 1433  Time Calculation (min): 23 min    Assessment/Plan   PT Assessment  End of Session Communication: Bedside nurse  Assessment Comment: recommend pt using FWW at all times currently for safe amb at home  End of Session Patient Position: Bed, 3 rail up, Alarm on (call button in reach)  PT Plan  Inpatient/Swing Bed or Outpatient: Inpatient  PT Plan  Treatment/Interventions: Transfer training, Gait training, Stair training, Balance training, Strengthening, Endurance training, Therapeutic exercise, Therapeutic activity  PT Plan: Ongoing PT  PT Frequency: 4 times per week  PT Discharge Recommendations: Low intensity level of continued care  Equipment Recommended upon Discharge: Wheeled walker  PT Recommended Transfer Status: Stand by assist, Assistive device (FWw)  PT - OK to Discharge: Yes      General Visit Information:   PT  Visit  PT Received On: 08/15/24  General  Family/Caregiver Present: No  Prior to Session Communication: Bedside nurse  Patient Position Received: Bed, 3 rail up, Alarm on  Preferred Learning Style: verbal, visual  General Comment: CLEARED BY  nurse for therapy; pt agreeable to therapy; pt for d/c home likely this afternoon per nurse and patient; + purewick, telemetry, IV    Subjective   Precautions:  Precautions  Medical Precautions: Fall precautions  Vital Signs:  Vital Signs  Heart Rate: 62  SpO2: 95 %  Patient Position: Lying    Objective   Pain:  Pain Assessment  Pain Assessment: 0-10  0-10 (Numeric) Pain Score: 0 - No pain  Cognition:  Cognition  Overall Cognitive Status: Within Functional Limits  Orientation Level: Oriented X4  Safety/Judgement:  (decreased safety insight during functional mobility)  Coordination:  Movements are Fluid and Coordinated: Yes  Postural Control:  Postural Control  Posture Comment: mild forward  head  Extremity/Trunk Assessments:    Activity Tolerance:  Activity Tolerance  Endurance: Decreased tolerance for upright activites  Activity Tolerance Comments: fair +  Treatments:  Therapeutic Exercise  Therapeutic Exercise Performed: No (pt deferred)    Therapeutic Activity  Therapeutic Activity Performed: Yes (see bed mobility, transfers, amb with FWW comments)    Bed Mobility  Bed Mobility: Yes  Bed Mobility 1  Bed Mobility 1: Supine to sitting  Level of Assistance 1: Modified independent  Bed Mobility Comments 1: head of bed elevated  Bed Mobility 2  Bed Mobility  2: Sitting to supine  Level of Assistance 2: Modified independent  Bed Mobility Comments 2: head of bed elevated    Ambulation/Gait Training  Ambulation/Gait Training Performed: Yes  Ambulation/Gait Training 1  Surface 1: Level tile  Device 1: Rolling walker  Assistance 1: Close supervision, Contact guard, Minimal verbal cues  Comments/Distance (ft) 1: pt amb 60 ft x 2, FWW, + turns, min assist of 1 for safe steering during turns, contact guard of 1 at trunk, verbal cues for staying close to FWW at all times, step through sequencing. o2 sat 96% with HR 66 bpm  Transfers  Transfer: Yes  Transfer 1  Transfer From 1: Sit to  Transfer to 1: Stand  Technique 1: Sit to stand  Transfer Device 1: Walker  Transfer Level of Assistance 1: Close supervision, Minimal verbal cues  Trials/Comments 1: verbal cues for proper rocio hand placement on bed  Transfers 2  Transfer From 2: Stand to  Transfer to 2: Sit  Technique 2: Stand to sit  Transfer Device 2: Walker  Transfer Level of Assistance 2: Close supervision, Minimal verbal cues  Trials/Comments 2: min verbal cues for reaching back with both hands for bed    Stairs  Stairs: No    Other Activity  Other Activity Performed:  (PT fit and provided pt with FWW for safe home amb)    Outcome Measures:  [facility] Basic Mobility  Turning from your back to your side while in a flat bed without using bedrails: None  Moving from  lying on your back to sitting on the side of a flat bed without using bedrails: A little  Moving to and from bed to chair (including a wheelchair): A little  Standing up from a chair using your arms (e.g. wheelchair or bedside chair): A little  To walk in hospital room: A little  Climbing 3-5 steps with railing: A little  Basic Mobility - Total Score: 19    Education Documentation  Mobility Training, taught by Leonora Muñoz, PT at 8/15/2024  2:43 PM.  Learner: Patient  Readiness: Acceptance  Method: Explanation, Demonstration  Response: Verbalizes Understanding    Education Comments  No comments found.        OP EDUCATION:       Encounter Problems       Encounter Problems (Active)       Balance       LTG - Patient will maintain balance to allow for safe mobility (Progressing)       Start:  08/14/24    Expected End:  08/23/24               Mobility       STG - Patient will ambulate 200' w/ LRAD and distant supervision  (Progressing)       Start:  08/14/24    Expected End:  08/23/24            STG - Patient will ascend and descend four to six stairs x 2 w/ 1 rail and supervision  (Progressing)       Start:  08/14/24    Expected End:  08/23/24            Pt will tolerate BLE exercises to promote functional strength, balance and endurance (Progressing)       Start:  08/14/24    Expected End:  08/23/24               PT Transfers       STG - Patient to transfer to and from sit to supine IND (Progressing)       Start:  08/14/24    Expected End:  08/23/24            STG - Patient will transfer sit to and from stand MOD I (Progressing)       Start:  08/14/24    Expected End:  08/23/24

## 2024-08-15 NOTE — CARE PLAN
"The patient's goals for the shift include \"Im wanting to go to sleep\"    The clinical goals for the shift include Pt will remain hemodynamically stable for entire shift.      Problem: Skin  Goal: Participates in plan/prevention/treatment measures  8/14/2024 2209 by Carine Galindo RN  Outcome: Progressing  Flowsheets (Taken 8/14/2024 2209)  Participates in plan/prevention/treatment measures:   Discuss with provider PT/OT consult   Elevate heels   Increase activity/out of bed for meals  8/14/2024 2206 by Carine Galindo RN  Outcome: Progressing  Flowsheets (Taken 8/14/2024 2206)  Participates in plan/prevention/treatment measures:   Discuss with provider PT/OT consult   Elevate heels   Increase activity/out of bed for meals  Goal: Prevent/minimize sheer/friction injuries  8/14/2024 2209 by Carine Galindo RN  Outcome: Progressing  Flowsheets (Taken 8/14/2024 2209)  Prevent/minimize sheer/friction injuries:   Complete micro-shifts as needed if patient unable. Adjust patient position to relieve pressure points, not a full turn   Increase activity/out of bed for meals   Use pull sheet   HOB 30 degrees or less   Turn/reposition every 2 hours/use positioning/transfer devices   Utilize specialty bed per algorithm  8/14/2024 2206 by Carine Galindo RN  Outcome: Progressing  Flowsheets (Taken 8/14/2024 2206)  Prevent/minimize sheer/friction injuries:   Complete micro-shifts as needed if patient unable. Adjust patient position to relieve pressure points, not a full turn   Increase activity/out of bed for meals   Use pull sheet   HOB 30 degrees or less   Turn/reposition every 2 hours/use positioning/transfer devices   Utilize specialty bed per algorithm     Problem: Fall/Injury  Goal: Not fall by end of shift  8/14/2024 2209 by Carine Galindo RN  Outcome: Progressing  8/14/2024 2206 by Carine Galindo RN  Outcome: Progressing  Goal: Be free from injury by end of the shift  8/14/2024 2209 by " Carine Galindo RN  Outcome: Progressing  8/14/2024 2206 by Carine Galindo RN  Outcome: Progressing  Goal: Verbalize understanding of personal risk factors for fall in the hospital  8/14/2024 2209 by Carine Galindo RN  Outcome: Progressing  8/14/2024 2206 by Carine Galindo RN  Outcome: Progressing  Goal: Verbalize understanding of risk factor reduction measures to prevent injury from fall in the home  8/14/2024 2209 by Carine Galindo RN  Outcome: Progressing  8/14/2024 2206 by Carine Galindo RN  Outcome: Progressing  Goal: Use assistive devices by end of the shift  8/14/2024 2209 by Carine Galindo RN  Outcome: Progressing  8/14/2024 2206 by Carine Galindo RN  Outcome: Progressing  Goal: Pace activities to prevent fatigue by end of the shift  8/14/2024 2209 by Carine Galindo RN  Outcome: Progressing  8/14/2024 2206 by Carine Galindo RN  Outcome: Progressing     Problem: Safety - Adult  Goal: Free from fall injury  8/14/2024 2209 by Carine Galindo RN  Outcome: Progressing  Flowsheets (Taken 8/14/2024 2209)  Free from fall injury:   Instruct family/caregiver on patient safety   Based on caregiver fall risk screen, instruct family/caregiver to ask for assistance with transferring infant if caregiver noted to have fall risk factors  8/14/2024 2206 by Carine Galindo RN  Outcome: Progressing     Problem: Discharge Planning  Goal: Discharge to home or other facility with appropriate resources  8/14/2024 2209 by Carine Galindo RN  Outcome: Progressing  Flowsheets (Taken 8/14/2024 2209)  Discharge to home or other facility with appropriate resources:   Identify barriers to discharge with patient and caregiver   Arrange for needed discharge resources and transportation as appropriate   Identify discharge learning needs (meds, wound care, etc)   Arrange for interpreters to assist at discharge as needed   Refer to discharge planning if  patient needs post-hospital services based on physician order or complex needs related to functional status, cognitive ability or social support system  8/14/2024 2206 by Carine Galindo RN  Outcome: Progressing     Problem: Chronic Conditions and Co-morbidities  Goal: Patient's chronic conditions and co-morbidity symptoms are monitored and maintained or improved  8/14/2024 2209 by Carine Galindo RN  Outcome: Progressing  Flowsheets (Taken 8/14/2024 2209)  Care Plan - Patient's Chronic Conditions and Co-Morbidity Symptoms are Monitored and Maintained or Improved:   Monitor and assess patient's chronic conditions and comorbid symptoms for stability, deterioration, or improvement   Collaborate with multidisciplinary team to address chronic and comorbid conditions and prevent exacerbation or deterioration   Update acute care plan with appropriate goals if chronic or comorbid symptoms are exacerbated and prevent overall improvement and discharge  8/14/2024 2206 by Carine Galindo RN  Outcome: Progressing     Problem: Diabetes  Goal: Achieve decreasing blood glucose levels by end of shift  8/14/2024 2209 by Carine Galindo RN  Outcome: Progressing  Flowsheets (Taken 8/14/2024 2209)  Achieve decreasing blood glucose levels by end of shift:   Med administration/monitoring of effect   Self monitor blood glucose with staff oversight  8/14/2024 2206 by Carine Galindo RN  Outcome: Progressing  Goal: Increase stability of blood glucose readings by end of shift  8/14/2024 2209 by Carine Galindo RN  Outcome: Progressing  Flowsheets (Taken 8/14/2024 2209)  Increase stability of blood glucose readings by end of shift: Med administration/monitoring of effect  8/14/2024 2206 by Carine Galindo RN  Outcome: Progressing  Goal: Decrease in ketones present in urine by end of shift  8/14/2024 2209 by Carine Galindo RN  Outcome: Progressing  Flowsheets (Taken 8/14/2024 2209)  Decrease in  ketones present in urine by end of shift:   Med administration/monitoring of effect   Monitor urine output  8/14/2024 2206 by Carine Galindo RN  Outcome: Progressing  Goal: Maintain electrolyte levels within acceptable range throughout shift  8/14/2024 2209 by Carine Galindo RN  Outcome: Progressing  Flowsheets (Taken 8/14/2024 2209)  Maintain electrolyte levels within acceptable range throughout shift:   Med administration/monitoring of effect   Monitor urine output  8/14/2024 2206 by Carine Galindo RN  Outcome: Progressing  Goal: Maintain glucose levels >70mg/dl to <250mg/dl throughout shift  8/14/2024 2209 by Carine Galindo RN  Outcome: Progressing  Flowsheets (Taken 8/14/2024 2209)  Maintain glucose levels >70mg/dl to <250mg/dl throughout shift:   Med administration/monitoring of effect   Self monitor blood glucose with staff oversight  8/14/2024 2206 by Carine Galindo RN  Outcome: Progressing  Goal: No changes in neurological exam by end of shift  8/14/2024 2209 by Carine Galindo RN  Outcome: Progressing  Flowsheets (Taken 8/14/2024 2209)  No changes in neurological exam by end of shift: Complete frequent neurological assessments  8/14/2024 2206 by Carine Galindo RN  Outcome: Progressing  Goal: Learn about and adhere to nutrition recommendations by end of shift  8/14/2024 2209 by Carine Galnido RN  Outcome: Progressing  Flowsheets (Taken 8/14/2024 2209)  Learn about and adhere to nutrition recommendations by end of shift: Ensure/encourage compliance with appropriate diet  8/14/2024 2206 by Carine Galindo RN  Outcome: Progressing  Goal: Vital signs within normal range for age by end of shift  8/14/2024 2209 by Carine Galindo RN  Outcome: Progressing  Flowsheets (Taken 8/14/2024 2209)  Vital signs within normal range for age by end of shift: Med administration/monitoring of effect  8/14/2024 2206 by Carine Galindo RN  Outcome:  Progressing  Goal: Increase self care and/or family involovement by end of shift  8/14/2024 2209 by Carine Galindo RN  Outcome: Progressing  Flowsheets (Taken 8/14/2024 2209)  Increase self care and/or family involovement by end of shift: Self monitor blood glucose with staff oversight  8/14/2024 2206 by Carine Galindo RN  Outcome: Progressing  Goal: Receive DSME education by end of shift  8/14/2024 2209 by Carine Galindo RN  Outcome: Progressing  Flowsheets (Taken 8/14/2024 2209)  Receive DSME education by end of shift: Provide patient centered education on Diabetic Self Management Education  8/14/2024 2206 by Carine Galindo RN  Outcome: Progressing     Problem: Heart Failure  Goal: Improved gas exchange this shift  8/14/2024 2209 by Carine Galindo RN  Outcome: Progressing  Flowsheets (Taken 8/14/2024 2209)  Improved gas exchange this shift:   Assist with pulmonary hygiene and secretion clearance   Prepare for use of devices/procedures to correct dysrhythmia   Position to promote circulation/maximize ventilation  8/14/2024 2206 by Carine Galindo RN  Outcome: Progressing  Goal: Improved urinary output this shift  8/14/2024 2209 by Carine Galindo RN  Outcome: Progressing  Flowsheets (Taken 8/14/2024 2209)  Improved urinary output this shift:   Med administration/monitor effect   Monitor intake/output and daily weight   Monitor serum electrolytes/replace per order  8/14/2024 2206 by Carine Galindo RN  Outcome: Progressing  Goal: Reduction in peripheral edema within 24 hours  8/14/2024 2209 by Carine Galindo RN  Outcome: Progressing  Flowsheets (Taken 8/14/2024 2209)  Reduction in peripheral edema within 24 hours:   Consult dietician   Monitor edema/skin/mucous membrane integrity   Frequent small meals/supplements per order   SCDs/elevate extremities  8/14/2024 2206 by Carine Galindo RN  Outcome: Progressing  Goal: Report improvement of  dyspnea/breathlessness this shift  8/14/2024 2209 by Carine Galindo RN  Outcome: Progressing  Flowsheets (Taken 8/14/2024 2209)  Report improvement of dyspnea/breathlessness this shift:   Ambulate/OOB 3 times daily   Encourage activity/mobility/ROM   Incentive spirometry/deep breathing /HOB elevated elevated   Consider PT/OT consult   Facilitate activity/mobility per patient tolerance/advance  8/14/2024 2206 by Carine Galindo RN  Outcome: Progressing  Goal: Weight from fluid excess reduced over 2-3 days, then stabilize  8/14/2024 2209 by Carine Galindo RN  Outcome: Progressing  Flowsheets (Taken 8/14/2024 2209)  Weight from fluid excess reduced over 2-3 days, then stabilize:   Med administration/monitor effect   Monitor serum electrolytes/replace per order   Monitor bowel elimination   Monitor intake/output and daily weight  8/14/2024 2206 by Carine Galindo RN  Outcome: Progressing  Goal: Increase self care and/or family involvement in 24 hours  8/14/2024 2209 by Carine Galindo RN  Outcome: Progressing  Flowsheets (Taken 8/14/2024 2209)  Increase self care and/or family involvement in 24 hours:   Assess for signs/symptoms of depression   Therapy evaluation and treatment   Organize tasks/allow time for rest   Facilitate advanced care planning/discussion of treatment options   Recognize current coping skills and assist to develop new coping skills  8/14/2024 2206 by Carine Galindo RN  Outcome: Progressing     Problem: Heart Failure  Goal: Improved gas exchange this shift  8/14/2024 2209 by Carine Galindo RN  Outcome: Progressing  Flowsheets (Taken 8/14/2024 2209)  Improved gas exchange this shift:   Assist with pulmonary hygiene and secretion clearance   Prepare for use of devices/procedures to correct dysrhythmia   Position to promote circulation/maximize ventilation  8/14/2024 2206 by Carine Galindo RN  Outcome: Progressing  Goal: Improved urinary output this  shift  8/14/2024 2209 by Carine Galindo RN  Outcome: Progressing  Flowsheets (Taken 8/14/2024 2209)  Improved urinary output this shift:   Med administration/monitor effect   Monitor intake/output and daily weight   Monitor serum electrolytes/replace per order  8/14/2024 2206 by Carine Galindo RN  Outcome: Progressing  Goal: Reduction in peripheral edema within 24 hours  8/14/2024 2209 by Carine Galindo RN  Outcome: Progressing  Flowsheets (Taken 8/14/2024 2209)  Reduction in peripheral edema within 24 hours:   Consult dietician   Monitor edema/skin/mucous membrane integrity   Frequent small meals/supplements per order   SCDs/elevate extremities  8/14/2024 2206 by Carine Galindo RN  Outcome: Progressing  Goal: Report improvement of dyspnea/breathlessness this shift  8/14/2024 2209 by Carine Galindo RN  Outcome: Progressing  Flowsheets (Taken 8/14/2024 2209)  Report improvement of dyspnea/breathlessness this shift:   Ambulate/OOB 3 times daily   Encourage activity/mobility/ROM   Incentive spirometry/deep breathing /HOB elevated elevated   Consider PT/OT consult   Facilitate activity/mobility per patient tolerance/advance  8/14/2024 2206 by Carine Galindo RN  Outcome: Progressing  Goal: Weight from fluid excess reduced over 2-3 days, then stabilize  8/14/2024 2209 by Carine Galindo RN  Outcome: Progressing  Flowsheets (Taken 8/14/2024 2209)  Weight from fluid excess reduced over 2-3 days, then stabilize:   Med administration/monitor effect   Monitor serum electrolytes/replace per order   Monitor bowel elimination   Monitor intake/output and daily weight  8/14/2024 2206 by Carine Galindo RN  Outcome: Progressing  Goal: Increase self care and/or family involvement in 24 hours  8/14/2024 2209 by Carine Galindo RN  Outcome: Progressing  Flowsheets (Taken 8/14/2024 2209)  Increase self care and/or family involvement in 24 hours:   Assess for signs/symptoms of  depression   Therapy evaluation and treatment   Organize tasks/allow time for rest   Facilitate advanced care planning/discussion of treatment options   Recognize current coping skills and assist to develop new coping skills  8/14/2024 2206 by Carine Galindo RN  Outcome: Progressing

## 2024-08-15 NOTE — PROGRESS NOTES
Physical Therapy                 Therapy Communication Note    Patient Name: George Rowan  MRN: [id number]  Today's Date: 8/15/2024     Discipline: Physical Therapy    Missed Visit Reason: Missed Visit Reason:  (Awaiting daughter arrive to assist with catheter care)    Missed Time: Attempt    Comment:

## 2024-08-15 NOTE — Clinical Note
Patient ready for discharge, paperwork in hand, Ivs removed then patient complains of chest and shoulder pain. Right

## 2024-08-15 NOTE — DISCHARGE SUMMARY
Discharge Diagnosis  Metabolic acidosis, increased anion gap (IAG)    Issues Requiring Follow-Up  Dr. Mckoy  Recheck TSH    Discharge Meds     Your medication list        START taking these medications        Instructions Last Dose Given Next Dose Due   calcium carbonate 200 mg calcium chewable tablet  Commonly known as: Tums      Chew 2 tablets (1,000 mg) 3 times a day.              CHANGE how you take these medications        Instructions Last Dose Given Next Dose Due   levothyroxine 125 mcg tablet  Commonly known as: Synthroid, Levoxyl  Start taking on: August 16, 2024  What changed:   medication strength  how much to take  when to take this  additional instructions      Take 0.5 tablets (62.5 mcg) by mouth early in the morning.. Take on an empty stomach at the same time each day, either 30 to 60 minutes prior to breakfast Do not fill before August 16, 2024.       torsemide 100 mg tablet  Commonly known as: Demadex  What changed: how much to take      Take 1 tablet (100 mg) by mouth 2 times a day for 15 days.              CONTINUE taking these medications        Instructions Last Dose Given Next Dose Due   acetaminophen 325 mg tablet  Commonly known as: Tylenol      Take 2 tablets (650 mg) by mouth 3 times a day.       albuterol 90 mcg/actuation inhaler  Commonly known as: ProAir HFA      Inhale 2 puffs every 4 hours if needed for wheezing or shortness of breath.       allopurinol 300 mg tablet  Commonly known as: Zyloprim      TAKE ONE-HALF TABLET BY MOUTH  ONCE DAILY       aspirin 81 mg capsule           atorvastatin 20 mg tablet  Commonly known as: Lipitor      TAKE 1 TABLET BY MOUTH ONCE  DAILY       epoetin clyde 10,000 unit/mL injection  Commonly known as: Epogen,Procrit           fluticasone 50 mcg/actuation nasal spray  Commonly known as: Flonase      USE 2 SPRAYS IN BOTH  NOSTRILS ONCE DAILY       gabapentin 300 mg capsule  Commonly known as: Neurontin      TAKE 1 CAPSULE BY MOUTH ONCE  DAILY        gentamicin 0.1 % cream  Commonly known as: Garamycin           hydroxychloroquine 200 mg tablet  Commonly known as: Plaquenil      TAKE 1 TABLET BY MOUTH ONCE  DAILY       metOLazone 10 mg tablet  Commonly known as: Zaroxolyn      Take 1 tablet (10 mg) by mouth once daily. prn       ramelteon 8 mg tablet  Commonly known as: Rozerem                  STOP taking these medications      calcitriol 0.5 mcg capsule  Commonly known as: Rocaltrol        carvedilol 3.125 mg tablet  Commonly known as: Coreg        zolpidem 5 mg tablet  Commonly known as: Ambien                  Where to Get Your Medications        These medications were sent to [facility]  [address], [address], [facility] [address]      Hours: 9 AM to 6 PM Mon-Fri, 9 AM to 1 PM Sat Phone: [phone]   levothyroxine 125 mcg tablet       Information about where to get these medications is not yet available    Ask your nurse or doctor about these medications  calcium carbonate 200 mg calcium chewable tablet  metOLazone 10 mg tablet         Test Results Pending At Discharge  Pending Labs       Order Current Status    Extra Urine Gray Tube Collected (08/14/24 1910)    Legionella Antigen, Urine In process    Staphylococcus aureus/MRSA colonization, Culture In process    Streptococcus pneumoniae Antigen, Urine In process    Urinalysis with Reflex Culture and Microscopic In process    Blood Culture Preliminary result    Blood Culture Preliminary result            Hospital Course   Patient presented to the ED with generalized weakness and dizziness. It is documented that the ED was told he had taken CBD gummies. Patient was given IV antibiotics empirically. A CT was suspicious for pneumonia although clinically he appears not to have pneumonia. Dr. Mckoy his nephrologist agrees that the CT shows pleural effusions and chronic changes. Patient is a peritoneal dialysis patient. Patient did have a elevated TSH and T4 was on the low end of normal,  he has not been taking his synthroid everyday. However we did increase his synthroid slightly to 62.5 mg daily. Patient will be discharged in stable condition home without needs.     Pertinent Physical Exam At Time of Discharge  Physical Exam  Constitutional:       Appearance: Normal appearance.   Cardiovascular:      Rate and Rhythm: Normal rate and regular rhythm.      Pulses: Normal pulses.      Heart sounds: Normal heart sounds.   Pulmonary:      Effort: Pulmonary effort is normal.      Breath sounds: Normal breath sounds.   Abdominal:      General: Bowel sounds are normal. There is distension.      Palpations: Abdomen is soft.      Tenderness: There is no abdominal tenderness.   Musculoskeletal:         General: Normal range of motion.   Skin:     General: Skin is warm and dry.   Neurological:      Mental Status: He is alert and oriented to person, place, and time.         Outpatient Follow-Up  Future Appointments   Date Time Provider Department [place]   9/17/2024  9:15 AM Seb Valdez DO [id number] [facility]   12/3/2024 11:00 AM Gus De Souza MD [id number] None         Carmela Ham, SHOLA-CNP

## 2024-08-15 NOTE — PROGRESS NOTES
"George Rowan is a 78 y.o. male on day 1 of admission presenting with Metabolic acidosis, increased anion gap (IAG).    Subjective   He did his peritoneal dialysis last night he feels better today.  His heart rate has controlled       Objective     Physical Exam  Constitutional:       Appearance: Normal appearance.   Cardiovascular:      Rate and Rhythm: Bradycardia present.      Heart sounds: Normal heart sounds. No murmur heard.  Pulmonary:      Breath sounds: Normal breath sounds.   Musculoskeletal:         General: No swelling.   Neurological:      Mental Status: He is alert.         Last Recorded Vitals  Blood pressure 106/57, pulse 65, temperature 36.5 °C (97.7 °F), temperature source Temporal, resp. rate 20, height 1.702 m (5' 7\"), weight 71.3 kg (157 lb 3 oz), SpO2 96%.  Intake/Output last 3 Shifts:  I/O last 3 completed shifts:  In: 1030 (14.4 mL/kg) [P.O.:480; IV Piggyback:550]  Out: 50 (0.7 mL/kg) [Urine:50 (0 mL/kg/hr)]  Weight: 71.3 kg     Relevant Results            Results for orders placed or performed during the hospital encounter of 08/13/24 (from the past 24 hour(s))   POCT GLUCOSE   Result Value Ref Range    POCT Glucose 220 (H) 74 - 99 mg/dL   Sars-CoV-2 PCR   Result Value Ref Range    Coronavirus 2019, PCR Not Detected Not Detected   Influenza A, and B PCR   Result Value Ref Range    Flu A Result Not Detected Not Detected    Flu B Result Not Detected Not Detected   POCT GLUCOSE   Result Value Ref Range    POCT Glucose 200 (H) 74 - 99 mg/dL   Urinalysis with Reflex Culture and Microscopic   Result Value Ref Range    Color, Urine Yellow Light-Yellow, Yellow, Dark-Yellow    Appearance, Urine Clear Clear    Specific Gravity, Urine 1.017 1.005 - 1.035    pH, Urine 5.0 5.0, 5.5, 6.0, 6.5, 7.0, 7.5, 8.0    Protein, Urine 100 (2+) (A) NEGATIVE, 10 (TRACE), 20 (TRACE) mg/dL    Glucose, Urine Normal Normal mg/dL    Blood, Urine NEGATIVE NEGATIVE    Ketones, Urine NEGATIVE NEGATIVE mg/dL    Bilirubin, " Urine NEGATIVE NEGATIVE    Urobilinogen, Urine Normal Normal mg/dL    Nitrite, Urine NEGATIVE NEGATIVE    Leukocyte Esterase, Urine NEGATIVE NEGATIVE   Urinalysis Microscopic   Result Value Ref Range    WBC, Urine 1-5 1-5, NONE /HPF    RBC, Urine 1-2 NONE, 1-2, 3-5 /HPF    Squamous Epithelial Cells, Urine 1-9 (SPARSE) Reference range not established. /HPF   POCT GLUCOSE   Result Value Ref Range    POCT Glucose 166 (H) 74 - 99 mg/dL                         Assessment/Plan   Assessment & Plan  Metabolic acidosis, increased anion gap (IAG)    Ischemic cardiomyopathy    Systemic lupus erythematosus (Multi)    Nonsustained ventricular tachycardia (Multi)    Coronary artery disease    Chronic systolic congestive heart failure (Multi)    Weakness    Lactic acid acidosis    Peritoneal dialysis catheter in situ (CMS-HCC)    ESRD (end stage renal disease) on dialysis (Multi)    Chronic respiratory failure (Multi)    Sepsis (Multi)    1 end-stage renal disease on peritoneal dialysis  2 chronic congestive heart failure with ejection fraction of 39%  3 hypotension  4 acute congestive heart failure secondary to his end-stage renal failure  5 elevated troponin  6 hypothyroidism  7 anemia     8/14 ; This is a 78-year-old male who comes in not feeling quite right he has had some hypotension and also bradycardia.  He does have poor left her function with ejection fraction 39% which has been stable.  He has end-stage renal failure on dialysis.  Troponin is elevated feel is secondary to his end-stage renal failure.  His bradycardia can also be secondary to his hypothyroidism he is on low-dose of Synthroid this may be needed to be increased.  Will also decrease his Coreg.  If his blood pressure remains low we will start him on midodrine.  He may have some fluid overload and needs to be further dialyzed.  Will follow with you    8/15: He is feeling better today his heart rate is stable he remains in sinus rhythm.  He says he has missed  some of his medications specially Synthroid at home.  His blood pressure and vitals are good.  Would keep him off his carvedilol due to the bradycardia still feel his Synthroid needs to be increased.  From our standpoint he can be discharged and will sign off.       I spent 15 minutes in the professional and overall care of this patient.      Justin Beckett MD

## 2024-08-15 NOTE — PROGRESS NOTES
"Occupational Therapy    Occupational Therapy Treatment    Name: George Rowan  MRN: [id number]  : 1945  Date: 08/15/24  Time Calculation  Start Time: 1529  Stop Time: 1545  Time Calculation (min): 16 min    Assessment:  OT Assessment: Patient will continue to benefit from skilled OT to increse safety and independence with daily tasks.  Prognosis: Good  Barriers to Discharge: None  Evaluation/Treatment Tolerance: Patient limited by fatigue  End of Session Communication: Bedside nurse  End of Session Patient Position: Bed, 3 rail up, Alarm on  Plan:  Treatment Interventions: ADL retraining, Functional transfer training, UE strengthening/ROM, Endurance training, Patient/family training, Compensatory technique education  OT Frequency: 3 times per week  OT Discharge Recommendations: Low intensity level of continued care  Equipment Recommended upon Discharge: Wheeled walker  OT Recommended Transfer Status: Stand by assist, Assist of 1  OT - OK to Discharge: Yes    Subjective   Previous Visit Info:  OT Last Visit  OT Received On: 08/15/24  General:  General  Reason for Referral: decline in ADLs, metabolic acidosis  Referred By: Dr Lindsay  Past Medical History Relevant to Rehab: ESRD w/ peritoneal dialysis, CHF, HTN, CAD, STEMI, cardiac stent x 3, macular hole Lt eye, nephroscoliosis, DM, SLE, spinal stenosis, retinal detachment, chronic resp failure  Family/Caregiver Present: No  Prior to Session Communication: Bedside nurse  Patient Position Received: Bed, 3 rail up, Alarm on  Preferred Learning Style: verbal, visual  General Comment: Patient is cleared by nursing for therapy. Patient in bed upon arrival and agreeable to participate. + IV, tele, purewick (At the end of the session, patient asks therapist if there are any resources available to talk to someone about anxiety and \"going through a change in life\" Therapist reports that message will be relayed to RN and care coordination. Message " sent)  Precautions:  Medical Precautions: Fall precautions    Pain Assessment:  Pain Assessment  Pain Assessment: 0-10  0-10 (Numeric) Pain Score: 0 - No pain     Objective   Cognition:  Overall Cognitive Status: Within Functional Limits  Orientation Level: Oriented X4  Activities of Daily Living: Grooming  Grooming Level of Assistance: Setup  Grooming Where Assessed: Edge of bed  Grooming Comments: wash face and brush teeth    Bed Mobility/Transfers: Bed Mobility  Bed Mobility: Yes  Bed Mobility 1  Bed Mobility 1: Supine to sitting  Level of Assistance 1: Modified independent  Bed Mobility Comments 1: head of bed slightly elevated  Bed Mobility 2  Bed Mobility  2: Sitting to supine  Level of Assistance 2: Modified independent  Bed Mobility Comments 2: head of bed elevated    Transfers  Transfer: Yes  Transfer 1  Transfer From 1: Bed to  Transfer to 1: Stand  Technique 1: Sit to stand  Transfer Device 1: Walker  Transfer Level of Assistance 1: Close supervision  Trials/Comments 1: cues for proper hand placement. patient then takes ~4 lateral side steps towards the head of the bed with Close Supervision using 2WW    Sitting Balance:  Static Sitting Balance  Static Sitting-Balance Support: Feet supported  Static Sitting-Level of Assistance: Distant supervision  Static Sitting-Comment/Number of Minutes: ~10 minutes    Therapy/Activity: Therapeutic Exercise  Therapeutic Exercise Performed: Yes  Therapeutic Exercise Activity 1: patient completes 1x20 B UE AROM exercises. exercises include bicep curls, chest press, shoulder flex/ext, and shoulder elevation/depression. fair form and tolerance noted with therapeutic rest breaks as needed for maximal muscle benefits    Other Activity:  Other Activity  Other Activity Performed:  (PT fit and provided pt with FWW for safe home amb)    RUE   RUE : Within Functional Limits and LUE   LUE: Within Functional Limits    Outcome Measures:  [facility] Daily Activity  Putting on and taking  off regular lower body clothing: A little  Bathing (including washing, rinsing, drying): A little  Putting on and taking off regular upper body clothing: A little  Toileting, which includes using toilet, bedpan or urinal: A little  Taking care of personal grooming such as brushing teeth: None  Eating Meals: None  Daily Activity - Total Score: 20    Education Documentation  Body Mechanics, taught by Shayna Mcknight OT at 8/15/2024  3:57 PM.  Learner: Patient  Readiness: Acceptance  Method: Explanation  Response: Verbalizes Understanding, Needs Reinforcement    Precautions, taught by Shayna Mcknight OT at 8/15/2024  3:57 PM.  Learner: Patient  Readiness: Acceptance  Method: Explanation  Response: Verbalizes Understanding, Needs Reinforcement    Home Exercise Program, taught by Shayna Mcknight OT at 8/15/2024  3:57 PM.  Learner: Patient  Readiness: Acceptance  Method: Explanation  Response: Verbalizes Understanding, Needs Reinforcement    ADL Training, taught by Shayna Mcknight OT at 8/15/2024  3:57 PM.  Learner: Patient  Readiness: Acceptance  Method: Explanation  Response: Verbalizes Understanding, Needs Reinforcement    Education Comments  No comments found.      Goals:  Encounter Problems       Encounter Problems (Active)       OT Goals       ADL's (Progressing)       Start:  08/14/24    Expected End:  08/28/24       Pt will complete bathing, dressing, grooming and toileting tasks independently .         Functional transfers (Progressing)       Start:  08/14/24    Expected End:  08/28/24       Pt will demon bed, chair and toilet transfers w/ Mod I w/ LRD, elev seat heights using B arm supports for safety and increased independence in daily task and functional mobility.          Functional endurance  (Progressing)       Start:  08/14/24    Expected End:  08/28/24       Pt will tolerate 20 minutes of functional activity to improve functional endurance for daily taska and functional mobility.

## 2024-08-15 NOTE — PROGRESS NOTES
CONSULT PROGRESS NOTES    SERVICE DATE: 8/15/2024   SERVICE TIME: 1:21 PM    CONSULTING SERVICE: Nephrology    ASSESSMENT AND PLAN   78-year-old peritoneal dialysis patient coming in with weakness, hypoxia, hypotension, bradycardia.  1.  End-stage renal disease  2.  Anemia of chronic kidney disease  3.  Hypotension  4.  Fluid overload     Using nightly CCPD.  His daughter has brought in all the requisite materials to allow him to perform his hemodialysis.  He has been commenced on IV broad-spectrum antibiotics by the primary team, presumably treating a pneumonia at present, though no major cough and the imaging findings may be chronic.  I really do not have any other major source for his sepsis.  The bradycardia was likely worsened by the carvedilol in addition to the uncontrolled hypothyroidism.  He needs compliance and possibly a dose adjustment to his levothyroxine.  Use 62.5 mcg daily.  I would use a combination of 1.5% and 2.5% PD solutions tonight.  I am dosing him with a few rounds of IV iron while he is here, but he did just receive high-dose erythropoietin stimulating agent on Friday.  I do not think he needs a transfusion right now.  I will continue to follow this patient.  No specific objection to discharge.  Discussed with Shantell Ham CNP.    SUBJECTIVE  INTERVAL HPI: He did okay on peritoneal dialysis last night, but his total ultrafiltration was not very high.  Only about 200 mL.  He was using all yellow bags.  His daughter is at bedside.  The E fluent is clear yellow.  She used heparin.  He has been getting piperacillin/tazobactam.  He has no abdominal pain.  He has some improved fatigue.  His breathing is improved, blood pressure remains in the 90s to 100s systolic.  Heart rate has improved.  He has been off of his beta-blocker.    MEDICATIONS:  aspirin, 81 mg, oral, Daily  atorvastatin, 20 mg, oral, Nightly  calcium carbonate, 1,000 mg, oral, TID  heparin (porcine), 5,000 Units, subcutaneous,  q8h  hydroxychloroquine, 200 mg, oral, Daily  iron sucrose, 200 mg, intravenous, Daily  levothyroxine, 50 mcg, oral, Daily  linezolid, 600 mg, intravenous, q12h  oxygen, , inhalation, Continuous - Inhalation  oxygen, , inhalation, Continuous - Inhalation  pantoprazole, 20 mg, oral, Daily before breakfast  piperacillin-tazobactam, 2.25 g, intravenous, q6h           PRN medications: acetaminophen **OR** acetaminophen **OR** acetaminophen, albuterol, dextromethorphan-guaifenesin, dextrose, dextrose, glucagon, guaiFENesin, ondansetron ODT **OR** ondansetron     OBJECTIVE  PHYSICAL EXAM:   Heart Rate:  [56-67]   Temp:  [36.2 °C (97.2 °F)-36.5 °C (97.7 °F)]   Resp:  [12-23]   BP: ()/(50-57)   SpO2:  [82 %-97 %]   Body mass index is 24.62 kg/m².  This is a chronically ill-appearing  man  Lethargic affect  No obvious skin rashes  Hearing intact  Phonation intact  Moist mucosa  Bradycardic  Trace Rales at the bases  Abdomen is soft, nondistended, nontender, positive bowel sounds, PD catheter in left lower quadrant, exit site visualized with no pus expressed, there is some erythema around the exit site though  No Lindo catheter in place, no suprapubic tenderness to palpation  No extremity edema  Moves 4 limbs spontaneously  No obvious joint deformities  No lymphadenopathy    DATA:   Labs:  Results for orders placed or performed during the hospital encounter of 08/13/24 (from the past 96 hour(s))   CBC and Auto Differential   Result Value Ref Range    WBC 9.6 4.4 - 11.3 x10*3/uL    nRBC 2.6 (H) 0.0 - 0.0 /100 WBCs    RBC 2.48 (L) 4.50 - 5.90 x10*6/uL    Hemoglobin 8.5 (L) 13.5 - 17.5 g/dL    Hematocrit 26.7 (L) 41.0 - 52.0 %     (H) 80 - 100 fL    MCH 34.3 (H) 26.0 - 34.0 pg    MCHC 31.8 (L) 32.0 - 36.0 g/dL    RDW 17.2 (H) 11.5 - 14.5 %    Platelets 149 (L) 150 - 450 x10*3/uL    Neutrophils % 76.8 40.0 - 80.0 %    Immature Granulocytes %, Automated 0.6 0.0 - 0.9 %    Lymphocytes % 15.8 13.0 - 44.0 %     Monocytes % 4.1 2.0 - 10.0 %    Eosinophils % 2.3 0.0 - 6.0 %    Basophils % 0.4 0.0 - 2.0 %    Neutrophils Absolute 7.34 (H) 1.60 - 5.50 x10*3/uL    Immature Granulocytes Absolute, Automated 0.06 0.00 - 0.50 x10*3/uL    Lymphocytes Absolute 1.51 0.80 - 3.00 x10*3/uL    Monocytes Absolute 0.39 0.05 - 0.80 x10*3/uL    Eosinophils Absolute 0.22 0.00 - 0.40 x10*3/uL    Basophils Absolute 0.04 0.00 - 0.10 x10*3/uL   Basic metabolic panel   Result Value Ref Range    Glucose 256 (H) 65 - 99 mg/dL    Sodium 131 (L) 133 - 145 mmol/L    Potassium 4.6 3.4 - 5.1 mmol/L    Chloride 90 (L) 97 - 107 mmol/L    Bicarbonate 17 (L) 24 - 31 mmol/L    Urea Nitrogen 84 (H) 8 - 25 mg/dL    Creatinine 8.60 (H) 0.40 - 1.60 mg/dL    eGFR 6 (L) >60 mL/min/1.73m*2    Calcium 9.4 8.5 - 10.4 mg/dL    Anion Gap >19 (H) <=19 mmol/L   Serial Troponin, Initial ([facility])   Result Value Ref Range    Troponin T, High Sensitivity 103 (HH) <=14 ng/L   Blood Gas Venous Full Panel   Result Value Ref Range    POCT pH, Venous 7.24 (LL) 7.33 - 7.43 pH    POCT pCO2, Venous 43 41 - 51 mm Hg    POCT pO2, Venous 22 (L) 35 - 45 mm Hg    POCT SO2, Venous 18 (L) 45 - 75 %    POCT Oxy Hemoglobin, Venous 17.6 (L) 45.0 - 75.0 %    POCT Hematocrit Calculated, Venous 26.0 (L) 41.0 - 52.0 %    POCT Sodium, Venous 126 (L) 136 - 145 mmol/L    POCT Potassium, Venous 4.4 3.5 - 5.3 mmol/L    POCT Chloride, Venous 90 (L) 98 - 107 mmol/L    POCT Ionized Calicum, Venous 1.24 1.10 - 1.33 mmol/L    POCT Glucose, Venous      POCT Lactate, Venous 7.5 (HH) 0.4 - 2.0 mmol/L    POCT Base Excess, Venous -8.5 (L) -2.0 - 3.0 mmol/L    POCT HCO3 Calculated, Venous 18.4 (L) 22.0 - 26.0 mmol/L    POCT Hemoglobin, Venous 8.8 (L) 13.5 - 17.5 g/dL    POCT Anion Gap, Venous 22.0 10.0 - 25.0 mmol/L    Patient Temperature 37.0 degrees Celsius    FiO2 0 %   Blood Culture    Specimen: Peripheral Venipuncture; Blood culture   Result Value Ref Range    Blood Culture No growth at 1 day    Blood Culture     Specimen: Peripheral Venipuncture; Blood culture   Result Value Ref Range    Blood Culture No growth at 1 day    Blood Gas Arterial Full Panel   Result Value Ref Range    POCT pH, Arterial 7.45 (H) 7.38 - 7.42 pH    POCT pCO2, Arterial 40 38 - 42 mm Hg    POCT pO2, Arterial 72 (L) 85 - 95 mm Hg    POCT SO2, Arterial 94 94 - 100 %    POCT Oxy Hemoglobin, Arterial 92.7 (L) 94.0 - 98.0 %    POCT Hematocrit Calculated, Arterial 24.0 (L) 41.0 - 52.0 %    POCT Sodium, Arterial 130 (L) 136 - 145 mmol/L    POCT Potassium, Arterial 3.6 3.5 - 5.3 mmol/L    POCT Chloride, Arterial 94 (L) 98 - 107 mmol/L    POCT Ionized Calcium, Arterial 1.15 1.10 - 1.33 mmol/L    POCT Glucose, Arterial 90 74 - 99 mg/dL    POCT Lactate, Arterial 2.2 (H) 0.4 - 2.0 mmol/L    POCT Base Excess, Arterial 3.5 (H) -2.0 - 3.0 mmol/L    POCT HCO3 Calculated, Arterial 27.8 (H) 22.0 - 26.0 mmol/L    POCT Hemoglobin, Arterial 8.0 (L) 13.5 - 17.5 g/dL    POCT Anion Gap, Arterial 12 10 - 25 mmo/L    Patient Temperature 37.0 degrees Celsius    FiO2 21 %    Site of Arterial Puncture Radial Right     Adalid's Test Positive    Serial Troponin, 2 Hour ([facility])   Result Value Ref Range    Troponin T, High Sensitivity 127 (HH) <=14 ng/L   Blood Gas Lactic Acid, Venous   Result Value Ref Range    POCT Lactate, Venous 1.5 0.4 - 2.0 mmol/L   POCT GLUCOSE   Result Value Ref Range    POCT Glucose 101 (H) 74 - 99 mg/dL   Comprehensive metabolic panel   Result Value Ref Range    Glucose 95 65 - 99 mg/dL    Sodium 134 133 - 145 mmol/L    Potassium 4.2 3.4 - 5.1 mmol/L    Chloride 94 (L) 97 - 107 mmol/L    Bicarbonate 22 (L) 24 - 31 mmol/L    Urea Nitrogen 89 (H) 8 - 25 mg/dL    Creatinine 9.00 (H) 0.40 - 1.60 mg/dL    eGFR 6 (L) >60 mL/min/1.73m*2    Calcium 9.5 8.5 - 10.4 mg/dL    Albumin 3.2 (L) 3.5 - 5.0 g/dL    Alkaline Phosphatase 150 (H) 35 - 125 U/L    Total Protein 5.5 (L) 5.9 - 7.9 g/dL     (H) 5 - 40 U/L    Bilirubin, Total 0.2 0.1 - 1.2 mg/dL     (H)  5 - 40 U/L    Anion Gap 18 <=19 mmol/L   CBC and Auto Differential   Result Value Ref Range    WBC 14.5 (H) 4.4 - 11.3 x10*3/uL    nRBC 1.5 (H) 0.0 - 0.0 /100 WBCs    RBC 2.43 (L) 4.50 - 5.90 x10*6/uL    Hemoglobin 8.2 (L) 13.5 - 17.5 g/dL    Hematocrit 25.3 (L) 41.0 - 52.0 %     (H) 80 - 100 fL    MCH 33.7 26.0 - 34.0 pg    MCHC 32.4 32.0 - 36.0 g/dL    RDW 16.7 (H) 11.5 - 14.5 %    Platelets 150 150 - 450 x10*3/uL    Neutrophils % 86.0 40.0 - 80.0 %    Immature Granulocytes %, Automated 0.6 0.0 - 0.9 %    Lymphocytes % 6.2 13.0 - 44.0 %    Monocytes % 6.7 2.0 - 10.0 %    Eosinophils % 0.3 0.0 - 6.0 %    Basophils % 0.2 0.0 - 2.0 %    Neutrophils Absolute 12.51 (H) 1.60 - 5.50 x10*3/uL    Immature Granulocytes Absolute, Automated 0.08 0.00 - 0.50 x10*3/uL    Lymphocytes Absolute 0.90 0.80 - 3.00 x10*3/uL    Monocytes Absolute 0.98 (H) 0.05 - 0.80 x10*3/uL    Eosinophils Absolute 0.04 0.00 - 0.40 x10*3/uL    Basophils Absolute 0.03 0.00 - 0.10 x10*3/uL   TSH with reflex to Free T4 if abnormal   Result Value Ref Range    Thyroid Stimulating Hormone 17.12 (H) 0.27 - 4.20 mIU/L   Hemoglobin A1c   Result Value Ref Range    Hemoglobin A1C 6.4 (H) See below %    Estimated Average Glucose 137 Not Established mg/dL   Procalcitonin   Result Value Ref Range    Procalcitonin 0.24 (H) <=0.07 ng/mL   Thyroxine, Free   Result Value Ref Range    Thyroxine, Free 1.30 0.90 - 1.70 ng/dL   Serial Troponin, 6 Hour ([facility])   Result Value Ref Range    Troponin T, High Sensitivity 156 (HH) <=14 ng/L   Phosphorus   Result Value Ref Range    Phosphorus 6.9 (H) 2.5 - 4.5 mg/dL   POCT GLUCOSE   Result Value Ref Range    POCT Glucose 101 (H) 74 - 99 mg/dL   POCT GLUCOSE   Result Value Ref Range    POCT Glucose 220 (H) 74 - 99 mg/dL   Sars-CoV-2 PCR   Result Value Ref Range    Coronavirus 2019, PCR Not Detected Not Detected   Influenza A, and B PCR   Result Value Ref Range    Flu A Result Not Detected Not Detected    Flu B Result Not  Detected Not Detected   POCT GLUCOSE   Result Value Ref Range    POCT Glucose 200 (H) 74 - 99 mg/dL   Urinalysis with Reflex Culture and Microscopic   Result Value Ref Range    Color, Urine Yellow Light-Yellow, Yellow, Dark-Yellow    Appearance, Urine Clear Clear    Specific Gravity, Urine 1.017 1.005 - 1.035    pH, Urine 5.0 5.0, 5.5, 6.0, 6.5, 7.0, 7.5, 8.0    Protein, Urine 100 (2+) (A) NEGATIVE, 10 (TRACE), 20 (TRACE) mg/dL    Glucose, Urine Normal Normal mg/dL    Blood, Urine NEGATIVE NEGATIVE    Ketones, Urine NEGATIVE NEGATIVE mg/dL    Bilirubin, Urine NEGATIVE NEGATIVE    Urobilinogen, Urine Normal Normal mg/dL    Nitrite, Urine NEGATIVE NEGATIVE    Leukocyte Esterase, Urine NEGATIVE NEGATIVE   Urinalysis Microscopic   Result Value Ref Range    WBC, Urine 1-5 1-5, NONE /HPF    RBC, Urine 1-2 NONE, 1-2, 3-5 /HPF    Squamous Epithelial Cells, Urine 1-9 (SPARSE) Reference range not established. /HPF   POCT GLUCOSE   Result Value Ref Range    POCT Glucose 166 (H) 74 - 99 mg/dL   POCT GLUCOSE   Result Value Ref Range    POCT Glucose 124 (H) 74 - 99 mg/dL   Basic metabolic panel   Result Value Ref Range    Glucose 147 (H) 65 - 99 mg/dL    Sodium 131 (L) 133 - 145 mmol/L    Potassium 3.4 3.4 - 5.1 mmol/L    Chloride 91 (L) 97 - 107 mmol/L    Bicarbonate 21 (L) 24 - 31 mmol/L    Urea Nitrogen 83 (H) 8 - 25 mg/dL    Creatinine 8.40 (H) 0.40 - 1.60 mg/dL    eGFR 6 (L) >60 mL/min/1.73m*2    Calcium 8.4 (L) 8.5 - 10.4 mg/dL    Anion Gap 19 <=19 mmol/L   CBC   Result Value Ref Range    WBC 11.1 4.4 - 11.3 x10*3/uL    nRBC 1.2 (H) 0.0 - 0.0 /100 WBCs    RBC 2.29 (L) 4.50 - 5.90 x10*6/uL    Hemoglobin 7.8 (L) 13.5 - 17.5 g/dL    Hematocrit 23.7 (L) 41.0 - 52.0 %     (H) 80 - 100 fL    MCH 34.1 (H) 26.0 - 34.0 pg    MCHC 32.9 32.0 - 36.0 g/dL    RDW 17.2 (H) 11.5 - 14.5 %    Platelets 137 (L) 150 - 450 x10*3/uL         SIGNATURE: Jose Antonio Mckoy MD PATIENT NAME: George Rowan   DATE: August 15, 2024 MRN:  [id number]   TIME: 1:21 PM PAGER: [phone]

## 2024-08-15 NOTE — PROGRESS NOTES
Spiritual Care Visit    Clinical Encounter Type  Visited With: Patient  Routine Visit: Introduction    Orthodox Encounters  Orthodox Needs:  (Emotional and social support)      Annotation:  attempted to provided support while rounding the Unit. Patient was resting and expressed appreciation for the visit. Patient declined spiritual care services today due to being tired.  reviewed the process for requesting spiritual care services as they desire. No other needs were expressed.

## 2024-08-16 PROBLEM — I20.9 ANGINA PECTORIS (CMS-HCC): Status: ACTIVE | Noted: 2024-08-13

## 2024-08-16 PROBLEM — I21.4 NSTEMI (NON-ST ELEVATED MYOCARDIAL INFARCTION) (MULTI): Status: ACTIVE | Noted: 2024-01-01

## 2024-08-16 LAB
TROPONIN T SERPL-MCNC: 289 NG/L
TROPONIN T SERPL-MCNC: 298 NG/L

## 2024-08-16 PROCEDURE — 2500000004 HC RX 250 GENERAL PHARMACY W/ HCPCS (ALT 636 FOR OP/ED): Performed by: INTERNAL MEDICINE

## 2024-08-16 PROCEDURE — 2500000002 HC RX 250 W HCPCS SELF ADMINISTERED DRUGS (ALT 637 FOR MEDICARE OP, ALT 636 FOR OP/ED)

## 2024-08-16 PROCEDURE — 2500000005 HC RX 250 GENERAL PHARMACY W/O HCPCS: Performed by: NURSE PRACTITIONER

## 2024-08-16 PROCEDURE — 36415 COLL VENOUS BLD VENIPUNCTURE: CPT | Performed by: INTERNAL MEDICINE

## 2024-08-16 PROCEDURE — 2500000001 HC RX 250 WO HCPCS SELF ADMINISTERED DRUGS (ALT 637 FOR MEDICARE OP): Performed by: INTERNAL MEDICINE

## 2024-08-16 PROCEDURE — 2500000005 HC RX 250 GENERAL PHARMACY W/O HCPCS: Performed by: INTERNAL MEDICINE

## 2024-08-16 PROCEDURE — 97530 THERAPEUTIC ACTIVITIES: CPT | Mod: GP

## 2024-08-16 PROCEDURE — 99232 SBSQ HOSP IP/OBS MODERATE 35: CPT | Performed by: INTERNAL MEDICINE

## 2024-08-16 PROCEDURE — 2060000001 HC INTERMEDIATE ICU ROOM DAILY

## 2024-08-16 PROCEDURE — 2500000002 HC RX 250 W HCPCS SELF ADMINISTERED DRUGS (ALT 637 FOR MEDICARE OP, ALT 636 FOR OP/ED): Performed by: INTERNAL MEDICINE

## 2024-08-16 PROCEDURE — 2500000001 HC RX 250 WO HCPCS SELF ADMINISTERED DRUGS (ALT 637 FOR MEDICARE OP): Performed by: NURSE PRACTITIONER

## 2024-08-16 PROCEDURE — 84484 ASSAY OF TROPONIN QUANT: CPT | Performed by: INTERNAL MEDICINE

## 2024-08-16 PROCEDURE — 94640 AIRWAY INHALATION TREATMENT: CPT

## 2024-08-16 PROCEDURE — 2500000001 HC RX 250 WO HCPCS SELF ADMINISTERED DRUGS (ALT 637 FOR MEDICARE OP)

## 2024-08-16 PROCEDURE — 94660 CPAP INITIATION&MGMT: CPT

## 2024-08-16 RX ORDER — MIRTAZAPINE 7.5 MG/1
7.5 TABLET, FILM COATED ORAL NIGHTLY
Status: DISCONTINUED | OUTPATIENT
Start: 2024-08-16 | End: 2024-08-17

## 2024-08-16 RX ORDER — NITROGLYCERIN 0.4 MG/1
0.4 TABLET SUBLINGUAL EVERY 5 MIN PRN
Status: DISCONTINUED | OUTPATIENT
Start: 2024-08-16 | End: 2024-08-18 | Stop reason: HOSPADM

## 2024-08-16 RX ORDER — HYDROXYZINE PAMOATE 25 MG/1
25 CAPSULE ORAL EVERY 6 HOURS PRN
Status: DISCONTINUED | OUTPATIENT
Start: 2024-08-16 | End: 2024-08-18 | Stop reason: HOSPADM

## 2024-08-16 RX ORDER — TIZANIDINE 2 MG/1
1 TABLET ORAL ONCE
Status: COMPLETED | OUTPATIENT
Start: 2024-08-16 | End: 2024-08-16

## 2024-08-16 RX ORDER — LIDOCAINE 560 MG/1
2 PATCH PERCUTANEOUS; TOPICAL; TRANSDERMAL DAILY
Status: DISCONTINUED | OUTPATIENT
Start: 2024-08-16 | End: 2024-08-18 | Stop reason: HOSPADM

## 2024-08-16 RX ORDER — NITROGLYCERIN 0.4 MG/1
0.4 TABLET SUBLINGUAL EVERY 5 MIN PRN
Status: DISCONTINUED | OUTPATIENT
Start: 2024-08-16 | End: 2024-08-16

## 2024-08-16 RX ORDER — ACETAMINOPHEN 500 MG
5 TABLET ORAL NIGHTLY
Status: DISCONTINUED | OUTPATIENT
Start: 2024-08-16 | End: 2024-08-18 | Stop reason: HOSPADM

## 2024-08-16 RX ORDER — RAMELTEON 8 MG/1
8 TABLET ORAL NIGHTLY
Status: DISCONTINUED | OUTPATIENT
Start: 2024-08-16 | End: 2024-08-16

## 2024-08-16 ASSESSMENT — COGNITIVE AND FUNCTIONAL STATUS - GENERAL
CLIMB 3 TO 5 STEPS WITH RAILING: A LOT
MOVING TO AND FROM BED TO CHAIR: A LITTLE
TURNING FROM BACK TO SIDE WHILE IN FLAT BAD: A LITTLE
MOBILITY SCORE: 18
WALKING IN HOSPITAL ROOM: A LITTLE
STANDING UP FROM CHAIR USING ARMS: A LITTLE

## 2024-08-16 ASSESSMENT — PAIN DESCRIPTION - LOCATION
LOCATION: CHEST
LOCATION: BACK

## 2024-08-16 ASSESSMENT — PAIN SCALES - GENERAL
PAINLEVEL_OUTOF10: 3
PAINLEVEL_OUTOF10: 3
PAINLEVEL_OUTOF10: 0 - NO PAIN
PAINLEVEL_OUTOF10: 0 - NO PAIN

## 2024-08-16 ASSESSMENT — PAIN DESCRIPTION - ORIENTATION: ORIENTATION: MID;UPPER

## 2024-08-16 ASSESSMENT — PAIN - FUNCTIONAL ASSESSMENT: PAIN_FUNCTIONAL_ASSESSMENT: 0-10

## 2024-08-16 NOTE — PROGRESS NOTES
George Rowan is a 78 y.o. male on day 2 of admission presenting with Metabolic acidosis, increased anion gap (IAG).    Subjective   Interval History:   Afebrile, no chills  Tired today, did not sleep well overnight  Remains on room air  Interval evaluation by cardiology -cardiac cath planned Monday  Episode of neck and shoulder pain radiating to chest  No coughing   No abdominal pain ,nausea, vomiting, or diarrhea             Objective   Range of Vitals (last 24 hours)  Heart Rate:  [55-90]   Temp:  [36.1 °C (97 °F)-36.4 °C (97.5 °F)]   Resp:  [11-28]   BP: ()/(42-59)   Weight:  [76.4 kg (168 lb 6.4 oz)]   SpO2:  [93 %-100 %]   Daily Weight  08/16/24 : 76.4 kg (168 lb 6.4 oz)    Body mass index is 26.38 kg/m².    Physical Exam  Constitutional:       Appearance: Normal appearance.   HENT:      Head: Normocephalic and atraumatic.      Nose: Nose normal.      Mouth/Throat:      Mouth: Mucous membranes are moist.      Pharynx: Oropharynx is clear.   Eyes:      General: No scleral icterus.  Cardiovascular:      Rate and Rhythm: Regular rhythm present.   Pulmonary:      Effort: Pulmonary effort is normal.      Breath sounds: Normal breath sounds.   Abdominal:      General: Bowel sounds are normal.      Palpations: Abdomen is soft. PD cathter, no tenderness   Musculoskeletal:         General: Normal range of motion.      Cervical back: Normal range of motion and neck supple.   Skin:     General: Skin is warm and dry.   Neurological:      Mental Status: He is alert.      Comments: Awake, alert   Psychiatric:         Mood and Affect: Mood normal.         Behavior: Behavior normal.     Antibiotics  gentamicin - 0.1 %    Relevant Results  Labs  Results from last 72 hours   Lab Units 08/15/24  0912 08/14/24  0428 08/13/24  2338   WBC AUTO x10*3/uL 11.1 14.5* 9.6   HEMOGLOBIN g/dL 7.8* 8.2* 8.5*   HEMATOCRIT % 23.7* 25.3* 26.7*   PLATELETS AUTO x10*3/uL 137* 150 149*   NEUTROS PCT AUTO %  --  86.0 76.8   LYMPHS PCT AUTO  %  --  6.2 15.8   MONOS PCT AUTO %  --  6.7 4.1   EOS PCT AUTO %  --  0.3 2.3     Results from last 72 hours   Lab Units 08/15/24  0912 08/14/24  0711 08/14/24 0428 08/13/24  2338   SODIUM mmol/L 131*  --  134 131*   POTASSIUM mmol/L 3.4  --  4.2 4.6   CHLORIDE mmol/L 91*  --  94* 90*   CO2 mmol/L 21*  --  22* 17*   BUN mg/dL 83*  --  89* 84*   CREATININE mg/dL 8.40*  --  9.00* 8.60*   GLUCOSE mg/dL 147*  --  95 256*   CALCIUM mg/dL 8.4*  --  9.5 9.4   ANION GAP mmol/L 19  --  18 >19*   EGFR mL/min/1.73m*2 6*  --  6* 6*   PHOSPHORUS mg/dL  --  6.9*  --   --      Results from last 72 hours   Lab Units 08/14/24 0428   ALK PHOS U/L 150*   BILIRUBIN TOTAL mg/dL 0.2   PROTEIN TOTAL g/dL 5.5*   ALT U/L 171*   AST U/L 119*   ALBUMIN g/dL 3.2*     Estimated Creatinine Clearance: 6.8 mL/min (A) (by C-G formula based on SCr of 8.4 mg/dL (H)).  C-Reactive Protein   Date Value Ref Range Status   10/05/2023 <0.10 <1.00 mg/dL Final     CRP   Date Value Ref Range Status   04/21/2022 0.3 0 - 2.0 MG/DL Final     Comment:     LESS THAN  Performed at [address] [address]     10/27/2021 0.3 0 - 2.0 MG/DL Final     Comment:     LESS THAN  Performed at [address] [address]       Microbiology  Legionella Urine antigen negative  Strep pneumonia antigen negative   MRSA PCR pending  Blood culture pending      Imaging  CT chest abdomen pelvis wo IV contrast    Result Date: 8/14/2024  Interpreted By:  Justin Denson, STUDY: CT CHEST ABDOMEN PELVIS WO CONTRAST; ;  8/14/2024 12:39 am   INDICATION: Signs/Symptoms:Sepsis.   COMPARISON: CT chest of 01/09/2018   ACCESSION NUMBER(S): [id number]   ORDERING CLINICIAN: GRAEME FLORES   TECHNIQUE: Noncontrast CT of the chest, abdomen and pelvis with multiplanar reformations.   FINDINGS: CHEST:   VESSELS: Aorta is normal in caliber. Atherosclerotic changes in the aorta and coronary arteries. HEART: Normal size. Hypoattenuation of myocardium relative to  blood pool suggests anemia. Mitral annular calcification. No pericardial effusion. MEDIASTINUM AND CESAR: No pathologically enlarged thoracic lymph nodes. Esophagus appears within normal limits. No abnormal mediastinal air or fluid. LUNG, PLEURA, LARGE AIRWAYS: Respiratory motion artifact limits evaluation of the lung parenchyma. Mild-to-moderate emphysema. Mild smooth interlobular septal thickening suggesting acute pulmonary interstitial edema. Patchy reticulonodular and ground-glass opacities, suspicious for multifocal bilateral pneumonia. Moderate to large right and small to moderate left lateral pleural effusions. No pneumothorax. CHEST WALL AND LOWER NECK: Within normal limits.   ABDOMEN:   BONES: No acute osseous abnormality. Large Schmorl's node in the superior L3 endplate. Severe degenerative changes of the imaged spine. ABDOMINAL WALL: Fat containing left inguinal hernia. Anasarca.   LIVER: Within normal limits. Riedel's lobe noted BILE DUCTS: Normal caliber. GALLBLADDER: No calcified cholelithiasis. Nonspecific wall thickening pericholecystic fluid is nonspecific in the setting of ascites PANCREAS: Within normal limits. SPLEEN: Within normal limits. ADRENALS: Within normal limits. KIDNEYS: Renal cortical atrophy. Small right interpolar cortical cyst. Vascular calcifications about both renal sinuses. Otherwise, normal URETERS: No hydroureter.   PELVIS:   REPRODUCTIVE ORGANS: Prostate is not enlarged. BLADDER: Within normal limits.   VESSELS: Severe atherosclerosis. Fusiform infrarenal aortic ectasia. No aneurysmal dilation is seen. RETROPERITONEUM: Within normal limits. BOWEL: No dilated or thickened bowel. Stomach appears within normal limits.  Normal appendix. PERITONEUM: Peritoneal dialysis catheter is coiled along the superior aspect of the bladder. Small volume of ascites. No pneumoperitoneum or loculated intraperitoneal collection.       Respiratory motion artifact limits evaluation of the lung  parenchyma. Mild-to-moderate emphysema. Mild smooth interlobular septal thickening suggesting acute pulmonary interstitial edema. Patchy reticulonodular and ground-glass opacities, suspicious for multifocal bilateral pneumonia. Moderate to large right and small to moderate left lateral pleural effusions. No pneumothorax.   No definite evidence of acute pathology in the abdomen or pelvis.   Peritoneal dialysis catheter is coiled along the superior aspect of the bladder. Small volume of ascites. No pneumoperitoneum or loculated intraperitoneal collection.   Additional findings as discussed above.   MACRO: None   Signed by: Justin Denson 8/14/2024 12:58 AM Dictation workstation:   [id number]    XR chest 1 view    Result Date: 8/13/2024  Interpreted By:  Tito Concepcion, STUDY: XR CHEST 1 VIEW;  8/13/2024 11:44 pm   INDICATION: Signs/Symptoms:Shortness of breath.   COMPARISON: Chest radiograph 07/08/2024   ACCESSION NUMBER(S): [id number]   ORDERING CLINICIAN: GRAEME FLORES   FINDINGS: A defibrillation pad overlies the right thorax. Heart is mildly enlarged. There is central pulmonary vascular congestion and perihilar opacities. Probable small left pleural effusion. No discernible pneumothorax. No acute osseous abnormality.       1. Mild cardiomegaly and central vascular congestion. 2. Possible small left pleural effusion.     Signed by: Tito Concepcion 8/13/2024 11:59 PM Dictation workstation:   [id number]        Assessment/Plan   Acute hypoxic respiratory failure, resolved , on room air  Possible Pneumonia-Abnormal CT chest  End Stage renal disease on peritoneal dialysis  Ischemic cardiomyopathy, history of Myocardial infarction with PCI 2019-cardiology on consult   Hypotension, resolved   Bradycardia        Discontinue Zyvox  Discontinue Zosyn  Monitor off antibiotics   Monitor temperature and WBC  Follow blood culture  Supplemental oxygen, as tolerated  Monitor blood pressure  Cardiology follow-up  Nephrology  follow-up       Total time spent caring for the patient today was 20 minutes. This includes time spent before the visit reviewing the chart, time spent during the visit, and time spent after the visit on documentation.       SHOLA Ascencio-CNP

## 2024-08-16 NOTE — PROGRESS NOTES
CONSULT PROGRESS NOTES    SERVICE DATE: 8/16/2024   SERVICE TIME: 11:30 AM    CONSULTING SERVICE: Nephrology    ASSESSMENT AND PLAN   78-year-old peritoneal dialysis patient coming in with weakness, hypoxia, hypotension, bradycardia.  1.  End-stage renal disease  2.  Anemia of chronic kidney disease  3.  Hypotension  4.  Fluid overload     Using nightly CCPD.  His daughter has brought in all the requisite materials to allow him to perform his hemodialysis.  He has been commenced on IV broad-spectrum antibiotics by the primary team, presumably treating a pneumonia at present, though no major cough and the imaging findings may be chronic.  I really do not have any other major source for his sepsis.  I would de-escalate antibiotics at this point.  Given hypotension, bradycardia, depressed ejection fraction, some sort of chest pain last night, troponin elevation, cardiology wants to do a left heart catheterization in 3 days.  No objection from my standpoint.  I have increased the levothyroxine to 62.5 mcg daily.  For his low ultrafiltration, I would use all 2.5% PD solutions tonight.  I am dosing him with a few rounds of IV iron while he is here, but he did just receive high-dose erythropoietin stimulating agent 1 week ago.  I do not think he needs a transfusion right now.  I will continue to follow this patient.  Discussed with Shantell Ham, [facility]IO.  Dr. Kwok is available this weekend.    SUBJECTIVE  INTERVAL HPI: Yesterday prior to discharge he developed what he described as pain that began at the upper part of his neck radiating down his shoulders.  Thereafter he had the pain descend down his esophagus to his upper epigastric area.  He describes this pain as pressure-like.  It did not radiate to his arms.  His blood pressure was stably low throughout.  His heart rate has not been as low.  He did okay on peritoneal dialysis last night, but his total ultrafiltration was not very high.  It was less than 200 mL.  He  reported using a combination of 1.5% and 2.5% dextrose bags.  His daughter is was not present at bedside.      MEDICATIONS:  aspirin, 81 mg, oral, Daily  atorvastatin, 20 mg, oral, Nightly  calcium carbonate, 1,000 mg, oral, TID  heparin (porcine), 5,000 Units, subcutaneous, q8h  hydroxychloroquine, 200 mg, oral, Daily  levothyroxine, 62.5 mcg, oral, Daily  lidocaine, 1 patch, transdermal, Nightly  lidocaine, 2 patch, transdermal, Daily  linezolid, 600 mg, intravenous, q12h  pantoprazole, 20 mg, oral, Daily before breakfast  piperacillin-tazobactam, 2.25 g, intravenous, q6h           PRN medications: acetaminophen **OR** acetaminophen **OR** acetaminophen, albuterol, dextromethorphan-guaifenesin, dextrose, dextrose, glucagon, guaiFENesin, nitroglycerin, ondansetron ODT **OR** ondansetron, oxygen     OBJECTIVE  PHYSICAL EXAM:   Heart Rate:  [55-90]   Temp:  [36.3 °C (97.3 °F)-36.4 °C (97.5 °F)]   Resp:  [11-28]   BP: ()/(42-59)   Weight:  [76.4 kg (168 lb 6.4 oz)]   SpO2:  [93 %-100 %]   Body mass index is 26.38 kg/m².  This is a chronically ill-appearing  man  Lethargic affect  No obvious skin rashes  Hearing intact  Phonation intact  Moist mucosa  Bradycardic  Trace Rales at the bases  Abdomen is soft, nondistended, nontender, positive bowel sounds, PD catheter in left lower quadrant, exit site visualized with no pus expressed, there is some erythema around the exit site though  No Lindo catheter in place, no suprapubic tenderness to palpation  No extremity edema  Moves 4 limbs spontaneously  No obvious joint deformities  No lymphadenopathy    DATA:   Labs:  Results for orders placed or performed during the hospital encounter of 08/13/24 (from the past 96 hour(s))   CBC and Auto Differential   Result Value Ref Range    WBC 9.6 4.4 - 11.3 x10*3/uL    nRBC 2.6 (H) 0.0 - 0.0 /100 WBCs    RBC 2.48 (L) 4.50 - 5.90 x10*6/uL    Hemoglobin 8.5 (L) 13.5 - 17.5 g/dL    Hematocrit 26.7 (L) 41.0 - 52.0 %    MCV  108 (H) 80 - 100 fL    MCH 34.3 (H) 26.0 - 34.0 pg    MCHC 31.8 (L) 32.0 - 36.0 g/dL    RDW 17.2 (H) 11.5 - 14.5 %    Platelets 149 (L) 150 - 450 x10*3/uL    Neutrophils % 76.8 40.0 - 80.0 %    Immature Granulocytes %, Automated 0.6 0.0 - 0.9 %    Lymphocytes % 15.8 13.0 - 44.0 %    Monocytes % 4.1 2.0 - 10.0 %    Eosinophils % 2.3 0.0 - 6.0 %    Basophils % 0.4 0.0 - 2.0 %    Neutrophils Absolute 7.34 (H) 1.60 - 5.50 x10*3/uL    Immature Granulocytes Absolute, Automated 0.06 0.00 - 0.50 x10*3/uL    Lymphocytes Absolute 1.51 0.80 - 3.00 x10*3/uL    Monocytes Absolute 0.39 0.05 - 0.80 x10*3/uL    Eosinophils Absolute 0.22 0.00 - 0.40 x10*3/uL    Basophils Absolute 0.04 0.00 - 0.10 x10*3/uL   Basic metabolic panel   Result Value Ref Range    Glucose 256 (H) 65 - 99 mg/dL    Sodium 131 (L) 133 - 145 mmol/L    Potassium 4.6 3.4 - 5.1 mmol/L    Chloride 90 (L) 97 - 107 mmol/L    Bicarbonate 17 (L) 24 - 31 mmol/L    Urea Nitrogen 84 (H) 8 - 25 mg/dL    Creatinine 8.60 (H) 0.40 - 1.60 mg/dL    eGFR 6 (L) >60 mL/min/1.73m*2    Calcium 9.4 8.5 - 10.4 mg/dL    Anion Gap >19 (H) <=19 mmol/L   Serial Troponin, Initial ([facility])   Result Value Ref Range    Troponin T, High Sensitivity 103 (HH) <=14 ng/L   Blood Gas Venous Full Panel   Result Value Ref Range    POCT pH, Venous 7.24 (LL) 7.33 - 7.43 pH    POCT pCO2, Venous 43 41 - 51 mm Hg    POCT pO2, Venous 22 (L) 35 - 45 mm Hg    POCT SO2, Venous 18 (L) 45 - 75 %    POCT Oxy Hemoglobin, Venous 17.6 (L) 45.0 - 75.0 %    POCT Hematocrit Calculated, Venous 26.0 (L) 41.0 - 52.0 %    POCT Sodium, Venous 126 (L) 136 - 145 mmol/L    POCT Potassium, Venous 4.4 3.5 - 5.3 mmol/L    POCT Chloride, Venous 90 (L) 98 - 107 mmol/L    POCT Ionized Calicum, Venous 1.24 1.10 - 1.33 mmol/L    POCT Glucose, Venous      POCT Lactate, Venous 7.5 (HH) 0.4 - 2.0 mmol/L    POCT Base Excess, Venous -8.5 (L) -2.0 - 3.0 mmol/L    POCT HCO3 Calculated, Venous 18.4 (L) 22.0 - 26.0 mmol/L    POCT Hemoglobin,  Venous 8.8 (L) 13.5 - 17.5 g/dL    POCT Anion Gap, Venous 22.0 10.0 - 25.0 mmol/L    Patient Temperature 37.0 degrees Celsius    FiO2 0 %   Blood Culture    Specimen: Peripheral Venipuncture; Blood culture   Result Value Ref Range    Blood Culture No growth at 2 days    Blood Culture    Specimen: Peripheral Venipuncture; Blood culture   Result Value Ref Range    Blood Culture No growth at 2 days    Blood Gas Arterial Full Panel   Result Value Ref Range    POCT pH, Arterial 7.45 (H) 7.38 - 7.42 pH    POCT pCO2, Arterial 40 38 - 42 mm Hg    POCT pO2, Arterial 72 (L) 85 - 95 mm Hg    POCT SO2, Arterial 94 94 - 100 %    POCT Oxy Hemoglobin, Arterial 92.7 (L) 94.0 - 98.0 %    POCT Hematocrit Calculated, Arterial 24.0 (L) 41.0 - 52.0 %    POCT Sodium, Arterial 130 (L) 136 - 145 mmol/L    POCT Potassium, Arterial 3.6 3.5 - 5.3 mmol/L    POCT Chloride, Arterial 94 (L) 98 - 107 mmol/L    POCT Ionized Calcium, Arterial 1.15 1.10 - 1.33 mmol/L    POCT Glucose, Arterial 90 74 - 99 mg/dL    POCT Lactate, Arterial 2.2 (H) 0.4 - 2.0 mmol/L    POCT Base Excess, Arterial 3.5 (H) -2.0 - 3.0 mmol/L    POCT HCO3 Calculated, Arterial 27.8 (H) 22.0 - 26.0 mmol/L    POCT Hemoglobin, Arterial 8.0 (L) 13.5 - 17.5 g/dL    POCT Anion Gap, Arterial 12 10 - 25 mmo/L    Patient Temperature 37.0 degrees Celsius    FiO2 21 %    Site of Arterial Puncture Radial Right     Adalid's Test Positive    Serial Troponin, 2 Hour ([facility])   Result Value Ref Range    Troponin T, High Sensitivity 127 (HH) <=14 ng/L   Blood Gas Lactic Acid, Venous   Result Value Ref Range    POCT Lactate, Venous 1.5 0.4 - 2.0 mmol/L   POCT GLUCOSE   Result Value Ref Range    POCT Glucose 101 (H) 74 - 99 mg/dL   Comprehensive metabolic panel   Result Value Ref Range    Glucose 95 65 - 99 mg/dL    Sodium 134 133 - 145 mmol/L    Potassium 4.2 3.4 - 5.1 mmol/L    Chloride 94 (L) 97 - 107 mmol/L    Bicarbonate 22 (L) 24 - 31 mmol/L    Urea Nitrogen 89 (H) 8 - 25 mg/dL    Creatinine  9.00 (H) 0.40 - 1.60 mg/dL    eGFR 6 (L) >60 mL/min/1.73m*2    Calcium 9.5 8.5 - 10.4 mg/dL    Albumin 3.2 (L) 3.5 - 5.0 g/dL    Alkaline Phosphatase 150 (H) 35 - 125 U/L    Total Protein 5.5 (L) 5.9 - 7.9 g/dL     (H) 5 - 40 U/L    Bilirubin, Total 0.2 0.1 - 1.2 mg/dL     (H) 5 - 40 U/L    Anion Gap 18 <=19 mmol/L   CBC and Auto Differential   Result Value Ref Range    WBC 14.5 (H) 4.4 - 11.3 x10*3/uL    nRBC 1.5 (H) 0.0 - 0.0 /100 WBCs    RBC 2.43 (L) 4.50 - 5.90 x10*6/uL    Hemoglobin 8.2 (L) 13.5 - 17.5 g/dL    Hematocrit 25.3 (L) 41.0 - 52.0 %     (H) 80 - 100 fL    MCH 33.7 26.0 - 34.0 pg    MCHC 32.4 32.0 - 36.0 g/dL    RDW 16.7 (H) 11.5 - 14.5 %    Platelets 150 150 - 450 x10*3/uL    Neutrophils % 86.0 40.0 - 80.0 %    Immature Granulocytes %, Automated 0.6 0.0 - 0.9 %    Lymphocytes % 6.2 13.0 - 44.0 %    Monocytes % 6.7 2.0 - 10.0 %    Eosinophils % 0.3 0.0 - 6.0 %    Basophils % 0.2 0.0 - 2.0 %    Neutrophils Absolute 12.51 (H) 1.60 - 5.50 x10*3/uL    Immature Granulocytes Absolute, Automated 0.08 0.00 - 0.50 x10*3/uL    Lymphocytes Absolute 0.90 0.80 - 3.00 x10*3/uL    Monocytes Absolute 0.98 (H) 0.05 - 0.80 x10*3/uL    Eosinophils Absolute 0.04 0.00 - 0.40 x10*3/uL    Basophils Absolute 0.03 0.00 - 0.10 x10*3/uL   TSH with reflex to Free T4 if abnormal   Result Value Ref Range    Thyroid Stimulating Hormone 17.12 (H) 0.27 - 4.20 mIU/L   Hemoglobin A1c   Result Value Ref Range    Hemoglobin A1C 6.4 (H) See below %    Estimated Average Glucose 137 Not Established mg/dL   Procalcitonin   Result Value Ref Range    Procalcitonin 0.24 (H) <=0.07 ng/mL   Thyroxine, Free   Result Value Ref Range    Thyroxine, Free 1.30 0.90 - 1.70 ng/dL   Serial Troponin, 6 Hour ([facility])   Result Value Ref Range    Troponin T, High Sensitivity 156 (HH) <=14 ng/L   Phosphorus   Result Value Ref Range    Phosphorus 6.9 (H) 2.5 - 4.5 mg/dL   POCT GLUCOSE   Result Value Ref Range    POCT Glucose 101 (H) 74 - 99  mg/dL   POCT GLUCOSE   Result Value Ref Range    POCT Glucose 220 (H) 74 - 99 mg/dL   Sars-CoV-2 PCR   Result Value Ref Range    Coronavirus 2019, PCR Not Detected Not Detected   Influenza A, and B PCR   Result Value Ref Range    Flu A Result Not Detected Not Detected    Flu B Result Not Detected Not Detected   POCT GLUCOSE   Result Value Ref Range    POCT Glucose 200 (H) 74 - 99 mg/dL   Urinalysis with Reflex Culture and Microscopic   Result Value Ref Range    Color, Urine Yellow Light-Yellow, Yellow, Dark-Yellow    Appearance, Urine Clear Clear    Specific Gravity, Urine 1.017 1.005 - 1.035    pH, Urine 5.0 5.0, 5.5, 6.0, 6.5, 7.0, 7.5, 8.0    Protein, Urine 100 (2+) (A) NEGATIVE, 10 (TRACE), 20 (TRACE) mg/dL    Glucose, Urine Normal Normal mg/dL    Blood, Urine NEGATIVE NEGATIVE    Ketones, Urine NEGATIVE NEGATIVE mg/dL    Bilirubin, Urine NEGATIVE NEGATIVE    Urobilinogen, Urine Normal Normal mg/dL    Nitrite, Urine NEGATIVE NEGATIVE    Leukocyte Esterase, Urine NEGATIVE NEGATIVE   Streptococcus pneumoniae Antigen, Urine    Specimen: Urine   Result Value Ref Range    Streptococcus pneumoniae Ag, Urine Negative Negative   Legionella Antigen, Urine    Specimen: Urine   Result Value Ref Range    L. pneumophila Urine Ag Negative Negative   Urinalysis Microscopic   Result Value Ref Range    WBC, Urine 1-5 1-5, NONE /HPF    RBC, Urine 1-2 NONE, 1-2, 3-5 /HPF    Squamous Epithelial Cells, Urine 1-9 (SPARSE) Reference range not established. /HPF   POCT GLUCOSE   Result Value Ref Range    POCT Glucose 166 (H) 74 - 99 mg/dL   POCT GLUCOSE   Result Value Ref Range    POCT Glucose 124 (H) 74 - 99 mg/dL   Basic metabolic panel   Result Value Ref Range    Glucose 147 (H) 65 - 99 mg/dL    Sodium 131 (L) 133 - 145 mmol/L    Potassium 3.4 3.4 - 5.1 mmol/L    Chloride 91 (L) 97 - 107 mmol/L    Bicarbonate 21 (L) 24 - 31 mmol/L    Urea Nitrogen 83 (H) 8 - 25 mg/dL    Creatinine 8.40 (H) 0.40 - 1.60 mg/dL    eGFR 6 (L) >60  mL/min/1.73m*2    Calcium 8.4 (L) 8.5 - 10.4 mg/dL    Anion Gap 19 <=19 mmol/L   CBC   Result Value Ref Range    WBC 11.1 4.4 - 11.3 x10*3/uL    nRBC 1.2 (H) 0.0 - 0.0 /100 WBCs    RBC 2.29 (L) 4.50 - 5.90 x10*6/uL    Hemoglobin 7.8 (L) 13.5 - 17.5 g/dL    Hematocrit 23.7 (L) 41.0 - 52.0 %     (H) 80 - 100 fL    MCH 34.1 (H) 26.0 - 34.0 pg    MCHC 32.9 32.0 - 36.0 g/dL    RDW 17.2 (H) 11.5 - 14.5 %    Platelets 137 (L) 150 - 450 x10*3/uL   POCT GLUCOSE   Result Value Ref Range    POCT Glucose 98 74 - 99 mg/dL   POCT GLUCOSE   Result Value Ref Range    POCT Glucose 166 (H) 74 - 99 mg/dL   Troponin T   Result Value Ref Range    Troponin T, High Sensitivity 221 (HH) <=14 ng/L   ECG 12 lead   Result Value Ref Range    Ventricular Rate 88 BPM    Atrial Rate 88 BPM    MT Interval 258 ms    QRS Duration 140 ms    QT Interval 414 ms    QTC Calculation(Bazett) 500 ms    P Axis 12 degrees    R Axis 30 degrees    T Axis 219 degrees    QRS Count 14 beats    Q Onset 211 ms    P Onset 82 ms    P Offset 140 ms    T Offset 418 ms    QTC Fredericia 470 ms   POCT GLUCOSE   Result Value Ref Range    POCT Glucose 113 (H) 74 - 99 mg/dL   Troponin T   Result Value Ref Range    Troponin T, High Sensitivity 289 (HH) <=14 ng/L   Troponin T   Result Value Ref Range    Troponin T, High Sensitivity 298 (HH) <=14 ng/L         SIGNATURE: Jose Antonio Mckoy MD PATIENT NAME: George Rowan   DATE: August 16, 2024 MRN: [id number]   TIME: 11:30 AM PAGER: [phone]

## 2024-08-16 NOTE — CONSULTS
"Inpatient consult to Psychiatry  Consult performed by: PRINCESS Coronado  Consult ordered by: PRINCESS Escobedo  Reason for consult: ''anxiety''      PSYCHIATRY CONSULT NOTE    Visit type: Face to face evaluation     HISTORY OF PRESENT ILLNESS:     George Rowan is a 78 y.o. male with a past medical history of CAD, CHF, ESRD on peritoneal dialysis who was admitted to [facility] on 8/13/24 for weakness, hypotension, sob. Psychiatry was consulted on 8/16 for anxiety.    On interview, pt is laying in bed, he is awake but complains of being very tired due to not sleeping, per patient, for the past 3 nights.  He reports ongoing anxiety symptoms since his hospitalization in June for respiratory failure.  This is his third hospitalization since June and he reports being very worried about his heart function and return of is respiratory distress.    He reports that he has never been a \"great\" sleeper, but that sleep has gotten significantly worse in the past couple of months.  He states that he will start to fall asleep but then \"I jusst jerk awake.\" He reports symptoms of anxiety including restlessness, increased worry, sob with feeling of panic.  He reports having increase in psychosocial stressors outside of the hospital as well which leads to ruminating on issues when he is trying to fall asleep.  Patient reports that Ramelteon was not helpful. He tried ambien during previous hospitalization and reports no effec.  He has tried trazodone and lunesta in the past as well.      Patient denies feeling depressed, but does report fatigue, poor sleep, poor appetite which may be contributed to his medical conditions.  He reports he has lost the ability to taste food which doesn't help with appetite.  He denies SI, SIB, HI, hx of SA.      We did discuss some helpful techniques including box breathing, 4-7-8 breathing, listening to guided meditation videos on youtube.  Patient is agreeable to trying these " techniques.     Past Psychiatric History  Current/Previous Diagnoses: Denies  Current Psychiatrist/Provider: Denies  Current Therapist: Denies  Other Providers / Agencies: Denies  Outpatient Treatment History: Denies  Past Medication Trials: Denies  Inpatient Hospitalizations: Denies  Suicide Attempts: Denies  Homicide attempts/Violence: Denies  Self Harm/Self Injurious: Denies    Substance Abuse History  Tobacco use history: quit in 2014  Alcohol use history: Denies  Cannabis use history: Denies  Illicit Drug Use History: Denies    Social History  Household: lives with wife and son.  Family is very supportive   History of trauma/abuse: Denies  Weapons at home and access to lethal means:  Pt is a gun owner    OARRS REVIEW  OARRS checked: No data recorded   OARRS comments: 6/26 - Zolpidem 5 mg #30 tabs for 30 days              6/4  - Gabapentin 300 mg #100 for 100 days                                 6/7 - Short scripts for percocet 5 days  Past Medical History  He has a past medical history of Cataract, Hyperlipidemia, Hypertension, Macular hole of left eye, Nephrosclerosis, Other seasonal allergic rhinitis, Other specified diabetes mellitus without complications (Multi), Personal history of diseases of the blood and blood-forming organs and certain disorders involving the immune mechanism, Personal history of other diseases of urinary system, Polyarticular gout, Pre-diabetes, Renal disorder, Spinal stenosis, and STEMI (ST elevation myocardial infarction) (Multi) (01/2019).    ALLERGIES  Tetracaine, Azathioprine, Fluorescein-benoxinate, and Other    Surgical History  He has a past surgical history that includes CT guided imaging for needle placement (06/12/2018); CT guided imaging for needle placement (08/22/2022); Vasectomy; Cataract extraction; Retinal detachment repair w/ scleral buckle (Left, 2019); Cardiac catheterization (01/20/2019); and Renal biopsy (08/2022).    FAMILY HISTORY  Family History   Problem  "Relation Name Age of Onset    Kidney failure Mother      Heart failure Mother      Heart disease Mother      Prostate cancer Father      Other (stomach mass) Sister Sister 1     Cancer Sister Sister 1       Psychiatric History: denies family history       PSYCHIATRIC REVIEW OF SYSTEMS  Depression: sleep disturbance: difficulty falling asleep, fatigue or loss of energy, and changes in appetite or weight leading to significant weight loss or gain unintentionally   Anxiety: excessive worry that is difficult to control, restlessness or feeling keyed up or on edge, fatigue, sleep disturbance, and worries of looming dangers/catastrophes  Nataly: negative  Psychosis: negative  Delirium: negative   Trauma: negative    OBJECTIVE:     VITALS      8/15/2024     3:00 PM 8/15/2024     8:54 PM 8/15/2024    11:27 PM 8/16/2024     2:36 AM 8/16/2024     4:29 AM 8/16/2024     7:48 AM 8/16/2024    11:30 AM   Vitals   Systolic 100 96   84 92 97   Diastolic 51 59   55 42 43   Heart Rate  90   55     Temp 36.3 °C (97.3 °F) 36.3 °C (97.3 °F)   36.3 °C (97.3 °F) 36.4 °C (97.5 °F) 36.1 °C (97 °F)   Resp  28 18 19 11     Weight (lb)     168.4     BMI     26.38 kg/m2     BSA (m2)     1.9 m2        Body mass index is 26.38 kg/m².  Facility age limit for growth %neo is 20 years.  Wt Readings from Last 4 Encounters:   08/16/24 76.4 kg (168 lb 6.4 oz)   08/06/24 67.1 kg (148 lb)   07/11/24 65.3 kg (144 lb)   07/09/24 69.8 kg (153 lb 14.1 oz)     Mental Status Exam  General: 78 year old male, ill appearing, laying in assigned hospital bed  Appearance: Appeared as age stated; appropriately dressed/groomed.  Attitude: pleasant, forthcoming  Behavior: Fair EC; overall responding appropriately  Motor Activity: No notable krystal PMAR  Speech: Clear, with fair phonation, and no lisp nor dysarthria.   Mood: \"tired\"  Affect: Dysthymic; constricted range/intensity; appropriate and congruent  Thought Process: Linear and logical; not perseverating   Thought " Content: Denied SI/HI. Not voicing/endorsing delusions.  Thought Perception: Did not appear to be responding to internal stimuli. Not endorsing AVH  Cognition: Grossly intact; A&O x4/4 to self, place, date, and context.  Insight: Good  Judgement: Good    HOME MEDICATIONS  Medication Documentation Review Audit       Reviewed by Carine Galindo RN (Registered Nurse) on 08/14/24 at 0631      Medication Order Taking? Sig Documenting Provider Last Dose Status   acetaminophen (Tylenol) 325 mg tablet [id number]  Take 2 tablets (650 mg) by mouth 3 times a day. Dmitry Morillo, DO  Active   albuterol (ProAir HFA) 90 mcg/actuation inhaler [id number]  Inhale 2 puffs every 4 hours if needed for wheezing or shortness of breath. Carl York, DO  Active   allopurinol (Zyloprim) 300 mg tablet [id number]  TAKE ONE-HALF TABLET BY MOUTH  ONCE DAILY Carl York, DO  Active   aspirin 81 mg capsule [id number]  Take 1 tablet by mouth once daily. Historical Provider, MD  Active   atorvastatin (Lipitor) 20 mg tablet [id number]  TAKE 1 TABLET BY MOUTH ONCE  DAILY Seb Valdez,   Active   calcitriol (Rocaltrol) 0.5 mcg capsule [id number]  Take 1 capsule (0.5 mcg) by mouth once daily. Dmitry Morillo DO  Active   carvedilol (Coreg) 3.125 mg tablet [id number]  Take 1 tablet (3.125 mg) by mouth 2 times a day.   Patient not taking: Reported on 8/6/2024    Seb Valdez DO  Active   epoetin clyde (Epogen,Procrit) 10,000 unit/mL injection [id number]  Inject 1 mL (10,000 Units) under the skin. Patients receives on wednesdays Historical Provider, MD  Active   fluticasone (Flonase) 50 mcg/actuation nasal spray [id number]  USE 2 SPRAYS IN BOTH  NOSTRILS ONCE DAILY Carl York DO  Active   gabapentin (Neurontin) 300 mg capsule [id number]  TAKE 1 CAPSULE BY MOUTH ONCE  DAILY Maurice Felix MD  Active   gentamicin (Garamycin) 0.1 % cream [id number]  apply to peritoneal dialysis catheter exit site DAILY and AS NEEDED Historical  Provider, MD  Active   hydroxychloroquine (Plaquenil) 200 mg tablet [id number]  TAKE 1 TABLET BY MOUTH ONCE  DAILY Maurice Felix MD  Active   levothyroxine (Synthroid, Levoxyl) 50 mcg tablet [id number]  TAKE 1 TABLET BY MOUTH ONCE  DAILY IN THE MORNING ON AN EMPTY STOMACH Carl York DO  Active   metOLazone (Zaroxolyn) 10 mg tablet [id number]  Take 1 tablet (10 mg) by mouth once daily.   Patient taking differently: Take 1 tablet (10 mg) by mouth once daily. prn    Nirali Moreno DO  Active   ramelteon (Rozerem) 8 mg tablet [id number]  Take 1 tablet (8 mg) by mouth once daily at bedtime. Bedtime Historical Provider, MD  Active   torsemide (Demadex) 100 mg tablet [id number]  Take 1 tablet (100 mg) by mouth 2 times a day for 15 days.   Patient taking differently: Take 0.5 tablets (50 mg) by mouth 2 times a day.    Nirali Moreno DO   24 2359   zolpidem (Ambien) 5 mg tablet [id number]  Take 1 tablet (5 mg) by mouth as needed at bedtime for sleep.   Patient not taking: Reported on 2024    Carl York DO  Active                     CURRENT MEDICATIONS  Scheduled medications  aspirin, 81 mg, oral, Daily  atorvastatin, 20 mg, oral, Nightly  calcium carbonate, 1,000 mg, oral, TID  heparin (porcine), 5,000 Units, subcutaneous, q8h  hydroxychloroquine, 200 mg, oral, Daily  levothyroxine, 62.5 mcg, oral, Daily  lidocaine, 1 patch, transdermal, Nightly  lidocaine, 2 patch, transdermal, Daily  mirtazapine, 7.5 mg, oral, Nightly  pantoprazole, 20 mg, oral, Daily before breakfast        Continuous medications       PRN medications  PRN medications: acetaminophen **OR** acetaminophen **OR** acetaminophen, albuterol, dextromethorphan-guaifenesin, dextrose, dextrose, glucagon, guaiFENesin, nitroglycerin, ondansetron ODT **OR** ondansetron, oxygen     LABS  Results for orders placed or performed during the hospital encounter of 24 (from the past 24 hour(s))   POCT GLUCOSE   Result Value Ref Range     POCT Glucose 166 (H) 74 - 99 mg/dL   Troponin T   Result Value Ref Range    Troponin T, High Sensitivity 221 (HH) <=14 ng/L   ECG 12 lead   Result Value Ref Range    Ventricular Rate 88 BPM    Atrial Rate 88 BPM    HI Interval 258 ms    QRS Duration 140 ms    QT Interval 414 ms    QTC Calculation(Bazett) 500 ms    P Axis 12 degrees    R Axis 30 degrees    T Axis 219 degrees    QRS Count 14 beats    Q Onset 211 ms    P Onset 82 ms    P Offset 140 ms    T Offset 418 ms    QTC Fredericia 470 ms   POCT GLUCOSE   Result Value Ref Range    POCT Glucose 113 (H) 74 - 99 mg/dL   Troponin T   Result Value Ref Range    Troponin T, High Sensitivity 289 (HH) <=14 ng/L   Troponin T   Result Value Ref Range    Troponin T, High Sensitivity 298 (HH) <=14 ng/L     Scheduled medications  aspirin, 81 mg, oral, Daily  atorvastatin, 20 mg, oral, Nightly  calcium carbonate, 1,000 mg, oral, TID  heparin (porcine), 5,000 Units, subcutaneous, q8h  hydroxychloroquine, 200 mg, oral, Daily  levothyroxine, 62.5 mcg, oral, Daily  lidocaine, 1 patch, transdermal, Nightly  lidocaine, 2 patch, transdermal, Daily  mirtazapine, 7.5 mg, oral, Nightly  pantoprazole, 20 mg, oral, Daily before breakfast      Continuous medications     PRN medications  PRN medications: acetaminophen **OR** acetaminophen **OR** acetaminophen, albuterol, dextromethorphan-guaifenesin, dextrose, dextrose, glucagon, guaiFENesin, nitroglycerin, ondansetron ODT **OR** ondansetron, oxygen    IMAGING     ECG 12 lead    Result Date: 8/16/2024  Sinus rhythm with 1st degree AV block Nonspecific intraventricular block T wave abnormality, consider inferolateral ischemia Abnormal ECG When compared with ECG of 13-AUG-2024 23:22, (unconfirmed) Previous ECG has undetermined rhythm, needs review Nonspecific intraventricular block has replaced (RBBB and left anterior fascicular block) Criteria for Inferior infarct are no longer Present    XR chest 1 view    Result Date:  8/15/2024  Interpreted By:  Chavez Tanner, STUDY: XR CHEST 1 VIEW;  8/15/2024 9:37 pm   INDICATION: Signs/Symptoms:Dyspnea.   COMPARISON: Chest x-ray 08/13/2024   ACCESSION NUMBER(S): [id number]   ORDERING CLINICIAN: JEISON MELENDEZ   FINDINGS: Multiple overlying leads are present.   CARDIOMEDIASTINAL SILHOUETTE: Cardiac silhouette is enlarged but stable. Atherosclerotic calcification of the aorta.   LUNGS: Hazy opacification of the lung bases likely relate to small layering effusions and atelectasis. Superimposed infection not excluded. Mild interstitial prominence. No pneumothorax. No consolidation.   ABDOMEN: No remarkable upper abdominal findings.   BONES: Multilevel degenerative changes of the spine.       Cardiomegaly with mild interstitial prominence suggestive of developing interstitial edema.   Mild blunting of the costophrenic angles likely relate to small layering effusions and atelectasis. Superimposed infection not excluded. Correlate clinically and attention on continued short-term follow-up is advised.   MACRO: None   Signed by: Chavez Tanner 8/15/2024 11:43 PM Dictation workstation:   [id number]    CT chest abdomen pelvis wo IV contrast    Result Date: 8/14/2024  Interpreted By:  Justin Denson, STUDY: CT CHEST ABDOMEN PELVIS WO CONTRAST; ;  8/14/2024 12:39 am   INDICATION: Signs/Symptoms:Sepsis.   COMPARISON: CT chest of 01/09/2018   ACCESSION NUMBER(S): [id number]   ORDERING CLINICIAN: GRAEME FLORES   TECHNIQUE: Noncontrast CT of the chest, abdomen and pelvis with multiplanar reformations.   FINDINGS: CHEST:   VESSELS: Aorta is normal in caliber. Atherosclerotic changes in the aorta and coronary arteries. HEART: Normal size. Hypoattenuation of myocardium relative to blood pool suggests anemia. Mitral annular calcification. No pericardial effusion. MEDIASTINUM AND CESAR: No pathologically enlarged thoracic lymph nodes. Esophagus appears within normal limits. No abnormal mediastinal air or fluid.  LUNG, PLEURA, LARGE AIRWAYS: Respiratory motion artifact limits evaluation of the lung parenchyma. Mild-to-moderate emphysema. Mild smooth interlobular septal thickening suggesting acute pulmonary interstitial edema. Patchy reticulonodular and ground-glass opacities, suspicious for multifocal bilateral pneumonia. Moderate to large right and small to moderate left lateral pleural effusions. No pneumothorax. CHEST WALL AND LOWER NECK: Within normal limits.   ABDOMEN:   BONES: No acute osseous abnormality. Large Schmorl's node in the superior L3 endplate. Severe degenerative changes of the imaged spine. ABDOMINAL WALL: Fat containing left inguinal hernia. Anasarca.   LIVER: Within normal limits. Riedel's lobe noted BILE DUCTS: Normal caliber. GALLBLADDER: No calcified cholelithiasis. Nonspecific wall thickening pericholecystic fluid is nonspecific in the setting of ascites PANCREAS: Within normal limits. SPLEEN: Within normal limits. ADRENALS: Within normal limits. KIDNEYS: Renal cortical atrophy. Small right interpolar cortical cyst. Vascular calcifications about both renal sinuses. Otherwise, normal URETERS: No hydroureter.   PELVIS:   REPRODUCTIVE ORGANS: Prostate is not enlarged. BLADDER: Within normal limits.   VESSELS: Severe atherosclerosis. Fusiform infrarenal aortic ectasia. No aneurysmal dilation is seen. RETROPERITONEUM: Within normal limits. BOWEL: No dilated or thickened bowel. Stomach appears within normal limits.  Normal appendix. PERITONEUM: Peritoneal dialysis catheter is coiled along the superior aspect of the bladder. Small volume of ascites. No pneumoperitoneum or loculated intraperitoneal collection.       Respiratory motion artifact limits evaluation of the lung parenchyma. Mild-to-moderate emphysema. Mild smooth interlobular septal thickening suggesting acute pulmonary interstitial edema. Patchy reticulonodular and ground-glass opacities, suspicious for multifocal bilateral pneumonia. Moderate to  large right and small to moderate left lateral pleural effusions. No pneumothorax.   No definite evidence of acute pathology in the abdomen or pelvis.   Peritoneal dialysis catheter is coiled along the superior aspect of the bladder. Small volume of ascites. No pneumoperitoneum or loculated intraperitoneal collection.   Additional findings as discussed above.   MACRO: None   Signed by: Justin Denson 8/14/2024 12:58 AM Dictation workstation:   [id number]    XR chest 1 view    Result Date: 8/13/2024  Interpreted By:  Tito Concepcion, STUDY: XR CHEST 1 VIEW;  8/13/2024 11:44 pm   INDICATION: Signs/Symptoms:Shortness of breath.   COMPARISON: Chest radiograph 07/08/2024   ACCESSION NUMBER(S): [id number]   ORDERING CLINICIAN: GRAEME FLORES   FINDINGS: A defibrillation pad overlies the right thorax. Heart is mildly enlarged. There is central pulmonary vascular congestion and perihilar opacities. Probable small left pleural effusion. No discernible pneumothorax. No acute osseous abnormality.       1. Mild cardiomegaly and central vascular congestion. 2. Possible small left pleural effusion.     Signed by: Tito Concepcion 8/13/2024 11:59 PM Dictation workstation:   [id number]       ASSESSMENT:     PSYCHIATRIC RISK ASSESSMENT  Violence Risk Factors:  male and stress/destabilizers  Acute Risk of Harm to Others is Considered: Low  Suicide Risk Factors: male, age > 65 years old , and chronic medical illness  Protective Factors: positive family relationships and marriage/partnership  Acute Risk of Harm to Self is Considered: Low    DIAGNOSIS  Assessment & Plan  Metabolic acidosis, increased anion gap (IAG)    Ischemic cardiomyopathy    Systemic lupus erythematosus (Multi)    Nonsustained ventricular tachycardia (Multi)    Coronary artery disease    Chronic systolic congestive heart failure (Multi)    Weakness    Lactic acid acidosis    Peritoneal dialysis catheter in situ (CMS-HCC)    ESRD (end stage renal disease) on dialysis  (Multi)    Chronic respiratory failure (Multi)    Sepsis (Multi)    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 Rowan is a 78 year old male with onset of anxiety symptoms and worsening insomnia in the setting of recent hospitalizations, starting of dialysis, declining function.  He has tried numerous medications for sleep which have been wholly ineffective.  He is agreeable to trialing low dose mirtazapine for sleep and anxiety.  Will also put Vistaril on PRN for anxiety.    PLAN/ RECOMMENDATIONS:   RECS:  -start mirtazapine 7.5 mg at bedtime for sleep, appetite, and anxiety.   -melatonin 5 mg at bedtime to aid sleep/wake cycle  -Vistaril 25 mg Q6H PRN for anxiety    - Would recommend the use of delirium precautions :  -- Minimize use of benzos, opiates, anticholinergics, as these may worsen mental status  -- Would use caution with narcotic pain medications  -- Would still adequately controlling pain, as uncontrolled pain is also a risk factor for delirium  -- Reinforce sleep hygiene; encourage patient to stay awake during the day  -- Keep curtains/blinds open during the day and closed at night.  -- Would recommend reorienting/redirecting patient as much as possible,   -- Aim for consistent staffing, familiar objects, avoiding bright lights and loud noises, etc.        -Thank you for allowing us to participate in the care of this patient.  Psychiatry will continue to follow.

## 2024-08-16 NOTE — PROGRESS NOTES
08/16/24 0855   Discharge Planning   Expected Discharge Disposition Home   Does the patient need discharge transport arranged? No     Notified that patient and family were requesting medications for anxiety overnight. Requested psychiatry consult for medication support if appropriate.     Patient plans to return home upon discharge and family to transport.

## 2024-08-16 NOTE — PROGRESS NOTES
Physical Therapy    Physical Therapy Treatment    Patient Name: George Rowan  MRN: [id number]  Today's Date: 8/16/2024  Time Calculation  Start Time: 1450  Stop Time: 1505  Time Calculation (min): 15 min    Assessment/Plan   PT Assessment  PT Assessment Results: Decreased strength, Decreased endurance, Impaired balance, Decreased mobility, Decreased cognition, Impaired judgement, Decreased safety awareness  Rehab Prognosis: Good  Barriers to Discharge: anxiety, assist for safety  Evaluation/Treatment Tolerance: Other (Comment) (limited by anxiety)  Medical Staff Made Aware: Yes  Strengths: Premorbid level of function  Barriers to Participation: Comorbidities  End of Session Communication: Bedside nurse, PCT/NA/CTA  Assessment Comment: Pt very anxious today, decreased safety awareness, resistant toward assist or intervention for safety  End of Session Patient Position: Bed, 2 rail up, Alarm on (call light in reach,)  PT Plan  Inpatient/Swing Bed or Outpatient: Inpatient  PT Plan  Treatment/Interventions: Transfer training, Bed mobility, Gait training, Stair training, Balance training, Strengthening, Endurance training, Therapeutic exercise, Therapeutic activity  PT Plan: Ongoing PT  PT Frequency: 4 times per week  PT Discharge Recommendations: Low intensity level of continued care  Equipment Recommended upon Discharge: Wheeled walker  PT Recommended Transfer Status: Assist x1, Assistive device  PT - OK to Discharge: Yes      General Visit Information:   PT  Visit  PT Received On: 08/16/24  General  Reason for Referral: impaired mobility  Referred By: Dr Lindsay  Past Medical History Relevant to Rehab: ESRD w/ peritoneal dialysis, CHF, HTN, CAD, STEMI, cardiac stent x 3, macular hole Lt eye, nephroscoliosis, DM, SLE, spinal stenosis, retinal detachment, chronic resp failure  Family/Caregiver Present: No  Prior to Session Communication: Bedside nurse  Patient Position Received: Bed, 2 rail up, Alarm off, not on at start  of session  Preferred Learning Style: verbal, visual  General Comment: Pt approached for PT session, needing to use bathroom, needing assist, reports feeling anxious.    Subjective   Precautions:  Precautions  Medical Precautions: Fall precautions    Objective   Pain:  Pain Assessment  Pain Assessment: 0-10  0-10 (Numeric) Pain Score: 0 - No pain  Cognition:  Cognition  Cognition Comments: Pt very anxious, decreased focus and safety awareness, making requests but then refusing assist, particularly addressing safety w/ mobility  Insight: Moderate  Impulsive: Moderately  Processing Speed: Delayed  Coordination:  Coordination Comment: slow, guarded, uneasy w/ feeling anxious  Postural Control:  Postural Control  Posture Comment: forward flexed ((stomach issues? having diarrhea, denied pain))  Dynamic Standing Balance  Dynamic Standing-Balance Support: No upper extremity supported (pt denied need for use of RW w/ short trip to bathroom)  Dynamic Standing-Level of Assistance: Contact guard  Dynamic Standing-Balance: Turning (amb)  Dynamic Standing-Comments: Fair- (decreased safety awareness w/ equipment lines)    Activity Tolerance:  Activity Tolerance  Endurance: Decreased tolerance for upright activites  Activity Tolerance Comments: Poor today due to anxiety  Treatments:  Bed Mobility 1  Bed Mobility 1: Supine to sitting, Sitting to supine  Level of Assistance 1: Close supervision  Bed Mobility Comments 1: in/out Rt side of bed, HOB elevated. Feeling a little better once settled in bed, appeared more relaxed,  requesting med for diarrhea (nurse informed) and for lip moisturizer.    Ambulation/Gait Training 1  Surface 1: Level tile  Device 1: No device  Assistance 1: Contact guard  Comments/Distance (ft) 1: Pt anxious to get to bathroom, dialysis lines connected, pt not currently doing dialysis; also w/ monitor and purewick needing to be detached, pt attempting to hurry to bathroom, incontinent of a small amount of  loose stool. Pt IND in bathroom, light assist for safety and balance w/ amb ~ 10' x 2, to/from bathroom.  Transfer 1  Technique 1: Sit to stand, Stand to sit  Transfer Level of Assistance 1: Contact guard  Trials/Comments 1: from/to EOB, to/from commode, cues for positioning in bed. Pt quickly sitting down EOB to rest, feeling SOB, anxious; encouraged pt to lie down.    Outcome Measures:  [facility] Basic Mobility  Turning from your back to your side while in a flat bed without using bedrails: None  Moving from lying on your back to sitting on the side of a flat bed without using bedrails: A little  Moving to and from bed to chair (including a wheelchair): A little  Standing up from a chair using your arms (e.g. wheelchair or bedside chair): A little  To walk in hospital room: A little  Climbing 3-5 steps with railing: A lot  Basic Mobility - Total Score: 18    Education Documentation  Precautions, taught by Kylee Ramirez, PT at 8/16/2024  3:51 PM.  Learner: Patient  Readiness: Nonacceptance  Method: Explanation  Response: Needs Reinforcement  Comment: decreased safety awareness w/ anxiety    Mobility Training, taught by Kylee Ramirez, PT at 8/16/2024  3:51 PM.  Learner: Patient  Readiness: Nonacceptance  Method: Explanation  Response: Needs Reinforcement  Comment: decreased safety awareness w/ anxiety    Education Comments  No comments found.        OP EDUCATION:       Encounter Problems       Encounter Problems (Active)       Balance       LTG - Patient will maintain balance to allow for safe mobility (Progressing)       Start:  08/14/24    Expected End:  08/23/24               Mobility       STG - Patient will ambulate 200' w/ LRAD and distant supervision  (Progressing)       Start:  08/14/24    Expected End:  08/23/24            STG - Patient will ascend and descend four to six stairs x 2 w/ 1 rail and supervision  (Progressing)       Start:  08/14/24    Expected End:  08/23/24            Pt will tolerate BLE  exercises to promote functional strength, balance and endurance (Progressing)       Start:  08/14/24    Expected End:  08/23/24               PT Transfers       STG - Patient to transfer to and from sit to supine IND (Progressing)       Start:  08/14/24    Expected End:  08/23/24            STG - Patient will transfer sit to and from stand MOD I (Progressing)       Start:  08/14/24    Expected End:  08/23/24

## 2024-08-16 NOTE — PROGRESS NOTES
Occupational Therapy                 Therapy Communication Note    Patient Name: George Rowan  MRN: [id number]  Today's Date: 8/16/2024     Discipline: Occupational Therapy    Missed Visit Reason: Missed Visit Reason: Parent refused, Patient sleeping    Missed Time: Attempt    Comment: pt refused at bed rest.

## 2024-08-16 NOTE — PROGRESS NOTES
George Rowan is a 78 y.o. male on day 2 of admission presenting with Metabolic acidosis, increased anion gap (IAG).      Subjective   Patient was discharged yesterday and before he left he developed chest pain and neck pain. He had EKG changes.  Patient is anxious about his pain in his neck getting worse. He slept very little last night, more from worry than pain, His pain at the moment is in his neck posteriorly radiating to both shoulders.        Objective     Last Recorded Vitals  BP (!) 92/42 (BP Location: Right arm, Patient Position: Lying)   Pulse 55   Temp 36.4 °C (97.5 °F) (Temporal)   Resp 11   Wt 76.4 kg (168 lb 6.4 oz)   SpO2 93%   Intake/Output last 3 Shifts:    Intake/Output Summary (Last 24 hours) at 8/16/2024 0948  Last data filed at 8/15/2024 1400  Gross per 24 hour   Intake 120 ml   Output --   Net 120 ml       Admission Weight  Weight: 75.5 kg (166 lb 7.2 oz) (08/13/24 2331)    Daily Weight  08/16/24 : 76.4 kg (168 lb 6.4 oz)    Image Results  ECG 12 lead  Sinus rhythm with 1st degree AV block  Nonspecific intraventricular block  T wave abnormality, consider inferolateral ischemia  Abnormal ECG  When compared with ECG of 13-AUG-2024 23:22, (unconfirmed)  Previous ECG has undetermined rhythm, needs review  Nonspecific intraventricular block has replaced (RBBB and left anterior fascicular block)  Criteria for Inferior infarct are no longer Present      Physical Exam  Constitutional:       Appearance: Normal appearance.   Cardiovascular:      Rate and Rhythm: Regular rhythm. Bradycardia present.      Pulses: Normal pulses.      Heart sounds: Normal heart sounds.   Pulmonary:      Effort: Pulmonary effort is normal.      Breath sounds: Normal breath sounds.   Abdominal:      General: Bowel sounds are normal.      Palpations: Abdomen is soft.   Musculoskeletal:         General: Normal range of motion.   Skin:     General: Skin is warm and dry.      Comments: Peritoneal dialysis catheter site  with redness and no swelling.    Neurological:      Mental Status: He is alert and oriented to person, place, and time.         Relevant Results             Results for orders placed or performed during the hospital encounter of 08/13/24 (from the past 24 hour(s))   POCT GLUCOSE   Result Value Ref Range    POCT Glucose 98 74 - 99 mg/dL   POCT GLUCOSE   Result Value Ref Range    POCT Glucose 166 (H) 74 - 99 mg/dL   Troponin T   Result Value Ref Range    Troponin T, High Sensitivity 221 (HH) <=14 ng/L   ECG 12 lead   Result Value Ref Range    Ventricular Rate 88 BPM    Atrial Rate 88 BPM    NC Interval 258 ms    QRS Duration 140 ms    QT Interval 414 ms    QTC Calculation(Bazett) 500 ms    P Axis 12 degrees    R Axis 30 degrees    T Axis 219 degrees    QRS Count 14 beats    Q Onset 211 ms    P Onset 82 ms    P Offset 140 ms    T Offset 418 ms    QTC Fredericia 470 ms   POCT GLUCOSE   Result Value Ref Range    POCT Glucose 113 (H) 74 - 99 mg/dL   Troponin T   Result Value Ref Range    Troponin T, High Sensitivity 289 (HH) <=14 ng/L   Troponin T   Result Value Ref Range    Troponin T, High Sensitivity 298 (HH) <=14 ng/L       Assessment/Plan                  Assessment & Plan  Metabolic acidosis, increased anion gap (IAG)    Ischemic cardiomyopathy    Systemic lupus erythematosus (Multi)    Nonsustained ventricular tachycardia (Multi)    Coronary artery disease    Chronic systolic congestive heart failure (Multi)    Weakness    Lactic acid acidosis    Peritoneal dialysis catheter in situ (CMS-[facility])    ESRD (end stage renal disease) on dialysis (Multi)    Chronic respiratory failure (Multi)    Sepsis (Multi)    Generalized Weakness 2/2 Presumed Sepsis 2/2 Mutifocal Bilateral Pneumonia Versus UTI versus Alternative source  Continuous cardiac pulse oximetry monitoring  Patient noted to be hypotensive/bradycardic/tachypneic in the ED; blood pressure on the low side, heart rate in the 50's and patient is does not have  tachypnea. He has been stable overnight. HE has had the neck and back pain.   Elevated lactic acid level  Continue with broad-spectrum IV antibiotics; Vancomycin and Zosyn  Follow-up blood cultures and urine cultures  Procalcitonin level requested  Infectious disease following  Urine Legionella and urine streptococcal antigen levels negative  Aspiration precautions/fall precautions  Holding all sedatives and gabapentinoids    High anion gap metabolic acidosis secondary to lactic acidosis   Possibly due to a combination of worsening renal function (Uremia) and/or sepsis. Patient had an anion gap of 19 yesterday. Will defer repeat to nehrology   Acute Pulmonary Intersitital Edema/B/L Pleural Effusions/Presumed Decompensated Heart Failure (HFrEF)   Management as above  Recent repeat 2D echocardiogram noted  May need ICD in near Future considering cardiomyopathy  Defer further management to cardiology   Transient Bradycardia in the ED/Elevated Troponin level   Will hold all AV sarai blocking agents  Holding Coreg  Defer further management to cardiology  Type 2 Demand Ischemia in the setting of ESRD and presumed Sepsis   Ischemic Cardiomyopathy   Meticulous volume management  ESRD on PD   Cr tends to be elevating recently. Worsening Uremia  Avoid nephrotoxic agents  Nephrology consultation placed  Defer further diuretics to nephrology specialist  Peritoneal dialysis catheter is coiled along the superior aspect of  the bladder per CT.  Defer to Nephrology   Anemia of chronic kidney disease  Defer EPO dose to Nephrology Team  CAD s/p PCI with Stents  He has roughly 3 stents that were placed by Dr. Valdez  Continue aspirin and statin therapy  Dr Donaldson will plan to take him to heart cath on Monday because of chest pain overnight.   H/o Carotid Artery Stenosis  Patient follows with Dr. Reid  Continue aspirin and statin therapy  Systemic Lupus Erythematosus  Continue hydroxychloroquine home dose  Gout  Holding Allopurinol  2/2 renal Function for now   Hypothyroidism  Increased levothyroxine to 62.5 mg daily  Hypertension    Monitor BP for any need in adjustments to medication  GI + DVT Prophylaxis   Low-dose oral PPI  Heparin subcu (monitor platelets closely)  Plan of Care  Heart cath Monday.   Return home with family at discharge.  Plan of care discussed with patient and collaborating physician.               Carmela Ham, APRN-CNP

## 2024-08-16 NOTE — CARE PLAN
Problem: Skin  Goal: Participates in plan/prevention/treatment measures  Outcome: Progressing  Flowsheets (Taken 8/16/2024 1948)  Participates in plan/prevention/treatment measures: Discuss with provider PT/OT consult  Goal: Prevent/minimize sheer/friction injuries  Outcome: Progressing  Flowsheets (Taken 8/16/2024 1948)  Prevent/minimize sheer/friction injuries:   Complete micro-shifts as needed if patient unable. Adjust patient position to relieve pressure points, not a full turn   HOB 30 degrees or less   Increase activity/out of bed for meals     Problem: Fall/Injury  Goal: Not fall by end of shift  Outcome: Progressing  Goal: Be free from injury by end of the shift  Outcome: Progressing  Goal: Verbalize understanding of personal risk factors for fall in the hospital  Outcome: Progressing  Goal: Verbalize understanding of risk factor reduction measures to prevent injury from fall in the home  Outcome: Progressing  Goal: Use assistive devices by end of the shift  Outcome: Progressing  Goal: Pace activities to prevent fatigue by end of the shift  Outcome: Progressing     Problem: Safety - Adult  Goal: Free from fall injury  Outcome: Progressing  Flowsheets (Taken 8/16/2024 1948)  Free from fall injury: Instruct family/caregiver on patient safety     Problem: Discharge Planning  Goal: Discharge to home or other facility with appropriate resources  Outcome: Progressing  Flowsheets (Taken 8/16/2024 1948)  Discharge to home or other facility with appropriate resources:   Identify barriers to discharge with patient and caregiver   Arrange for needed discharge resources and transportation as appropriate     Problem: Chronic Conditions and Co-morbidities  Goal: Patient's chronic conditions and co-morbidity symptoms are monitored and maintained or improved  Outcome: Progressing  Flowsheets (Taken 8/16/2024 1948)  Care Plan - Patient's Chronic Conditions and Co-Morbidity Symptoms are Monitored and Maintained or  Improved:   Monitor and assess patient's chronic conditions and comorbid symptoms for stability, deterioration, or improvement   Collaborate with multidisciplinary team to address chronic and comorbid conditions and prevent exacerbation or deterioration     Problem: Diabetes  Goal: Achieve decreasing blood glucose levels by end of shift  Outcome: Progressing  Flowsheets (Taken 8/16/2024 1948)  Achieve decreasing blood glucose levels by end of shift: Med administration/monitoring of effect  Goal: Increase stability of blood glucose readings by end of shift  Outcome: Progressing  Flowsheets (Taken 8/16/2024 1948)  Increase stability of blood glucose readings by end of shift: Med administration/monitoring of effect  Goal: Decrease in ketones present in urine by end of shift  Outcome: Progressing  Goal: Maintain electrolyte levels within acceptable range throughout shift  Outcome: Progressing  Flowsheets (Taken 8/16/2024 1948)  Maintain electrolyte levels within acceptable range throughout shift: Med administration/monitoring of effect  Goal: Maintain glucose levels >70mg/dl to <250mg/dl throughout shift  Outcome: Progressing  Flowsheets (Taken 8/16/2024 1948)  Maintain glucose levels >70mg/dl to <250mg/dl throughout shift: Med administration/monitoring of effect  Goal: No changes in neurological exam by end of shift  Outcome: Progressing  Flowsheets (Taken 8/16/2024 1948)  No changes in neurological exam by end of shift: Complete frequent neurological assessments  Goal: Learn about and adhere to nutrition recommendations by end of shift  Outcome: Progressing  Flowsheets (Taken 8/16/2024 1948)  Learn about and adhere to nutrition recommendations by end of shift: Ensure/encourage compliance with appropriate diet  Goal: Vital signs within normal range for age by end of shift  Outcome: Progressing  Flowsheets (Taken 8/16/2024 1948)  Vital signs within normal range for age by end of shift: Med administration/monitoring of  "effect  Goal: Increase self care and/or family involovement by end of shift  Outcome: Progressing  Flowsheets (Taken 8/16/2024 1948)  Increase self care and/or family involovement by end of shift: Self monitor blood glucose with staff oversight  Goal: Receive DSME education by end of shift  Outcome: Progressing  Flowsheets (Taken 8/16/2024 1948)  Receive DSME education by end of shift: Provide patient centered education on Diabetic Self Management Education     Problem: Heart Failure  Goal: Improved gas exchange this shift  Outcome: Progressing  Flowsheets (Taken 8/16/2024 1948)  Improved gas exchange this shift: Assist with pulmonary hygiene and secretion clearance  Goal: Improved urinary output this shift  Outcome: Progressing  Goal: Reduction in peripheral edema within 24 hours  Outcome: Progressing  Flowsheets (Taken 8/16/2024 1948)  Reduction in peripheral edema within 24 hours: Monitor edema/skin/mucous membrane integrity  Goal: Report improvement of dyspnea/breathlessness this shift  Outcome: Progressing  Flowsheets (Taken 8/16/2024 1948)  Report improvement of dyspnea/breathlessness this shift: Encourage activity/mobility/ROM  Goal: Weight from fluid excess reduced over 2-3 days, then stabilize  Outcome: Progressing  Flowsheets (Taken 8/16/2024 1948)  Weight from fluid excess reduced over 2-3 days, then stabilize:   Med administration/monitor effect   Monitor intake/output and daily weight  Goal: Increase self care and/or family involvement in 24 hours  Outcome: Progressing  Flowsheets (Taken 8/16/2024 1948)  Increase self care and/or family involvement in 24 hours:   Assess for signs/symptoms of depression   Facilitate advanced care planning/discussion of treatment options   Organize tasks/allow time for rest   The patient's goals for the shift include \"Im wanting to go to sleep\"    The clinical goals for the shift include rest    Over the shift, the patient did not make progress toward the following goals. " Barriers to progression include overstimulation. Recommendations to address these barriers include lower stimulus environment.

## 2024-08-16 NOTE — PROGRESS NOTES
"George Rowan is a 78 y.o. male on day 2 of admission presenting with Metabolic acidosis, increased anion gap (IAG).    Subjective   Was asked to revisit this patient for an episode of neck and shoulder pain radiating into his chest yesterday with physical therapy relieved with rest.  He is currently chest pain-free.  Blood pressures are marginal as they have been.  His EKG during the chest pain episode shows sinus rhythm with some nonspecific ST-T wave abnormalities not necessarily indicative of ischemia.  High-sensitivity troponins are elevated in the presence of end-stage renal disease in a patient on peritoneal dialysis.       Objective     Physical Exam   Gen: NAD   Neck: no JVD, carotid upstroke is brisk and without delay   Heart: rrr, s1s2+ no mrg   Lungs: CTA   Ext: warm no edema  Last Recorded Vitals  Blood pressure (!) 92/42, pulse 55, temperature 36.4 °C (97.5 °F), temperature source Temporal, resp. rate 11, height 1.702 m (5' 7\"), weight 76.4 kg (168 lb 6.4 oz), SpO2 93%.  Intake/Output last 3 Shifts:  I/O last 3 completed shifts:  In: 320 (4.2 mL/kg) [P.O.:320]  Out: - (0 mL/kg)   Weight: 76.4 kg         Assessment/Plan   Assessment & Plan  Metabolic acidosis, increased anion gap (IAG)    Ischemic cardiomyopathy    Systemic lupus erythematosus (Multi)    Nonsustained ventricular tachycardia (Multi)    Coronary artery disease    Chronic systolic congestive heart failure (Multi)    Weakness    Lactic acid acidosis    Peritoneal dialysis catheter in situ (CMS-[facility])    ESRD (end stage renal disease) on dialysis (Multi)    Chronic respiratory failure (Multi)    Sepsis (Multi)    8/16: This is a patient with an ischemic cardiomyopathy, cardiac catheterization in the setting of a myocardial infarction in 2019 with percutaneous intervention to 100% occluded circumflex that was dominant for posterior circulation with 2 drug-eluting stents.  His right was diffusely diseased severely so but nondominant.  His LAD " had moderate disease.  He has mild aortic stenosis.  He had an echocardiogram in June that showed an ejection fraction of 30 to 35% which is new in comparison to an echo performed in 2023.  He is on aspirin and a high potency statin however in terms of medical therapy for his ischemic cardiomyopathy we are limited due to bradycardia and hypotension.  I feel at this point cardiac catheterization with possible percutaneous coronary intervention is appropriate if anything for symptom relief and for diagnostic purposes given his relatively new systolic dysfunction.  I discussed this with him.  We would be able to facilitate this on Monday.  We discussed the details of the procedure as well as the risk benefits and alternatives.  Will also had this discussion with his daughter.       I spent 25 minutes in the professional and overall care of this patient.      Zackery Donaldson, DO

## 2024-08-17 LAB — STAPHYLOCOCCUS SPEC CULT: ABNORMAL

## 2024-08-17 PROCEDURE — 2500000002 HC RX 250 W HCPCS SELF ADMINISTERED DRUGS (ALT 637 FOR MEDICARE OP, ALT 636 FOR OP/ED)

## 2024-08-17 PROCEDURE — 2500000004 HC RX 250 GENERAL PHARMACY W/ HCPCS (ALT 636 FOR OP/ED): Performed by: INTERNAL MEDICINE

## 2024-08-17 PROCEDURE — 2500000002 HC RX 250 W HCPCS SELF ADMINISTERED DRUGS (ALT 637 FOR MEDICARE OP, ALT 636 FOR OP/ED): Performed by: NURSE PRACTITIONER

## 2024-08-17 PROCEDURE — 2500000001 HC RX 250 WO HCPCS SELF ADMINISTERED DRUGS (ALT 637 FOR MEDICARE OP): Performed by: INTERNAL MEDICINE

## 2024-08-17 PROCEDURE — 99232 SBSQ HOSP IP/OBS MODERATE 35: CPT | Performed by: INTERNAL MEDICINE

## 2024-08-17 PROCEDURE — 2500000005 HC RX 250 GENERAL PHARMACY W/O HCPCS: Performed by: INTERNAL MEDICINE

## 2024-08-17 PROCEDURE — 2500000005 HC RX 250 GENERAL PHARMACY W/O HCPCS: Performed by: NURSE PRACTITIONER

## 2024-08-17 PROCEDURE — 2060000001 HC INTERMEDIATE ICU ROOM DAILY

## 2024-08-17 PROCEDURE — 2500000002 HC RX 250 W HCPCS SELF ADMINISTERED DRUGS (ALT 637 FOR MEDICARE OP, ALT 636 FOR OP/ED): Performed by: INTERNAL MEDICINE

## 2024-08-17 RX ORDER — ZOLPIDEM TARTRATE 5 MG/1
5 TABLET ORAL NIGHTLY PRN
Status: DISCONTINUED | OUTPATIENT
Start: 2024-08-17 | End: 2024-08-18 | Stop reason: HOSPADM

## 2024-08-17 ASSESSMENT — PAIN DESCRIPTION - ORIENTATION: ORIENTATION: MID

## 2024-08-17 ASSESSMENT — PAIN DESCRIPTION - LOCATION: LOCATION: BACK

## 2024-08-17 ASSESSMENT — COGNITIVE AND FUNCTIONAL STATUS - GENERAL
MOBILITY SCORE: 24
DAILY ACTIVITIY SCORE: 24

## 2024-08-17 ASSESSMENT — PAIN SCALES - GENERAL
PAINLEVEL_OUTOF10: 5 - MODERATE PAIN
PAINLEVEL_OUTOF10: 0 - NO PAIN
PAINLEVEL_OUTOF10: 0 - NO PAIN
PAINLEVEL_OUTOF10: 3

## 2024-08-17 ASSESSMENT — PAIN - FUNCTIONAL ASSESSMENT
PAIN_FUNCTIONAL_ASSESSMENT: 0-10

## 2024-08-17 NOTE — CARE PLAN
Problem: Skin  Goal: Participates in plan/prevention/treatment measures  Outcome: Progressing  Flowsheets (Taken 8/17/2024 1439)  Participates in plan/prevention/treatment measures: Discuss with provider PT/OT consult  Goal: Prevent/minimize sheer/friction injuries  Outcome: Progressing  Flowsheets (Taken 8/17/2024 1439)  Prevent/minimize sheer/friction injuries:   Complete micro-shifts as needed if patient unable. Adjust patient position to relieve pressure points, not a full turn   HOB 30 degrees or less   Increase activity/out of bed for meals     Problem: Fall/Injury  Goal: Not fall by end of shift  Outcome: Progressing  Goal: Be free from injury by end of the shift  Outcome: Progressing  Goal: Verbalize understanding of personal risk factors for fall in the hospital  Outcome: Progressing  Goal: Verbalize understanding of risk factor reduction measures to prevent injury from fall in the home  Outcome: Progressing  Goal: Use assistive devices by end of the shift  Outcome: Progressing  Goal: Pace activities to prevent fatigue by end of the shift  Outcome: Progressing     Problem: Safety - Adult  Goal: Free from fall injury  Outcome: Progressing     Problem: Discharge Planning  Goal: Discharge to home or other facility with appropriate resources  Outcome: Progressing  Flowsheets (Taken 8/17/2024 1439)  Discharge to home or other facility with appropriate resources:   Identify barriers to discharge with patient and caregiver   Arrange for needed discharge resources and transportation as appropriate   Identify discharge learning needs (meds, wound care, etc)     Problem: Chronic Conditions and Co-morbidities  Goal: Patient's chronic conditions and co-morbidity symptoms are monitored and maintained or improved  Outcome: Progressing  Flowsheets (Taken 8/16/2024 1948)  Care Plan - Patient's Chronic Conditions and Co-Morbidity Symptoms are Monitored and Maintained or Improved:   Monitor and assess patient's chronic  conditions and comorbid symptoms for stability, deterioration, or improvement   Collaborate with multidisciplinary team to address chronic and comorbid conditions and prevent exacerbation or deterioration     Problem: Diabetes  Goal: Achieve decreasing blood glucose levels by end of shift  Outcome: Progressing  Flowsheets (Taken 8/17/2024 1439)  Achieve decreasing blood glucose levels by end of shift: Med administration/monitoring of effect  Goal: Increase stability of blood glucose readings by end of shift  Outcome: Progressing  Flowsheets (Taken 8/17/2024 1439)  Increase stability of blood glucose readings by end of shift: Med administration/monitoring of effect  Goal: Decrease in ketones present in urine by end of shift  Outcome: Progressing  Flowsheets (Taken 8/17/2024 1439)  Decrease in ketones present in urine by end of shift: Med administration/monitoring of effect  Goal: Maintain electrolyte levels within acceptable range throughout shift  Outcome: Progressing  Flowsheets (Taken 8/17/2024 1439)  Maintain electrolyte levels within acceptable range throughout shift: Med administration/monitoring of effect  Goal: Maintain glucose levels >70mg/dl to <250mg/dl throughout shift  Outcome: Progressing  Flowsheets (Taken 8/17/2024 1439)  Maintain glucose levels >70mg/dl to <250mg/dl throughout shift: Med administration/monitoring of effect  Goal: No changes in neurological exam by end of shift  Outcome: Progressing  Flowsheets (Taken 8/17/2024 1439)  No changes in neurological exam by end of shift: Complete frequent neurological assessments  Goal: Learn about and adhere to nutrition recommendations by end of shift  Outcome: Progressing  Flowsheets (Taken 8/17/2024 1439)  Learn about and adhere to nutrition recommendations by end of shift: Ensure/encourage compliance with appropriate diet  Goal: Vital signs within normal range for age by end of shift  Outcome: Progressing  Flowsheets (Taken 8/17/2024 1439)  Vital  signs within normal range for age by end of shift: Med administration/monitoring of effect  Goal: Increase self care and/or family involovement by end of shift  Outcome: Progressing  Flowsheets (Taken 8/17/2024 1439)  Increase self care and/or family involovement by end of shift: Self monitor blood glucose with staff oversight  Goal: Receive DSME education by end of shift  Outcome: Progressing  Flowsheets (Taken 8/17/2024 1439)  Receive DSME education by end of shift: Provide patient centered education on Diabetic Self Management Education     Problem: Heart Failure  Goal: Improved gas exchange this shift  Outcome: Progressing  Flowsheets (Taken 8/17/2024 1439)  Improved gas exchange this shift: Assist with pulmonary hygiene and secretion clearance  Goal: Improved urinary output this shift  Outcome: Progressing  Flowsheets (Taken 8/17/2024 1439)  Improved urinary output this shift: Med administration/monitor effect  Goal: Reduction in peripheral edema within 24 hours  Outcome: Progressing  Flowsheets (Taken 8/17/2024 1439)  Reduction in peripheral edema within 24 hours: Monitor edema/skin/mucous membrane integrity  Goal: Report improvement of dyspnea/breathlessness this shift  Outcome: Progressing  Flowsheets (Taken 8/17/2024 1439)  Report improvement of dyspnea/breathlessness this shift:   Ambulate/OOB 3 times daily   Encourage activity/mobility/ROM  Goal: Weight from fluid excess reduced over 2-3 days, then stabilize  Outcome: Progressing  Flowsheets (Taken 8/17/2024 1439)  Weight from fluid excess reduced over 2-3 days, then stabilize:   Med administration/monitor effect   Monitor intake/output and daily weight  Goal: Increase self care and/or family involvement in 24 hours  Outcome: Progressing  Flowsheets (Taken 8/17/2024 1439)  Increase self care and/or family involvement in 24 hours:   Assess for signs/symptoms of depression   Facilitate advanced care planning/discussion of treatment options   Organize  "tasks/allow time for rest   The patient's goals for the shift include \"Im wanting to go to sleep\"    The clinical goals for the shift include rest/sleep    Over the shift, the patient did not make progress toward the following goals. Barriers to progression include restlessness. Recommendations to address these barriers include sleep hygiene strategies.    "

## 2024-08-17 NOTE — PROGRESS NOTES
George Rowan is a 78 y.o. male on day 3 of admission presenting with Metabolic acidosis, increased anion gap (IAG).      Subjective   Patient resting in bed. He is anxious and not able to wear bipap at night. His daughter is at bedside changing his catheter dressing.        Objective     Last Recorded Vitals  /64 (BP Location: Right arm)   Pulse 87   Temp 36.2 °C (97.2 °F) (Temporal)   Resp (!) 32   Wt 76.4 kg (168 lb 6.4 oz)   SpO2 100%   Intake/Output last 3 Shifts:    Intake/Output Summary (Last 24 hours) at 8/17/2024 1357  Last data filed at 8/17/2024 1100  Gross per 24 hour   Intake 150 ml   Output 50 ml   Net 100 ml       Admission Weight  Weight: 75.5 kg (166 lb 7.2 oz) (08/13/24 2331)    Daily Weight  08/16/24 : 76.4 kg (168 lb 6.4 oz)    Image Results  ECG 12 lead  Sinus rhythm with 1st degree AV block  Nonspecific intraventricular block  T wave abnormality, consider inferolateral ischemia  Abnormal ECG  When compared with ECG of 13-AUG-2024 23:22, (unconfirmed)  Previous ECG has undetermined rhythm, needs review  Nonspecific intraventricular block has replaced (RBBB and left anterior fascicular block)  Criteria for Inferior infarct are no longer Present      Physical Exam  Constitutional:       Appearance: He is ill-appearing.   Cardiovascular:      Rate and Rhythm: Normal rate and regular rhythm.      Pulses: Normal pulses.      Heart sounds: Normal heart sounds.   Pulmonary:      Effort: Pulmonary effort is normal.      Breath sounds: Normal breath sounds.   Abdominal:      General: Bowel sounds are normal.      Palpations: Abdomen is soft.   Musculoskeletal:         General: Normal range of motion.   Skin:     General: Skin is warm and dry.   Neurological:      Mental Status: He is alert and oriented to person, place, and time.         Relevant Results             No results found for this or any previous visit (from the past 24 hour(s)).    Assessment/Plan                  Assessment &  Plan  Metabolic acidosis, increased anion gap (IAG)    Ischemic cardiomyopathy    Systemic lupus erythematosus (Multi)    Nonsustained ventricular tachycardia (Multi)    Coronary artery disease    Chronic systolic congestive heart failure (Multi)    Weakness    Lactic acid acidosis    Peritoneal dialysis catheter in situ (CMS-HCC)    ESRD (end stage renal disease) on dialysis (Multi)    Chronic respiratory failure (Multi)    Sepsis (Multi)    Generalized Weakness 2/2 Presumed Sepsis 2/2 Mutifocal Bilateral Pneumonia Versus UTI versus Alternative source  Continuous cardiac pulse oximetry monitoring  Patient noted to be hypotensive/bradycardic/tachypneic in the ED; blood pressure on the low side, heart rate in the 50's and patient is does not have tachypnea. He has been stable overnight. He has had the neck and back pain. States he hasn't slept at all.  -add ambien for sleep.   Elevated lactic acid level  Continue with broad-spectrum IV antibiotics; Vancomycin and Zosyn  Follow-up blood cultures and urine cultures  Procalcitonin level requested  Infectious disease following  Urine Legionella and urine streptococcal antigen levels negative  Aspiration precautions/fall precautions  Holding all sedatives and gabapentinoids    High anion gap metabolic acidosis secondary to lactic acidosis   Possibly due to a combination of worsening renal function (Uremia) and/or sepsis. Patient had an anion gap of 19 yesterday. Will defer repeat to nehrology   Acute Pulmonary Intersitital Edema/B/L Pleural Effusions/Presumed Decompensated Heart Failure (HFrEF)   Management as above  Recent repeat 2D echocardiogram noted  May need ICD in near Future considering cardiomyopathy  Defer further management to cardiology   Transient Bradycardia in the ED/Elevated Troponin level   Will hold all AV sarai blocking agents  Holding Coreg  Defer further management to cardiology  Type 2 Demand Ischemia in the setting of ESRD and presumed Sepsis    Ischemic Cardiomyopathy   Meticulous volume management  ESRD on PD   Cr tends to be elevating recently. Worsening Uremia  Avoid nephrotoxic agents  Nephrology consultation placed  Defer further diuretics to nephrology specialist  Peritoneal dialysis catheter is coiled along the superior aspect of  the bladder per CT.  Defer to Nephrology   Anemia of chronic kidney disease  Defer EPO dose to Nephrology Team  CAD s/p PCI with Stents  He has roughly 3 stents that were placed by Dr. Valdez  Continue aspirin and statin therapy  Dr Donaldson will plan to take him to heart cath on Monday because of chest pain overnight.   H/o Carotid Artery Stenosis  Patient follows with Dr. Reid  Continue aspirin and statin therapy  Systemic Lupus Erythematosus  Continue hydroxychloroquine home dose  Gout  Holding Allopurinol 2/2 renal Function for now   Hypothyroidism  Increased levothyroxine to 62.5 mg daily  Hypertension    Monitor BP for any need in adjustments to medication  GI + DVT Prophylaxis   Low-dose oral PPI  Heparin subcu (monitor platelets closely)  Plan of Care  Heart cath Monday.   Return home with family at discharge.  Plan of care discussed with patient and collaborating physician.               Carmela Ham, APRN-CNP

## 2024-08-17 NOTE — PROGRESS NOTES
George Rowan is a 78 y.o. male on day 3 of admission presenting with Metabolic acidosis, increased anion gap (IAG).      Subjective   No new overnight events.  CCPD running well.  SBP in the 90s to 110s.       Scheduled medications  aspirin, 81 mg, oral, Daily  atorvastatin, 20 mg, oral, Nightly  calcium carbonate, 1,000 mg, oral, TID  heparin (porcine), 5,000 Units, subcutaneous, q8h  hydroxychloroquine, 200 mg, oral, Daily  levothyroxine, 62.5 mcg, oral, Daily  lidocaine, 1 patch, transdermal, Nightly  lidocaine, 2 patch, transdermal, Daily  melatonin, 5 mg, oral, Nightly  pantoprazole, 20 mg, oral, Daily before breakfast      Continuous medications     PRN medications  PRN medications: acetaminophen **OR** acetaminophen **OR** acetaminophen, albuterol, dextromethorphan-guaifenesin, dextrose, dextrose, glucagon, guaiFENesin, hydrOXYzine pamoate, nitroglycerin, ondansetron ODT **OR** ondansetron, oxygen, zolpidem      Objective     Vitals 24HR  Heart Rate:  [60-92]   Temp:  [35.9 °C (96.6 °F)-36.4 °C (97.5 °F)]   Resp:  [19-32]   BP: ()/(46-70)   SpO2:  [95 %-100 %]     General: Elderly man, on BiPAP  Lungs: Clear anteriorly  Heart: S1 and S2, regular  Abdomen: Soft, PD catheter in place  Extremities: No pedal edema  Neuro: Sleeping but arousable      Intake/Output last 3 Shifts:    Intake/Output Summary (Last 24 hours) at 8/17/2024 1432  Last data filed at 8/17/2024 1100  Gross per 24 hour   Intake 150 ml   Output 50 ml   Net 100 ml         Assessment/Plan      ESRD on PD  History of hypotension  Fluid overload  Hyponatremia  Anemia in CKD    CCPD reported to be running well and it appears that patient's daughter has been the one taking care of this (using his home machine and solutions).  Will continue with 2.5% dextrose solution.    Continue other care.      Melo Kwok MD

## 2024-08-17 NOTE — PROGRESS NOTES
Subjective Data:  none    Overnight Events:    none     Objective Data:  Last Recorded Vitals:  Vitals:    08/16/24 2130 08/17/24 0043 08/17/24 0410 08/17/24 0743   BP: 99/70 106/69 110/66 103/60   BP Location:  Right arm Right arm Right arm   Patient Position:  Lying Sitting Lying   Pulse: 75 92 87    Resp: (!) 28 20 (!) 32    Temp:  35.9 °C (96.6 °F) 35.9 °C (96.6 °F) 36.1 °C (97 °F)   TempSrc:  Oral Oral Temporal   SpO2: 99% 100% 97% 100%   Weight:       Height:           Last Labs:  CBC - 8/15/2024:  9:12 AM  11.1 7.8 137    23.7      CMP - 8/15/2024:  9:12 AM  8.4 5.5 119 --- 0.2   6.9 3.2 171 150      PTT - 8/22/2023:  1:24 PM  1.0   10.9 27.3     HGBA1C   Date/Time Value Ref Range Status   08/14/2024 04:28 AM 6.4 See below % Final   05/28/2024 11:02 AM 6.2 4.3 - 5.6 % Final     Comment:     American Diabetes Association guidelines indicate that patients with HgbA1c in the range 5.7-6.4% are at increased risk for development of diabetes, and intervention by lifestyle modification may be beneficial. HgbA1c greater or equal to 6.5% is considered diagnostic of diabetes.   08/22/2023 01:24 PM 6.0 4.0 - 6.0 % Final     Comment:     Hemoglobin A1C levels are related to mean blood glucose during the   preceding 2-3 months. The relationship table below may be used as a   general guide. Each 1% increase in HGB A1C is a reflection of an   increase in mean glucose of approximately 30 mg/dl.   Reference: Diabetes Care, volume 29, supplement 1 Jan. 2006                        HGB A1C ................. Approx. Mean Glucose   _______________________________________________   6%   ...............................  120 mg/dl   7%   ...............................  150 mg/dl   8%   ...............................  180 mg/dl   9%   ...............................  210 mg/dl   10%  ...............................  240 mg/dl  Performed at [address] [address]     03/29/2023 09:09 AM 5.8 4.0 - 6.0 % Final  "    Comment:     Hemoglobin A1C levels are related to mean blood glucose during the   preceding 2-3 months. The relationship table below may be used as a   general guide. Each 1% increase in HGB A1C is a reflection of an   increase in mean glucose of approximately 30 mg/dl.   Reference: Diabetes Care, volume 29, supplement 1 Jan. 2006                        HGB A1C ................. Approx. Mean Glucose   _______________________________________________   6%   ...............................  120 mg/dl   7%   ...............................  150 mg/dl   8%   ...............................  180 mg/dl   9%   ...............................  210 mg/dl   10%  ...............................  240 mg/dl  Performed at [address] [address]       LDLCALC   Date/Time Value Ref Range Status   04/24/2023 09:15 AM 76 65 - 130 MG/DL Final   10/13/2022 09:19 AM 63 65 - 130 MG/DL Final   04/05/2022 10:40 AM 58 65 - 130 MG/DL Final      Last I/O:  No intake/output data recorded.    Past Cardiology Tests (Last 3 Years):  EKG:  ECG 12 lead 08/15/2024 (Preliminary)      ECG 12 Lead 07/06/2024      ECG 12 lead 06/17/2024      Electrocardiogram, 12-lead PRN ACS symptoms 06/17/2024      ECG 12 lead 10/16/2023    Echo:  Transthoracic Echo (TTE) Complete 06/18/2024    Ejection Fractions:  No results found for: \"EF\"  Cath:  No results found for this or any previous visit from the past 1095 days.    Stress Test:  Nuclear Stress Test 05/07/2024    Cardiac Imaging:  No results found for this or any previous visit from the past 1095 days.      Inpatient Medications:  Scheduled medications   Medication Dose Route Frequency    aspirin  81 mg oral Daily    atorvastatin  20 mg oral Nightly    calcium carbonate  1,000 mg oral TID    heparin (porcine)  5,000 Units subcutaneous q8h    hydroxychloroquine  200 mg oral Daily    levothyroxine  62.5 mcg oral Daily    lidocaine  1 patch transdermal Nightly    lidocaine  2 patch " transdermal Daily    melatonin  5 mg oral Nightly    mirtazapine  7.5 mg oral Nightly    pantoprazole  20 mg oral Daily before breakfast     PRN medications   Medication    acetaminophen    Or    acetaminophen    Or    acetaminophen    albuterol    dextromethorphan-guaifenesin    dextrose    dextrose    glucagon    guaiFENesin    hydrOXYzine pamoate    nitroglycerin    ondansetron ODT    Or    ondansetron    oxygen     Continuous Medications   Medication Dose Last Rate       Physical Exam:  Neck:  Normal jugular venous pressure,  Lungs:  Clear to auscultation without wheezing or rhonchi or rales  Heart:  Regular rate and rhythm normal S1 and S2, soft 1/6 systolic ejection murmur aortic region is stable   abdomen:  Soft, good bowel sounds, nontender,   Extremities:   without pretibial edema  Neurological:  No focal sensory or motor deficits,    Assessment/Plan   8/16: This is a patient with an ischemic cardiomyopathy, cardiac catheterization in the setting of a myocardial infarction in 2019 with percutaneous intervention to 100% occluded circumflex that was dominant for posterior circulation with 2 drug-eluting stents.  His right was diffusely diseased severely so but nondominant.  His LAD had moderate disease.  He has mild aortic stenosis.  He had an echocardiogram in June that showed an ejection fraction of 30 to 35% which is new in comparison to an echo performed in 2023.  He is on aspirin and a high potency statin however in terms of medical therapy for his ischemic cardiomyopathy we are limited due to bradycardia and hypotension.  I feel at this point cardiac catheterization with possible percutaneous coronary intervention is appropriate if anything for symptom relief and for diagnostic purposes given his relatively new systolic dysfunction.  I discussed this with him.  We would be able to facilitate this on Monday.  We discussed the details of the procedure as well as the risk benefits and alternatives.  Will  also had this discussion with his daughter.     8/17: Scheduled for cardiac catheterization Monday, 8/19/2024 to assess whether or not his recent chest and back and esophageal discomfort was related to coronary artery obstruction.  Fortunately with his chronic renal failure and aortic valve stenosis is options for cardiomyopathy management did not include ACE inhibitor/ARB/ARNI.  His hemodynamics are stable, O2 saturation 100%, nephrology is aware of plans for his cardiac catheterization.      Code Status:  Full Code    I spent 20 minutes in the professional and overall care of this patient.        Seb Valdez, DO

## 2024-08-18 VITALS
HEIGHT: 67 IN | RESPIRATION RATE: 26 BRPM | TEMPERATURE: 97.5 F | WEIGHT: 168.43 LBS | BODY MASS INDEX: 26.44 KG/M2 | OXYGEN SATURATION: 100 % | SYSTOLIC BLOOD PRESSURE: 109 MMHG | HEART RATE: 69 BPM | DIASTOLIC BLOOD PRESSURE: 82 MMHG

## 2024-08-18 LAB
ANION GAP BLDA CALCULATED.4IONS-SCNC: 29 MMO/L (ref 10–25)
ARTERIAL PATENCY WRIST A: POSITIVE
ATRIAL RATE: 88 BPM
BACTERIA BLD CULT: NORMAL
BACTERIA BLD CULT: NORMAL
BASE EXCESS BLDA CALC-SCNC: -19.5 MMOL/L (ref -2–3)
BODY TEMPERATURE: 37 DEGREES CELSIUS
CA-I BLDA-SCNC: 1.02 MMOL/L (ref 1.1–1.33)
CHLORIDE BLDA-SCNC: 93 MMOL/L (ref 98–107)
CRITICAL CALL TIME: 149
CRITICAL CALLED BY: ABNORMAL
CRITICAL CALLED TO: ABNORMAL
CRITICAL NOTE: ABNORMAL
CRITICAL READ BACK: ABNORMAL
GLUCOSE BLD MANUAL STRIP-MCNC: 78 MG/DL (ref 74–99)
GLUCOSE BLDA-MCNC: 198 MG/DL (ref 74–99)
HCO3 BLDA-SCNC: 9.7 MMOL/L (ref 22–26)
HCT VFR BLD EST: 23 % (ref 41–52)
HGB BLDA-MCNC: 7.8 G/DL (ref 13.5–17.5)
INHALED O2 CONCENTRATION: 100 %
LACTATE BLDA-SCNC: >17 MMOL/L (ref 0.4–2)
OXYHGB MFR BLDA: 62.9 % (ref 94–98)
P AXIS: 12 DEGREES
P OFFSET: 140 MS
P ONSET: 82 MS
PCO2 BLDA: 36 MM HG (ref 38–42)
PEEP CMH2O: 5 CM H2O
PH BLDA: 7.04 PH (ref 7.38–7.42)
PO2 BLDA: 54 MM HG (ref 85–95)
POTASSIUM BLDA-SCNC: 4.4 MMOL/L (ref 3.5–5.3)
PR INTERVAL: 258 MS
Q ONSET: 211 MS
QRS COUNT: 14 BEATS
QRS DURATION: 140 MS
QT INTERVAL: 414 MS
QTC CALCULATION(BAZETT): 500 MS
QTC FREDERICIA: 470 MS
R AXIS: 30 DEGREES
SAO2 % BLDA: 64 % (ref 94–100)
SODIUM BLDA-SCNC: 127 MMOL/L (ref 136–145)
SPECIMEN DRAWN FROM PATIENT: ABNORMAL
T AXIS: 219 DEGREES
T OFFSET: 418 MS
TIDAL VOLUME: 450 ML
VENTILATOR MODE: ABNORMAL
VENTILATOR RATE: 16 BPM
VENTRICULAR RATE: 88 BPM

## 2024-08-18 PROCEDURE — 94002 VENT MGMT INPAT INIT DAY: CPT

## 2024-08-18 PROCEDURE — 0BH17EZ INSERTION OF ENDOTRACHEAL AIRWAY INTO TRACHEA, VIA NATURAL OR ARTIFICIAL OPENING: ICD-10-PCS | Performed by: INTERNAL MEDICINE

## 2024-08-18 PROCEDURE — 31500 INSERT EMERGENCY AIRWAY: CPT | Performed by: INTERNAL MEDICINE

## 2024-08-18 PROCEDURE — 84132 ASSAY OF SERUM POTASSIUM: CPT | Performed by: STUDENT IN AN ORGANIZED HEALTH CARE EDUCATION/TRAINING PROGRAM

## 2024-08-18 PROCEDURE — 82947 ASSAY GLUCOSE BLOOD QUANT: CPT

## 2024-08-18 PROCEDURE — 94799 UNLISTED PULMONARY SVC/PX: CPT

## 2024-08-18 PROCEDURE — 2500000005 HC RX 250 GENERAL PHARMACY W/O HCPCS: Performed by: STUDENT IN AN ORGANIZED HEALTH CARE EDUCATION/TRAINING PROGRAM

## 2024-08-18 PROCEDURE — 2500000004 HC RX 250 GENERAL PHARMACY W/ HCPCS (ALT 636 FOR OP/ED): Performed by: INTERNAL MEDICINE

## 2024-08-18 PROCEDURE — 3E033XZ INTRODUCTION OF VASOPRESSOR INTO PERIPHERAL VEIN, PERCUTANEOUS APPROACH: ICD-10-PCS | Performed by: INTERNAL MEDICINE

## 2024-08-18 PROCEDURE — 36600 WITHDRAWAL OF ARTERIAL BLOOD: CPT

## 2024-08-18 PROCEDURE — 5A1935Z RESPIRATORY VENTILATION, LESS THAN 24 CONSECUTIVE HOURS: ICD-10-PCS | Performed by: INTERNAL MEDICINE

## 2024-08-18 RX ORDER — PROPOFOL 10 MG/ML
0-50 INJECTION, EMULSION INTRAVENOUS CONTINUOUS
Status: DISCONTINUED | OUTPATIENT
Start: 2024-08-18 | End: 2024-08-18 | Stop reason: HOSPADM

## 2024-08-18 RX ORDER — EPINEPHRINE HCL IN DEXTROSE 5% 4MG/250ML
0-1 PLASTIC BAG, INJECTION (ML) INTRAVENOUS CONTINUOUS
Status: DISCONTINUED | OUTPATIENT
Start: 2024-08-18 | End: 2024-08-18 | Stop reason: HOSPADM

## 2024-08-18 RX ORDER — HYDROMORPHONE HYDROCHLORIDE 1 MG/ML
1 INJECTION, SOLUTION INTRAMUSCULAR; INTRAVENOUS; SUBCUTANEOUS ONCE
Status: COMPLETED | OUTPATIENT
Start: 2024-08-18 | End: 2024-08-18

## 2024-08-18 RX ORDER — NOREPINEPHRINE BITARTRATE/D5W 8 MG/250ML
0-.5 PLASTIC BAG, INJECTION (ML) INTRAVENOUS CONTINUOUS
Status: DISCONTINUED | OUTPATIENT
Start: 2024-08-18 | End: 2024-08-18 | Stop reason: HOSPADM

## 2024-08-18 NOTE — SIGNIFICANT EVENT
Code Blue was activated because patient was found pulseless. Rhythm was identified as PEA on telemetry.  The patient's RN team was present in the room administering patient care and the patient was wearing BiPAP device.  Patient started to become bradycardic and became unresponsive and lost pulse.  A rapid response was initiated immediately.  Immediately followed by a CODE BLUE.  When I presented to the room nursing staff was performing high-quality CPR.  RT team was providing bag-valve-mask to the patient.  The patient had already received 1 epinephrine IV push x 1.  I requested a fingerstick blood sugar.  I requested a normal saline bolus administration.  I requested sodium bicarbonate administration.  I immediately performed rapid sequence intubation at the bedside to secure an advanced airway with the video glide scope.    Patient was treated with ACLS protocol as per regulation of [employer] College [employer]. Please refer to the hospital code sheet for detail. Pt was given epinephrine 4 TIMES. Final Rhythum was PEA. Resuscitation was continued for roughly 15 MINUTES and ROSC was achieved.  Shortly after patient developed PEA again and CPR was initiated again.  Request was made to call family and have family present at bedside.  As I was attempting to place a central line the patient appeared to be in hypercoagulable state and I had difficulty advancing the guidewire in the femoral vein as well as the internal jugular vein.  We were able to secure multiple peripheral IVs for the patient.  I administered the patient sodium bicarbonate x 3.  Patient was administered multiple rounds of epinephrine.  Patient was administered calcium chloride.  Ultimately on the second attempt ROSC was achieved.  Was started on Levophed and epinephrine vasopressor support.  Patient was transferred immediately to MICU.    I had a family meeting with multiple family members present.  The patient's daughter and son and wife are  present during my encounter.  Discussed the risks benefits alternatives and consequences with continuing CPR.  Explained to them that patient is not showing full upper brainstem activity at this time.  This considering he was not even sedated.  We discussed options with advance care directives and the patient's family members have the  present.  They mentioned that the patient would have not wanted to be dependent on life support.  They will make a decision as whether to update advance care directives to DNR or consider comfort care and move towards a compassionate extubation.        On Examination:    Constitutional: Intubated on mechanical ventilator  Eyes: See no pupillary reflex  ENMT: ETT in place  Head/Neck: Neck supple, No JVD,   Respiratory/Thorax: Medical breath sounds  Cardiovascular: Regular, rate and rhythm, no murmurs  Gastrointestinal: Soft, non-distended, +BS.  Musculoskeletal: ROM intact, no joint swelling, normal strength  Extremities: peripheral pulses intact; no edema  Neurological: Comatosed      CCT 65 minutes

## 2024-08-18 NOTE — PROCEDURES
Rapid Sequence Intubation:     Date/Time: 08/18/2024      Performed by: Darion Lindsay MD  Authorized by: Darion Lindsay MD    Consent:     Consent obtained:  Verbal    Consent given by: Emergent during code    Risks, benefits, and alternatives were discussed: yes      Risks discussed:  Death and hypoxia    Alternatives discussed:  No treatment and delayed treatment  Universal protocol:     Procedure explained and questions answered to patient or proxy's satisfaction: yes      Relevant documents present and verified: yes      Test results available: yes      Imaging studies available: yes      Required blood products, implants, devices, and special equipment available: yes      Site/side marked: no (NA)      Immediately prior to procedure, a time out was called: yes      Patient identity confirmed:  Arm band  Pre-procedure details:     Indications: airway protection, altered consciousness and respiratory failure      Patient status:  Awake    Look externally: no concerns      Mouth opening - incisor distance:  2 finger widths    Mallampati score:  II    Obstruction: none      Neck mobility: normal      Pharmacologic strategy: RSI      Induction agents:  Etomidate 20 mg    Paralytic agent: None  Procedure details:     Preoxygenation:  Bag valve mask    CPR in progress: no      Number of attempts:  1  Successful intubation attempt details:     Intubation method:  Oral    Intubation technique: video assisted      Laryngoscope blade:  Mac 4    Bougie used: no      Tube size (mm):  7.5    Tube type:  Cuffed    Tube visualized through cords: yes    Placement assessment:     ETT at teeth/gumline (cm):  22    Tube secured with:  ETT marino    Breath sounds:  Equal    Placement verification: chest rise, colorimetric ETCO2, CXR verification and direct visualization      CXR findings:  Pending   Post-procedure details:     Procedure completion:  Tolerated

## 2024-08-18 NOTE — NURSING NOTE
I activated rapid response after I noticed changes in patient's status.patient was bradycardic and code blue was then initiated by team members.  Resuscitation was commenced as per the ACLS protocol and 1st dose of Adrenaline was administered, peritoneal dialysis was  paused more details on  resuscitation process are recorded in the patient's  charts.Return of ROSC achieved at 0112 and patient transferred to  ICU for further treatment.

## 2024-08-18 NOTE — DISCHARGE SUMMARY
Discharge Diagnosis  Metabolic acidosis, increased anion gap (IAG)    Issues Requiring Follow-Up      Test Results Pending At Discharge  Pending Labs       Order Current Status    Extra Urine Gray Tube Collected (08/14/24 1910)    Urinalysis with Reflex Culture and Microscopic In process    Blood Culture Preliminary result    Blood Culture Preliminary result            Hospital Course    Patient presents with generalized weakness and dizziness. ED physicians note the patient's son stated the patient has not been sleeping well and he recently bought some over-the-counter CBD Gummies and gave them to him this afternoon. No nausea or vomiting. Noted to be having some cough and chest pressure recently. No diarrhea. His only complaint is weakness.     In the ED the patient was administered empiric broad-spectrum IV antibiotics with IV Zosyn and IV vancomycin.  Cultures were obtained.  Urinalysis is still pending.  Sodium bicarbonate 50 mEq IV push x 1 was ordered.  A CT chest abdomen pelvis without IV contrast was requested by me and is still pending.  Arterial blood gas was requested by me and is still pending as well.        Arterial blood gas has been obtained.  Noted for pH of 7.45, pCO2 40, pO2 72, oxygen saturation 92.7.  Lactic acid improved to 2.2.        CT chest abdomen pelvis without IV contrast result obtained.  Noted for the following:        IMPRESSION:     Respiratory motion artifact limits evaluation of the lung parenchyma.  Mild-to-moderate emphysema. Mild smooth interlobular septal  thickening suggesting acute pulmonary interstitial edema. Patchy  reticulonodular and ground-glass opacities, suspicious for multifocal  bilateral pneumonia. Moderate to large right and small to moderate  left lateral pleural effusions. No pneumothorax.      No definite evidence of acute pathology in the abdomen or pelvis.      Peritoneal dialysis catheter is coiled along the superior aspect of  the bladder. Small volume of  "ascites. No pneumoperitoneum or  loculated intraperitoneal collection.      Gabriela the patient's daughter as well as his wife and son were present during my encounter.  Gabriela is extremely involved with her father's health care and has several logs of all of his PD dialysis sessions.  She is involved with preparing the diasylate for his PD sessions.  She states that Dr. Mckoy has scheduled a PET test to evaluate the efficiency of the PD for her father which is scheduled later this month. He already may have had a \"adequacy\" test.       Patient was treated with empiric IV antibiotics for presumed multifocal bilateral pneumonia versus UTI.  Was treated for ion gap metabolic acidosis secondary to lactic acidosis.  Developed acute pulmonary interstitial edema and bilateral pleural effusions.  She was treated for presumed decompensated heart failure.  Was noted to be bradycardic this hospitalization and his Coreg was discontinued.  She has a history of ischemic cardiomyopathy.  Patient continued his dialysis via peritoneal from home.  Unfortunately throughout the hospitalization the patient developed many episodes of anxiety.    Patient developed chest pain on and off this hospitalization.  Cardiac enzymes were obtained and were lower than last hospitalization.  EKG were reviewed.  She was seen by our cardiologist who are planning to do left heart catheterization early next week.  Unfortunately the patient experienced a cardiac arrest and a CODE BLUE was initiated on August 18, 2024.  The patient was resuscitated and ROSC was achieved on 2 separate occasions.  The family ultimately decided to transition to a comfort care model.  Patient was compassionately extubated at the bedside.  Patient's  was present as well.  My sincere condolences were extended to the family.    Cause of death appears to be cardiogenic shock secondary to myocardial infarction.        Pertinent Physical Exam At Time of Discharge    Physical " Exam      On Examination:        There were no Breath sounds  No heart sounds  No pupillary reflex  No Brain reflexes        Pt was pronounced  at : 03:45 on 24        Home Medications     Medication List      START taking these medications     calcium carbonate 200 mg calcium chewable tablet; Commonly known as:   Tums; Chew 2 tablets (1,000 mg) 3 times a day.     CHANGE how you take these medications     levothyroxine 125 mcg tablet; Commonly known as: Synthroid, Levoxyl;   Take 0.5 tablets (62.5 mcg) by mouth early in the morning.. Take on an   empty stomach at the same time each day, either 30 to 60 minutes prior to   breakfast Do not fill before 2024.; What changed: medication   strength, how much to take, when to take this, additional instructions   torsemide 100 mg tablet; Commonly known as: Demadex; Take 1 tablet (100   mg) by mouth 2 times a day for 15 days.; What changed: how much to take     CONTINUE taking these medications     acetaminophen 325 mg tablet; Commonly known as: Tylenol; Take 2 tablets   (650 mg) by mouth 3 times a day.   albuterol 90 mcg/actuation inhaler; Commonly known as: ProAir HFA;   Inhale 2 puffs every 4 hours if needed for wheezing or shortness of   breath.   allopurinol 300 mg tablet; Commonly known as: Zyloprim; TAKE ONE-HALF   TABLET BY MOUTH  ONCE DAILY   aspirin 81 mg capsule   atorvastatin 20 mg tablet; Commonly known as: Lipitor; TAKE 1 TABLET BY   MOUTH ONCE  DAILY   epoetin cldye 10,000 unit/mL injection; Commonly known as: Epogen,Procrit   fluticasone 50 mcg/actuation nasal spray; Commonly known as: Flonase;   USE 2 SPRAYS IN BOTH  NOSTRILS ONCE DAILY   gabapentin 300 mg capsule; Commonly known as: Neurontin; TAKE 1 CAPSULE   BY MOUTH ONCE  DAILY   gentamicin 0.1 % cream; Commonly known as: Garamycin   hydroxychloroquine 200 mg tablet; Commonly known as: Plaquenil; TAKE 1   TABLET BY MOUTH ONCE  DAILY   metOLazone 10 mg tablet; Commonly known as:  Zaroxolyn; Take 1 tablet (10   mg) by mouth once daily. prn   ramelteon 8 mg tablet; Commonly known as: Rozerem     STOP taking these medications     calcitriol 0.5 mcg capsule; Commonly known as: Rocaltrol   carvedilol 3.125 mg tablet; Commonly known as: Coreg   zolpidem 5 mg tablet; Commonly known as: Ambien       Outpatient Follow-Up  Future Appointments   Date Time Provider Department Center   9/17/2024  9:15 AM Seb Valdez DO [id number] [facility]   12/3/2024 11:00 AM Gus De Souza MD [id number] None       Darion Lindsay MD

## 2024-08-18 NOTE — SIGNIFICANT EVENT
Patient was transitioned to a comfort model of care.  Patient was compassionately extubated at the bedside.  Family was present at the bedside.  Patient seen and examined at the bedside.      On Examination:      There were no Breath sounds  No heart sounds  No pupillary reflex  No Brain reflexes      Pt was pronounced  at : 03:45      I extended my sincere condolences to the patient's family members.

## 2024-08-19 ENCOUNTER — PATIENT OUTREACH (OUTPATIENT)
Dept: PRIMARY CARE | Facility: CLINIC | Age: 79
End: 2024-08-19
Payer: MEDICARE

## 2024-08-19 ENCOUNTER — DOCUMENTATION (OUTPATIENT)
Dept: PRIMARY CARE | Facility: CLINIC | Age: 79
End: 2024-08-19
Payer: MEDICARE

## 2024-08-19 NOTE — PROGRESS NOTES
Patient  2024 at 03:45. Not evident in chart, discharge summary stated patient was discharged to home.

## 2024-08-20 LAB
ATRIAL RATE: 32 BPM
P AXIS: 42 DEGREES
Q ONSET: 187 MS
QRS COUNT: 8 BEATS
QRS DURATION: 146 MS
QT INTERVAL: 548 MS
QTC CALCULATION(BAZETT): 474 MS
QTC FREDERICIA: 497 MS
R AXIS: -46 DEGREES
T AXIS: 144 DEGREES
T OFFSET: 461 MS
VENTRICULAR RATE: 45 BPM

## 2024-08-21 ENCOUNTER — APPOINTMENT (OUTPATIENT)
Dept: NEUROLOGY | Facility: CLINIC | Age: 79
End: 2024-08-21
Payer: MEDICARE

## 2024-09-17 ENCOUNTER — APPOINTMENT (OUTPATIENT)
Dept: CARDIOLOGY | Facility: CLINIC | Age: 79
End: 2024-09-17
Payer: MEDICARE

## 2024-12-03 ENCOUNTER — APPOINTMENT (OUTPATIENT)
Dept: NEUROLOGY | Facility: CLINIC | Age: 79
End: 2024-12-03
Payer: MEDICARE